# Patient Record
Sex: MALE | Race: WHITE | NOT HISPANIC OR LATINO | Employment: OTHER | URBAN - METROPOLITAN AREA
[De-identification: names, ages, dates, MRNs, and addresses within clinical notes are randomized per-mention and may not be internally consistent; named-entity substitution may affect disease eponyms.]

---

## 2017-06-01 ENCOUNTER — CONVERSION ENCOUNTER (OUTPATIENT)
Dept: SURGERY | Facility: CLINIC | Age: 77
End: 2017-06-01

## 2017-10-26 ENCOUNTER — HOSPITAL ENCOUNTER (OUTPATIENT)
Dept: FAMILY MEDICINE CLINIC | Facility: CLINIC | Age: 77
Setting detail: SPECIMEN
Discharge: HOME | End: 2017-10-26
Attending: FAMILY MEDICINE | Admitting: FAMILY MEDICINE

## 2017-10-26 ENCOUNTER — CONVERSION ENCOUNTER (OUTPATIENT)
Dept: SURGERY | Facility: CLINIC | Age: 77
End: 2017-10-26

## 2017-10-26 LAB
ALBUMIN SERPL-MCNC: 3.9 G/DL (ref 3.5–4.8)
ALBUMIN/GLOB SERPL: 1.2 {RATIO} (ref 1–1.7)
ALP SERPL-CCNC: 61 IU/L (ref 32–91)
ALT SERPL-CCNC: 18 IU/L (ref 17–63)
ANION GAP SERPL CALC-SCNC: 12 MMOL/L (ref 10–20)
AST SERPL-CCNC: 20 IU/L (ref 15–41)
BASOPHILS # BLD AUTO: 0.1 10*3/UL (ref 0–0.2)
BASOPHILS NFR BLD AUTO: 1 % (ref 0–2)
BILIRUB SERPL-MCNC: 0.5 MG/DL (ref 0.3–1.2)
BUN SERPL-MCNC: 16 MG/DL (ref 8–20)
BUN/CREAT SERPL: 14.5 (ref 6.2–20.3)
CALCIUM SERPL-MCNC: 9.3 MG/DL (ref 8.9–10.3)
CHLORIDE SERPL-SCNC: 104 MMOL/L (ref 101–111)
CHOLEST SERPL-MCNC: 242 MG/DL
CHOLEST/HDLC SERPL: 6.5 {RATIO}
CONV CO2: 28 MMOL/L (ref 22–32)
CONV LDL CHOLESTEROL DIRECT: 180 MG/DL (ref 0–100)
CONV TOTAL PROTEIN: 7.2 G/DL (ref 6.1–7.9)
CREAT UR-MCNC: 1.1 MG/DL (ref 0.7–1.2)
DIFFERENTIAL METHOD BLD: (no result)
EOSINOPHIL # BLD AUTO: 0.3 10*3/UL (ref 0–0.3)
EOSINOPHIL # BLD AUTO: 5 % (ref 0–3)
ERYTHROCYTE [DISTWIDTH] IN BLOOD BY AUTOMATED COUNT: 14.3 % (ref 11.5–14.5)
GLOBULIN UR ELPH-MCNC: 3.3 G/DL (ref 2.5–3.8)
GLUCOSE SERPL-MCNC: 97 MG/DL (ref 65–99)
HCT VFR BLD AUTO: 43.7 % (ref 40–54)
HDLC SERPL-MCNC: 37 MG/DL
HGB BLD-MCNC: 14.3 G/DL (ref 14–18)
LDLC/HDLC SERPL: 4.8 {RATIO}
LIPID INTERPRETATION: ABNORMAL
LYMPHOCYTES # BLD AUTO: 1.5 10*3/UL (ref 0.8–4.8)
LYMPHOCYTES NFR BLD AUTO: 26 % (ref 18–42)
MCH RBC QN AUTO: 29.9 PG (ref 26–32)
MCHC RBC AUTO-ENTMCNC: 32.8 G/DL (ref 32–36)
MCV RBC AUTO: 91.1 FL (ref 80–94)
MONOCYTES # BLD AUTO: 0.4 10*3/UL (ref 0.1–1.3)
MONOCYTES NFR BLD AUTO: 7 % (ref 2–11)
NEUTROPHILS # BLD AUTO: 3.4 10*3/UL (ref 2.3–8.6)
NEUTROPHILS NFR BLD AUTO: 61 % (ref 50–75)
NRBC BLD AUTO-RTO: 0 /100{WBCS}
NRBC/RBC NFR BLD MANUAL: 0 10*3/UL
PLATELET # BLD AUTO: 258 10*3/UL (ref 150–450)
PMV BLD AUTO: 9.1 FL (ref 7.4–10.4)
POTASSIUM SERPL-SCNC: 5 MMOL/L (ref 3.6–5.1)
RBC # BLD AUTO: 4.8 10*6/UL (ref 4.6–6)
SODIUM SERPL-SCNC: 139 MMOL/L (ref 136–144)
TRIGL SERPL-MCNC: 99 MG/DL
VLDLC SERPL CALC-MCNC: 24.2 MG/DL
WBC # BLD AUTO: 5.6 10*3/UL (ref 4.5–11.5)

## 2017-12-13 ENCOUNTER — ON CAMPUS - OUTPATIENT (AMBULATORY)
Dept: URBAN - METROPOLITAN AREA HOSPITAL 2 | Facility: HOSPITAL | Age: 77
End: 2017-12-13
Payer: MEDICARE

## 2017-12-13 ENCOUNTER — OFFICE (AMBULATORY)
Dept: URBAN - METROPOLITAN AREA CLINIC 64 | Facility: CLINIC | Age: 77
End: 2017-12-13
Payer: MEDICARE

## 2017-12-13 ENCOUNTER — HOSPITAL ENCOUNTER (OUTPATIENT)
Dept: OTHER | Facility: HOSPITAL | Age: 77
Setting detail: SPECIMEN
Discharge: HOME | End: 2017-12-13
Attending: INTERNAL MEDICINE | Admitting: INTERNAL MEDICINE

## 2017-12-13 VITALS
DIASTOLIC BLOOD PRESSURE: 68 MMHG | SYSTOLIC BLOOD PRESSURE: 123 MMHG | WEIGHT: 188 LBS | HEART RATE: 80 BPM | SYSTOLIC BLOOD PRESSURE: 101 MMHG | SYSTOLIC BLOOD PRESSURE: 156 MMHG | HEART RATE: 97 BPM | HEART RATE: 85 BPM | HEART RATE: 90 BPM | SYSTOLIC BLOOD PRESSURE: 119 MMHG | HEART RATE: 81 BPM | SYSTOLIC BLOOD PRESSURE: 124 MMHG | SYSTOLIC BLOOD PRESSURE: 110 MMHG | SYSTOLIC BLOOD PRESSURE: 109 MMHG | HEART RATE: 105 BPM | HEIGHT: 68 IN | DIASTOLIC BLOOD PRESSURE: 92 MMHG | SYSTOLIC BLOOD PRESSURE: 148 MMHG | OXYGEN SATURATION: 99 % | HEART RATE: 83 BPM | TEMPERATURE: 97.6 F | SYSTOLIC BLOOD PRESSURE: 100 MMHG | DIASTOLIC BLOOD PRESSURE: 75 MMHG | DIASTOLIC BLOOD PRESSURE: 63 MMHG | DIASTOLIC BLOOD PRESSURE: 62 MMHG | OXYGEN SATURATION: 98 % | DIASTOLIC BLOOD PRESSURE: 64 MMHG | DIASTOLIC BLOOD PRESSURE: 87 MMHG | RESPIRATION RATE: 18 BRPM | OXYGEN SATURATION: 94 % | RESPIRATION RATE: 16 BRPM

## 2017-12-13 DIAGNOSIS — K57.30 DIVERTICULOSIS OF LARGE INTESTINE WITHOUT PERFORATION OR ABS: ICD-10-CM

## 2017-12-13 DIAGNOSIS — K64.1 SECOND DEGREE HEMORRHOIDS: ICD-10-CM

## 2017-12-13 DIAGNOSIS — D12.5 BENIGN NEOPLASM OF SIGMOID COLON: ICD-10-CM

## 2017-12-13 DIAGNOSIS — D12.2 BENIGN NEOPLASM OF ASCENDING COLON: ICD-10-CM

## 2017-12-13 DIAGNOSIS — Z86.010 PERSONAL HISTORY OF COLONIC POLYPS: ICD-10-CM

## 2017-12-13 LAB
GI HISTOLOGY: A. UNSPECIFIED: (no result)
GI HISTOLOGY: B. UNSPECIFIED: (no result)
GI HISTOLOGY: PDF REPORT: (no result)

## 2017-12-13 PROCEDURE — 88305 TISSUE EXAM BY PATHOLOGIST: CPT | Mod: 26 | Performed by: INTERNAL MEDICINE

## 2017-12-13 PROCEDURE — 45385 COLONOSCOPY W/LESION REMOVAL: CPT | Mod: PT | Performed by: INTERNAL MEDICINE

## 2017-12-13 RX ADMIN — PROPOFOL: 10 INJECTION, EMULSION INTRAVENOUS at 11:22

## 2018-06-27 ENCOUNTER — CONVERSION ENCOUNTER (OUTPATIENT)
Dept: SURGERY | Facility: CLINIC | Age: 78
End: 2018-06-27

## 2018-08-23 ENCOUNTER — HOSPITAL ENCOUNTER (OUTPATIENT)
Dept: GENERAL RADIOLOGY | Facility: HOSPITAL | Age: 78
Discharge: HOME | End: 2018-08-23
Attending: INTERNAL MEDICINE | Admitting: INTERNAL MEDICINE

## 2018-09-21 ENCOUNTER — HOSPITAL ENCOUNTER (OUTPATIENT)
Dept: CT IMAGING | Facility: HOSPITAL | Age: 78
Discharge: HOME | End: 2018-09-21
Attending: INTERNAL MEDICINE | Admitting: INTERNAL MEDICINE

## 2018-12-18 ENCOUNTER — CONVERSION ENCOUNTER (OUTPATIENT)
Dept: SURGERY | Facility: CLINIC | Age: 78
End: 2018-12-18

## 2018-12-21 ENCOUNTER — HOSPITAL ENCOUNTER (OUTPATIENT)
Dept: FAMILY MEDICINE CLINIC | Facility: CLINIC | Age: 78
Setting detail: SPECIMEN
Discharge: HOME | End: 2018-12-21
Attending: FAMILY MEDICINE | Admitting: FAMILY MEDICINE

## 2018-12-21 LAB
ALBUMIN SERPL-MCNC: 3.9 G/DL (ref 3.5–4.8)
ALBUMIN/GLOB SERPL: 1.3 {RATIO} (ref 1–1.7)
ALP SERPL-CCNC: 61 IU/L (ref 32–91)
ALT SERPL-CCNC: 20 IU/L (ref 17–63)
ANION GAP SERPL CALC-SCNC: 11.1 MMOL/L (ref 10–20)
AST SERPL-CCNC: 22 IU/L (ref 15–41)
BASOPHILS # BLD AUTO: 0.1 10*3/UL (ref 0–0.2)
BASOPHILS NFR BLD AUTO: 1 % (ref 0–2)
BILIRUB SERPL-MCNC: 0.6 MG/DL (ref 0.3–1.2)
BUN SERPL-MCNC: 9 MG/DL (ref 8–20)
BUN/CREAT SERPL: 8.2 (ref 6.2–20.3)
CALCIUM SERPL-MCNC: 8.7 MG/DL (ref 8.9–10.3)
CHLORIDE SERPL-SCNC: 106 MMOL/L (ref 101–111)
CHOLEST SERPL-MCNC: 122 MG/DL
CHOLEST/HDLC SERPL: 3 {RATIO}
CONV CO2: 26 MMOL/L (ref 22–32)
CONV LDL CHOLESTEROL DIRECT: 76 MG/DL (ref 0–100)
CONV TOTAL PROTEIN: 7 G/DL (ref 6.1–7.9)
CREAT UR-MCNC: 1.1 MG/DL (ref 0.7–1.2)
DIFFERENTIAL METHOD BLD: (no result)
EOSINOPHIL # BLD AUTO: 0.3 10*3/UL (ref 0–0.3)
EOSINOPHIL # BLD AUTO: 4 % (ref 0–3)
ERYTHROCYTE [DISTWIDTH] IN BLOOD BY AUTOMATED COUNT: 14.1 % (ref 11.5–14.5)
GLOBULIN UR ELPH-MCNC: 3.1 G/DL (ref 2.5–3.8)
GLUCOSE SERPL-MCNC: 115 MG/DL (ref 65–99)
HBA1C MFR BLD: 5.6 % (ref 0–5.6)
HCT VFR BLD AUTO: 41.5 % (ref 40–54)
HDLC SERPL-MCNC: 41 MG/DL
HGB BLD-MCNC: 14 G/DL (ref 14–18)
LDLC/HDLC SERPL: 1.9 {RATIO}
LIPID INTERPRETATION: NORMAL
LYMPHOCYTES # BLD AUTO: 1.4 10*3/UL (ref 0.8–4.8)
LYMPHOCYTES NFR BLD AUTO: 23 % (ref 18–42)
MCH RBC QN AUTO: 31 PG (ref 26–32)
MCHC RBC AUTO-ENTMCNC: 33.7 G/DL (ref 32–36)
MCV RBC AUTO: 92.1 FL (ref 80–94)
MONOCYTES # BLD AUTO: 0.5 10*3/UL (ref 0.1–1.3)
MONOCYTES NFR BLD AUTO: 8 % (ref 2–11)
NEUTROPHILS # BLD AUTO: 4 10*3/UL (ref 2.3–8.6)
NEUTROPHILS NFR BLD AUTO: 64 % (ref 50–75)
NRBC BLD AUTO-RTO: 0 /100{WBCS}
NRBC/RBC NFR BLD MANUAL: 0 10*3/UL
PLATELET # BLD AUTO: 215 10*3/UL (ref 150–450)
PMV BLD AUTO: 9.3 FL (ref 7.4–10.4)
POTASSIUM SERPL-SCNC: 4.1 MMOL/L (ref 3.6–5.1)
RBC # BLD AUTO: 4.51 10*6/UL (ref 4.6–6)
SODIUM SERPL-SCNC: 139 MMOL/L (ref 136–144)
TRIGL SERPL-MCNC: 79 MG/DL
VLDLC SERPL CALC-MCNC: 4.8 MG/DL
WBC # BLD AUTO: 6.3 10*3/UL (ref 4.5–11.5)

## 2019-01-03 ENCOUNTER — HOSPITAL ENCOUNTER (OUTPATIENT)
Dept: LAB | Facility: HOSPITAL | Age: 79
Discharge: HOME | End: 2019-01-03
Attending: INTERNAL MEDICINE | Admitting: INTERNAL MEDICINE

## 2019-01-03 LAB — CREAT UR-MCNC: 1 MG/DL (ref 0.7–1.2)

## 2019-01-11 ENCOUNTER — HOSPITAL ENCOUNTER (OUTPATIENT)
Dept: CT IMAGING | Facility: HOSPITAL | Age: 79
Discharge: HOME | End: 2019-01-11
Attending: INTERNAL MEDICINE | Admitting: INTERNAL MEDICINE

## 2019-03-06 ENCOUNTER — CONVERSION ENCOUNTER (OUTPATIENT)
Dept: SURGERY | Facility: CLINIC | Age: 79
End: 2019-03-06

## 2019-05-29 ENCOUNTER — CONVERSION ENCOUNTER (OUTPATIENT)
Dept: FAMILY MEDICINE CLINIC | Facility: CLINIC | Age: 79
End: 2019-05-29

## 2019-06-04 VITALS
BODY MASS INDEX: 29.69 KG/M2 | HEART RATE: 81 BPM | HEIGHT: 68 IN | WEIGHT: 195.25 LBS | OXYGEN SATURATION: 96 % | OXYGEN SATURATION: 98 % | DIASTOLIC BLOOD PRESSURE: 70 MMHG | SYSTOLIC BLOOD PRESSURE: 144 MMHG | BODY MASS INDEX: 28.82 KG/M2 | SYSTOLIC BLOOD PRESSURE: 129 MMHG | OXYGEN SATURATION: 97 % | DIASTOLIC BLOOD PRESSURE: 72 MMHG | SYSTOLIC BLOOD PRESSURE: 140 MMHG | HEART RATE: 82 BPM | SYSTOLIC BLOOD PRESSURE: 118 MMHG | WEIGHT: 198 LBS | HEIGHT: 68 IN | OXYGEN SATURATION: 96 % | WEIGHT: 196 LBS | SYSTOLIC BLOOD PRESSURE: 133 MMHG | HEART RATE: 82 BPM | DIASTOLIC BLOOD PRESSURE: 77 MMHG | BODY MASS INDEX: 29.7 KG/M2 | DIASTOLIC BLOOD PRESSURE: 73 MMHG | WEIGHT: 190.2 LBS | WEIGHT: 201.25 LBS | HEART RATE: 81 BPM | OXYGEN SATURATION: 97 % | DIASTOLIC BLOOD PRESSURE: 58 MMHG | BODY MASS INDEX: 30.01 KG/M2 | HEIGHT: 68 IN | HEART RATE: 80 BPM

## 2019-06-04 VITALS
WEIGHT: 185.4 LBS | SYSTOLIC BLOOD PRESSURE: 138 MMHG | OXYGEN SATURATION: 96 % | DIASTOLIC BLOOD PRESSURE: 83 MMHG | HEART RATE: 76 BPM | HEIGHT: 68 IN | BODY MASS INDEX: 28.1 KG/M2

## 2019-06-06 NOTE — PROGRESS NOTES
Visit Type:  Follow-up Visit  Referring Provider:  Radha Adler MD  Primary Provider:  Nayely RONDON MD    CC:  hospital follow up.    History of Present Illness:  hospital follow up on sbo and delerium  home bp was elevated this afternoon  appt w/ vascular surgeon      Past Medical History:     Reviewed history from 12/18/2018 and no changes required:        Coronary Artery Disease, s/p stent        Hyperlipidemia        Hypertension        Myocardial Infarction        AAA        lung cancer [id number]- chemo, surgery, radiation to brain        BPH- urology- dr cherry        COPD                        had pna and shingles         colonoscopy 2014- scheduled for dec 17, polyps    Past Surgical History:     Reviewed history from 10/13/2016 and no changes required:        Heart Catherization  2006        P T C A: with stent  1999        AAA repair 1998        Social History:     Reviewed history from 03/06/2019 and no changes required:        Patient is a former smoker.        Passive Smoke: N        Alcohol Use: N        Drug Use: N        HIV/High Risk: N        Regular Exercise: N        Hx Domestic Abuse: N        Lutheran Affecting Care: N              Active Medications (reviewed today):  CARTIA  MG XR24H-CAP (DILTIAZEM HCL COATED BEADS) TAKE 1 CAPSULE DAILY  ROSUVASTATIN TABS 40MG (ROSUVASTATIN CALCIUM) TAKE 1 TABLET DAILY  CALTRATE 600 TABLET (CALCIUM CARBONATE TABS) p.o. qd  TAMSULOSIN HCL 0.4 MG ORAL CAPSULE (TAMSULOSIN HCL) p.o.  qhs  CENTRUM SILVER ORAL TABLET (MULTIPLE VITAMINS-MINERALS) Take 1 tablet by mouth daily  FINASTERIDE 5 MG ORAL TABLET (FINASTERIDE) Take 1 tablet by mouth daily    Current Allergies:  No known allergies    Current Medications (including medications started today):   CARTIA  MG XR24H-CAP (DILTIAZEM HCL COATED BEADS) TAKE 1 CAPSULE DAILY  ROSUVASTATIN TABS 40MG (ROSUVASTATIN CALCIUM) TAKE 1 TABLET DAILY  CALTRATE 600 TABLET (CALCIUM CARBONATE TABS) p.o.  qd  TAMSULOSIN HCL 0.4 MG ORAL CAPSULE (TAMSULOSIN HCL) p.o.  qhs  CENTRUM SILVER ORAL TABLET (MULTIPLE VITAMINS-MINERALS) Take 1 tablet by mouth daily  FINASTERIDE 5 MG ORAL TABLET (FINASTERIDE) Take 1 tablet by mouth daily      Risk Factors:     Smoked Tobacco Use:  Former smoker  Passive smoke exposure:  no  Drug use:  no  HIV high-risk behavior:  no  Caffeine use:  4 drinks per day  Alcohol use:  no  Exercise:  no  Seatbelt use:  100 %  Sun Exposure:  occasionally        Vital Signs:    Patient Profile:    78 Years Old Male  Height:     68 inches  Weight:     185.4 pounds  BMI:        28.19     O2 Sat:     96 %  Pulse rate: 76 / minute  BP Sittin / 83  (left arm)    Cuff size:  regular    Medications: Medications were reviewed with the patient during this visit.  No Known Allergy.        Vitals Entered By: Inez Correa CMA (May 29, 2019 3:38 PM)      Physical Exam    Abdomen:       normal bowel sounds; no hepatosplenomegaly no ventral,umbilical hernias or masses noted.  non-tender.    Additional Exam:        General:      well developed, well nourished, in no acute distress.    Head:      normocephalic and atraumatic.    Lungs:      clear bilaterally to auscultation.    Heart:      regular rhythm.    Psych:      alert and cooperative; normal mood and affect; normal attention span and concentration.        Blood Pressure:  Today's BP: 138/83 mm Hg    Labwork:   Most Recent Lab Results:   LDL: 76 mg/dL 2018  HbA1c: : 5.6 % 2018        Impression & Recommendations:    Problem # 1:  Small bowel obstruction (ICD-560.9) (XQR44-G11.609)  resolved      Patient Instructions:  1)  small bowel obstruction  2)  follow up 6 months                        Medication Administration    Orders Added:  1)  12700-Mdr Vst-Est Level III [CPT-35455]  ]      Electronically signed by Radha Adler MD on 2019 at 4:13 PM  ________________________________________________________________________        Disclaimer: Converted Note message may not contain all data elements that existed in the legacy source system. Please see [facility] for the original note details.

## 2019-07-12 RX ORDER — DILTIAZEM HYDROCHLORIDE 240 MG/1
1 CAPSULE, COATED, EXTENDED RELEASE ORAL EVERY 24 HOURS
COMMUNITY
Start: 2017-12-19 | End: 2019-07-24 | Stop reason: SDUPTHER

## 2019-07-12 RX ORDER — TAMSULOSIN HYDROCHLORIDE 0.4 MG/1
CAPSULE ORAL
COMMUNITY
Start: 2015-02-09 | End: 2021-01-13

## 2019-07-12 RX ORDER — ROSUVASTATIN CALCIUM 40 MG/1
TABLET, COATED ORAL EVERY 24 HOURS
COMMUNITY
Start: 2018-05-01 | End: 2019-08-21

## 2019-07-12 RX ORDER — MULTIVIT WITH MINERALS/LUTEIN
TABLET ORAL
COMMUNITY
Start: 2017-06-01 | End: 2021-01-13

## 2019-07-12 RX ORDER — FINASTERIDE 5 MG/1
TABLET, FILM COATED ORAL
COMMUNITY
Start: 2016-10-13 | End: 2021-01-13

## 2019-07-25 RX ORDER — DILTIAZEM HYDROCHLORIDE 240 MG/1
CAPSULE, EXTENDED RELEASE ORAL
Qty: 90 CAPSULE | Refills: 0 | Status: SHIPPED | OUTPATIENT
Start: 2019-07-25 | End: 2019-10-18 | Stop reason: SDUPTHER

## 2019-08-06 PROBLEM — I21.3 ST ELEVATION (STEMI) MYOCARDIAL INFARCTION (HCC): Status: ACTIVE | Noted: 2019-08-06

## 2019-08-06 PROBLEM — I25.10 CORONARY ARTERY DISEASE: Status: ACTIVE | Noted: 2019-08-06

## 2019-08-06 PROBLEM — J44.9 CHRONIC OBSTRUCTIVE PULMONARY DISEASE (HCC): Status: ACTIVE | Noted: 2017-06-01

## 2019-08-06 PROBLEM — I10 HYPERTENSION: Status: ACTIVE | Noted: 2019-08-06

## 2019-08-06 PROBLEM — E78.5 HYPERLIPIDEMIA: Status: ACTIVE | Noted: 2019-08-06

## 2019-08-07 ENCOUNTER — OFFICE VISIT (OUTPATIENT)
Dept: CARDIOLOGY | Facility: CLINIC | Age: 79
End: 2019-08-07

## 2019-08-07 VITALS
BODY MASS INDEX: 30.48 KG/M2 | HEIGHT: 64 IN | WEIGHT: 178.5 LBS | OXYGEN SATURATION: 99 % | DIASTOLIC BLOOD PRESSURE: 73 MMHG | HEART RATE: 73 BPM | SYSTOLIC BLOOD PRESSURE: 126 MMHG

## 2019-08-07 DIAGNOSIS — I10 ESSENTIAL HYPERTENSION: ICD-10-CM

## 2019-08-07 DIAGNOSIS — I25.118 CORONARY ARTERY DISEASE OF NATIVE ARTERY OF NATIVE HEART WITH STABLE ANGINA PECTORIS (HCC): Primary | ICD-10-CM

## 2019-08-07 DIAGNOSIS — E78.00 PURE HYPERCHOLESTEROLEMIA: ICD-10-CM

## 2019-08-07 PROCEDURE — 99213 OFFICE O/P EST LOW 20 MIN: CPT | Performed by: INTERNAL MEDICINE

## 2019-08-07 RX ORDER — DILTIAZEM HYDROCHLORIDE 240 MG/1
240 CAPSULE, EXTENDED RELEASE ORAL DAILY
COMMUNITY
End: 2021-01-13

## 2019-08-07 RX ORDER — ASPIRIN 81 MG/1
81 TABLET ORAL DAILY
COMMUNITY
End: 2020-01-09

## 2019-08-07 NOTE — PROGRESS NOTES
"    Subjective:     Encounter Date:08/07/2019      Patient ID: Lenny Larson is a 78 y.o. male.    Chief Complaint:  History of Present Illness 28-year-old white male with history of coronary artery disease hypertension hyperlipidemia COPD lung cancer and abdominal aortic aneurysm presents to my office for follow-up.  Pain is currently stable without any symptoms of chest pain but has some shortness of breath with exertion.  No complaints of any PND orthopnea.  No palpitations dizziness syncope or swelling of the feet.  He is taking his medicines regularly.  He does not smoke.    The following portions of the patient's history were reviewed and updated as appropriate: allergies, current medications, past family history, past medical history, past social history, past surgical history and problem list.  Past Medical History:   Diagnosis Date   • COPD (chronic obstructive pulmonary disease) (CMS/HCC)    • Coronary artery disease    • Hyperlipidemia    • Hypertension      Past Surgical History:   Procedure Laterality Date   • CARDIAC CATHETERIZATION      PCI     /73   Pulse 73   Ht 162.6 cm (64\")   Wt 81 kg (178 lb 8 oz)   SpO2 99%   BMI 30.64 kg/m²   History reviewed. No pertinent family history.    Current Outpatient Medications:   •  aspirin 81 MG EC tablet, Take 81 mg by mouth Daily., Disp: , Rfl:   •  Calcium Carbonate (CALTRATE 600 PO), CALTRATE 600 TABS, Disp: , Rfl:   •  CARTIA  MG 24 hr capsule, TAKE 1 CAPSULE DAILY, Disp: 90 capsule, Rfl: 0  •  diltiaZEM (TIAZAC) 240 MG 24 hr capsule, Take 240 mg by mouth Daily., Disp: , Rfl:   •  finasteride (PROSCAR) 5 MG tablet, FINASTERIDE 5 MG TABS, Disp: , Rfl:   •  Ipratropium-Albuterol (COMBIVENT IN), Inhale 2 puffs., Disp: , Rfl:   •  Multiple Vitamins-Minerals (CENTRUM SILVER) tablet, CENTRUM SILVER TABS, Disp: , Rfl:   •  rosuvastatin (CRESTOR) 40 MG tablet, Daily., Disp: , Rfl:   •  tamsulosin (FLOMAX) 0.4 MG capsule 24 hr capsule, TAMSULOSIN HCL " 0.4 MG CAPS, Disp: , Rfl:   Allergies   Allergen Reactions   • Ativan [Lorazepam] Mental Status Change     Social History     Socioeconomic History   • Marital status:      Spouse name: Not on file   • Number of children: Not on file   • Years of education: Not on file   • Highest education level: Not on file   Tobacco Use   • Smoking status: Never Smoker     Review of Systems   Constitution: Positive for malaise/fatigue. Negative for fever.   Cardiovascular: Negative for chest pain, dyspnea on exertion and palpitations.   Respiratory: Positive for shortness of breath. Negative for cough.    Skin: Negative for rash.   Gastrointestinal: Negative for abdominal pain, nausea and vomiting.   Neurological: Negative for focal weakness, headaches, light-headedness and numbness.   All other systems reviewed and are negative.             Objective:     Physical Exam   Constitutional: He appears well-developed and well-nourished.   HENT:   Head: Normocephalic and atraumatic.   Eyes: Conjunctivae are normal. No scleral icterus.   Neck: Normal range of motion. Neck supple. No JVD present. Carotid bruit is not present.   Cardiovascular: Normal rate, regular rhythm, S1 normal, S2 normal, normal heart sounds and intact distal pulses. PMI is not displaced.   Pulmonary/Chest: Effort normal and breath sounds normal. He has no wheezes. He has no rales.   Abdominal: Soft. Bowel sounds are normal.   Neurological: He is alert. He has normal strength.   Skin: Skin is warm and dry. No rash noted.     Procedures    Lab Review:       Assessment:          Diagnosis Plan   1. 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)     2. Essential hypertension     3. Pure hypercholesterolemia            Plan:       Patient had stent placement to the LAD in the past and is currently stable on medications.  Patient has normal LV function.  Patient blood pressure and heart are stable per  Patient's  lipid levels are followed by primary care doctor.

## 2019-08-12 ENCOUNTER — TELEPHONE (OUTPATIENT)
Dept: FAMILY MEDICINE CLINIC | Facility: CLINIC | Age: 79
End: 2019-08-12

## 2019-08-12 DIAGNOSIS — Z13.6 SCREENING FOR HEART DISEASE: Primary | ICD-10-CM

## 2019-08-14 ENCOUNTER — LAB (OUTPATIENT)
Dept: FAMILY MEDICINE CLINIC | Facility: CLINIC | Age: 79
End: 2019-08-14

## 2019-08-14 DIAGNOSIS — Z13.6 SCREENING FOR HEART DISEASE: ICD-10-CM

## 2019-08-14 LAB
ARTICHOKE IGE QN: 221 MG/DL (ref 0–100)
CHOLEST SERPL-MCNC: 263 MG/DL
HDLC SERPL QL: 7.31
HDLC SERPL-MCNC: 36 MG/DL
LDLC/HDLC SERPL: 5.84 {RATIO}
TRIGL SERPL-MCNC: 84 MG/DL
VLDLC SERPL-MCNC: 16.8 MG/DL

## 2019-08-14 PROCEDURE — 36415 COLL VENOUS BLD VENIPUNCTURE: CPT

## 2019-08-14 PROCEDURE — 80061 LIPID PANEL: CPT | Performed by: FAMILY MEDICINE

## 2019-08-15 ENCOUNTER — TELEPHONE (OUTPATIENT)
Dept: FAMILY MEDICINE CLINIC | Facility: CLINIC | Age: 79
End: 2019-08-15

## 2019-08-15 NOTE — TELEPHONE ENCOUNTER
Cholesterol is extremely elevated--putting him at risk for heart attack and stroke, appears patient was on rosuvastatin or Crestor in the past----is there a reason he is no longer on this?

## 2019-08-21 RX ORDER — ROSUVASTATIN CALCIUM 40 MG/1
40 TABLET, COATED ORAL DAILY
Qty: 90 TABLET | Refills: 3 | Status: SHIPPED | OUTPATIENT
Start: 2019-08-21 | End: 2020-09-01 | Stop reason: SDUPTHER

## 2019-08-21 NOTE — TELEPHONE ENCOUNTER
Pt just stopped taking it because he just wanted to. [place] asked that it get restarted and [facility]

## 2019-08-22 ENCOUNTER — TELEPHONE (OUTPATIENT)
Dept: FAMILY MEDICINE CLINIC | Facility: CLINIC | Age: 79
End: 2019-08-22

## 2019-08-28 ENCOUNTER — TELEPHONE (OUTPATIENT)
Dept: FAMILY MEDICINE CLINIC | Facility: CLINIC | Age: 79
End: 2019-08-28

## 2019-10-18 RX ORDER — DILTIAZEM HYDROCHLORIDE 240 MG/1
CAPSULE, EXTENDED RELEASE ORAL
Qty: 90 CAPSULE | Refills: 2 | Status: SHIPPED | OUTPATIENT
Start: 2019-10-18 | End: 2020-08-03

## 2020-01-09 ENCOUNTER — OFFICE VISIT (OUTPATIENT)
Dept: FAMILY MEDICINE CLINIC | Facility: CLINIC | Age: 80
End: 2020-01-09

## 2020-01-09 VITALS
BODY MASS INDEX: 31.92 KG/M2 | OXYGEN SATURATION: 98 % | DIASTOLIC BLOOD PRESSURE: 69 MMHG | HEART RATE: 85 BPM | TEMPERATURE: 98 F | SYSTOLIC BLOOD PRESSURE: 125 MMHG | WEIGHT: 187 LBS | HEIGHT: 64 IN

## 2020-01-09 DIAGNOSIS — J44.9 CHRONIC OBSTRUCTIVE PULMONARY DISEASE, UNSPECIFIED COPD TYPE (HCC): ICD-10-CM

## 2020-01-09 DIAGNOSIS — Z23 INFLUENZA VACCINE NEEDED: ICD-10-CM

## 2020-01-09 DIAGNOSIS — I10 ESSENTIAL HYPERTENSION: Primary | ICD-10-CM

## 2020-01-09 DIAGNOSIS — E78.5 HYPERLIPIDEMIA, UNSPECIFIED HYPERLIPIDEMIA TYPE: ICD-10-CM

## 2020-01-09 LAB
ALBUMIN SERPL-MCNC: 4.2 G/DL (ref 3.5–5.2)
ALBUMIN/GLOB SERPL: 1.4 G/DL
ALP SERPL-CCNC: 62 U/L (ref 39–117)
ALT SERPL W P-5'-P-CCNC: 10 U/L (ref 1–41)
ANION GAP SERPL CALCULATED.3IONS-SCNC: 8.7 MMOL/L (ref 5–15)
AST SERPL-CCNC: 15 U/L (ref 1–40)
BASOPHILS # BLD AUTO: 0.08 10*3/MM3 (ref 0–0.2)
BASOPHILS NFR BLD AUTO: 1.3 % (ref 0–1.5)
BILIRUB SERPL-MCNC: 0.3 MG/DL (ref 0.2–1.2)
BUN BLD-MCNC: 12 MG/DL (ref 8–23)
BUN/CREAT SERPL: 11.8 (ref 7–25)
CALCIUM SPEC-SCNC: 9.4 MG/DL (ref 8.6–10.5)
CHLORIDE SERPL-SCNC: 101 MMOL/L (ref 98–107)
CHOLEST SERPL-MCNC: 136 MG/DL (ref 0–200)
CO2 SERPL-SCNC: 29.3 MMOL/L (ref 22–29)
CREAT BLD-MCNC: 1.02 MG/DL (ref 0.76–1.27)
DEPRECATED RDW RBC AUTO: 40.7 FL (ref 37–54)
EOSINOPHIL # BLD AUTO: 0.32 10*3/MM3 (ref 0–0.4)
EOSINOPHIL NFR BLD AUTO: 5.2 % (ref 0.3–6.2)
ERYTHROCYTE [DISTWIDTH] IN BLOOD BY AUTOMATED COUNT: 12.5 % (ref 12.3–15.4)
GFR SERPL CREATININE-BSD FRML MDRD: 71 ML/MIN/1.73
GLOBULIN UR ELPH-MCNC: 3 GM/DL
GLUCOSE BLD-MCNC: 99 MG/DL (ref 65–99)
HCT VFR BLD AUTO: 40.3 % (ref 37.5–51)
HDLC SERPL-MCNC: 38 MG/DL (ref 40–60)
HGB BLD-MCNC: 13 G/DL (ref 13–17.7)
IMM GRANULOCYTES # BLD AUTO: 0.02 10*3/MM3 (ref 0–0.05)
IMM GRANULOCYTES NFR BLD AUTO: 0.3 % (ref 0–0.5)
LDLC SERPL CALC-MCNC: 76 MG/DL (ref 0–100)
LDLC/HDLC SERPL: 2 {RATIO}
LYMPHOCYTES # BLD AUTO: 1.67 10*3/MM3 (ref 0.7–3.1)
LYMPHOCYTES NFR BLD AUTO: 27.2 % (ref 19.6–45.3)
MCH RBC QN AUTO: 29.2 PG (ref 26.6–33)
MCHC RBC AUTO-ENTMCNC: 32.3 G/DL (ref 31.5–35.7)
MCV RBC AUTO: 90.6 FL (ref 79–97)
MONOCYTES # BLD AUTO: 0.48 10*3/MM3 (ref 0.1–0.9)
MONOCYTES NFR BLD AUTO: 7.8 % (ref 5–12)
NEUTROPHILS # BLD AUTO: 3.56 10*3/MM3 (ref 1.7–7)
NEUTROPHILS NFR BLD AUTO: 58.2 % (ref 42.7–76)
NRBC BLD AUTO-RTO: 0 /100 WBC (ref 0–0.2)
PLATELET # BLD AUTO: 279 10*3/MM3 (ref 140–450)
PMV BLD AUTO: 11.2 FL (ref 6–12)
POTASSIUM BLD-SCNC: 4.9 MMOL/L (ref 3.5–5.2)
PROT SERPL-MCNC: 7.2 G/DL (ref 6–8.5)
RBC # BLD AUTO: 4.45 10*6/MM3 (ref 4.14–5.8)
SODIUM BLD-SCNC: 139 MMOL/L (ref 136–145)
TRIGL SERPL-MCNC: 110 MG/DL (ref 0–150)
VLDLC SERPL-MCNC: 22 MG/DL (ref 5–40)
WBC NRBC COR # BLD: 6.13 10*3/MM3 (ref 3.4–10.8)

## 2020-01-09 PROCEDURE — 99213 OFFICE O/P EST LOW 20 MIN: CPT | Performed by: FAMILY MEDICINE

## 2020-01-09 PROCEDURE — 36415 COLL VENOUS BLD VENIPUNCTURE: CPT | Performed by: FAMILY MEDICINE

## 2020-01-09 PROCEDURE — 90674 CCIIV4 VAC NO PRSV 0.5 ML IM: CPT | Performed by: FAMILY MEDICINE

## 2020-01-09 PROCEDURE — 80053 COMPREHEN METABOLIC PANEL: CPT | Performed by: FAMILY MEDICINE

## 2020-01-09 PROCEDURE — 85025 COMPLETE CBC W/AUTO DIFF WBC: CPT | Performed by: FAMILY MEDICINE

## 2020-01-09 PROCEDURE — 80061 LIPID PANEL: CPT | Performed by: FAMILY MEDICINE

## 2020-01-09 PROCEDURE — G0008 ADMIN INFLUENZA VIRUS VAC: HCPCS | Performed by: FAMILY MEDICINE

## 2020-01-09 NOTE — PROGRESS NOTES
"Subjective   Lenny Larson is a 79 y.o. male.     He is in today to see me for the first time.  He is not new to our office as he used to see our former provider, Dr Adler.  He has a history of hypertension and high cholesterol and BPH.  He tries to be compliant with his medication but does occasionally miss some doses.  He denies any issues with headache or chest pain.  He denies any issues with bowel or bladder function.  He is due for labs and he also wants to get a flu vaccination today.         /69 (BP Location: Left arm, Patient Position: Sitting, Cuff Size: Large Adult)   Pulse 85   Temp 98 °F (36.7 °C) (Oral)   Ht 162.6 cm (64\")   Wt 84.8 kg (187 lb)   SpO2 98%   BMI 32.10 kg/m²       Chief Complaint   Patient presents with   • Annual Exam     establish care           Current Outpatient Medications:   •  aspirin 81 MG EC tablet, Take 81 mg by mouth Daily., Disp: , Rfl:   •  Calcium Carbonate (CALTRATE 600 PO), CALTRATE 600 TABS, Disp: , Rfl:   •  CARTIA  MG 24 hr capsule, TAKE 1 CAPSULE DAILY, Disp: 90 capsule, Rfl: 2  •  diltiaZEM (TIAZAC) 240 MG 24 hr capsule, Take 240 mg by mouth Daily., Disp: , Rfl:   •  finasteride (PROSCAR) 5 MG tablet, FINASTERIDE 5 MG TABS, Disp: , Rfl:   •  Ipratropium-Albuterol (COMBIVENT IN), Inhale 2 puffs., Disp: , Rfl:   •  Multiple Vitamins-Minerals (CENTRUM SILVER) tablet, CENTRUM SILVER TABS, Disp: , Rfl:   •  rosuvastatin (CRESTOR) 40 MG tablet, Take 1 tablet by mouth Daily., Disp: 90 tablet, Rfl: 3  •  tamsulosin (FLOMAX) 0.4 MG capsule 24 hr capsule, TAMSULOSIN HCL 0.4 MG CAPS, Disp: , Rfl:         The following portions of the patient's history were reviewed and updated as appropriate: allergies, current medications, past family history, past medical history, past social history, past surgical history and problem list.    Review of Systems   Constitutional: Negative for activity change, fatigue and fever.   HENT: Negative for congestion, sinus " pressure, sinus pain, sore throat and trouble swallowing.    Eyes: Negative for visual disturbance.   Respiratory: Negative for chest tightness, shortness of breath and wheezing.    Cardiovascular: Negative for chest pain.   Gastrointestinal: Negative for abdominal distention, abdominal pain, constipation, diarrhea, nausea and vomiting.   Genitourinary: Negative for difficulty urinating and dysuria.   Musculoskeletal: Negative for back pain and neck pain.   Neurological: Negative for dizziness, weakness and numbness.   Psychiatric/Behavioral: Negative for agitation, hallucinations and suicidal ideas.       Objective   Physical Exam   Constitutional: He is oriented to person, place, and time. No distress.   Neck: Normal range of motion. Neck supple. No thyromegaly present.   Cardiovascular: Normal rate, regular rhythm and normal heart sounds.   Pulmonary/Chest: Effort normal and breath sounds normal. He has no wheezes.   Abdominal: Soft. Bowel sounds are normal.   Lymphadenopathy:     He has no cervical adenopathy.   Neurological: He is alert and oriented to person, place, and time.   Nursing note and vitals reviewed.        Assessment/Plan   Problems Addressed this Visit        Cardiovascular and Mediastinum    Hyperlipidemia    Hypertension - Primary    Relevant Orders    Comprehensive metabolic panel    Lipid panel       Respiratory    Chronic obstructive pulmonary disease (CMS/HCC)    Relevant Orders    CBC w AUTO Differential    CBC Auto Differential      Other Visit Diagnoses     Influenza vaccine needed        Relevant Orders    Flucelvax Quad=>4Years (PFS) (Completed)        His chronic issues seem to be stable at present  I updated flu vaccine today and I will get labs to see if any adjustments are needed  He is to call for new concerns and see me again in 6 months

## 2020-01-20 ENCOUNTER — TRANSCRIBE ORDERS (OUTPATIENT)
Dept: ADMINISTRATIVE | Facility: HOSPITAL | Age: 80
End: 2020-01-20

## 2020-01-20 DIAGNOSIS — R06.00 DYSPNEA, UNSPECIFIED TYPE: Primary | ICD-10-CM

## 2020-01-30 ENCOUNTER — HOSPITAL ENCOUNTER (OUTPATIENT)
Dept: RESPIRATORY THERAPY | Facility: HOSPITAL | Age: 80
Discharge: HOME | End: 2020-01-30
Admitting: INTERNAL MEDICINE

## 2020-01-30 DIAGNOSIS — R06.00 DYSPNEA, UNSPECIFIED TYPE: ICD-10-CM

## 2020-01-30 PROCEDURE — 94729 DIFFUSING CAPACITY: CPT

## 2020-01-30 PROCEDURE — 94726 PLETHYSMOGRAPHY LUNG VOLUMES: CPT

## 2020-01-30 PROCEDURE — 94060 EVALUATION OF WHEEZING: CPT

## 2020-01-30 PROCEDURE — 94618 PULMONARY STRESS TESTING: CPT

## 2020-01-30 RX ORDER — ALBUTEROL SULFATE 2.5 MG/3ML
2.5 SOLUTION RESPIRATORY (INHALATION) ONCE
Status: COMPLETED | OUTPATIENT
Start: 2020-01-30 | End: 2020-01-30

## 2020-01-30 RX ADMIN — ALBUTEROL SULFATE 2.5 MG: 2.5 SOLUTION RESPIRATORY (INHALATION) at 12:10

## 2020-01-30 NOTE — NURSING NOTE
Exercise Oximetry    Patient Name:Lenny Larson   MRN: [id number]   Date: 01/30/20             ROOM AIR BASELINE   SpO2% 97   Heart Rate 88   Blood Pressure      EXERCISE ON ROOM AIR SpO2% EXERCISE ON O2 @  LPM SpO2%   1 MINUTE 97 1 MINUTE    2 MINUTES 96 2 MINUTES    3 MINUTES 96 3 MINUTES    4 MINUTES 96 4 MINUTES    5 MINUTES 95 5 MINUTES    6 MINUTES 96 6 MINUTES               Distance Walked   Distance Walked   Dyspnea (Mar Scale)   Dyspnea (Mar Scale)   Fatigue (Mar Scale)   Fatigue (Mar Scale)   SpO2% Post Exercise  97 SpO2% Post Exercise   HR Post Exercise  84 HR Post Exercise   Time to Recovery  IMMEDIATELY Time to Recovery     Comments: PATIENT WALKED ON ROOM AIR, O2 SATS REMAINED 95% OR BETTER THRU-OUT THE WALK

## 2020-07-09 ENCOUNTER — LAB (OUTPATIENT)
Dept: FAMILY MEDICINE CLINIC | Facility: CLINIC | Age: 80
End: 2020-07-09

## 2020-07-09 ENCOUNTER — OFFICE VISIT (OUTPATIENT)
Dept: FAMILY MEDICINE CLINIC | Facility: CLINIC | Age: 80
End: 2020-07-09

## 2020-07-09 VITALS
OXYGEN SATURATION: 96 % | SYSTOLIC BLOOD PRESSURE: 148 MMHG | WEIGHT: 186 LBS | HEART RATE: 99 BPM | DIASTOLIC BLOOD PRESSURE: 93 MMHG | TEMPERATURE: 97.5 F | BODY MASS INDEX: 31.93 KG/M2

## 2020-07-09 DIAGNOSIS — I10 ESSENTIAL HYPERTENSION: Primary | ICD-10-CM

## 2020-07-09 DIAGNOSIS — E78.5 HYPERLIPIDEMIA, UNSPECIFIED HYPERLIPIDEMIA TYPE: ICD-10-CM

## 2020-07-09 DIAGNOSIS — J44.9 CHRONIC OBSTRUCTIVE PULMONARY DISEASE, UNSPECIFIED COPD TYPE (HCC): ICD-10-CM

## 2020-07-09 DIAGNOSIS — Z12.5 ENCOUNTER FOR SCREENING FOR MALIGNANT NEOPLASM OF PROSTATE: ICD-10-CM

## 2020-07-09 LAB
ALBUMIN SERPL-MCNC: 4.1 G/DL (ref 3.5–5.2)
ALBUMIN/GLOB SERPL: 1.3 G/DL
ALP SERPL-CCNC: 60 U/L (ref 39–117)
ALT SERPL W P-5'-P-CCNC: 16 U/L (ref 1–41)
ANION GAP SERPL CALCULATED.3IONS-SCNC: 9.4 MMOL/L (ref 5–15)
AST SERPL-CCNC: 17 U/L (ref 1–40)
BASOPHILS # BLD AUTO: 0.06 10*3/MM3 (ref 0–0.2)
BASOPHILS NFR BLD AUTO: 1 % (ref 0–1.5)
BILIRUB SERPL-MCNC: 0.4 MG/DL (ref 0–1.2)
BUN SERPL-MCNC: 14 MG/DL (ref 8–23)
BUN/CREAT SERPL: 12.3 (ref 7–25)
CALCIUM SPEC-SCNC: 9.4 MG/DL (ref 8.6–10.5)
CHLORIDE SERPL-SCNC: 106 MMOL/L (ref 98–107)
CHOLEST SERPL-MCNC: 121 MG/DL (ref 0–200)
CO2 SERPL-SCNC: 26.6 MMOL/L (ref 22–29)
CREAT SERPL-MCNC: 1.14 MG/DL (ref 0.76–1.27)
DEPRECATED RDW RBC AUTO: 43.2 FL (ref 37–54)
EOSINOPHIL # BLD AUTO: 0.27 10*3/MM3 (ref 0–0.4)
EOSINOPHIL NFR BLD AUTO: 4.3 % (ref 0.3–6.2)
ERYTHROCYTE [DISTWIDTH] IN BLOOD BY AUTOMATED COUNT: 13.1 % (ref 12.3–15.4)
GFR SERPL CREATININE-BSD FRML MDRD: 62 ML/MIN/1.73
GLOBULIN UR ELPH-MCNC: 3.2 GM/DL
GLUCOSE SERPL-MCNC: 103 MG/DL (ref 65–99)
HCT VFR BLD AUTO: 40.9 % (ref 37.5–51)
HDLC SERPL-MCNC: 36 MG/DL (ref 40–60)
HGB BLD-MCNC: 13.4 G/DL (ref 13–17.7)
IMM GRANULOCYTES # BLD AUTO: 0.02 10*3/MM3 (ref 0–0.05)
IMM GRANULOCYTES NFR BLD AUTO: 0.3 % (ref 0–0.5)
LDLC SERPL CALC-MCNC: 70 MG/DL (ref 0–100)
LDLC/HDLC SERPL: 1.94 {RATIO}
LYMPHOCYTES # BLD AUTO: 2 10*3/MM3 (ref 0.7–3.1)
LYMPHOCYTES NFR BLD AUTO: 32.2 % (ref 19.6–45.3)
MCH RBC QN AUTO: 29.4 PG (ref 26.6–33)
MCHC RBC AUTO-ENTMCNC: 32.8 G/DL (ref 31.5–35.7)
MCV RBC AUTO: 89.7 FL (ref 79–97)
MONOCYTES # BLD AUTO: 0.54 10*3/MM3 (ref 0.1–0.9)
MONOCYTES NFR BLD AUTO: 8.7 % (ref 5–12)
NEUTROPHILS NFR BLD AUTO: 3.32 10*3/MM3 (ref 1.7–7)
NEUTROPHILS NFR BLD AUTO: 53.5 % (ref 42.7–76)
NRBC BLD AUTO-RTO: 0 /100 WBC (ref 0–0.2)
PLATELET # BLD AUTO: 277 10*3/MM3 (ref 140–450)
PMV BLD AUTO: 11.5 FL (ref 6–12)
POTASSIUM SERPL-SCNC: 4.7 MMOL/L (ref 3.5–5.2)
PROT SERPL-MCNC: 7.3 G/DL (ref 6–8.5)
PSA SERPL-MCNC: 2.27 NG/ML (ref 0–4)
RBC # BLD AUTO: 4.56 10*6/MM3 (ref 4.14–5.8)
SODIUM SERPL-SCNC: 142 MMOL/L (ref 136–145)
TRIGL SERPL-MCNC: 75 MG/DL (ref 0–150)
VLDLC SERPL-MCNC: 15 MG/DL (ref 5–40)
WBC # BLD AUTO: 6.21 10*3/MM3 (ref 3.4–10.8)

## 2020-07-09 PROCEDURE — 80061 LIPID PANEL: CPT | Performed by: FAMILY MEDICINE

## 2020-07-09 PROCEDURE — 80053 COMPREHEN METABOLIC PANEL: CPT | Performed by: FAMILY MEDICINE

## 2020-07-09 PROCEDURE — 99214 OFFICE O/P EST MOD 30 MIN: CPT | Performed by: FAMILY MEDICINE

## 2020-07-09 PROCEDURE — 36415 COLL VENOUS BLD VENIPUNCTURE: CPT | Performed by: FAMILY MEDICINE

## 2020-07-09 PROCEDURE — 85025 COMPLETE CBC W/AUTO DIFF WBC: CPT | Performed by: FAMILY MEDICINE

## 2020-07-09 PROCEDURE — G0103 PSA SCREENING: HCPCS | Performed by: FAMILY MEDICINE

## 2020-07-09 NOTE — PROGRESS NOTES
Subjective   Lenny Larson is a 79 y.o. male.     He is in today for follow-up on his chronic issues with high blood pressure and high cholesterol.  He also has COPD and sees pulmonary for that primarily.  He is not currently using an inhaler, stating he is tried many different inhalers without success.  Currently, when he gets short of breath, he sips on about half a can of Pepsi and feels better.  He denies any chest pain.  He denies any dizziness or lightheadedness.  He denies any issue with sleep or mood.  He denies any fever or illness symptoms.  He denies any issue with vision or hearing.  He denies any issue with bowel or bladder function.  He has been compliant with his medication.         /93 (BP Location: Left arm, Patient Position: Sitting, Cuff Size: Adult)   Pulse 99   Temp 97.5 °F (36.4 °C) (Temporal)   Wt 84.4 kg (186 lb)   SpO2 96%   BMI 31.93 kg/m²       Chief Complaint   Patient presents with   • Hypertension     6 month f/u           Current Outpatient Medications:   •  Calcium Carbonate (CALTRATE 600 PO), CALTRATE 600 TABS, Disp: , Rfl:   •  CARTIA  MG 24 hr capsule, TAKE 1 CAPSULE DAILY, Disp: 90 capsule, Rfl: 2  •  diltiaZEM (TIAZAC) 240 MG 24 hr capsule, Take 240 mg by mouth Daily., Disp: , Rfl:   •  finasteride (PROSCAR) 5 MG tablet, FINASTERIDE 5 MG TABS, Disp: , Rfl:   •  Multiple Vitamins-Minerals (CENTRUM SILVER) tablet, CENTRUM SILVER TABS, Disp: , Rfl:   •  rosuvastatin (CRESTOR) 40 MG tablet, Take 1 tablet by mouth Daily., Disp: 90 tablet, Rfl: 3  •  tamsulosin (FLOMAX) 0.4 MG capsule 24 hr capsule, TAMSULOSIN HCL 0.4 MG CAPS, Disp: , Rfl:         The following portions of the patient's history were reviewed and updated as appropriate: allergies, current medications, past family history, past medical history, past social history, past surgical history and problem list.    Review of Systems   Constitutional: Negative for activity change, fatigue and fever.   HENT:  Negative for congestion, sinus pressure, sinus pain, sore throat and trouble swallowing.    Eyes: Negative for visual disturbance.   Respiratory: Negative for chest tightness, shortness of breath and wheezing.    Cardiovascular: Negative for chest pain.   Gastrointestinal: Negative for abdominal distention, abdominal pain, constipation, diarrhea, nausea and vomiting.   Genitourinary: Negative for difficulty urinating, dysuria, frequency and urgency.   Musculoskeletal: Negative for back pain and neck pain.   Neurological: Negative for dizziness, weakness, light-headedness and numbness.   Psychiatric/Behavioral: Negative for agitation, hallucinations and suicidal ideas.       Objective   Physical Exam   Constitutional: He is oriented to person, place, and time. No distress.   Neck: No thyromegaly present.   Cardiovascular: Normal rate, regular rhythm and normal heart sounds.   No murmur heard.  Pulmonary/Chest: Effort normal.   Abdominal: Soft. Bowel sounds are normal. There is no guarding.   Lymphadenopathy:     He has no cervical adenopathy.   Neurological: He is alert and oriented to person, place, and time. He displays normal reflexes.   Nursing note and vitals reviewed.        Assessment/Plan   Problems Addressed this Visit        Cardiovascular and Mediastinum    Hyperlipidemia    Hypertension - Primary    Relevant Orders    Comprehensive metabolic panel    Lipid panel       Respiratory    Chronic obstructive pulmonary disease (CMS/HCC)    Relevant Orders    CBC w AUTO Differential      Other Visit Diagnoses     Encounter for screening for malignant neoplasm of prostate        Relevant Orders    PSA SCREENING        At this time I am going to continue his current treatment plan and follow-up on his upcoming visit with cardiology  I will update labs today and adjust treatment plan if indicated  I have asked him to monitor his diet and call me for any new concerns  I will see him back in 6 months, sooner if  needed

## 2020-08-03 RX ORDER — THEOPHYLLINE 300 MG/1
300 TABLET, EXTENDED RELEASE ORAL DAILY
COMMUNITY
Start: 2020-06-25 | End: 2021-01-13

## 2020-08-03 RX ORDER — DILTIAZEM HYDROCHLORIDE 240 MG/1
CAPSULE, COATED, EXTENDED RELEASE ORAL
Qty: 90 CAPSULE | Refills: 3 | Status: SHIPPED | OUTPATIENT
Start: 2020-08-03 | End: 2021-01-01 | Stop reason: SDUPTHER

## 2020-09-01 RX ORDER — ROSUVASTATIN CALCIUM 40 MG/1
40 TABLET, COATED ORAL DAILY
Qty: 90 TABLET | Refills: 3 | Status: SHIPPED | OUTPATIENT
Start: 2020-09-01 | End: 2021-01-01 | Stop reason: SDUPTHER

## 2020-10-07 ENCOUNTER — FLU SHOT (OUTPATIENT)
Dept: FAMILY MEDICINE CLINIC | Facility: CLINIC | Age: 80
End: 2020-10-07

## 2020-10-07 DIAGNOSIS — Z23 IMMUNIZATION DUE: Primary | ICD-10-CM

## 2020-10-07 PROCEDURE — 90694 VACC AIIV4 NO PRSRV 0.5ML IM: CPT | Performed by: FAMILY MEDICINE

## 2020-10-07 PROCEDURE — G0008 ADMIN INFLUENZA VIRUS VAC: HCPCS | Performed by: FAMILY MEDICINE

## 2020-11-06 ENCOUNTER — OFFICE (AMBULATORY)
Dept: URBAN - METROPOLITAN AREA PATHOLOGY 4 | Facility: PATHOLOGY | Age: 80
End: 2020-11-06
Payer: MEDICARE

## 2020-11-06 ENCOUNTER — ON CAMPUS - OUTPATIENT (AMBULATORY)
Dept: URBAN - METROPOLITAN AREA HOSPITAL 2 | Facility: HOSPITAL | Age: 80
End: 2020-11-06
Payer: MEDICARE

## 2020-11-06 ENCOUNTER — OFFICE (AMBULATORY)
Dept: URBAN - METROPOLITAN AREA PATHOLOGY 4 | Facility: PATHOLOGY | Age: 80
End: 2020-11-06
Payer: COMMERCIAL

## 2020-11-06 VITALS
OXYGEN SATURATION: 100 % | RESPIRATION RATE: 17 BRPM | HEART RATE: 96 BPM | DIASTOLIC BLOOD PRESSURE: 63 MMHG | SYSTOLIC BLOOD PRESSURE: 99 MMHG | HEART RATE: 102 BPM | OXYGEN SATURATION: 94 % | DIASTOLIC BLOOD PRESSURE: 69 MMHG | OXYGEN SATURATION: 96 % | SYSTOLIC BLOOD PRESSURE: 107 MMHG | DIASTOLIC BLOOD PRESSURE: 50 MMHG | HEART RATE: 56 BPM | SYSTOLIC BLOOD PRESSURE: 127 MMHG | TEMPERATURE: 97 F | SYSTOLIC BLOOD PRESSURE: 124 MMHG | DIASTOLIC BLOOD PRESSURE: 67 MMHG | DIASTOLIC BLOOD PRESSURE: 62 MMHG | HEART RATE: 76 BPM | RESPIRATION RATE: 16 BRPM | OXYGEN SATURATION: 99 % | DIASTOLIC BLOOD PRESSURE: 112 MMHG | HEART RATE: 61 BPM | SYSTOLIC BLOOD PRESSURE: 104 MMHG | DIASTOLIC BLOOD PRESSURE: 74 MMHG | WEIGHT: 167 LBS | SYSTOLIC BLOOD PRESSURE: 112 MMHG | HEART RATE: 77 BPM | SYSTOLIC BLOOD PRESSURE: 138 MMHG | RESPIRATION RATE: 19 BRPM | DIASTOLIC BLOOD PRESSURE: 60 MMHG | SYSTOLIC BLOOD PRESSURE: 123 MMHG | DIASTOLIC BLOOD PRESSURE: 100 MMHG | HEART RATE: 104 BPM | DIASTOLIC BLOOD PRESSURE: 71 MMHG | OXYGEN SATURATION: 93 % | SYSTOLIC BLOOD PRESSURE: 106 MMHG | HEIGHT: 68 IN | OXYGEN SATURATION: 97 % | DIASTOLIC BLOOD PRESSURE: 58 MMHG | HEART RATE: 106 BPM | SYSTOLIC BLOOD PRESSURE: 115 MMHG | RESPIRATION RATE: 18 BRPM | RESPIRATION RATE: 14 BRPM | SYSTOLIC BLOOD PRESSURE: 113 MMHG | HEART RATE: 92 BPM | HEART RATE: 99 BPM

## 2020-11-06 DIAGNOSIS — K57.30 DIVERTICULOSIS OF LARGE INTESTINE WITHOUT PERFORATION OR ABS: ICD-10-CM

## 2020-11-06 DIAGNOSIS — K22.8 OTHER SPECIFIED DISEASES OF ESOPHAGUS: ICD-10-CM

## 2020-11-06 DIAGNOSIS — Z86.010 PERSONAL HISTORY OF COLONIC POLYPS: ICD-10-CM

## 2020-11-06 DIAGNOSIS — D12.3 BENIGN NEOPLASM OF TRANSVERSE COLON: ICD-10-CM

## 2020-11-06 DIAGNOSIS — K21.9 GASTRO-ESOPHAGEAL REFLUX DISEASE WITHOUT ESOPHAGITIS: ICD-10-CM

## 2020-11-06 DIAGNOSIS — B37.81 CANDIDAL ESOPHAGITIS: ICD-10-CM

## 2020-11-06 DIAGNOSIS — K64.1 SECOND DEGREE HEMORRHOIDS: ICD-10-CM

## 2020-11-06 DIAGNOSIS — R13.10 DYSPHAGIA, UNSPECIFIED: ICD-10-CM

## 2020-11-06 PROBLEM — K63.5 POLYP OF COLON: Status: ACTIVE | Noted: 2020-11-06

## 2020-11-06 LAB
GI HISTOLOGY: A. UNSPECIFIED: (no result)
GI HISTOLOGY: B. UNSPECIFIED: (no result)
GI HISTOLOGY: C. UNSPECIFIED: (no result)
GI HISTOLOGY: PDF REPORT: (no result)

## 2020-11-06 PROCEDURE — 45380 COLONOSCOPY AND BIOPSY: CPT | Mod: 59,PT | Performed by: INTERNAL MEDICINE

## 2020-11-06 PROCEDURE — 88305 TISSUE EXAM BY PATHOLOGIST: CPT | Mod: 26 | Performed by: INTERNAL MEDICINE

## 2020-11-06 PROCEDURE — 43450 DILATE ESOPHAGUS 1/MULT PASS: CPT | Performed by: INTERNAL MEDICINE

## 2020-11-06 PROCEDURE — 43239 EGD BIOPSY SINGLE/MULTIPLE: CPT | Mod: 59 | Performed by: INTERNAL MEDICINE

## 2020-11-06 PROCEDURE — 45385 COLONOSCOPY W/LESION REMOVAL: CPT | Mod: PT | Performed by: INTERNAL MEDICINE

## 2021-01-01 ENCOUNTER — TELEPHONE (OUTPATIENT)
Dept: SOCIAL WORK | Facility: HOSPITAL | Age: 81
End: 2021-01-01

## 2021-01-01 ENCOUNTER — HOSPITAL ENCOUNTER (OUTPATIENT)
Dept: MRI IMAGING | Facility: HOSPITAL | Age: 81
Discharge: HOME | End: 2021-10-16
Admitting: FAMILY MEDICINE

## 2021-01-01 ENCOUNTER — HOME HEALTH ADMISSION (OUTPATIENT)
Dept: HOME HEALTH SERVICES | Facility: HOME HEALTHCARE | Age: 81
End: 2021-01-01

## 2021-01-01 ENCOUNTER — OFFICE VISIT (OUTPATIENT)
Dept: WOUND CARE | Facility: HOSPITAL | Age: 81
End: 2021-01-01

## 2021-01-01 ENCOUNTER — HOME CARE VISIT (OUTPATIENT)
Dept: HOME HEALTH SERVICES | Facility: HOME HEALTHCARE | Age: 81
End: 2021-01-01

## 2021-01-01 ENCOUNTER — APPOINTMENT (OUTPATIENT)
Dept: GENERAL RADIOLOGY | Facility: HOSPITAL | Age: 81
End: 2021-01-01

## 2021-01-01 ENCOUNTER — APPOINTMENT (OUTPATIENT)
Dept: CT IMAGING | Facility: HOSPITAL | Age: 81
End: 2021-01-01

## 2021-01-01 ENCOUNTER — TRANSITIONAL CARE MANAGEMENT TELEPHONE ENCOUNTER (OUTPATIENT)
Dept: CALL CENTER | Facility: HOSPITAL | Age: 81
End: 2021-01-01

## 2021-01-01 ENCOUNTER — TELEPHONE (OUTPATIENT)
Dept: CARDIAC REHAB | Facility: HOSPITAL | Age: 81
End: 2021-01-01

## 2021-01-01 ENCOUNTER — APPOINTMENT (OUTPATIENT)
Dept: WOUND CARE | Facility: HOSPITAL | Age: 81
End: 2021-01-01

## 2021-01-01 ENCOUNTER — READMISSION MANAGEMENT (OUTPATIENT)
Dept: CALL CENTER | Facility: HOSPITAL | Age: 81
End: 2021-01-01

## 2021-01-01 ENCOUNTER — ANESTHESIA (OUTPATIENT)
Dept: GASTROENTEROLOGY | Facility: HOSPITAL | Age: 81
End: 2021-01-01

## 2021-01-01 ENCOUNTER — CLINICAL SUPPORT (OUTPATIENT)
Dept: FAMILY MEDICINE CLINIC | Facility: CLINIC | Age: 81
End: 2021-01-01

## 2021-01-01 ENCOUNTER — OFFICE VISIT (OUTPATIENT)
Dept: CARDIOLOGY | Facility: CLINIC | Age: 81
End: 2021-01-01

## 2021-01-01 ENCOUNTER — TELEPHONE (OUTPATIENT)
Dept: CARDIOLOGY | Facility: CLINIC | Age: 81
End: 2021-01-01

## 2021-01-01 ENCOUNTER — APPOINTMENT (OUTPATIENT)
Dept: ULTRASOUND IMAGING | Facility: HOSPITAL | Age: 81
End: 2021-01-01

## 2021-01-01 ENCOUNTER — HOSPITAL ENCOUNTER (OUTPATIENT)
Dept: CARDIOLOGY | Facility: HOSPITAL | Age: 81
Discharge: HOME | End: 2021-09-16

## 2021-01-01 ENCOUNTER — HOSPITAL ENCOUNTER (OUTPATIENT)
Dept: NEUROLOGY | Facility: HOSPITAL | Age: 81
Discharge: HOME | End: 2021-11-29
Admitting: PSYCHIATRY & NEUROLOGY

## 2021-01-01 ENCOUNTER — ANESTHESIA EVENT (OUTPATIENT)
Dept: GASTROENTEROLOGY | Facility: HOSPITAL | Age: 81
End: 2021-01-01

## 2021-01-01 ENCOUNTER — OFFICE VISIT (OUTPATIENT)
Dept: FAMILY MEDICINE CLINIC | Facility: CLINIC | Age: 81
End: 2021-01-01

## 2021-01-01 ENCOUNTER — LAB (OUTPATIENT)
Dept: FAMILY MEDICINE CLINIC | Facility: CLINIC | Age: 81
End: 2021-01-01

## 2021-01-01 ENCOUNTER — OFFICE VISIT (OUTPATIENT)
Dept: NEUROLOGY | Facility: CLINIC | Age: 81
End: 2021-01-01

## 2021-01-01 ENCOUNTER — HOSPITAL ENCOUNTER (OUTPATIENT)
Dept: GENERAL RADIOLOGY | Facility: HOSPITAL | Age: 81
Discharge: HOME | End: 2021-10-08
Admitting: PHYSICIAN ASSISTANT

## 2021-01-01 ENCOUNTER — TELEPHONE (OUTPATIENT)
Dept: FAMILY MEDICINE CLINIC | Facility: CLINIC | Age: 81
End: 2021-01-01

## 2021-01-01 ENCOUNTER — HOSPITAL ENCOUNTER (OUTPATIENT)
Dept: CT IMAGING | Facility: HOSPITAL | Age: 81
Discharge: HOME | End: 2021-07-12
Admitting: PHYSICIAN ASSISTANT

## 2021-01-01 ENCOUNTER — HOSPITAL ENCOUNTER (OUTPATIENT)
Dept: ULTRASOUND IMAGING | Facility: HOSPITAL | Age: 81
Discharge: HOME | End: 2021-07-26

## 2021-01-01 ENCOUNTER — HOSPITAL ENCOUNTER (INPATIENT)
Facility: HOSPITAL | Age: 81
LOS: 11 days | Discharge: HOME HEALTH | End: 2021-09-29
Attending: STUDENT IN AN ORGANIZED HEALTH CARE EDUCATION/TRAINING PROGRAM | Admitting: INTERNAL MEDICINE

## 2021-01-01 ENCOUNTER — HOSPITAL ENCOUNTER (INPATIENT)
Facility: HOSPITAL | Age: 81
LOS: 3 days | Discharge: HOME | End: 2021-07-24
Attending: HOSPITALIST | Admitting: HOSPITALIST

## 2021-01-01 VITALS
DIASTOLIC BLOOD PRESSURE: 78 MMHG | HEART RATE: 57 BPM | TEMPERATURE: 100 F | SYSTOLIC BLOOD PRESSURE: 122 MMHG | OXYGEN SATURATION: 97 %

## 2021-01-01 VITALS
TEMPERATURE: 97.9 F | OXYGEN SATURATION: 98 % | DIASTOLIC BLOOD PRESSURE: 58 MMHG | SYSTOLIC BLOOD PRESSURE: 122 MMHG | RESPIRATION RATE: 18 BRPM | HEART RATE: 52 BPM

## 2021-01-01 VITALS
WEIGHT: 153.88 LBS | RESPIRATION RATE: 16 BRPM | BODY MASS INDEX: 23.32 KG/M2 | TEMPERATURE: 97.4 F | OXYGEN SATURATION: 95 % | HEART RATE: 86 BPM | HEIGHT: 68 IN | SYSTOLIC BLOOD PRESSURE: 111 MMHG | DIASTOLIC BLOOD PRESSURE: 54 MMHG

## 2021-01-01 VITALS
BODY MASS INDEX: 23.64 KG/M2 | DIASTOLIC BLOOD PRESSURE: 114 MMHG | WEIGHT: 156 LBS | HEIGHT: 68 IN | SYSTOLIC BLOOD PRESSURE: 156 MMHG

## 2021-01-01 VITALS
HEART RATE: 82 BPM | DIASTOLIC BLOOD PRESSURE: 68 MMHG | RESPIRATION RATE: 19 BRPM | OXYGEN SATURATION: 97 % | TEMPERATURE: 98.2 F | SYSTOLIC BLOOD PRESSURE: 118 MMHG

## 2021-01-01 VITALS
DIASTOLIC BLOOD PRESSURE: 70 MMHG | BODY MASS INDEX: 26.07 KG/M2 | WEIGHT: 172 LBS | OXYGEN SATURATION: 95 % | HEIGHT: 68 IN | SYSTOLIC BLOOD PRESSURE: 111 MMHG | HEART RATE: 108 BPM

## 2021-01-01 VITALS
BODY MASS INDEX: 23.76 KG/M2 | WEIGHT: 156.75 LBS | HEIGHT: 68 IN | OXYGEN SATURATION: 96 % | DIASTOLIC BLOOD PRESSURE: 56 MMHG | SYSTOLIC BLOOD PRESSURE: 102 MMHG | HEART RATE: 108 BPM

## 2021-01-01 VITALS
WEIGHT: 142 LBS | DIASTOLIC BLOOD PRESSURE: 46 MMHG | TEMPERATURE: 97.1 F | HEART RATE: 65 BPM | HEIGHT: 68 IN | BODY MASS INDEX: 21.52 KG/M2 | SYSTOLIC BLOOD PRESSURE: 115 MMHG

## 2021-01-01 VITALS
TEMPERATURE: 97.4 F | RESPIRATION RATE: 15 BRPM | DIASTOLIC BLOOD PRESSURE: 49 MMHG | HEIGHT: 68 IN | OXYGEN SATURATION: 100 % | BODY MASS INDEX: 21.52 KG/M2 | WEIGHT: 141.98 LBS | SYSTOLIC BLOOD PRESSURE: 95 MMHG | HEART RATE: 98 BPM

## 2021-01-01 VITALS
SYSTOLIC BLOOD PRESSURE: 109 MMHG | WEIGHT: 158 LBS | BODY MASS INDEX: 22.67 KG/M2 | OXYGEN SATURATION: 98 % | HEART RATE: 86 BPM | TEMPERATURE: 98.2 F | DIASTOLIC BLOOD PRESSURE: 54 MMHG

## 2021-01-01 VITALS
SYSTOLIC BLOOD PRESSURE: 85 MMHG | HEART RATE: 56 BPM | DIASTOLIC BLOOD PRESSURE: 37 MMHG | WEIGHT: 138 LBS | RESPIRATION RATE: 16 BRPM | TEMPERATURE: 97.1 F | BODY MASS INDEX: 20.92 KG/M2 | HEIGHT: 68 IN | OXYGEN SATURATION: 96 %

## 2021-01-01 VITALS
RESPIRATION RATE: 18 BRPM | HEART RATE: 61 BPM | TEMPERATURE: 100.4 F | OXYGEN SATURATION: 96 % | DIASTOLIC BLOOD PRESSURE: 76 MMHG | SYSTOLIC BLOOD PRESSURE: 122 MMHG

## 2021-01-01 VITALS
DIASTOLIC BLOOD PRESSURE: 58 MMHG | RESPIRATION RATE: 18 BRPM | TEMPERATURE: 98.8 F | HEART RATE: 52 BPM | SYSTOLIC BLOOD PRESSURE: 116 MMHG | OXYGEN SATURATION: 97 %

## 2021-01-01 VITALS
HEART RATE: 71 BPM | TEMPERATURE: 97.5 F | SYSTOLIC BLOOD PRESSURE: 108 MMHG | DIASTOLIC BLOOD PRESSURE: 70 MMHG | OXYGEN SATURATION: 95 %

## 2021-01-01 VITALS
DIASTOLIC BLOOD PRESSURE: 62 MMHG | RESPIRATION RATE: 18 BRPM | OXYGEN SATURATION: 97 % | SYSTOLIC BLOOD PRESSURE: 128 MMHG | TEMPERATURE: 98 F | HEART RATE: 65 BPM

## 2021-01-01 VITALS
DIASTOLIC BLOOD PRESSURE: 49 MMHG | HEART RATE: 47 BPM | WEIGHT: 146.5 LBS | SYSTOLIC BLOOD PRESSURE: 110 MMHG | OXYGEN SATURATION: 100 % | BODY MASS INDEX: 22.2 KG/M2 | HEIGHT: 68 IN

## 2021-01-01 VITALS
TEMPERATURE: 98.1 F | OXYGEN SATURATION: 97 % | SYSTOLIC BLOOD PRESSURE: 110 MMHG | DIASTOLIC BLOOD PRESSURE: 68 MMHG | HEART RATE: 104 BPM

## 2021-01-01 DIAGNOSIS — E78.00 PURE HYPERCHOLESTEROLEMIA: ICD-10-CM

## 2021-01-01 DIAGNOSIS — L89.151 PRESSURE INJURY OF SACRAL REGION, STAGE 1: ICD-10-CM

## 2021-01-01 DIAGNOSIS — J43.9 PULMONARY EMPHYSEMA, UNSPECIFIED EMPHYSEMA TYPE (HCC): ICD-10-CM

## 2021-01-01 DIAGNOSIS — R41.3 MEMORY LOSS: Primary | ICD-10-CM

## 2021-01-01 DIAGNOSIS — M53.3 COCCYX PAIN: Primary | ICD-10-CM

## 2021-01-01 DIAGNOSIS — I50.22 CHRONIC SYSTOLIC CONGESTIVE HEART FAILURE (HCC): ICD-10-CM

## 2021-01-01 DIAGNOSIS — I25.118 CORONARY ARTERY DISEASE OF NATIVE ARTERY OF NATIVE HEART WITH STABLE ANGINA PECTORIS (HCC): Primary | ICD-10-CM

## 2021-01-01 DIAGNOSIS — I10 ESSENTIAL HYPERTENSION: ICD-10-CM

## 2021-01-01 DIAGNOSIS — R06.02 SHORTNESS OF BREATH: Primary | ICD-10-CM

## 2021-01-01 DIAGNOSIS — Z87.09 H/O PLEURAL EFFUSION: ICD-10-CM

## 2021-01-01 DIAGNOSIS — R06.02 SHORTNESS OF BREATH: ICD-10-CM

## 2021-01-01 DIAGNOSIS — R26.9 GAIT ABNORMALITY: ICD-10-CM

## 2021-01-01 DIAGNOSIS — R41.3 MEMORY DEFICIT: Primary | ICD-10-CM

## 2021-01-01 DIAGNOSIS — I95.9 HYPOTENSION, UNSPECIFIED HYPOTENSION TYPE: ICD-10-CM

## 2021-01-01 DIAGNOSIS — R94.5 LIVER FUNCTION ABNORMALITY: ICD-10-CM

## 2021-01-01 DIAGNOSIS — R53.83 FATIGUE, UNSPECIFIED TYPE: ICD-10-CM

## 2021-01-01 DIAGNOSIS — R41.3 MEMORY LOSS: ICD-10-CM

## 2021-01-01 DIAGNOSIS — R94.5 LIVER FUNCTION ABNORMALITY: Primary | ICD-10-CM

## 2021-01-01 DIAGNOSIS — Z85.118 HISTORY OF LUNG CANCER: ICD-10-CM

## 2021-01-01 DIAGNOSIS — Z85.118 HISTORY OF LUNG CANCER: Primary | ICD-10-CM

## 2021-01-01 DIAGNOSIS — N28.9 ABNORMAL RENAL FUNCTION: ICD-10-CM

## 2021-01-01 DIAGNOSIS — M53.3 COCCYX PAIN: ICD-10-CM

## 2021-01-01 DIAGNOSIS — E78.5 HYPERLIPIDEMIA, UNSPECIFIED HYPERLIPIDEMIA TYPE: ICD-10-CM

## 2021-01-01 DIAGNOSIS — R30.0 DYSURIA: Primary | ICD-10-CM

## 2021-01-01 DIAGNOSIS — I48.19 PERSISTENT ATRIAL FIBRILLATION (HCC): ICD-10-CM

## 2021-01-01 DIAGNOSIS — B25.0 CYTOMEGALOVIRUS PNEUMONIA (HCC): Primary | ICD-10-CM

## 2021-01-01 DIAGNOSIS — I25.118 CORONARY ARTERY DISEASE OF NATIVE ARTERY OF NATIVE HEART WITH STABLE ANGINA PECTORIS (HCC): ICD-10-CM

## 2021-01-01 DIAGNOSIS — C34.11 MALIGNANT NEOPLASM OF UPPER LOBE OF RIGHT LUNG (HCC): Primary | ICD-10-CM

## 2021-01-01 DIAGNOSIS — I50.23 ACUTE ON CHRONIC SYSTOLIC HEART FAILURE (HCC): Primary | ICD-10-CM

## 2021-01-01 DIAGNOSIS — J43.1 PANLOBULAR EMPHYSEMA (HCC): Chronic | ICD-10-CM

## 2021-01-01 DIAGNOSIS — I50.23 ACUTE ON CHRONIC SYSTOLIC CONGESTIVE HEART FAILURE (HCC): ICD-10-CM

## 2021-01-01 DIAGNOSIS — G47.00 INSOMNIA, UNSPECIFIED TYPE: ICD-10-CM

## 2021-01-01 LAB
A-TOCOPHEROL VIT E SERPL-MCNC: 10.8 MG/L (ref 9–29)
ALBUMIN SERPL-MCNC: 2.5 G/DL (ref 3.5–5.2)
ALBUMIN SERPL-MCNC: 2.7 G/DL (ref 3.5–5.2)
ALBUMIN SERPL-MCNC: 2.7 G/DL (ref 3.5–5.2)
ALBUMIN SERPL-MCNC: 2.8 G/DL (ref 3.5–5.2)
ALBUMIN SERPL-MCNC: 2.9 G/DL (ref 3.5–5.2)
ALBUMIN SERPL-MCNC: 3.3 G/DL (ref 3.5–5.2)
ALBUMIN SERPL-MCNC: 3.8 G/DL (ref 3.5–5.2)
ALBUMIN UR-MCNC: 12.4 MG/DL
ALBUMIN/GLOB SERPL: 0.9 G/DL
ALBUMIN/GLOB SERPL: 1 G/DL
ALBUMIN/GLOB SERPL: 1.4 G/DL
ALP SERPL-CCNC: 66 U/L (ref 39–117)
ALP SERPL-CCNC: 70 U/L (ref 39–117)
ALP SERPL-CCNC: 71 U/L (ref 39–117)
ALT SERPL W P-5'-P-CCNC: 76 U/L (ref 1–41)
ALT SERPL W P-5'-P-CCNC: 89 U/L (ref 1–41)
ALT SERPL W P-5'-P-CCNC: 95 U/L (ref 1–41)
AMMONIA BLD-SCNC: 13 UMOL/L (ref 16–60)
AMMONIA BLD-SCNC: 33 UMOL/L (ref 16–60)
ANION GAP SERPL CALCULATED.3IONS-SCNC: 10 MMOL/L (ref 5–15)
ANION GAP SERPL CALCULATED.3IONS-SCNC: 11 MMOL/L (ref 5–15)
ANION GAP SERPL CALCULATED.3IONS-SCNC: 11 MMOL/L (ref 5–15)
ANION GAP SERPL CALCULATED.3IONS-SCNC: 12 MMOL/L (ref 5–15)
ANION GAP SERPL CALCULATED.3IONS-SCNC: 7 MMOL/L (ref 5–15)
ANION GAP SERPL CALCULATED.3IONS-SCNC: 8 MMOL/L (ref 5–15)
ANION GAP SERPL CALCULATED.3IONS-SCNC: 9 MMOL/L (ref 5–15)
APPEARANCE FLD: CLEAR
APTT PPP: 28.3 SECONDS (ref 24–31)
AST SERPL-CCNC: 60 U/L (ref 1–40)
AST SERPL-CCNC: 68 U/L (ref 1–40)
AST SERPL-CCNC: 70 U/L (ref 1–40)
B PARAPERT DNA SPEC QL NAA+PROBE: NOT DETECTED
B PARAPERT DNA SPEC QL NAA+PROBE: NOT DETECTED
B PERT DNA SPEC QL NAA+PROBE: NOT DETECTED
B PERT DNA SPEC QL NAA+PROBE: NOT DETECTED
BACTERIA FLD CULT: NORMAL
BACTERIA SPEC AEROBE CULT: NO GROWTH
BACTERIA SPEC AEROBE CULT: NORMAL
BACTERIA SPEC RESP CULT: NORMAL
BACTERIA UR QL AUTO: ABNORMAL /HPF
BASOPHILS # BLD AUTO: 0 10*3/MM3 (ref 0–0.2)
BASOPHILS # BLD AUTO: 0.04 10*3/MM3 (ref 0–0.2)
BASOPHILS # BLD AUTO: 0.1 10*3/MM3 (ref 0–0.2)
BASOPHILS NFR BLD AUTO: 0.1 % (ref 0–1.5)
BASOPHILS NFR BLD AUTO: 0.1 % (ref 0–1.5)
BASOPHILS NFR BLD AUTO: 0.2 % (ref 0–1.5)
BASOPHILS NFR BLD AUTO: 0.3 % (ref 0–1.5)
BASOPHILS NFR BLD AUTO: 0.3 % (ref 0–1.5)
BASOPHILS NFR BLD AUTO: 0.6 % (ref 0–1.5)
BASOPHILS NFR BLD AUTO: 0.7 % (ref 0–1.5)
BASOPHILS NFR BLD AUTO: 0.7 % (ref 0–1.5)
BASOPHILS NFR BLD AUTO: 0.8 % (ref 0–1.5)
BH CV ECHO MEAS - ACS: 0.92 CM
BH CV ECHO MEAS - AI DEC SLOPE: 423.4 CM/SEC^2
BH CV ECHO MEAS - AI DEC TIME: 0.86 SEC
BH CV ECHO MEAS - AI MAX PG: 53.8 MMHG
BH CV ECHO MEAS - AI MAX VEL: 364.8 CM/SEC
BH CV ECHO MEAS - AI P1/2T: 252.3 MSEC
BH CV ECHO MEAS - AO MAX PG (FULL): 28.3 MMHG
BH CV ECHO MEAS - AO MAX PG: 30.4 MMHG
BH CV ECHO MEAS - AO MEAN PG (FULL): 18.6 MMHG
BH CV ECHO MEAS - AO MEAN PG: 19.9 MMHG
BH CV ECHO MEAS - AO ROOT AREA (BSA CORRECTED): 1.9
BH CV ECHO MEAS - AO ROOT AREA: 10.5 CM^2
BH CV ECHO MEAS - AO ROOT DIAM: 3.7 CM
BH CV ECHO MEAS - AO V2 MAX: 275.7 CM/SEC
BH CV ECHO MEAS - AO V2 MEAN: 216.5 CM/SEC
BH CV ECHO MEAS - AO V2 VTI: 55.4 CM
BH CV ECHO MEAS - ASC AORTA: 3.2 CM
BH CV ECHO MEAS - AVA(I,A): 0.99 CM^2
BH CV ECHO MEAS - AVA(I,D): 0.99 CM^2
BH CV ECHO MEAS - AVA(V,A): 0.94 CM^2
BH CV ECHO MEAS - AVA(V,D): 0.94 CM^2
BH CV ECHO MEAS - BSA(HAYCOCK): 1.9 M^2
BH CV ECHO MEAS - BSA: 1.9 M^2
BH CV ECHO MEAS - BZI_BMI: 25.5 KILOGRAMS/M^2
BH CV ECHO MEAS - BZI_METRIC_HEIGHT: 172.7 CM
BH CV ECHO MEAS - BZI_METRIC_WEIGHT: 76.2 KG
BH CV ECHO MEAS - EDV(CUBED): 225.2 ML
BH CV ECHO MEAS - EDV(MOD-SP2): 128.4 ML
BH CV ECHO MEAS - EDV(MOD-SP4): 122.9 ML
BH CV ECHO MEAS - EDV(TEICH): 185.8 ML
BH CV ECHO MEAS - EF(CUBED): 33.3 %
BH CV ECHO MEAS - EF(MOD-SP2): 39.7 %
BH CV ECHO MEAS - EF(MOD-SP4): 25.8 %
BH CV ECHO MEAS - EF(TEICH): 26.7 %
BH CV ECHO MEAS - ESV(CUBED): 150.2 ML
BH CV ECHO MEAS - ESV(MOD-SP2): 77.4 ML
BH CV ECHO MEAS - ESV(MOD-SP4): 91.1 ML
BH CV ECHO MEAS - ESV(TEICH): 136.2 ML
BH CV ECHO MEAS - FS: 12.6 %
BH CV ECHO MEAS - IVS/LVPW: 1
BH CV ECHO MEAS - IVSD: 0.79 CM
BH CV ECHO MEAS - LA DIMENSION: 4.6 CM
BH CV ECHO MEAS - LA/AO: 1.3
BH CV ECHO MEAS - LV DIASTOLIC VOL/BSA (35-75): 64.7 ML/M^2
BH CV ECHO MEAS - LV MASS(C)D: 187.8 GRAMS
BH CV ECHO MEAS - LV MASS(C)DI: 98.9 GRAMS/M^2
BH CV ECHO MEAS - LV MAX PG: 2.1 MMHG
BH CV ECHO MEAS - LV MEAN PG: 1.3 MMHG
BH CV ECHO MEAS - LV SYSTOLIC VOL/BSA (12-30): 48 ML/M^2
BH CV ECHO MEAS - LV V1 MAX: 73.2 CM/SEC
BH CV ECHO MEAS - LV V1 MEAN: 54.1 CM/SEC
BH CV ECHO MEAS - LV V1 VTI: 15.6 CM
BH CV ECHO MEAS - LVIDD: 6.1 CM
BH CV ECHO MEAS - LVIDS: 5.3 CM
BH CV ECHO MEAS - LVOT AREA: 3.5 CM^2
BH CV ECHO MEAS - LVOT DIAM: 2.1 CM
BH CV ECHO MEAS - LVPWD: 0.79 CM
BH CV ECHO MEAS - MV A MAX VEL: 52.4 CM/SEC
BH CV ECHO MEAS - MV DEC SLOPE: 1363 CM/SEC^2
BH CV ECHO MEAS - MV DEC TIME: 0.1 SEC
BH CV ECHO MEAS - MV E MAX VEL: 133.8 CM/SEC
BH CV ECHO MEAS - MV E/A: 2.6
BH CV ECHO MEAS - MV MAX PG: 8.8 MMHG
BH CV ECHO MEAS - MV MEAN PG: 3.8 MMHG
BH CV ECHO MEAS - MV V2 MAX: 148.7 CM/SEC
BH CV ECHO MEAS - MV V2 MEAN: 93.3 CM/SEC
BH CV ECHO MEAS - MV V2 VTI: 20.5 CM
BH CV ECHO MEAS - MVA(VTI): 2.7 CM^2
BH CV ECHO MEAS - PA ACC TIME: 0.12 SEC
BH CV ECHO MEAS - PA PR(ACCEL): 24.5 MMHG
BH CV ECHO MEAS - RAP SYSTOLE: 3 MMHG
BH CV ECHO MEAS - RV MAX PG: 1.6 MMHG
BH CV ECHO MEAS - RV MEAN PG: 0.73 MMHG
BH CV ECHO MEAS - RV V1 MAX: 63 CM/SEC
BH CV ECHO MEAS - RV V1 MEAN: 38.4 CM/SEC
BH CV ECHO MEAS - RV V1 VTI: 8.5 CM
BH CV ECHO MEAS - RVDD: 2 CM
BH CV ECHO MEAS - RVSP: 25.8 MMHG
BH CV ECHO MEAS - SI(AO): 306.7 ML/M^2
BH CV ECHO MEAS - SI(CUBED): 39.5 ML/M^2
BH CV ECHO MEAS - SI(LVOT): 29 ML/M^2
BH CV ECHO MEAS - SI(MOD-SP2): 26.9 ML/M^2
BH CV ECHO MEAS - SI(MOD-SP4): 16.7 ML/M^2
BH CV ECHO MEAS - SI(TEICH): 26.1 ML/M^2
BH CV ECHO MEAS - SV(AO): 582.1 ML
BH CV ECHO MEAS - SV(CUBED): 75 ML
BH CV ECHO MEAS - SV(LVOT): 55.1 ML
BH CV ECHO MEAS - SV(MOD-SP2): 51 ML
BH CV ECHO MEAS - SV(MOD-SP4): 31.7 ML
BH CV ECHO MEAS - SV(TEICH): 49.6 ML
BH CV ECHO MEAS - TR MAX VEL: 238.3 CM/SEC
BILIRUB BLD-MCNC: ABNORMAL MG/DL
BILIRUB SERPL-MCNC: 0.2 MG/DL (ref 0–1.2)
BILIRUB SERPL-MCNC: 0.3 MG/DL (ref 0–1.2)
BILIRUB SERPL-MCNC: 0.3 MG/DL (ref 0–1.2)
BILIRUB UR QL STRIP: NEGATIVE
BILIRUB UR QL STRIP: NEGATIVE
BUN SERPL-MCNC: 17 MG/DL (ref 8–23)
BUN SERPL-MCNC: 19 MG/DL (ref 8–23)
BUN SERPL-MCNC: 19 MG/DL (ref 8–23)
BUN SERPL-MCNC: 20 MG/DL (ref 8–23)
BUN SERPL-MCNC: 21 MG/DL (ref 8–23)
BUN SERPL-MCNC: 23 MG/DL (ref 8–23)
BUN SERPL-MCNC: 24 MG/DL (ref 8–23)
BUN SERPL-MCNC: 26 MG/DL (ref 8–23)
BUN SERPL-MCNC: 28 MG/DL (ref 8–23)
BUN SERPL-MCNC: 28 MG/DL (ref 8–23)
BUN SERPL-MCNC: 30 MG/DL (ref 8–23)
BUN SERPL-MCNC: 30 MG/DL (ref 8–23)
BUN SERPL-MCNC: 34 MG/DL (ref 8–23)
BUN/CREAT SERPL: 13.5 (ref 7–25)
BUN/CREAT SERPL: 14.2 (ref 7–25)
BUN/CREAT SERPL: 14.5 (ref 7–25)
BUN/CREAT SERPL: 15 (ref 7–25)
BUN/CREAT SERPL: 15.7 (ref 7–25)
BUN/CREAT SERPL: 16.5 (ref 7–25)
BUN/CREAT SERPL: 18 (ref 7–25)
BUN/CREAT SERPL: 18.3 (ref 7–25)
BUN/CREAT SERPL: 18.4 (ref 7–25)
BUN/CREAT SERPL: 18.5 (ref 7–25)
BUN/CREAT SERPL: 19.6 (ref 7–25)
BUN/CREAT SERPL: 21.1 (ref 7–25)
BUN/CREAT SERPL: 21.2 (ref 7–25)
BUN/CREAT SERPL: 21.7 (ref 7–25)
BUN/CREAT SERPL: 22.8 (ref 7–25)
BUN/CREAT SERPL: 23 (ref 7–25)
BUN/CREAT SERPL: 23.4 (ref 7–25)
BUN/CREAT SERPL: 24.4 (ref 7–25)
BUN/CREAT SERPL: 25.6 (ref 7–25)
C PNEUM DNA NPH QL NAA+NON-PROBE: NOT DETECTED
C PNEUM DNA NPH QL NAA+NON-PROBE: NOT DETECTED
CALCIUM SPEC-SCNC: 7 MG/DL (ref 8.6–10.5)
CALCIUM SPEC-SCNC: 7.5 MG/DL (ref 8.6–10.5)
CALCIUM SPEC-SCNC: 7.8 MG/DL (ref 8.6–10.5)
CALCIUM SPEC-SCNC: 7.9 MG/DL (ref 8.6–10.5)
CALCIUM SPEC-SCNC: 8 MG/DL (ref 8.6–10.5)
CALCIUM SPEC-SCNC: 8.1 MG/DL (ref 8.6–10.5)
CALCIUM SPEC-SCNC: 8.2 MG/DL (ref 8.6–10.5)
CALCIUM SPEC-SCNC: 8.3 MG/DL (ref 8.6–10.5)
CALCIUM SPEC-SCNC: 8.4 MG/DL (ref 8.6–10.5)
CALCIUM SPEC-SCNC: 8.5 MG/DL (ref 8.6–10.5)
CALCIUM SPEC-SCNC: 8.6 MG/DL (ref 8.6–10.5)
CALCIUM SPEC-SCNC: 8.6 MG/DL (ref 8.6–10.5)
CALCIUM SPEC-SCNC: 8.9 MG/DL (ref 8.6–10.5)
CHLORIDE SERPL-SCNC: 101 MMOL/L (ref 98–107)
CHLORIDE SERPL-SCNC: 101 MMOL/L (ref 98–107)
CHLORIDE SERPL-SCNC: 102 MMOL/L (ref 98–107)
CHLORIDE SERPL-SCNC: 102 MMOL/L (ref 98–107)
CHLORIDE SERPL-SCNC: 103 MMOL/L (ref 98–107)
CHLORIDE SERPL-SCNC: 105 MMOL/L (ref 98–107)
CHLORIDE SERPL-SCNC: 106 MMOL/L (ref 98–107)
CHLORIDE SERPL-SCNC: 107 MMOL/L (ref 98–107)
CHLORIDE SERPL-SCNC: 107 MMOL/L (ref 98–107)
CHLORIDE SERPL-SCNC: 98 MMOL/L (ref 98–107)
CHOLEST SERPL-MCNC: 117 MG/DL (ref 0–200)
CLARITY UR: ABNORMAL
CLARITY UR: ABNORMAL
CLARITY, POC: CLEAR
CMV DNA SPEC QL NAA+PROBE: NEGATIVE
CMV DNA SPEC QL NAA+PROBE: NORMAL
CO2 SERPL-SCNC: 23 MMOL/L (ref 22–29)
CO2 SERPL-SCNC: 23 MMOL/L (ref 22–29)
CO2 SERPL-SCNC: 25 MMOL/L (ref 22–29)
CO2 SERPL-SCNC: 26 MMOL/L (ref 22–29)
CO2 SERPL-SCNC: 27 MMOL/L (ref 22–29)
CO2 SERPL-SCNC: 28 MMOL/L (ref 22–29)
CO2 SERPL-SCNC: 29 MMOL/L (ref 22–29)
CO2 SERPL-SCNC: 29 MMOL/L (ref 22–29)
CO2 SERPL-SCNC: 30 MMOL/L (ref 22–29)
CO2 SERPL-SCNC: 30 MMOL/L (ref 22–29)
COLOR FLD: YELLOW
COLOR UR: YELLOW
CREAT SERPL-MCNC: 0.86 MG/DL (ref 0.76–1.27)
CREAT SERPL-MCNC: 0.92 MG/DL (ref 0.76–1.27)
CREAT SERPL-MCNC: 0.99 MG/DL (ref 0.76–1.27)
CREAT SERPL-MCNC: 1 MG/DL (ref 0.76–1.27)
CREAT SERPL-MCNC: 1.2 MG/DL (ref 0.76–1.27)
CREAT SERPL-MCNC: 1.2 MG/DL (ref 0.76–1.27)
CREAT SERPL-MCNC: 1.21 MG/DL (ref 0.76–1.27)
CREAT SERPL-MCNC: 1.25 MG/DL (ref 0.76–1.27)
CREAT SERPL-MCNC: 1.28 MG/DL (ref 0.76–1.27)
CREAT SERPL-MCNC: 1.28 MG/DL (ref 0.76–1.27)
CREAT SERPL-MCNC: 1.31 MG/DL (ref 0.76–1.27)
CREAT SERPL-MCNC: 1.31 MG/DL (ref 0.76–1.27)
CREAT SERPL-MCNC: 1.33 MG/DL (ref 0.76–1.27)
CREAT SERPL-MCNC: 1.39 MG/DL (ref 0.76–1.27)
CREAT SERPL-MCNC: 1.51 MG/DL (ref 0.76–1.27)
CREAT SERPL-MCNC: 1.53 MG/DL (ref 0.76–1.27)
CREAT SERPL-MCNC: 1.55 MG/DL (ref 0.76–1.27)
CREAT UR-MCNC: 117.7 MG/DL
DEPRECATED RDW RBC AUTO: 42.1 FL (ref 37–54)
DEPRECATED RDW RBC AUTO: 45.9 FL (ref 37–54)
DEPRECATED RDW RBC AUTO: 46.4 FL (ref 37–54)
DEPRECATED RDW RBC AUTO: 47.7 FL (ref 37–54)
DEPRECATED RDW RBC AUTO: 48.1 FL (ref 37–54)
DEPRECATED RDW RBC AUTO: 48.6 FL (ref 37–54)
DEPRECATED RDW RBC AUTO: 49.9 FL (ref 37–54)
DEPRECATED RDW RBC AUTO: 50.3 FL (ref 37–54)
DEPRECATED RDW RBC AUTO: 50.3 FL (ref 37–54)
DEPRECATED RDW RBC AUTO: 50.8 FL (ref 37–54)
DEPRECATED RDW RBC AUTO: 50.8 FL (ref 37–54)
DEPRECATED RDW RBC AUTO: 51.2 FL (ref 37–54)
DEPRECATED RDW RBC AUTO: 52.1 FL (ref 37–54)
DEPRECATED RDW RBC AUTO: 52.1 FL (ref 37–54)
DEPRECATED RDW RBC AUTO: 53.8 FL (ref 37–54)
EOSINOPHIL # BLD AUTO: 0 10*3/MM3 (ref 0–0.4)
EOSINOPHIL # BLD AUTO: 0.1 10*3/MM3 (ref 0–0.4)
EOSINOPHIL # BLD AUTO: 0.11 10*3/MM3 (ref 0–0.4)
EOSINOPHIL NFR BLD AUTO: 0.3 % (ref 0.3–6.2)
EOSINOPHIL NFR BLD AUTO: 0.4 % (ref 0.3–6.2)
EOSINOPHIL NFR BLD AUTO: 0.4 % (ref 0.3–6.2)
EOSINOPHIL NFR BLD AUTO: 0.5 % (ref 0.3–6.2)
EOSINOPHIL NFR BLD AUTO: 0.6 % (ref 0.3–6.2)
EOSINOPHIL NFR BLD AUTO: 0.9 % (ref 0.3–6.2)
EOSINOPHIL NFR BLD AUTO: 1 % (ref 0.3–6.2)
EOSINOPHIL NFR BLD AUTO: 1.1 % (ref 0.3–6.2)
EOSINOPHIL NFR BLD AUTO: 1.2 % (ref 0.3–6.2)
EOSINOPHIL NFR BLD AUTO: 1.3 % (ref 0.3–6.2)
EOSINOPHIL NFR BLD AUTO: 1.4 % (ref 0.3–6.2)
EOSINOPHIL NFR BLD AUTO: 1.6 % (ref 0.3–6.2)
EOSINOPHIL NFR BLD AUTO: 1.8 % (ref 0.3–6.2)
ERYTHROCYTE [DISTWIDTH] IN BLOOD BY AUTOMATED COUNT: 12.8 % (ref 12.3–15.4)
ERYTHROCYTE [DISTWIDTH] IN BLOOD BY AUTOMATED COUNT: 14.5 % (ref 12.3–15.4)
ERYTHROCYTE [DISTWIDTH] IN BLOOD BY AUTOMATED COUNT: 14.6 % (ref 12.3–15.4)
ERYTHROCYTE [DISTWIDTH] IN BLOOD BY AUTOMATED COUNT: 14.8 % (ref 12.3–15.4)
ERYTHROCYTE [DISTWIDTH] IN BLOOD BY AUTOMATED COUNT: 15.1 % (ref 12.3–15.4)
ERYTHROCYTE [DISTWIDTH] IN BLOOD BY AUTOMATED COUNT: 15.8 % (ref 12.3–15.4)
ERYTHROCYTE [DISTWIDTH] IN BLOOD BY AUTOMATED COUNT: 15.8 % (ref 12.3–15.4)
ERYTHROCYTE [DISTWIDTH] IN BLOOD BY AUTOMATED COUNT: 16 % (ref 12.3–15.4)
ERYTHROCYTE [DISTWIDTH] IN BLOOD BY AUTOMATED COUNT: 16.1 % (ref 12.3–15.4)
ERYTHROCYTE [DISTWIDTH] IN BLOOD BY AUTOMATED COUNT: 16.2 % (ref 12.3–15.4)
ERYTHROCYTE [DISTWIDTH] IN BLOOD BY AUTOMATED COUNT: 16.3 % (ref 12.3–15.4)
ERYTHROCYTE [DISTWIDTH] IN BLOOD BY AUTOMATED COUNT: 16.3 % (ref 12.3–15.4)
ERYTHROCYTE [DISTWIDTH] IN BLOOD BY AUTOMATED COUNT: 16.4 % (ref 12.3–15.4)
ERYTHROCYTE [DISTWIDTH] IN BLOOD BY AUTOMATED COUNT: 16.6 % (ref 12.3–15.4)
ERYTHROCYTE [DISTWIDTH] IN BLOOD BY AUTOMATED COUNT: 16.7 % (ref 12.3–15.4)
FLUAV SUBTYP SPEC NAA+PROBE: NOT DETECTED
FLUAV SUBTYP SPEC NAA+PROBE: NOT DETECTED
FLUBV RNA ISLT QL NAA+PROBE: NOT DETECTED
FLUBV RNA ISLT QL NAA+PROBE: NOT DETECTED
FOLATE SERPL-MCNC: >20 NG/ML (ref 4.78–24.2)
FUNGUS WND CULT: ABNORMAL
FUNGUS WND CULT: ABNORMAL
GALACTOMANNAN AG SPEC IA-ACNC: 0.41 INDEX (ref 0–0.49)
GAMMA TOCOPHEROL SERPL-MCNC: 0.8 MG/L (ref 0.5–4.9)
GFR SERPL CREATININE-BSD FRML MDRD: 44 ML/MIN/1.73
GFR SERPL CREATININE-BSD FRML MDRD: 44 ML/MIN/1.73
GFR SERPL CREATININE-BSD FRML MDRD: 45 ML/MIN/1.73
GFR SERPL CREATININE-BSD FRML MDRD: 49 ML/MIN/1.73
GFR SERPL CREATININE-BSD FRML MDRD: 52 ML/MIN/1.73
GFR SERPL CREATININE-BSD FRML MDRD: 53 ML/MIN/1.73
GFR SERPL CREATININE-BSD FRML MDRD: 53 ML/MIN/1.73
GFR SERPL CREATININE-BSD FRML MDRD: 54 ML/MIN/1.73
GFR SERPL CREATININE-BSD FRML MDRD: 54 ML/MIN/1.73
GFR SERPL CREATININE-BSD FRML MDRD: 56 ML/MIN/1.73
GFR SERPL CREATININE-BSD FRML MDRD: 58 ML/MIN/1.73
GFR SERPL CREATININE-BSD FRML MDRD: 59 ML/MIN/1.73
GFR SERPL CREATININE-BSD FRML MDRD: 59 ML/MIN/1.73
GFR SERPL CREATININE-BSD FRML MDRD: 72 ML/MIN/1.73
GFR SERPL CREATININE-BSD FRML MDRD: 73 ML/MIN/1.73
GFR SERPL CREATININE-BSD FRML MDRD: 80 ML/MIN/1.73
GFR SERPL CREATININE-BSD FRML MDRD: 86 ML/MIN/1.73
GLOBULIN UR ELPH-MCNC: 2.8 GM/DL
GLOBULIN UR ELPH-MCNC: 2.9 GM/DL
GLOBULIN UR ELPH-MCNC: 3.4 GM/DL
GLUCOSE BLDC GLUCOMTR-MCNC: 127 MG/DL (ref 70–105)
GLUCOSE SERPL-MCNC: 100 MG/DL (ref 65–99)
GLUCOSE SERPL-MCNC: 102 MG/DL (ref 65–99)
GLUCOSE SERPL-MCNC: 103 MG/DL (ref 65–99)
GLUCOSE SERPL-MCNC: 107 MG/DL (ref 65–99)
GLUCOSE SERPL-MCNC: 110 MG/DL (ref 65–99)
GLUCOSE SERPL-MCNC: 111 MG/DL (ref 65–99)
GLUCOSE SERPL-MCNC: 112 MG/DL (ref 65–99)
GLUCOSE SERPL-MCNC: 117 MG/DL (ref 65–99)
GLUCOSE SERPL-MCNC: 118 MG/DL (ref 65–99)
GLUCOSE SERPL-MCNC: 126 MG/DL (ref 65–99)
GLUCOSE SERPL-MCNC: 134 MG/DL (ref 65–99)
GLUCOSE SERPL-MCNC: 79 MG/DL (ref 65–99)
GLUCOSE SERPL-MCNC: 79 MG/DL (ref 65–99)
GLUCOSE SERPL-MCNC: 87 MG/DL (ref 65–99)
GLUCOSE SERPL-MCNC: 91 MG/DL (ref 65–99)
GLUCOSE SERPL-MCNC: 94 MG/DL (ref 65–99)
GLUCOSE SERPL-MCNC: 98 MG/DL (ref 65–99)
GLUCOSE SERPL-MCNC: 99 MG/DL (ref 65–99)
GLUCOSE SERPL-MCNC: 99 MG/DL (ref 65–99)
GLUCOSE UR STRIP-MCNC: NEGATIVE MG/DL
GRAM STN SPEC: NORMAL
GRAN CASTS URNS QL MICRO: ABNORMAL /LPF
HADV DNA SPEC NAA+PROBE: NOT DETECTED
HADV DNA SPEC NAA+PROBE: NOT DETECTED
HAV IGM SERPL QL IA: NORMAL
HBV CORE IGM SERPL QL IA: NORMAL
HBV SURFACE AG SERPL QL IA: NORMAL
HCOV 229E RNA SPEC QL NAA+PROBE: NOT DETECTED
HCOV 229E RNA SPEC QL NAA+PROBE: NOT DETECTED
HCOV HKU1 RNA SPEC QL NAA+PROBE: NOT DETECTED
HCOV HKU1 RNA SPEC QL NAA+PROBE: NOT DETECTED
HCOV NL63 RNA SPEC QL NAA+PROBE: NOT DETECTED
HCOV NL63 RNA SPEC QL NAA+PROBE: NOT DETECTED
HCOV OC43 RNA SPEC QL NAA+PROBE: NOT DETECTED
HCOV OC43 RNA SPEC QL NAA+PROBE: NOT DETECTED
HCT VFR BLD AUTO: 29.2 % (ref 37.5–51)
HCT VFR BLD AUTO: 29.9 % (ref 37.5–51)
HCT VFR BLD AUTO: 30.3 % (ref 37.5–51)
HCT VFR BLD AUTO: 31 % (ref 37.5–51)
HCT VFR BLD AUTO: 31.1 % (ref 37.5–51)
HCT VFR BLD AUTO: 31.2 % (ref 37.5–51)
HCT VFR BLD AUTO: 31.4 % (ref 37.5–51)
HCT VFR BLD AUTO: 31.6 % (ref 37.5–51)
HCT VFR BLD AUTO: 31.8 % (ref 37.5–51)
HCT VFR BLD AUTO: 31.9 % (ref 37.5–51)
HCT VFR BLD AUTO: 32 % (ref 37.5–51)
HCT VFR BLD AUTO: 33.4 % (ref 37.5–51)
HCT VFR BLD AUTO: 33.7 % (ref 37.5–51)
HCT VFR BLD AUTO: 34.5 % (ref 37.5–51)
HCT VFR BLD AUTO: 34.6 % (ref 37.5–51)
HCT VFR BLD AUTO: 34.6 % (ref 37.5–51)
HCT VFR BLD AUTO: 34.8 % (ref 37.5–51)
HCT VFR BLD AUTO: 35.3 % (ref 37.5–51)
HCV AB SER DONR QL: NORMAL
HDLC SERPL-MCNC: 40 MG/DL (ref 40–60)
HGB BLD-MCNC: 10.2 G/DL (ref 13–17.7)
HGB BLD-MCNC: 10.2 G/DL (ref 13–17.7)
HGB BLD-MCNC: 10.3 G/DL (ref 13–17.7)
HGB BLD-MCNC: 10.4 G/DL (ref 13–17.7)
HGB BLD-MCNC: 10.6 G/DL (ref 13–17.7)
HGB BLD-MCNC: 10.6 G/DL (ref 13–17.7)
HGB BLD-MCNC: 11 G/DL (ref 13–17.7)
HGB BLD-MCNC: 11.1 G/DL (ref 13–17.7)
HGB BLD-MCNC: 11.1 G/DL (ref 13–17.7)
HGB BLD-MCNC: 11.4 G/DL (ref 13–17.7)
HGB BLD-MCNC: 11.4 G/DL (ref 13–17.7)
HGB BLD-MCNC: 11.6 G/DL (ref 13–17.7)
HGB BLD-MCNC: 11.7 G/DL (ref 13–17.7)
HGB BLD-MCNC: 9.8 G/DL (ref 13–17.7)
HGB BLD-MCNC: 9.9 G/DL (ref 13–17.7)
HGB BLD-MCNC: 9.9 G/DL (ref 13–17.7)
HGB UR QL STRIP.AUTO: ABNORMAL
HGB UR QL STRIP.AUTO: NEGATIVE
HMPV RNA NPH QL NAA+NON-PROBE: NOT DETECTED
HMPV RNA NPH QL NAA+NON-PROBE: NOT DETECTED
HOLD SPECIMEN: NORMAL
HPIV1 RNA SPEC QL NAA+PROBE: NOT DETECTED
HPIV1 RNA SPEC QL NAA+PROBE: NOT DETECTED
HPIV2 RNA SPEC QL NAA+PROBE: NOT DETECTED
HPIV2 RNA SPEC QL NAA+PROBE: NOT DETECTED
HPIV3 RNA NPH QL NAA+PROBE: NOT DETECTED
HPIV3 RNA NPH QL NAA+PROBE: NOT DETECTED
HPIV4 P GENE NPH QL NAA+PROBE: NOT DETECTED
HPIV4 P GENE NPH QL NAA+PROBE: NOT DETECTED
HYALINE CASTS UR QL AUTO: ABNORMAL /LPF
IMM GRANULOCYTES # BLD AUTO: 0.02 10*3/MM3 (ref 0–0.05)
IMM GRANULOCYTES NFR BLD AUTO: 0.3 % (ref 0–0.5)
INR PPP: 1.04 (ref 0.93–1.1)
INR PPP: 1.04 (ref 0.93–1.1)
INTERPRETATION: NEGATIVE
INTERPRETATION: NEGATIVE
IRON 24H UR-MRATE: 26 MCG/DL (ref 59–158)
IRON 24H UR-MRATE: 31 MCG/DL (ref 59–158)
IRON SATN MFR SERPL: 13 % (ref 20–50)
IRON SATN MFR SERPL: 17 % (ref 20–50)
KETONES UR QL STRIP: NEGATIVE
KETONES UR QL STRIP: NEGATIVE
KETONES UR QL: ABNORMAL
LAB AP CASE REPORT: NORMAL
LDH FLD-CCNC: 143 U/L
LDH SERPL-CCNC: 238 U/L (ref 135–225)
LDLC SERPL CALC-MCNC: 65 MG/DL (ref 0–100)
LDLC/HDLC SERPL: 1.67 {RATIO}
LEUKOCYTE EST, POC: NEGATIVE
LEUKOCYTE ESTERASE UR QL STRIP.AUTO: ABNORMAL
LEUKOCYTE ESTERASE UR QL STRIP.AUTO: NEGATIVE
LYMPHOCYTES # BLD AUTO: 0.6 10*3/MM3 (ref 0.7–3.1)
LYMPHOCYTES # BLD AUTO: 0.6 10*3/MM3 (ref 0.7–3.1)
LYMPHOCYTES # BLD AUTO: 0.7 10*3/MM3 (ref 0.7–3.1)
LYMPHOCYTES # BLD AUTO: 0.8 10*3/MM3 (ref 0.7–3.1)
LYMPHOCYTES # BLD AUTO: 0.9 10*3/MM3 (ref 0.7–3.1)
LYMPHOCYTES # BLD AUTO: 1 10*3/MM3 (ref 0.7–3.1)
LYMPHOCYTES # BLD AUTO: 1.62 10*3/MM3 (ref 0.7–3.1)
LYMPHOCYTES NFR BLD AUTO: 10.9 % (ref 19.6–45.3)
LYMPHOCYTES NFR BLD AUTO: 12 % (ref 19.6–45.3)
LYMPHOCYTES NFR BLD AUTO: 12.5 % (ref 19.6–45.3)
LYMPHOCYTES NFR BLD AUTO: 12.9 % (ref 19.6–45.3)
LYMPHOCYTES NFR BLD AUTO: 12.9 % (ref 19.6–45.3)
LYMPHOCYTES NFR BLD AUTO: 14.1 % (ref 19.6–45.3)
LYMPHOCYTES NFR BLD AUTO: 14.4 % (ref 19.6–45.3)
LYMPHOCYTES NFR BLD AUTO: 14.4 % (ref 19.6–45.3)
LYMPHOCYTES NFR BLD AUTO: 15.9 % (ref 19.6–45.3)
LYMPHOCYTES NFR BLD AUTO: 16.4 % (ref 19.6–45.3)
LYMPHOCYTES NFR BLD AUTO: 24.2 % (ref 19.6–45.3)
LYMPHOCYTES NFR BLD AUTO: 7.1 % (ref 19.6–45.3)
LYMPHOCYTES NFR BLD AUTO: 9.1 % (ref 19.6–45.3)
LYMPHOCYTES NFR BLD AUTO: 9.3 % (ref 19.6–45.3)
LYMPHOCYTES NFR BLD AUTO: 9.6 % (ref 19.6–45.3)
LYMPHOCYTES NFR FLD MANUAL: 56 %
M PNEUMO IGG SER IA-ACNC: NOT DETECTED
M PNEUMO IGG SER IA-ACNC: NOT DETECTED
MACROPHAGE FLUID: 24 %
MAGNESIUM SERPL-MCNC: 1.8 MG/DL (ref 1.6–2.4)
MAGNESIUM SERPL-MCNC: 1.8 MG/DL (ref 1.6–2.4)
MAGNESIUM SERPL-MCNC: 1.9 MG/DL (ref 1.6–2.4)
MAGNESIUM SERPL-MCNC: 2 MG/DL (ref 1.6–2.4)
MAGNESIUM SERPL-MCNC: 2 MG/DL (ref 1.6–2.4)
MAGNESIUM SERPL-MCNC: 2.1 MG/DL (ref 1.6–2.4)
MAGNESIUM SERPL-MCNC: 2.2 MG/DL (ref 1.6–2.4)
MAGNESIUM SERPL-MCNC: 2.2 MG/DL (ref 1.6–2.4)
MAXIMAL PREDICTED HEART RATE: 142 BPM
MCH RBC QN AUTO: 28.6 PG (ref 26.6–33)
MCH RBC QN AUTO: 29 PG (ref 26.6–33)
MCH RBC QN AUTO: 29.1 PG (ref 26.6–33)
MCH RBC QN AUTO: 29.1 PG (ref 26.6–33)
MCH RBC QN AUTO: 29.2 PG (ref 26.6–33)
MCH RBC QN AUTO: 29.2 PG (ref 26.6–33)
MCH RBC QN AUTO: 29.3 PG (ref 26.6–33)
MCH RBC QN AUTO: 29.6 PG (ref 26.6–33)
MCH RBC QN AUTO: 29.7 PG (ref 26.6–33)
MCH RBC QN AUTO: 29.8 PG (ref 26.6–33)
MCH RBC QN AUTO: 29.9 PG (ref 26.6–33)
MCH RBC QN AUTO: 30.2 PG (ref 26.6–33)
MCH RBC QN AUTO: 30.6 PG (ref 26.6–33)
MCHC RBC AUTO-ENTMCNC: 32.1 G/DL (ref 31.5–35.7)
MCHC RBC AUTO-ENTMCNC: 32.3 G/DL (ref 31.5–35.7)
MCHC RBC AUTO-ENTMCNC: 32.5 G/DL (ref 31.5–35.7)
MCHC RBC AUTO-ENTMCNC: 32.6 G/DL (ref 31.5–35.7)
MCHC RBC AUTO-ENTMCNC: 32.7 G/DL (ref 31.5–35.7)
MCHC RBC AUTO-ENTMCNC: 32.7 G/DL (ref 31.5–35.7)
MCHC RBC AUTO-ENTMCNC: 32.8 G/DL (ref 31.5–35.7)
MCHC RBC AUTO-ENTMCNC: 32.8 G/DL (ref 31.5–35.7)
MCHC RBC AUTO-ENTMCNC: 33 G/DL (ref 31.5–35.7)
MCHC RBC AUTO-ENTMCNC: 33.1 G/DL (ref 31.5–35.7)
MCHC RBC AUTO-ENTMCNC: 33.2 G/DL (ref 31.5–35.7)
MCHC RBC AUTO-ENTMCNC: 33.2 G/DL (ref 31.5–35.7)
MCHC RBC AUTO-ENTMCNC: 33.3 G/DL (ref 31.5–35.7)
MCHC RBC AUTO-ENTMCNC: 33.4 G/DL (ref 31.5–35.7)
MCHC RBC AUTO-ENTMCNC: 33.7 G/DL (ref 31.5–35.7)
MCV RBC AUTO: 87.5 FL (ref 79–97)
MCV RBC AUTO: 88.2 FL (ref 79–97)
MCV RBC AUTO: 88.3 FL (ref 79–97)
MCV RBC AUTO: 88.4 FL (ref 79–97)
MCV RBC AUTO: 88.5 FL (ref 79–97)
MCV RBC AUTO: 88.5 FL (ref 79–97)
MCV RBC AUTO: 89 FL (ref 79–97)
MCV RBC AUTO: 89.1 FL (ref 79–97)
MCV RBC AUTO: 89.3 FL (ref 79–97)
MCV RBC AUTO: 90.1 FL (ref 79–97)
MCV RBC AUTO: 90.2 FL (ref 79–97)
MCV RBC AUTO: 90.2 FL (ref 79–97)
MCV RBC AUTO: 90.3 FL (ref 79–97)
MCV RBC AUTO: 90.6 FL (ref 79–97)
MCV RBC AUTO: 90.6 FL (ref 79–97)
MCV RBC AUTO: 91 FL (ref 79–97)
MCV RBC AUTO: 91.3 FL (ref 79–97)
MCV RBC AUTO: 91.8 FL (ref 79–97)
METHOD: NORMAL
MICROALBUMIN/CREAT UR: 105.4 MG/G
MONOCYTES # BLD AUTO: 0.4 10*3/MM3 (ref 0.1–0.9)
MONOCYTES # BLD AUTO: 0.4 10*3/MM3 (ref 0.1–0.9)
MONOCYTES # BLD AUTO: 0.5 10*3/MM3 (ref 0.1–0.9)
MONOCYTES # BLD AUTO: 0.57 10*3/MM3 (ref 0.1–0.9)
MONOCYTES # BLD AUTO: 0.6 10*3/MM3 (ref 0.1–0.9)
MONOCYTES # BLD AUTO: 0.7 10*3/MM3 (ref 0.1–0.9)
MONOCYTES NFR BLD AUTO: 6.3 % (ref 5–12)
MONOCYTES NFR BLD AUTO: 6.9 % (ref 5–12)
MONOCYTES NFR BLD AUTO: 7.3 % (ref 5–12)
MONOCYTES NFR BLD AUTO: 7.6 % (ref 5–12)
MONOCYTES NFR BLD AUTO: 7.8 % (ref 5–12)
MONOCYTES NFR BLD AUTO: 8.1 % (ref 5–12)
MONOCYTES NFR BLD AUTO: 8.4 % (ref 5–12)
MONOCYTES NFR BLD AUTO: 8.4 % (ref 5–12)
MONOCYTES NFR BLD AUTO: 8.5 % (ref 5–12)
MONOCYTES NFR BLD AUTO: 8.5 % (ref 5–12)
MONOCYTES NFR BLD AUTO: 8.6 % (ref 5–12)
MONOCYTES NFR BLD AUTO: 9 % (ref 5–12)
MONOCYTES NFR BLD AUTO: 9.6 % (ref 5–12)
MONOCYTES NFR BLD AUTO: 9.7 % (ref 5–12)
MONOCYTES NFR BLD AUTO: 9.7 % (ref 5–12)
MONOCYTES NFR FLD: 15 %
MR PISA EROA: 0.1 CM2
MYCOBACTERIUM SPEC CULT: NORMAL
NEUTROPHILS NFR BLD AUTO: 3.9 10*3/MM3 (ref 1.7–7)
NEUTROPHILS NFR BLD AUTO: 4 10*3/MM3 (ref 1.7–7)
NEUTROPHILS NFR BLD AUTO: 4.1 10*3/MM3 (ref 1.7–7)
NEUTROPHILS NFR BLD AUTO: 4.33 10*3/MM3 (ref 1.7–7)
NEUTROPHILS NFR BLD AUTO: 4.4 10*3/MM3 (ref 1.7–7)
NEUTROPHILS NFR BLD AUTO: 4.8 10*3/MM3 (ref 1.7–7)
NEUTROPHILS NFR BLD AUTO: 4.9 10*3/MM3 (ref 1.7–7)
NEUTROPHILS NFR BLD AUTO: 5.2 10*3/MM3 (ref 1.7–7)
NEUTROPHILS NFR BLD AUTO: 5.6 10*3/MM3 (ref 1.7–7)
NEUTROPHILS NFR BLD AUTO: 6 10*3/MM3 (ref 1.7–7)
NEUTROPHILS NFR BLD AUTO: 64.8 % (ref 42.7–76)
NEUTROPHILS NFR BLD AUTO: 7.1 10*3/MM3 (ref 1.7–7)
NEUTROPHILS NFR BLD AUTO: 7.8 10*3/MM3 (ref 1.7–7)
NEUTROPHILS NFR BLD AUTO: 72.2 % (ref 42.7–76)
NEUTROPHILS NFR BLD AUTO: 73.7 % (ref 42.7–76)
NEUTROPHILS NFR BLD AUTO: 74.8 % (ref 42.7–76)
NEUTROPHILS NFR BLD AUTO: 75 % (ref 42.7–76)
NEUTROPHILS NFR BLD AUTO: 75.2 % (ref 42.7–76)
NEUTROPHILS NFR BLD AUTO: 76.2 % (ref 42.7–76)
NEUTROPHILS NFR BLD AUTO: 76.8 % (ref 42.7–76)
NEUTROPHILS NFR BLD AUTO: 77.1 % (ref 42.7–76)
NEUTROPHILS NFR BLD AUTO: 78.3 % (ref 42.7–76)
NEUTROPHILS NFR BLD AUTO: 79.5 % (ref 42.7–76)
NEUTROPHILS NFR BLD AUTO: 81.4 % (ref 42.7–76)
NEUTROPHILS NFR BLD AUTO: 81.9 % (ref 42.7–76)
NEUTROPHILS NFR BLD AUTO: 83.1 % (ref 42.7–76)
NEUTROPHILS NFR BLD AUTO: 86.1 % (ref 42.7–76)
NEUTROPHILS NFR FLD MANUAL: 5 %
NIGHT BLUE STAIN TISS: NORMAL
NITRITE UR QL STRIP: NEGATIVE
NITRITE UR QL STRIP: NEGATIVE
NITRITE UR-MCNC: NEGATIVE MG/ML
NRBC BLD AUTO-RTO: 0 /100 WBC (ref 0–0.2)
NRBC BLD AUTO-RTO: 0.1 /100 WBC (ref 0–0.2)
NRBC BLD AUTO-RTO: 0.2 /100 WBC (ref 0–0.2)
NT-PROBNP SERPL-MCNC: 3400 PG/ML (ref 0–1800)
NT-PROBNP SERPL-MCNC: 4794 PG/ML (ref 0–1800)
NUC CELL # FLD: 138 /MM3
P JIROVECII DNA # SPEC NAA+PROBE: NEGATIVE {COPIES}/ML
P JIROVECII DNA # SPEC NAA+PROBE: NEGATIVE {COPIES}/ML
PATH REPORT.FINAL DX SPEC: NORMAL
PATH REPORT.GROSS SPEC: NORMAL
PH FLD: 7.5 [PH] (ref 6.5–7.5)
PH UR STRIP.AUTO: 5.5 [PH] (ref 5–8)
PH UR STRIP.AUTO: 8.5 [PH] (ref 5–8)
PH UR: 6 [PH] (ref 5–8)
PHOSPHATE SERPL-MCNC: 2.3 MG/DL (ref 2.5–4.5)
PHOSPHATE SERPL-MCNC: 2.3 MG/DL (ref 2.5–4.5)
PHOSPHATE SERPL-MCNC: 3.5 MG/DL (ref 2.5–4.5)
PHOSPHATE SERPL-MCNC: 3.6 MG/DL (ref 2.5–4.5)
PISA ALIASING VEL: 0.13 M/S
PISA RADIUS: 0.7 CM
PLATELET # BLD AUTO: 229 10*3/MM3 (ref 140–450)
PLATELET # BLD AUTO: 237 10*3/MM3 (ref 140–450)
PLATELET # BLD AUTO: 271 10*3/MM3 (ref 140–450)
PLATELET # BLD AUTO: 272 10*3/MM3 (ref 140–450)
PLATELET # BLD AUTO: 285 10*3/MM3 (ref 140–450)
PLATELET # BLD AUTO: 293 10*3/MM3 (ref 140–450)
PLATELET # BLD AUTO: 304 10*3/MM3 (ref 140–450)
PLATELET # BLD AUTO: 308 10*3/MM3 (ref 140–450)
PLATELET # BLD AUTO: 312 10*3/MM3 (ref 140–450)
PLATELET # BLD AUTO: 316 10*3/MM3 (ref 140–450)
PLATELET # BLD AUTO: 325 10*3/MM3 (ref 140–450)
PLATELET # BLD AUTO: 326 10*3/MM3 (ref 140–450)
PLATELET # BLD AUTO: 333 10*3/MM3 (ref 140–450)
PLATELET # BLD AUTO: 354 10*3/MM3 (ref 140–450)
PLATELET # BLD AUTO: 354 10*3/MM3 (ref 140–450)
PLATELET # BLD AUTO: 362 10*3/MM3 (ref 140–450)
PLATELET # BLD AUTO: 411 10*3/MM3 (ref 140–450)
PLATELET # BLD AUTO: 423 10*3/MM3 (ref 140–450)
PMV BLD AUTO: 10.9 FL (ref 6–12)
PMV BLD AUTO: 8 FL (ref 6–12)
PMV BLD AUTO: 8 FL (ref 6–12)
PMV BLD AUTO: 8.2 FL (ref 6–12)
PMV BLD AUTO: 8.3 FL (ref 6–12)
PMV BLD AUTO: 8.3 FL (ref 6–12)
PMV BLD AUTO: 8.4 FL (ref 6–12)
PMV BLD AUTO: 8.5 FL (ref 6–12)
PMV BLD AUTO: 8.8 FL (ref 6–12)
PMV BLD AUTO: 8.9 FL (ref 6–12)
PMV BLD AUTO: 9 FL (ref 6–12)
POTASSIUM SERPL-SCNC: 3.3 MMOL/L (ref 3.5–5.2)
POTASSIUM SERPL-SCNC: 3.4 MMOL/L (ref 3.5–5.2)
POTASSIUM SERPL-SCNC: 3.4 MMOL/L (ref 3.5–5.2)
POTASSIUM SERPL-SCNC: 3.6 MMOL/L (ref 3.5–5.2)
POTASSIUM SERPL-SCNC: 3.8 MMOL/L (ref 3.5–5.2)
POTASSIUM SERPL-SCNC: 3.8 MMOL/L (ref 3.5–5.2)
POTASSIUM SERPL-SCNC: 3.9 MMOL/L (ref 3.5–5.2)
POTASSIUM SERPL-SCNC: 4 MMOL/L (ref 3.5–5.2)
POTASSIUM SERPL-SCNC: 4 MMOL/L (ref 3.5–5.2)
POTASSIUM SERPL-SCNC: 4.1 MMOL/L (ref 3.5–5.2)
POTASSIUM SERPL-SCNC: 4.3 MMOL/L (ref 3.5–5.2)
POTASSIUM SERPL-SCNC: 4.3 MMOL/L (ref 3.5–5.2)
POTASSIUM SERPL-SCNC: 4.4 MMOL/L (ref 3.5–5.2)
POTASSIUM SERPL-SCNC: 4.7 MMOL/L (ref 3.5–5.2)
POTASSIUM SERPL-SCNC: 4.8 MMOL/L (ref 3.5–5.2)
POTASSIUM SERPL-SCNC: 5 MMOL/L (ref 3.5–5.2)
POTASSIUM SERPL-SCNC: 5 MMOL/L (ref 3.5–5.2)
PROT FLD-MCNC: 3.6 G/DL
PROT SERPL-MCNC: 5.4 G/DL (ref 6–8.5)
PROT SERPL-MCNC: 6.6 G/DL (ref 6–8.5)
PROT SERPL-MCNC: 6.7 G/DL (ref 6–8.5)
PROT UR QL STRIP: ABNORMAL
PROT UR QL STRIP: ABNORMAL
PROT UR STRIP-MCNC: ABNORMAL MG/DL
PROTHROMBIN TIME: 11.5 SECONDS (ref 9.6–11.7)
PROTHROMBIN TIME: 11.5 SECONDS (ref 9.6–11.7)
QT INTERVAL: 393 MS
QT INTERVAL: 461 MS
RBC # BLD AUTO: 3.34 10*6/MM3 (ref 4.14–5.8)
RBC # BLD AUTO: 3.38 10*6/MM3 (ref 4.14–5.8)
RBC # BLD AUTO: 3.4 10*6/MM3 (ref 4.14–5.8)
RBC # BLD AUTO: 3.44 10*6/MM3 (ref 4.14–5.8)
RBC # BLD AUTO: 3.48 10*6/MM3 (ref 4.14–5.8)
RBC # BLD AUTO: 3.5 10*6/MM3 (ref 4.14–5.8)
RBC # BLD AUTO: 3.5 10*6/MM3 (ref 4.14–5.8)
RBC # BLD AUTO: 3.54 10*6/MM3 (ref 4.14–5.8)
RBC # BLD AUTO: 3.54 10*6/MM3 (ref 4.14–5.8)
RBC # BLD AUTO: 3.55 10*6/MM3 (ref 4.14–5.8)
RBC # BLD AUTO: 3.56 10*6/MM3 (ref 4.14–5.8)
RBC # BLD AUTO: 3.77 10*6/MM3 (ref 4.14–5.8)
RBC # BLD AUTO: 3.79 10*6/MM3 (ref 4.14–5.8)
RBC # BLD AUTO: 3.84 10*6/MM3 (ref 4.14–5.8)
RBC # BLD AUTO: 3.87 10*6/MM3 (ref 4.14–5.8)
RBC # BLD AUTO: 3.91 10*6/MM3 (ref 4.14–5.8)
RBC # UR STRIP: NEGATIVE /UL
RBC # UR: ABNORMAL /HPF
REF LAB TEST METHOD: ABNORMAL
RESULT: NORMAL NG/ML
RESULT: NORMAL NG/ML
RHINOVIRUS RNA SPEC NAA+PROBE: NOT DETECTED
RHINOVIRUS RNA SPEC NAA+PROBE: NOT DETECTED
RSV RNA NPH QL NAA+NON-PROBE: NOT DETECTED
RSV RNA NPH QL NAA+NON-PROBE: NOT DETECTED
SARS-COV-2 RNA PNL SPEC NAA+PROBE: NOT DETECTED
SARS-COV-2 RNA PNL SPEC NAA+PROBE: NOT DETECTED
SODIUM SERPL-SCNC: 136 MMOL/L (ref 136–145)
SODIUM SERPL-SCNC: 139 MMOL/L (ref 136–145)
SODIUM SERPL-SCNC: 140 MMOL/L (ref 136–145)
SODIUM SERPL-SCNC: 141 MMOL/L (ref 136–145)
SODIUM SERPL-SCNC: 142 MMOL/L (ref 136–145)
SODIUM SERPL-SCNC: 143 MMOL/L (ref 136–145)
SODIUM SERPL-SCNC: 143 MMOL/L (ref 136–145)
SODIUM UR-SCNC: 77 MMOL/L
SP GR UR STRIP: 1.01 (ref 1–1.03)
SP GR UR STRIP: 1.02 (ref 1–1.03)
SP GR UR: 1.02 (ref 1–1.03)
SPECIMEN SOURCE: NORMAL
SQUAMOUS #/AREA URNS HPF: ABNORMAL /HPF
STRESS TARGET HR: 121 BPM
TIBC SERPL-MCNC: 179 MCG/DL (ref 298–536)
TIBC SERPL-MCNC: 206 MCG/DL (ref 298–536)
TRANSFERRIN SERPL-MCNC: 120 MG/DL (ref 200–360)
TRANSFERRIN SERPL-MCNC: 138 MG/DL (ref 200–360)
TRIGL SERPL-MCNC: 51 MG/DL (ref 0–150)
TROPONIN T SERPL-MCNC: <0.01 NG/ML (ref 0–0.03)
TSH SERPL DL<=0.05 MIU/L-ACNC: 3.14 UIU/ML (ref 0.27–4.2)
UROBILINOGEN UR QL STRIP: ABNORMAL
UROBILINOGEN UR QL STRIP: ABNORMAL
UROBILINOGEN UR QL: NORMAL
VIRUS SPEC CULT: NORMAL
VIT B12 BLD-MCNC: 659 PG/ML (ref 211–946)
VLDLC SERPL-MCNC: 12 MG/DL (ref 5–40)
WBC # BLD AUTO: 5.1 10*3/MM3 (ref 3.4–10.8)
WBC # BLD AUTO: 5.5 10*3/MM3 (ref 3.4–10.8)
WBC # BLD AUTO: 5.5 10*3/MM3 (ref 3.4–10.8)
WBC # BLD AUTO: 5.9 10*3/MM3 (ref 3.4–10.8)
WBC # BLD AUTO: 6 10*3/MM3 (ref 3.4–10.8)
WBC # BLD AUTO: 6 10*3/MM3 (ref 3.4–10.8)
WBC # BLD AUTO: 6.1 10*3/MM3 (ref 3.4–10.8)
WBC # BLD AUTO: 6.2 10*3/MM3 (ref 3.4–10.8)
WBC # BLD AUTO: 6.4 10*3/MM3 (ref 3.4–10.8)
WBC # BLD AUTO: 6.4 10*3/MM3 (ref 3.4–10.8)
WBC # BLD AUTO: 6.69 10*3/MM3 (ref 3.4–10.8)
WBC # BLD AUTO: 6.9 10*3/MM3 (ref 3.4–10.8)
WBC # BLD AUTO: 7.1 10*3/MM3 (ref 3.4–10.8)
WBC # BLD AUTO: 7.3 10*3/MM3 (ref 3.4–10.8)
WBC # BLD AUTO: 7.3 10*3/MM3 (ref 3.4–10.8)
WBC # BLD AUTO: 7.7 10*3/MM3 (ref 3.4–10.8)
WBC # BLD AUTO: 8.5 10*3/MM3 (ref 3.4–10.8)
WBC # BLD AUTO: 9.1 10*3/MM3 (ref 3.4–10.8)
WBC UR QL AUTO: ABNORMAL /HPF

## 2021-01-01 PROCEDURE — 94799 UNLISTED PULMONARY SVC/PX: CPT

## 2021-01-01 PROCEDURE — 71250 CT THORAX DX C-: CPT

## 2021-01-01 PROCEDURE — 99233 SBSQ HOSP IP/OBS HIGH 50: CPT | Performed by: INTERNAL MEDICINE

## 2021-01-01 PROCEDURE — 63710000001 PREDNISONE PER 5 MG: Performed by: NURSE PRACTITIONER

## 2021-01-01 PROCEDURE — 84443 ASSAY THYROID STIM HORMONE: CPT | Performed by: PHYSICIAN ASSISTANT

## 2021-01-01 PROCEDURE — 87385 HISTOPLASMA CAPSUL AG IA: CPT | Performed by: INTERNAL MEDICINE

## 2021-01-01 PROCEDURE — 63710000001 PREDNISONE PER 1 MG: Performed by: NURSE PRACTITIONER

## 2021-01-01 PROCEDURE — 89051 BODY FLUID CELL COUNT: CPT | Performed by: NURSE PRACTITIONER

## 2021-01-01 PROCEDURE — 83735 ASSAY OF MAGNESIUM: CPT | Performed by: NURSE PRACTITIONER

## 2021-01-01 PROCEDURE — 87633 RESP VIRUS 12-25 TARGETS: CPT | Performed by: INTERNAL MEDICINE

## 2021-01-01 PROCEDURE — G0378 HOSPITAL OBSERVATION PER HR: HCPCS

## 2021-01-01 PROCEDURE — 82962 GLUCOSE BLOOD TEST: CPT

## 2021-01-01 PROCEDURE — 99239 HOSP IP/OBS DSCHRG MGMT >30: CPT | Performed by: HOSPITALIST

## 2021-01-01 PROCEDURE — G0463 HOSPITAL OUTPT CLINIC VISIT: HCPCS

## 2021-01-01 PROCEDURE — 25010000002 CEFTRIAXONE PER 250 MG: Performed by: INTERNAL MEDICINE

## 2021-01-01 PROCEDURE — 87496 CYTOMEG DNA AMP PROBE: CPT | Performed by: INTERNAL MEDICINE

## 2021-01-01 PROCEDURE — 25010000002 ENOXAPARIN PER 10 MG: Performed by: INTERNAL MEDICINE

## 2021-01-01 PROCEDURE — 25010000002 ENOXAPARIN PER 10 MG: Performed by: NURSE PRACTITIONER

## 2021-01-01 PROCEDURE — 97116 GAIT TRAINING THERAPY: CPT

## 2021-01-01 PROCEDURE — 93356 MYOCRD STRAIN IMG SPCKL TRCK: CPT

## 2021-01-01 PROCEDURE — 87102 FUNGUS ISOLATION CULTURE: CPT | Performed by: INTERNAL MEDICINE

## 2021-01-01 PROCEDURE — 99232 SBSQ HOSP IP/OBS MODERATE 35: CPT | Performed by: INTERNAL MEDICINE

## 2021-01-01 PROCEDURE — 99232 SBSQ HOSP IP/OBS MODERATE 35: CPT | Performed by: STUDENT IN AN ORGANIZED HEALTH CARE EDUCATION/TRAINING PROGRAM

## 2021-01-01 PROCEDURE — 99204 OFFICE O/P NEW MOD 45 MIN: CPT | Performed by: PSYCHIATRY & NEUROLOGY

## 2021-01-01 PROCEDURE — 25010000002 PHENYLEPHRINE 10 MG/ML SOLUTION 5 ML VIAL: Performed by: INTERNAL MEDICINE

## 2021-01-01 PROCEDURE — 93010 ELECTROCARDIOGRAM REPORT: CPT | Performed by: INTERNAL MEDICINE

## 2021-01-01 PROCEDURE — 83880 ASSAY OF NATRIURETIC PEPTIDE: CPT | Performed by: NURSE PRACTITIONER

## 2021-01-01 PROCEDURE — 85025 COMPLETE CBC W/AUTO DIFF WBC: CPT | Performed by: PHYSICIAN ASSISTANT

## 2021-01-01 PROCEDURE — 94760 N-INVAS EAR/PLS OXIMETRY 1: CPT

## 2021-01-01 PROCEDURE — G0180 MD CERTIFICATION HHA PATIENT: HCPCS | Performed by: FAMILY MEDICINE

## 2021-01-01 PROCEDURE — 80048 BASIC METABOLIC PNL TOTAL CA: CPT | Performed by: INTERNAL MEDICINE

## 2021-01-01 PROCEDURE — 99215 OFFICE O/P EST HI 40 MIN: CPT | Performed by: INTERNAL MEDICINE

## 2021-01-01 PROCEDURE — 88108 CYTOPATH CONCENTRATE TECH: CPT | Performed by: NURSE PRACTITIONER

## 2021-01-01 PROCEDURE — 99214 OFFICE O/P EST MOD 30 MIN: CPT | Performed by: INTERNAL MEDICINE

## 2021-01-01 PROCEDURE — 80069 RENAL FUNCTION PANEL: CPT | Performed by: INTERNAL MEDICINE

## 2021-01-01 PROCEDURE — 87635 SARS-COV-2 COVID-19 AMP PRB: CPT

## 2021-01-01 PROCEDURE — G0299 HHS/HOSPICE OF RN EA 15 MIN: HCPCS

## 2021-01-01 PROCEDURE — 87206 SMEAR FLUORESCENT/ACID STAI: CPT | Performed by: INTERNAL MEDICINE

## 2021-01-01 PROCEDURE — 85025 COMPLETE CBC W/AUTO DIFF WBC: CPT | Performed by: NURSE PRACTITIONER

## 2021-01-01 PROCEDURE — 88108 CYTOPATH CONCENTRATE TECH: CPT | Performed by: INTERNAL MEDICINE

## 2021-01-01 PROCEDURE — 87071 CULTURE AEROBIC QUANT OTHER: CPT | Performed by: INTERNAL MEDICINE

## 2021-01-01 PROCEDURE — 83986 ASSAY PH BODY FLUID NOS: CPT | Performed by: NURSE PRACTITIONER

## 2021-01-01 PROCEDURE — 83615 LACTATE (LD) (LDH) ENZYME: CPT | Performed by: NURSE PRACTITIONER

## 2021-01-01 PROCEDURE — 70450 CT HEAD/BRAIN W/O DYE: CPT

## 2021-01-01 PROCEDURE — 83880 ASSAY OF NATRIURETIC PEPTIDE: CPT | Performed by: INTERNAL MEDICINE

## 2021-01-01 PROCEDURE — 84466 ASSAY OF TRANSFERRIN: CPT | Performed by: INTERNAL MEDICINE

## 2021-01-01 PROCEDURE — 80053 COMPREHEN METABOLIC PANEL: CPT | Performed by: NURSE PRACTITIONER

## 2021-01-01 PROCEDURE — 85025 COMPLETE CBC W/AUTO DIFF WBC: CPT | Performed by: INTERNAL MEDICINE

## 2021-01-01 PROCEDURE — 71045 X-RAY EXAM CHEST 1 VIEW: CPT

## 2021-01-01 PROCEDURE — G0157 HHC PT ASSISTANT EA 15: HCPCS

## 2021-01-01 PROCEDURE — 72220 X-RAY EXAM SACRUM TAILBONE: CPT

## 2021-01-01 PROCEDURE — 25010000002 PROPOFOL 10 MG/ML EMULSION: Performed by: ANESTHESIOLOGIST ASSISTANT

## 2021-01-01 PROCEDURE — 72148 MRI LUMBAR SPINE W/O DYE: CPT

## 2021-01-01 PROCEDURE — 76775 US EXAM ABDO BACK WALL LIM: CPT

## 2021-01-01 PROCEDURE — 94618 PULMONARY STRESS TESTING: CPT

## 2021-01-01 PROCEDURE — 25010000002 MAGNESIUM SULFATE IN D5W 1G/100ML (PREMIX) 1-5 GM/100ML-% SOLUTION: Performed by: NURSE PRACTITIONER

## 2021-01-01 PROCEDURE — 97110 THERAPEUTIC EXERCISES: CPT

## 2021-01-01 PROCEDURE — 94640 AIRWAY INHALATION TREATMENT: CPT

## 2021-01-01 PROCEDURE — 99495 TRANSJ CARE MGMT MOD F2F 14D: CPT | Performed by: PHYSICIAN ASSISTANT

## 2021-01-01 PROCEDURE — G0151 HHCP-SERV OF PT,EA 15 MIN: HCPCS

## 2021-01-01 PROCEDURE — 0B9C7ZX DRAINAGE OF RIGHT UPPER LUNG LOBE, VIA NATURAL OR ARTIFICIAL OPENING, DIAGNOSTIC: ICD-10-PCS | Performed by: INTERNAL MEDICINE

## 2021-01-01 PROCEDURE — 84484 ASSAY OF TROPONIN QUANT: CPT | Performed by: NURSE PRACTITIONER

## 2021-01-01 PROCEDURE — 85027 COMPLETE CBC AUTOMATED: CPT | Performed by: STUDENT IN AN ORGANIZED HEALTH CARE EDUCATION/TRAINING PROGRAM

## 2021-01-01 PROCEDURE — 85025 COMPLETE CBC W/AUTO DIFF WBC: CPT | Performed by: HOSPITALIST

## 2021-01-01 PROCEDURE — 25010000002 ALBUMIN HUMAN 25% PER 50 ML: Performed by: NURSE PRACTITIONER

## 2021-01-01 PROCEDURE — 84132 ASSAY OF SERUM POTASSIUM: CPT | Performed by: NURSE PRACTITIONER

## 2021-01-01 PROCEDURE — 97530 THERAPEUTIC ACTIVITIES: CPT

## 2021-01-01 PROCEDURE — 82043 UR ALBUMIN QUANTITATIVE: CPT | Performed by: HOSPITALIST

## 2021-01-01 PROCEDURE — 99239 HOSP IP/OBS DSCHRG MGMT >30: CPT | Performed by: STUDENT IN AN ORGANIZED HEALTH CARE EDUCATION/TRAINING PROGRAM

## 2021-01-01 PROCEDURE — 93356 MYOCRD STRAIN IMG SPCKL TRCK: CPT | Performed by: INTERNAL MEDICINE

## 2021-01-01 PROCEDURE — 85730 THROMBOPLASTIN TIME PARTIAL: CPT | Performed by: NURSE PRACTITIONER

## 2021-01-01 PROCEDURE — 0W9930Z DRAINAGE OF RIGHT PLEURAL CAVITY WITH DRAINAGE DEVICE, PERCUTANEOUS APPROACH: ICD-10-PCS | Performed by: INTERNAL MEDICINE

## 2021-01-01 PROCEDURE — 99222 1ST HOSP IP/OBS MODERATE 55: CPT | Performed by: NURSE PRACTITIONER

## 2021-01-01 PROCEDURE — P9047 ALBUMIN (HUMAN), 25%, 50ML: HCPCS | Performed by: NURSE PRACTITIONER

## 2021-01-01 PROCEDURE — 25010000002 FUROSEMIDE PER 20 MG: Performed by: NURSE PRACTITIONER

## 2021-01-01 PROCEDURE — 87798 DETECT AGENT NOS DNA AMP: CPT | Performed by: INTERNAL MEDICINE

## 2021-01-01 PROCEDURE — 85027 COMPLETE CBC AUTOMATED: CPT | Performed by: HOSPITALIST

## 2021-01-01 PROCEDURE — 95816 EEG AWAKE AND DROWSY: CPT

## 2021-01-01 PROCEDURE — 87116 MYCOBACTERIA CULTURE: CPT | Performed by: INTERNAL MEDICINE

## 2021-01-01 PROCEDURE — 99232 SBSQ HOSP IP/OBS MODERATE 35: CPT | Performed by: HOSPITALIST

## 2021-01-01 PROCEDURE — 81003 URINALYSIS AUTO W/O SCOPE: CPT | Performed by: FAMILY MEDICINE

## 2021-01-01 PROCEDURE — 1111F DSCHRG MED/CURRENT MED MERGE: CPT | Performed by: PHYSICIAN ASSISTANT

## 2021-01-01 PROCEDURE — 84466 ASSAY OF TRANSFERRIN: CPT | Performed by: HOSPITALIST

## 2021-01-01 PROCEDURE — 87086 URINE CULTURE/COLONY COUNT: CPT | Performed by: INTERNAL MEDICINE

## 2021-01-01 PROCEDURE — 87635 SARS-COV-2 COVID-19 AMP PRB: CPT | Performed by: HOSPITALIST

## 2021-01-01 PROCEDURE — 87205 SMEAR GRAM STAIN: CPT | Performed by: INTERNAL MEDICINE

## 2021-01-01 PROCEDURE — 81001 URINALYSIS AUTO W/SCOPE: CPT | Performed by: INTERNAL MEDICINE

## 2021-01-01 PROCEDURE — 85610 PROTHROMBIN TIME: CPT | Performed by: NURSE PRACTITIONER

## 2021-01-01 PROCEDURE — 97163 PT EVAL HIGH COMPLEX 45 MIN: CPT

## 2021-01-01 PROCEDURE — 82746 ASSAY OF FOLIC ACID SERUM: CPT | Performed by: PHYSICIAN ASSISTANT

## 2021-01-01 PROCEDURE — 81003 URINALYSIS AUTO W/O SCOPE: CPT | Performed by: PHYSICIAN ASSISTANT

## 2021-01-01 PROCEDURE — 84157 ASSAY OF PROTEIN OTHER: CPT | Performed by: NURSE PRACTITIONER

## 2021-01-01 PROCEDURE — 83540 ASSAY OF IRON: CPT | Performed by: INTERNAL MEDICINE

## 2021-01-01 PROCEDURE — 84300 ASSAY OF URINE SODIUM: CPT | Performed by: HOSPITALIST

## 2021-01-01 PROCEDURE — 80048 BASIC METABOLIC PNL TOTAL CA: CPT | Performed by: HOSPITALIST

## 2021-01-01 PROCEDURE — 80053 COMPREHEN METABOLIC PANEL: CPT | Performed by: PHYSICIAN ASSISTANT

## 2021-01-01 PROCEDURE — 84446 ASSAY OF VITAMIN E: CPT | Performed by: PHYSICIAN ASSISTANT

## 2021-01-01 PROCEDURE — 82570 ASSAY OF URINE CREATININE: CPT | Performed by: HOSPITALIST

## 2021-01-01 PROCEDURE — 87070 CULTURE OTHR SPECIMN AEROBIC: CPT | Performed by: NURSE PRACTITIONER

## 2021-01-01 PROCEDURE — 87252 VIRUS INOCULATION TISSUE: CPT | Performed by: INTERNAL MEDICINE

## 2021-01-01 PROCEDURE — 0B9F8ZX DRAINAGE OF RIGHT LOWER LUNG LOBE, VIA NATURAL OR ARTIFICIAL OPENING ENDOSCOPIC, DIAGNOSTIC: ICD-10-PCS | Performed by: INTERNAL MEDICINE

## 2021-01-01 PROCEDURE — 87798 DETECT AGENT NOS DNA AMP: CPT | Performed by: HOSPITALIST

## 2021-01-01 PROCEDURE — G0156 HHCP-SVS OF AIDE,EA 15 MIN: HCPCS

## 2021-01-01 PROCEDURE — 25010000002 PROPOFOL 200 MG/20ML EMULSION: Performed by: ANESTHESIOLOGY

## 2021-01-01 PROCEDURE — 80048 BASIC METABOLIC PNL TOTAL CA: CPT | Performed by: STUDENT IN AN ORGANIZED HEALTH CARE EDUCATION/TRAINING PROGRAM

## 2021-01-01 PROCEDURE — 87070 CULTURE OTHR SPECIMN AEROBIC: CPT | Performed by: INTERNAL MEDICINE

## 2021-01-01 PROCEDURE — 80048 BASIC METABOLIC PNL TOTAL CA: CPT | Performed by: NURSE PRACTITIONER

## 2021-01-01 PROCEDURE — 87205 SMEAR GRAM STAIN: CPT | Performed by: NURSE PRACTITIONER

## 2021-01-01 PROCEDURE — 83540 ASSAY OF IRON: CPT | Performed by: HOSPITALIST

## 2021-01-01 PROCEDURE — 82140 ASSAY OF AMMONIA: CPT | Performed by: INTERNAL MEDICINE

## 2021-01-01 PROCEDURE — 80061 LIPID PANEL: CPT | Performed by: PHYSICIAN ASSISTANT

## 2021-01-01 PROCEDURE — 36415 COLL VENOUS BLD VENIPUNCTURE: CPT | Performed by: PHYSICIAN ASSISTANT

## 2021-01-01 PROCEDURE — 93005 ELECTROCARDIOGRAM TRACING: CPT | Performed by: HOSPITALIST

## 2021-01-01 PROCEDURE — 99219 PR INITIAL OBSERVATION CARE/DAY 50 MINUTES: CPT | Performed by: NURSE PRACTITIONER

## 2021-01-01 PROCEDURE — 99233 SBSQ HOSP IP/OBS HIGH 50: CPT | Performed by: STUDENT IN AN ORGANIZED HEALTH CARE EDUCATION/TRAINING PROGRAM

## 2021-01-01 PROCEDURE — 87305 ASPERGILLUS AG IA: CPT | Performed by: INTERNAL MEDICINE

## 2021-01-01 PROCEDURE — 93306 TTE W/DOPPLER COMPLETE: CPT | Performed by: INTERNAL MEDICINE

## 2021-01-01 PROCEDURE — 82607 VITAMIN B-12: CPT | Performed by: PHYSICIAN ASSISTANT

## 2021-01-01 PROCEDURE — 99214 OFFICE O/P EST MOD 30 MIN: CPT | Performed by: PHYSICIAN ASSISTANT

## 2021-01-01 PROCEDURE — 93306 TTE W/DOPPLER COMPLETE: CPT

## 2021-01-01 PROCEDURE — 80074 ACUTE HEPATITIS PANEL: CPT | Performed by: HOSPITALIST

## 2021-01-01 PROCEDURE — 95816 EEG AWAKE AND DROWSY: CPT | Performed by: PSYCHIATRY & NEUROLOGY

## 2021-01-01 PROCEDURE — 76705 ECHO EXAM OF ABDOMEN: CPT

## 2021-01-01 PROCEDURE — 93005 ELECTROCARDIOGRAM TRACING: CPT | Performed by: NURSE PRACTITIONER

## 2021-01-01 RX ORDER — CALCIUM GLUCONATE 20 MG/ML
2 INJECTION, SOLUTION INTRAVENOUS ONCE AS NEEDED
Status: DISCONTINUED | OUTPATIENT
Start: 2021-01-01 | End: 2021-01-01

## 2021-01-01 RX ORDER — ALUMINA, MAGNESIA, AND SIMETHICONE 2400; 2400; 240 MG/30ML; MG/30ML; MG/30ML
15 SUSPENSION ORAL EVERY 6 HOURS PRN
Status: DISCONTINUED | OUTPATIENT
Start: 2021-01-01 | End: 2021-01-01 | Stop reason: HOSPADM

## 2021-01-01 RX ORDER — ALBUTEROL SULFATE 2.5 MG/3ML
2.5 SOLUTION RESPIRATORY (INHALATION) EVERY 6 HOURS PRN
Status: DISCONTINUED | OUTPATIENT
Start: 2021-01-01 | End: 2021-01-01 | Stop reason: HOSPADM

## 2021-01-01 RX ORDER — SODIUM CHLORIDE 0.9 % (FLUSH) 0.9 %
10 SYRINGE (ML) INJECTION EVERY 12 HOURS SCHEDULED
Status: DISCONTINUED | OUTPATIENT
Start: 2021-01-01 | End: 2021-01-01 | Stop reason: HOSPADM

## 2021-01-01 RX ORDER — LIDOCAINE 50 MG/G
OINTMENT TOPICAL AS NEEDED
Status: DISCONTINUED | OUTPATIENT
Start: 2021-01-01 | End: 2021-01-01 | Stop reason: HOSPADM

## 2021-01-01 RX ORDER — PREDNISONE 20 MG/1
20 TABLET ORAL DAILY
Status: COMPLETED | OUTPATIENT
Start: 2021-01-01 | End: 2021-01-01

## 2021-01-01 RX ORDER — PHENOL 1.4 %
600 AEROSOL, SPRAY (ML) MUCOUS MEMBRANE DAILY
Qty: 30 TABLET | Refills: 11 | Status: SHIPPED | OUTPATIENT
Start: 2021-01-01

## 2021-01-01 RX ORDER — FUROSEMIDE 20 MG/1
20 TABLET ORAL DAILY
Qty: 30 TABLET | Refills: 0 | Status: SHIPPED | OUTPATIENT
Start: 2021-01-01 | End: 2021-01-01 | Stop reason: SDUPTHER

## 2021-01-01 RX ORDER — ONDANSETRON 2 MG/ML
4 INJECTION INTRAMUSCULAR; INTRAVENOUS EVERY 6 HOURS PRN
Status: DISCONTINUED | OUTPATIENT
Start: 2021-01-01 | End: 2021-01-01 | Stop reason: HOSPADM

## 2021-01-01 RX ORDER — LIDOCAINE HYDROCHLORIDE 20 MG/ML
INJECTION, SOLUTION EPIDURAL; INFILTRATION; INTRACAUDAL; PERINEURAL AS NEEDED
Status: DISCONTINUED | OUTPATIENT
Start: 2021-01-01 | End: 2021-01-01 | Stop reason: SURG

## 2021-01-01 RX ORDER — CHOLECALCIFEROL (VITAMIN D3) 125 MCG
5 CAPSULE ORAL NIGHTLY PRN
Status: DISCONTINUED | OUTPATIENT
Start: 2021-01-01 | End: 2021-01-01 | Stop reason: HOSPADM

## 2021-01-01 RX ORDER — AMIODARONE HYDROCHLORIDE 200 MG/1
200 TABLET ORAL 2 TIMES DAILY
COMMUNITY
End: 2021-01-01 | Stop reason: SDUPTHER

## 2021-01-01 RX ORDER — EPINEPHRINE 0.1 MG/ML
SYRINGE (ML) INJECTION
Status: DISCONTINUED
Start: 2021-01-01 | End: 2021-01-01 | Stop reason: WASHOUT

## 2021-01-01 RX ORDER — ACETAMINOPHEN 160 MG/5ML
650 SOLUTION ORAL EVERY 4 HOURS PRN
Status: DISCONTINUED | OUTPATIENT
Start: 2021-01-01 | End: 2021-01-01 | Stop reason: HOSPADM

## 2021-01-01 RX ORDER — LIDOCAINE HYDROCHLORIDE 20 MG/ML
INJECTION, SOLUTION EPIDURAL; INFILTRATION; INTRACAUDAL; PERINEURAL
Status: DISPENSED
Start: 2021-01-01 | End: 2021-01-01

## 2021-01-01 RX ORDER — ACETAMINOPHEN 650 MG/1
650 SUPPOSITORY RECTAL EVERY 4 HOURS PRN
Status: DISCONTINUED | OUTPATIENT
Start: 2021-01-01 | End: 2021-01-01 | Stop reason: HOSPADM

## 2021-01-01 RX ORDER — TAMSULOSIN HYDROCHLORIDE 0.4 MG/1
0.4 CAPSULE ORAL NIGHTLY
Status: DISCONTINUED | OUTPATIENT
Start: 2021-01-01 | End: 2021-01-01

## 2021-01-01 RX ORDER — LIDOCAINE 50 MG/G
OINTMENT TOPICAL
Status: DISPENSED
Start: 2021-01-01 | End: 2021-01-01

## 2021-01-01 RX ORDER — ALBUMIN (HUMAN) 12.5 G/50ML
25 SOLUTION INTRAVENOUS EVERY 4 HOURS
Status: COMPLETED | OUTPATIENT
Start: 2021-01-01 | End: 2021-01-01

## 2021-01-01 RX ORDER — ROSUVASTATIN CALCIUM 10 MG/1
40 TABLET, COATED ORAL DAILY
Status: DISCONTINUED | OUTPATIENT
Start: 2021-01-01 | End: 2021-01-01 | Stop reason: HOSPADM

## 2021-01-01 RX ORDER — MIDAZOLAM HYDROCHLORIDE 1 MG/ML
0.5 INJECTION INTRAMUSCULAR; INTRAVENOUS
Status: DISCONTINUED | OUTPATIENT
Start: 2021-01-01 | End: 2021-01-01 | Stop reason: HOSPADM

## 2021-01-01 RX ORDER — TRAZODONE HYDROCHLORIDE 50 MG/1
50 TABLET ORAL NIGHTLY
Status: DISCONTINUED | OUTPATIENT
Start: 2021-01-01 | End: 2021-01-01 | Stop reason: HOSPADM

## 2021-01-01 RX ORDER — METOPROLOL SUCCINATE 25 MG/1
25 TABLET, EXTENDED RELEASE ORAL
Status: DISCONTINUED | OUTPATIENT
Start: 2021-01-01 | End: 2021-01-01

## 2021-01-01 RX ORDER — LIDOCAINE HYDROCHLORIDE 10 MG/ML
INJECTION, SOLUTION EPIDURAL; INFILTRATION; INTRACAUDAL; PERINEURAL
Status: DISPENSED
Start: 2021-01-01 | End: 2021-01-01

## 2021-01-01 RX ORDER — GUAIFENESIN 600 MG/1
1200 TABLET, EXTENDED RELEASE ORAL 2 TIMES DAILY
Status: DISCONTINUED | OUTPATIENT
Start: 2021-01-01 | End: 2021-01-01 | Stop reason: HOSPADM

## 2021-01-01 RX ORDER — LIDOCAINE HYDROCHLORIDE 20 MG/ML
JELLY TOPICAL AS NEEDED
Status: DISCONTINUED | OUTPATIENT
Start: 2021-01-01 | End: 2021-01-01 | Stop reason: HOSPADM

## 2021-01-01 RX ORDER — TORSEMIDE 10 MG/1
20 TABLET ORAL DAILY
Status: DISCONTINUED | OUTPATIENT
Start: 2021-01-01 | End: 2021-01-01

## 2021-01-01 RX ORDER — MAGNESIUM SULFATE 1 G/100ML
1 INJECTION INTRAVENOUS AS NEEDED
Status: DISCONTINUED | OUTPATIENT
Start: 2021-01-01 | End: 2021-01-01 | Stop reason: HOSPADM

## 2021-01-01 RX ORDER — IPRATROPIUM BROMIDE AND ALBUTEROL SULFATE 2.5; .5 MG/3ML; MG/3ML
3 SOLUTION RESPIRATORY (INHALATION)
Status: DISCONTINUED | OUTPATIENT
Start: 2021-01-01 | End: 2021-01-01 | Stop reason: HOSPADM

## 2021-01-01 RX ORDER — FLUCONAZOLE 100 MG/1
100 TABLET ORAL DAILY
Qty: 5 TABLET | Refills: 0 | Status: SHIPPED | OUTPATIENT
Start: 2021-01-01 | End: 2021-01-01

## 2021-01-01 RX ORDER — SODIUM CHLORIDE 0.9 % (FLUSH) 0.9 %
10 SYRINGE (ML) INJECTION AS NEEDED
Status: DISCONTINUED | OUTPATIENT
Start: 2021-01-01 | End: 2021-01-01 | Stop reason: HOSPADM

## 2021-01-01 RX ORDER — DONEPEZIL HYDROCHLORIDE 5 MG/1
5 TABLET, FILM COATED ORAL NIGHTLY
Qty: 30 TABLET | Refills: 5 | Status: SHIPPED | OUTPATIENT
Start: 2021-01-01 | End: 2022-01-01

## 2021-01-01 RX ORDER — DILTIAZEM HYDROCHLORIDE 240 MG/1
240 CAPSULE, COATED, EXTENDED RELEASE ORAL DAILY
Status: DISCONTINUED | OUTPATIENT
Start: 2021-01-01 | End: 2021-01-01 | Stop reason: HOSPADM

## 2021-01-01 RX ORDER — BUDESONIDE 0.5 MG/2ML
0.5 INHALANT ORAL
Status: DISCONTINUED | OUTPATIENT
Start: 2021-01-01 | End: 2021-01-01 | Stop reason: HOSPADM

## 2021-01-01 RX ORDER — PROPOFOL 10 MG/ML
VIAL (ML) INTRAVENOUS AS NEEDED
Status: DISCONTINUED | OUTPATIENT
Start: 2021-01-01 | End: 2021-01-01 | Stop reason: SURG

## 2021-01-01 RX ORDER — APIXABAN 5 MG/1
TABLET, FILM COATED ORAL
Qty: 180 TABLET | Refills: 1 | Status: SHIPPED | OUTPATIENT
Start: 2021-01-01 | End: 2021-01-01

## 2021-01-01 RX ORDER — DIGOXIN 125 MCG
125 TABLET ORAL
Status: DISCONTINUED | OUTPATIENT
Start: 2021-01-01 | End: 2021-01-01 | Stop reason: HOSPADM

## 2021-01-01 RX ORDER — FUROSEMIDE 10 MG/ML
40 INJECTION INTRAMUSCULAR; INTRAVENOUS EVERY 12 HOURS
Status: DISCONTINUED | OUTPATIENT
Start: 2021-01-01 | End: 2021-01-01

## 2021-01-01 RX ORDER — MIDODRINE HYDROCHLORIDE 2.5 MG/1
2.5 TABLET ORAL
Status: DISCONTINUED | OUTPATIENT
Start: 2021-01-01 | End: 2021-01-01

## 2021-01-01 RX ORDER — FUROSEMIDE 20 MG/1
20 TABLET ORAL DAILY
Status: DISCONTINUED | OUTPATIENT
Start: 2021-01-01 | End: 2021-01-01 | Stop reason: HOSPADM

## 2021-01-01 RX ORDER — APIXABAN 5 MG/1
TABLET, FILM COATED ORAL
Qty: 180 TABLET | Refills: 3 | Status: SHIPPED | OUTPATIENT
Start: 2021-01-01 | End: 2022-01-01

## 2021-01-01 RX ORDER — PANTOPRAZOLE SODIUM 40 MG/1
40 TABLET, DELAYED RELEASE ORAL
Status: DISCONTINUED | OUTPATIENT
Start: 2021-01-01 | End: 2021-01-01

## 2021-01-01 RX ORDER — TRAZODONE HYDROCHLORIDE 150 MG/1
150 TABLET ORAL NIGHTLY
Qty: 30 TABLET | Refills: 3 | Status: SHIPPED | OUTPATIENT
Start: 2021-01-01 | End: 2021-01-01

## 2021-01-01 RX ORDER — MIDODRINE HYDROCHLORIDE 5 MG/1
10 TABLET ORAL
Status: DISCONTINUED | OUTPATIENT
Start: 2021-01-01 | End: 2021-01-01 | Stop reason: HOSPADM

## 2021-01-01 RX ORDER — TRAZODONE HYDROCHLORIDE 50 MG/1
50 TABLET ORAL NIGHTLY
Qty: 90 TABLET | Refills: 0 | Status: SHIPPED | OUTPATIENT
Start: 2021-01-01 | End: 2021-01-01

## 2021-01-01 RX ORDER — IPRATROPIUM BROMIDE AND ALBUTEROL SULFATE 2.5; .5 MG/3ML; MG/3ML
3 SOLUTION RESPIRATORY (INHALATION) EVERY 4 HOURS PRN
Status: DISCONTINUED | OUTPATIENT
Start: 2021-01-01 | End: 2021-01-01 | Stop reason: HOSPADM

## 2021-01-01 RX ORDER — MIDODRINE HYDROCHLORIDE 10 MG/1
10 TABLET ORAL
Qty: 90 TABLET | Refills: 2 | Status: SHIPPED | OUTPATIENT
Start: 2021-01-01 | End: 2021-01-01 | Stop reason: SDUPTHER

## 2021-01-01 RX ORDER — TAMSULOSIN HYDROCHLORIDE 0.4 MG/1
0.4 CAPSULE ORAL DAILY
Status: DISCONTINUED | OUTPATIENT
Start: 2021-01-01 | End: 2021-01-01

## 2021-01-01 RX ORDER — AMIODARONE HYDROCHLORIDE 200 MG/1
200 TABLET ORAL EVERY 12 HOURS SCHEDULED
Status: DISCONTINUED | OUTPATIENT
Start: 2021-01-01 | End: 2021-01-01 | Stop reason: HOSPADM

## 2021-01-01 RX ORDER — PREDNISONE 10 MG/1
10 TABLET ORAL DAILY
Status: COMPLETED | OUTPATIENT
Start: 2021-01-01 | End: 2021-01-01

## 2021-01-01 RX ORDER — SODIUM CHLORIDE 9 MG/ML
INJECTION, SOLUTION INTRAVENOUS CONTINUOUS PRN
Status: DISCONTINUED | OUTPATIENT
Start: 2021-01-01 | End: 2021-01-01 | Stop reason: SURG

## 2021-01-01 RX ORDER — FUROSEMIDE 10 MG/ML
40 INJECTION INTRAMUSCULAR; INTRAVENOUS EVERY 12 HOURS
Status: COMPLETED | OUTPATIENT
Start: 2021-01-01 | End: 2021-01-01

## 2021-01-01 RX ORDER — ACETAMINOPHEN 325 MG/1
650 TABLET ORAL EVERY 4 HOURS PRN
Status: DISCONTINUED | OUTPATIENT
Start: 2021-01-01 | End: 2021-01-01 | Stop reason: HOSPADM

## 2021-01-01 RX ORDER — FINASTERIDE 5 MG/1
5 TABLET, FILM COATED ORAL DAILY
Status: DISCONTINUED | OUTPATIENT
Start: 2021-01-01 | End: 2021-01-01 | Stop reason: HOSPADM

## 2021-01-01 RX ORDER — METOPROLOL SUCCINATE 25 MG/1
25 TABLET, EXTENDED RELEASE ORAL
Status: DISCONTINUED | OUTPATIENT
Start: 2021-01-01 | End: 2021-01-01 | Stop reason: HOSPADM

## 2021-01-01 RX ORDER — CALCIUM GLUCONATE 20 MG/ML
1 INJECTION, SOLUTION INTRAVENOUS ONCE AS NEEDED
Status: DISCONTINUED | OUTPATIENT
Start: 2021-01-01 | End: 2021-01-01

## 2021-01-01 RX ORDER — MIDODRINE HYDROCHLORIDE 10 MG/1
10 TABLET ORAL
Qty: 90 TABLET | Refills: 2 | Status: SHIPPED | OUTPATIENT
Start: 2021-01-01

## 2021-01-01 RX ORDER — LIDOCAINE HYDROCHLORIDE 20 MG/ML
JELLY TOPICAL
Status: DISPENSED
Start: 2021-01-01 | End: 2021-01-01

## 2021-01-01 RX ORDER — LIDOCAINE HYDROCHLORIDE 20 MG/ML
INJECTION, SOLUTION INFILTRATION; PERINEURAL AS NEEDED
Status: DISCONTINUED | OUTPATIENT
Start: 2021-01-01 | End: 2021-01-01 | Stop reason: HOSPADM

## 2021-01-01 RX ORDER — DILTIAZEM HYDROCHLORIDE 240 MG/1
240 CAPSULE, COATED, EXTENDED RELEASE ORAL DAILY
Qty: 90 CAPSULE | Refills: 3 | Status: SHIPPED | OUTPATIENT
Start: 2021-01-01 | End: 2021-01-01 | Stop reason: HOSPADM

## 2021-01-01 RX ORDER — LIDOCAINE HYDROCHLORIDE 20 MG/ML
INJECTION, SOLUTION EPIDURAL; INFILTRATION; INTRACAUDAL; PERINEURAL
Status: COMPLETED
Start: 2021-01-01 | End: 2021-01-01

## 2021-01-01 RX ORDER — POTASSIUM CHLORIDE 20 MEQ/1
20 TABLET, EXTENDED RELEASE ORAL DAILY
Status: DISCONTINUED | OUTPATIENT
Start: 2021-01-01 | End: 2021-01-01 | Stop reason: HOSPADM

## 2021-01-01 RX ORDER — HYDROMORPHONE HCL 110MG/55ML
0.5 PATIENT CONTROLLED ANALGESIA SYRINGE INTRAVENOUS
Status: DISCONTINUED | OUTPATIENT
Start: 2021-01-01 | End: 2021-01-01 | Stop reason: HOSPADM

## 2021-01-01 RX ORDER — MAGNESIUM SULFATE HEPTAHYDRATE 40 MG/ML
2 INJECTION, SOLUTION INTRAVENOUS AS NEEDED
Status: DISCONTINUED | OUTPATIENT
Start: 2021-01-01 | End: 2021-01-01 | Stop reason: HOSPADM

## 2021-01-01 RX ORDER — ONDANSETRON 4 MG/1
4 TABLET, FILM COATED ORAL EVERY 6 HOURS PRN
Status: DISCONTINUED | OUTPATIENT
Start: 2021-01-01 | End: 2021-01-01 | Stop reason: HOSPADM

## 2021-01-01 RX ORDER — THEOPHYLLINE 300 MG/1
300 TABLET, EXTENDED RELEASE ORAL DAILY
Status: DISCONTINUED | OUTPATIENT
Start: 2021-01-01 | End: 2021-01-01 | Stop reason: HOSPADM

## 2021-01-01 RX ORDER — AMIODARONE HYDROCHLORIDE 200 MG/1
200 TABLET ORAL 2 TIMES DAILY
Qty: 180 TABLET | Refills: 3 | Status: SHIPPED | OUTPATIENT
Start: 2021-01-01

## 2021-01-01 RX ORDER — TAMSULOSIN HYDROCHLORIDE 0.4 MG/1
1 CAPSULE ORAL 2 TIMES DAILY
Qty: 60 CAPSULE | Refills: 0 | Status: SHIPPED | OUTPATIENT
Start: 2021-01-01 | End: 2021-01-01 | Stop reason: ALTCHOICE

## 2021-01-01 RX ORDER — METOPROLOL SUCCINATE 25 MG/1
25 TABLET, EXTENDED RELEASE ORAL
Qty: 30 TABLET | Refills: 2 | Status: SHIPPED | OUTPATIENT
Start: 2021-01-01 | End: 2021-01-01

## 2021-01-01 RX ORDER — ROSUVASTATIN CALCIUM 10 MG/1
40 TABLET, COATED ORAL NIGHTLY
Status: DISCONTINUED | OUTPATIENT
Start: 2021-01-01 | End: 2021-01-01 | Stop reason: HOSPADM

## 2021-01-01 RX ORDER — POTASSIUM CHLORIDE 20 MEQ/1
40 TABLET, EXTENDED RELEASE ORAL AS NEEDED
Status: DISCONTINUED | OUTPATIENT
Start: 2021-01-01 | End: 2021-01-01 | Stop reason: HOSPADM

## 2021-01-01 RX ORDER — FUROSEMIDE 20 MG/1
20 TABLET ORAL DAILY
Qty: 90 TABLET | Refills: 2 | Status: SHIPPED | OUTPATIENT
Start: 2021-01-01 | End: 2022-01-01

## 2021-01-01 RX ORDER — TAMSULOSIN HYDROCHLORIDE 0.4 MG/1
0.4 CAPSULE ORAL 2 TIMES DAILY
Status: DISCONTINUED | OUTPATIENT
Start: 2021-01-01 | End: 2021-01-01 | Stop reason: HOSPADM

## 2021-01-01 RX ORDER — TRAZODONE HYDROCHLORIDE 150 MG/1
150 TABLET ORAL NIGHTLY
Qty: 90 TABLET | Refills: 0 | Status: SHIPPED | OUTPATIENT
Start: 2021-01-01 | End: 2021-01-01

## 2021-01-01 RX ORDER — MIDODRINE HYDROCHLORIDE 5 MG/1
5 TABLET ORAL
Status: DISCONTINUED | OUTPATIENT
Start: 2021-01-01 | End: 2021-01-01

## 2021-01-01 RX ORDER — LIDOCAINE HYDROCHLORIDE 10 MG/ML
INJECTION, SOLUTION EPIDURAL; INFILTRATION; INTRACAUDAL; PERINEURAL AS NEEDED
Status: DISCONTINUED | OUTPATIENT
Start: 2021-01-01 | End: 2021-01-01 | Stop reason: HOSPADM

## 2021-01-01 RX ORDER — PROPOFOL 10 MG/ML
INJECTION, EMULSION INTRAVENOUS AS NEEDED
Status: DISCONTINUED | OUTPATIENT
Start: 2021-01-01 | End: 2021-01-01 | Stop reason: SURG

## 2021-01-01 RX ORDER — HEPARIN SODIUM 1000 [USP'U]/ML
INJECTION, SOLUTION INTRAVENOUS; SUBCUTANEOUS AS NEEDED
Status: DISCONTINUED | OUTPATIENT
Start: 2021-01-01 | End: 2021-01-01

## 2021-01-01 RX ORDER — DIGOXIN 125 MCG
125 TABLET ORAL
Qty: 30 TABLET | Refills: 2 | Status: SHIPPED | OUTPATIENT
Start: 2021-01-01 | End: 2022-01-01

## 2021-01-01 RX ORDER — TAMSULOSIN HYDROCHLORIDE 0.4 MG/1
0.4 CAPSULE ORAL 2 TIMES DAILY
Status: DISCONTINUED | OUTPATIENT
Start: 2021-01-01 | End: 2021-01-01

## 2021-01-01 RX ORDER — MAGNESIUM SULFATE HEPTAHYDRATE 40 MG/ML
2 INJECTION, SOLUTION INTRAVENOUS AS NEEDED
Status: ACTIVE | OUTPATIENT
Start: 2021-01-01 | End: 2021-01-01

## 2021-01-01 RX ORDER — TORSEMIDE 10 MG/1
10 TABLET ORAL DAILY
Status: DISCONTINUED | OUTPATIENT
Start: 2021-01-01 | End: 2021-01-01

## 2021-01-01 RX ORDER — CALCIUM CARBONATE 200(500)MG
2 TABLET,CHEWABLE ORAL DAILY
COMMUNITY
End: 2021-01-01 | Stop reason: SDUPTHER

## 2021-01-01 RX ORDER — POTASSIUM CHLORIDE 29.8 MG/ML
20 INJECTION INTRAVENOUS AS NEEDED
Status: DISCONTINUED | OUTPATIENT
Start: 2021-01-01 | End: 2021-01-01 | Stop reason: HOSPADM

## 2021-01-01 RX ORDER — ROSUVASTATIN CALCIUM 40 MG/1
40 TABLET, COATED ORAL DAILY
Qty: 90 TABLET | Refills: 3 | Status: SHIPPED | OUTPATIENT
Start: 2021-01-01

## 2021-01-01 RX ADMIN — FINASTERIDE 5 MG: 5 TABLET, FILM COATED ORAL at 18:52

## 2021-01-01 RX ADMIN — POTASSIUM CHLORIDE 20 MEQ: 1500 TABLET, EXTENDED RELEASE ORAL at 08:13

## 2021-01-01 RX ADMIN — CEFTRIAXONE SODIUM 2 G: 2 INJECTION, POWDER, FOR SOLUTION INTRAMUSCULAR; INTRAVENOUS at 12:33

## 2021-01-01 RX ADMIN — POTASSIUM CHLORIDE 40 MEQ: 1500 TABLET, EXTENDED RELEASE ORAL at 08:18

## 2021-01-01 RX ADMIN — BUDESONIDE 0.5 MG: 0.5 SUSPENSION RESPIRATORY (INHALATION) at 23:25

## 2021-01-01 RX ADMIN — MIDODRINE HYDROCHLORIDE 10 MG: 5 TABLET ORAL at 11:25

## 2021-01-01 RX ADMIN — CEFTRIAXONE SODIUM 2 G: 2 INJECTION, POWDER, FOR SOLUTION INTRAMUSCULAR; INTRAVENOUS at 12:23

## 2021-01-01 RX ADMIN — Medication 5 MG: at 20:14

## 2021-01-01 RX ADMIN — PHENYLEPHRINE HYDROCHLORIDE 0.5 MCG/KG/MIN: 10 INJECTION INTRAVENOUS at 18:07

## 2021-01-01 RX ADMIN — IPRATROPIUM BROMIDE AND ALBUTEROL SULFATE 3 ML: 2.5; .5 SOLUTION RESPIRATORY (INHALATION) at 23:20

## 2021-01-01 RX ADMIN — THEOPHYLLINE 300 MG: 300 TABLET, EXTENDED RELEASE ORAL at 09:17

## 2021-01-01 RX ADMIN — BUDESONIDE 0.5 MG: 0.5 SUSPENSION RESPIRATORY (INHALATION) at 06:11

## 2021-01-01 RX ADMIN — METOPROLOL SUCCINATE 25 MG: 25 TABLET, EXTENDED RELEASE ORAL at 09:57

## 2021-01-01 RX ADMIN — IPRATROPIUM BROMIDE AND ALBUTEROL SULFATE 3 ML: 2.5; .5 SOLUTION RESPIRATORY (INHALATION) at 23:48

## 2021-01-01 RX ADMIN — FINASTERIDE 5 MG: 5 TABLET, FILM COATED ORAL at 08:40

## 2021-01-01 RX ADMIN — Medication 10 ML: at 21:35

## 2021-01-01 RX ADMIN — TAMSULOSIN HYDROCHLORIDE 0.4 MG: 0.4 CAPSULE ORAL at 13:25

## 2021-01-01 RX ADMIN — AMIODARONE HYDROCHLORIDE 200 MG: 200 TABLET ORAL at 08:18

## 2021-01-01 RX ADMIN — AMIODARONE HYDROCHLORIDE 200 MG: 200 TABLET ORAL at 08:04

## 2021-01-01 RX ADMIN — DIGOXIN 125 MCG: 125 TABLET ORAL at 11:25

## 2021-01-01 RX ADMIN — ACETAMINOPHEN 650 MG: 325 TABLET, FILM COATED ORAL at 21:23

## 2021-01-01 RX ADMIN — ENOXAPARIN SODIUM 80 MG: 80 INJECTION, SOLUTION INTRAVENOUS; SUBCUTANEOUS at 04:07

## 2021-01-01 RX ADMIN — BUDESONIDE 0.5 MG: 0.5 SUSPENSION RESPIRATORY (INHALATION) at 07:16

## 2021-01-01 RX ADMIN — MIDODRINE HYDROCHLORIDE 5 MG: 5 TABLET ORAL at 09:16

## 2021-01-01 RX ADMIN — MIDODRINE HYDROCHLORIDE 10 MG: 5 TABLET ORAL at 08:12

## 2021-01-01 RX ADMIN — MIDODRINE HYDROCHLORIDE 10 MG: 5 TABLET ORAL at 12:06

## 2021-01-01 RX ADMIN — IPRATROPIUM BROMIDE AND ALBUTEROL SULFATE 3 ML: 2.5; .5 SOLUTION RESPIRATORY (INHALATION) at 15:17

## 2021-01-01 RX ADMIN — ENOXAPARIN SODIUM 70 MG: 80 INJECTION SUBCUTANEOUS at 03:43

## 2021-01-01 RX ADMIN — PROPOFOL 50 MG: 10 INJECTION, EMULSION INTRAVENOUS at 07:46

## 2021-01-01 RX ADMIN — TAMSULOSIN HYDROCHLORIDE 0.4 MG: 0.4 CAPSULE ORAL at 20:47

## 2021-01-01 RX ADMIN — BUDESONIDE 0.5 MG: 0.5 SUSPENSION RESPIRATORY (INHALATION) at 20:04

## 2021-01-01 RX ADMIN — MIDODRINE HYDROCHLORIDE 10 MG: 5 TABLET ORAL at 17:28

## 2021-01-01 RX ADMIN — BUDESONIDE 0.5 MG: 0.5 SUSPENSION RESPIRATORY (INHALATION) at 08:20

## 2021-01-01 RX ADMIN — BUDESONIDE 0.5 MG: 0.5 SUSPENSION RESPIRATORY (INHALATION) at 23:28

## 2021-01-01 RX ADMIN — GUAIFENESIN 1200 MG: 600 TABLET, EXTENDED RELEASE ORAL at 08:40

## 2021-01-01 RX ADMIN — BUDESONIDE 0.5 MG: 0.5 SUSPENSION RESPIRATORY (INHALATION) at 07:59

## 2021-01-01 RX ADMIN — APIXABAN 5 MG: 5 TABLET, FILM COATED ORAL at 08:32

## 2021-01-01 RX ADMIN — CEFTRIAXONE SODIUM 2 G: 2 INJECTION, POWDER, FOR SOLUTION INTRAMUSCULAR; INTRAVENOUS at 14:28

## 2021-01-01 RX ADMIN — ROSUVASTATIN CALCIUM 40 MG: 10 TABLET, FILM COATED ORAL at 21:05

## 2021-01-01 RX ADMIN — THEOPHYLLINE 300 MG: 300 TABLET, EXTENDED RELEASE ORAL at 08:18

## 2021-01-01 RX ADMIN — THEOPHYLLINE 300 MG: 300 TABLET, EXTENDED RELEASE ORAL at 09:30

## 2021-01-01 RX ADMIN — AMIODARONE HYDROCHLORIDE 200 MG: 200 TABLET ORAL at 21:34

## 2021-01-01 RX ADMIN — PROPOFOL 50 MG: 10 INJECTION, EMULSION INTRAVENOUS at 07:44

## 2021-01-01 RX ADMIN — PANTOPRAZOLE SODIUM 40 MG: 40 TABLET, DELAYED RELEASE ORAL at 06:31

## 2021-01-01 RX ADMIN — TORSEMIDE 10 MG: 10 TABLET ORAL at 09:17

## 2021-01-01 RX ADMIN — ROSUVASTATIN CALCIUM 40 MG: 10 TABLET, FILM COATED ORAL at 20:47

## 2021-01-01 RX ADMIN — GUAIFENESIN 1200 MG: 600 TABLET, EXTENDED RELEASE ORAL at 20:50

## 2021-01-01 RX ADMIN — ACETAMINOPHEN 650 MG: 325 TABLET, FILM COATED ORAL at 21:40

## 2021-01-01 RX ADMIN — ROSUVASTATIN 40 MG: 10 TABLET, FILM COATED ORAL at 08:09

## 2021-01-01 RX ADMIN — IPRATROPIUM BROMIDE AND ALBUTEROL SULFATE 3 ML: 2.5; .5 SOLUTION RESPIRATORY (INHALATION) at 00:05

## 2021-01-01 RX ADMIN — PANTOPRAZOLE SODIUM 40 MG: 40 TABLET, DELAYED RELEASE ORAL at 05:37

## 2021-01-01 RX ADMIN — SODIUM CHLORIDE 250 ML: 9 INJECTION, SOLUTION INTRAVENOUS at 14:07

## 2021-01-01 RX ADMIN — DIGOXIN 125 MCG: 125 TABLET ORAL at 12:32

## 2021-01-01 RX ADMIN — POTASSIUM CHLORIDE 20 MEQ: 1500 TABLET, EXTENDED RELEASE ORAL at 09:16

## 2021-01-01 RX ADMIN — FINASTERIDE 5 MG: 5 TABLET, FILM COATED ORAL at 14:57

## 2021-01-01 RX ADMIN — TAMSULOSIN HYDROCHLORIDE 0.4 MG: 0.4 CAPSULE ORAL at 20:22

## 2021-01-01 RX ADMIN — FINASTERIDE 5 MG: 5 TABLET, FILM COATED ORAL at 09:42

## 2021-01-01 RX ADMIN — THEOPHYLLINE 300 MG: 300 TABLET, EXTENDED RELEASE ORAL at 14:57

## 2021-01-01 RX ADMIN — IPRATROPIUM BROMIDE AND ALBUTEROL SULFATE 3 ML: 2.5; .5 SOLUTION RESPIRATORY (INHALATION) at 15:22

## 2021-01-01 RX ADMIN — GUAIFENESIN 1200 MG: 600 TABLET, EXTENDED RELEASE ORAL at 09:42

## 2021-01-01 RX ADMIN — AMIODARONE HYDROCHLORIDE 200 MG: 200 TABLET ORAL at 21:05

## 2021-01-01 RX ADMIN — IPRATROPIUM BROMIDE AND ALBUTEROL SULFATE 3 ML: 2.5; .5 SOLUTION RESPIRATORY (INHALATION) at 20:57

## 2021-01-01 RX ADMIN — LIDOCAINE HYDROCHLORIDE 50 MG: 20 INJECTION, SOLUTION EPIDURAL; INFILTRATION; INTRACAUDAL; PERINEURAL at 07:44

## 2021-01-01 RX ADMIN — AMIODARONE HYDROCHLORIDE 200 MG: 200 TABLET ORAL at 07:53

## 2021-01-01 RX ADMIN — IPRATROPIUM BROMIDE AND ALBUTEROL SULFATE 3 ML: 2.5; .5 SOLUTION RESPIRATORY (INHALATION) at 07:17

## 2021-01-01 RX ADMIN — PANTOPRAZOLE SODIUM 40 MG: 40 TABLET, DELAYED RELEASE ORAL at 05:29

## 2021-01-01 RX ADMIN — AMIODARONE HYDROCHLORIDE 200 MG: 200 TABLET ORAL at 08:40

## 2021-01-01 RX ADMIN — IPRATROPIUM BROMIDE AND ALBUTEROL SULFATE 3 ML: 2.5; .5 SOLUTION RESPIRATORY (INHALATION) at 14:34

## 2021-01-01 RX ADMIN — IPRATROPIUM BROMIDE AND ALBUTEROL SULFATE 3 ML: 2.5; .5 SOLUTION RESPIRATORY (INHALATION) at 18:31

## 2021-01-01 RX ADMIN — AMIODARONE HYDROCHLORIDE 200 MG: 200 TABLET ORAL at 10:58

## 2021-01-01 RX ADMIN — BUDESONIDE 0.5 MG: 0.5 SUSPENSION RESPIRATORY (INHALATION) at 07:12

## 2021-01-01 RX ADMIN — ROSUVASTATIN 40 MG: 10 TABLET, FILM COATED ORAL at 09:17

## 2021-01-01 RX ADMIN — APIXABAN 5 MG: 5 TABLET, FILM COATED ORAL at 08:09

## 2021-01-01 RX ADMIN — BUDESONIDE 0.5 MG: 0.5 SUSPENSION RESPIRATORY (INHALATION) at 07:47

## 2021-01-01 RX ADMIN — METOPROLOL SUCCINATE 25 MG: 25 TABLET, EXTENDED RELEASE ORAL at 09:17

## 2021-01-01 RX ADMIN — CARBIDOPA AND LEVODOPA 2.5 MG: 50; 200 TABLET, EXTENDED RELEASE ORAL at 10:58

## 2021-01-01 RX ADMIN — IPRATROPIUM BROMIDE AND ALBUTEROL SULFATE 3 ML: 2.5; .5 SOLUTION RESPIRATORY (INHALATION) at 07:53

## 2021-01-01 RX ADMIN — IPRATROPIUM BROMIDE AND ALBUTEROL SULFATE 3 ML: 2.5; .5 SOLUTION RESPIRATORY (INHALATION) at 15:02

## 2021-01-01 RX ADMIN — IPRATROPIUM BROMIDE AND ALBUTEROL SULFATE 3 ML: 2.5; .5 SOLUTION RESPIRATORY (INHALATION) at 15:34

## 2021-01-01 RX ADMIN — PREDNISONE 10 MG: 10 TABLET ORAL at 08:04

## 2021-01-01 RX ADMIN — AMIODARONE HYDROCHLORIDE 200 MG: 200 TABLET ORAL at 20:12

## 2021-01-01 RX ADMIN — IPRATROPIUM BROMIDE AND ALBUTEROL SULFATE 3 ML: 2.5; .5 SOLUTION RESPIRATORY (INHALATION) at 06:53

## 2021-01-01 RX ADMIN — DILTIAZEM HYDROCHLORIDE 240 MG: 240 CAPSULE, COATED, EXTENDED RELEASE ORAL at 14:56

## 2021-01-01 RX ADMIN — IPRATROPIUM BROMIDE AND ALBUTEROL SULFATE 3 ML: 2.5; .5 SOLUTION RESPIRATORY (INHALATION) at 07:29

## 2021-01-01 RX ADMIN — PANTOPRAZOLE SODIUM 40 MG: 40 TABLET, DELAYED RELEASE ORAL at 10:58

## 2021-01-01 RX ADMIN — ALBUMIN HUMAN 25 G: 0.25 SOLUTION INTRAVENOUS at 16:38

## 2021-01-01 RX ADMIN — MIDODRINE HYDROCHLORIDE 10 MG: 5 TABLET ORAL at 15:09

## 2021-01-01 RX ADMIN — THEOPHYLLINE 300 MG: 300 TABLET, EXTENDED RELEASE ORAL at 09:57

## 2021-01-01 RX ADMIN — MIDODRINE HYDROCHLORIDE 10 MG: 5 TABLET ORAL at 08:09

## 2021-01-01 RX ADMIN — ROSUVASTATIN CALCIUM 40 MG: 10 TABLET, FILM COATED ORAL at 21:35

## 2021-01-01 RX ADMIN — PREDNISONE 30 MG: 20 TABLET ORAL at 16:38

## 2021-01-01 RX ADMIN — ALBUMIN HUMAN 25 G: 0.25 SOLUTION INTRAVENOUS at 14:05

## 2021-01-01 RX ADMIN — AMIODARONE HYDROCHLORIDE 200 MG: 200 TABLET ORAL at 09:17

## 2021-01-01 RX ADMIN — AMIODARONE HYDROCHLORIDE 200 MG: 200 TABLET ORAL at 20:45

## 2021-01-01 RX ADMIN — AMIODARONE HYDROCHLORIDE 200 MG: 200 TABLET ORAL at 09:08

## 2021-01-01 RX ADMIN — CARBIDOPA AND LEVODOPA 2.5 MG: 50; 200 TABLET, EXTENDED RELEASE ORAL at 07:44

## 2021-01-01 RX ADMIN — MIDODRINE HYDROCHLORIDE 10 MG: 5 TABLET ORAL at 11:58

## 2021-01-01 RX ADMIN — Medication 10 ML: at 08:50

## 2021-01-01 RX ADMIN — METOPROLOL SUCCINATE 25 MG: 25 TABLET, EXTENDED RELEASE ORAL at 08:13

## 2021-01-01 RX ADMIN — TRAZODONE HYDROCHLORIDE 50 MG: 50 TABLET ORAL at 21:05

## 2021-01-01 RX ADMIN — THEOPHYLLINE 300 MG: 300 TABLET, EXTENDED RELEASE ORAL at 08:04

## 2021-01-01 RX ADMIN — IPRATROPIUM BROMIDE AND ALBUTEROL SULFATE 3 ML: 2.5; .5 SOLUTION RESPIRATORY (INHALATION) at 23:06

## 2021-01-01 RX ADMIN — POTASSIUM CHLORIDE 20 MEQ: 1500 TABLET, EXTENDED RELEASE ORAL at 09:08

## 2021-01-01 RX ADMIN — DILTIAZEM HYDROCHLORIDE 240 MG: 240 CAPSULE, COATED, EXTENDED RELEASE ORAL at 09:28

## 2021-01-01 RX ADMIN — Medication 10 ML: at 21:40

## 2021-01-01 RX ADMIN — AMIODARONE HYDROCHLORIDE 200 MG: 200 TABLET ORAL at 20:47

## 2021-01-01 RX ADMIN — FUROSEMIDE 20 MG: 20 TABLET ORAL at 08:40

## 2021-01-01 RX ADMIN — IPRATROPIUM BROMIDE AND ALBUTEROL SULFATE 3 ML: 2.5; .5 SOLUTION RESPIRATORY (INHALATION) at 10:30

## 2021-01-01 RX ADMIN — BUDESONIDE 0.5 MG: 0.5 SUSPENSION RESPIRATORY (INHALATION) at 06:53

## 2021-01-01 RX ADMIN — AMIODARONE HYDROCHLORIDE 200 MG: 200 TABLET ORAL at 20:44

## 2021-01-01 RX ADMIN — SODIUM CHLORIDE: 0.9 INJECTION, SOLUTION INTRAVENOUS at 07:40

## 2021-01-01 RX ADMIN — BUDESONIDE 0.5 MG: 0.5 SUSPENSION RESPIRATORY (INHALATION) at 23:33

## 2021-01-01 RX ADMIN — MIDODRINE HYDROCHLORIDE 10 MG: 5 TABLET ORAL at 11:39

## 2021-01-01 RX ADMIN — THEOPHYLLINE 300 MG: 300 TABLET, EXTENDED RELEASE ORAL at 09:46

## 2021-01-01 RX ADMIN — FINASTERIDE 5 MG: 5 TABLET, FILM COATED ORAL at 07:53

## 2021-01-01 RX ADMIN — ROSUVASTATIN 40 MG: 10 TABLET, FILM COATED ORAL at 08:18

## 2021-01-01 RX ADMIN — IPRATROPIUM BROMIDE AND ALBUTEROL SULFATE 3 ML: 2.5; .5 SOLUTION RESPIRATORY (INHALATION) at 15:13

## 2021-01-01 RX ADMIN — FINASTERIDE 5 MG: 5 TABLET, FILM COATED ORAL at 10:58

## 2021-01-01 RX ADMIN — THEOPHYLLINE 300 MG: 300 TABLET, EXTENDED RELEASE ORAL at 08:06

## 2021-01-01 RX ADMIN — IPRATROPIUM BROMIDE AND ALBUTEROL SULFATE 3 ML: 2.5; .5 SOLUTION RESPIRATORY (INHALATION) at 15:28

## 2021-01-01 RX ADMIN — MIDODRINE HYDROCHLORIDE 5 MG: 5 TABLET ORAL at 16:40

## 2021-01-01 RX ADMIN — AMIODARONE HYDROCHLORIDE 200 MG: 200 TABLET ORAL at 20:18

## 2021-01-01 RX ADMIN — TORSEMIDE 20 MG: 10 TABLET ORAL at 07:53

## 2021-01-01 RX ADMIN — CEFTRIAXONE SODIUM 2 G: 2 INJECTION, POWDER, FOR SOLUTION INTRAMUSCULAR; INTRAVENOUS at 11:39

## 2021-01-01 RX ADMIN — GUAIFENESIN 1200 MG: 600 TABLET, EXTENDED RELEASE ORAL at 21:23

## 2021-01-01 RX ADMIN — TORSEMIDE 20 MG: 10 TABLET ORAL at 10:58

## 2021-01-01 RX ADMIN — FINASTERIDE 5 MG: 5 TABLET, FILM COATED ORAL at 08:03

## 2021-01-01 RX ADMIN — ACETAMINOPHEN 650 MG: 325 TABLET, FILM COATED ORAL at 15:33

## 2021-01-01 RX ADMIN — ACETAMINOPHEN 650 MG: 325 TABLET, FILM COATED ORAL at 16:59

## 2021-01-01 RX ADMIN — TORSEMIDE 10 MG: 10 TABLET ORAL at 09:15

## 2021-01-01 RX ADMIN — METOPROLOL SUCCINATE 25 MG: 25 TABLET, EXTENDED RELEASE ORAL at 20:22

## 2021-01-01 RX ADMIN — Medication 10 ML: at 09:17

## 2021-01-01 RX ADMIN — MIDODRINE HYDROCHLORIDE 10 MG: 5 TABLET ORAL at 17:56

## 2021-01-01 RX ADMIN — TORSEMIDE 10 MG: 10 TABLET ORAL at 08:06

## 2021-01-01 RX ADMIN — ROSUVASTATIN 40 MG: 10 TABLET, FILM COATED ORAL at 09:57

## 2021-01-01 RX ADMIN — AMIODARONE HYDROCHLORIDE 200 MG: 200 TABLET ORAL at 20:11

## 2021-01-01 RX ADMIN — TAMSULOSIN HYDROCHLORIDE 0.4 MG: 0.4 CAPSULE ORAL at 08:04

## 2021-01-01 RX ADMIN — APIXABAN 5 MG: 5 TABLET, FILM COATED ORAL at 08:13

## 2021-01-01 RX ADMIN — AMIODARONE HYDROCHLORIDE 200 MG: 200 TABLET ORAL at 21:41

## 2021-01-01 RX ADMIN — Medication 10 ML: at 20:22

## 2021-01-01 RX ADMIN — TRAZODONE HYDROCHLORIDE 50 MG: 50 TABLET ORAL at 21:23

## 2021-01-01 RX ADMIN — APIXABAN 5 MG: 5 TABLET, FILM COATED ORAL at 21:41

## 2021-01-01 RX ADMIN — CARBIDOPA AND LEVODOPA 2.5 MG: 50; 200 TABLET, EXTENDED RELEASE ORAL at 11:38

## 2021-01-01 RX ADMIN — IPRATROPIUM BROMIDE AND ALBUTEROL SULFATE 3 ML: 2.5; .5 SOLUTION RESPIRATORY (INHALATION) at 15:33

## 2021-01-01 RX ADMIN — ALBUMIN HUMAN 25 G: 0.25 SOLUTION INTRAVENOUS at 21:41

## 2021-01-01 RX ADMIN — Medication 10 ML: at 10:59

## 2021-01-01 RX ADMIN — ENOXAPARIN SODIUM 80 MG: 80 INJECTION, SOLUTION INTRAVENOUS; SUBCUTANEOUS at 05:47

## 2021-01-01 RX ADMIN — GUAIFENESIN 1200 MG: 600 TABLET, EXTENDED RELEASE ORAL at 14:57

## 2021-01-01 RX ADMIN — FINASTERIDE 5 MG: 5 TABLET, FILM COATED ORAL at 09:08

## 2021-01-01 RX ADMIN — FUROSEMIDE 20 MG: 20 TABLET ORAL at 09:29

## 2021-01-01 RX ADMIN — BUDESONIDE 0.5 MG: 0.5 SUSPENSION RESPIRATORY (INHALATION) at 20:08

## 2021-01-01 RX ADMIN — CALCIUM CARBONATE-VITAMIN D TAB 500 MG-200 UNIT 1 TABLET: 500-200 TAB at 09:42

## 2021-01-01 RX ADMIN — TRAZODONE HYDROCHLORIDE 50 MG: 50 TABLET ORAL at 20:47

## 2021-01-01 RX ADMIN — GUAIFENESIN 1200 MG: 600 TABLET, EXTENDED RELEASE ORAL at 09:29

## 2021-01-01 RX ADMIN — Medication 10 ML: at 21:19

## 2021-01-01 RX ADMIN — APIXABAN 5 MG: 5 TABLET, FILM COATED ORAL at 09:17

## 2021-01-01 RX ADMIN — ROSUVASTATIN 40 MG: 10 TABLET, FILM COATED ORAL at 09:08

## 2021-01-01 RX ADMIN — IPRATROPIUM BROMIDE AND ALBUTEROL SULFATE 3 ML: 2.5; .5 SOLUTION RESPIRATORY (INHALATION) at 07:44

## 2021-01-01 RX ADMIN — CEFTRIAXONE SODIUM 2 G: 2 INJECTION, POWDER, FOR SOLUTION INTRAMUSCULAR; INTRAVENOUS at 12:59

## 2021-01-01 RX ADMIN — Medication 10 ML: at 09:56

## 2021-01-01 RX ADMIN — BUDESONIDE 0.5 MG: 0.5 SUSPENSION RESPIRATORY (INHALATION) at 07:53

## 2021-01-01 RX ADMIN — FINASTERIDE 5 MG: 5 TABLET, FILM COATED ORAL at 09:16

## 2021-01-01 RX ADMIN — TAMSULOSIN HYDROCHLORIDE 0.4 MG: 0.4 CAPSULE ORAL at 08:12

## 2021-01-01 RX ADMIN — Medication 10 ML: at 07:54

## 2021-01-01 RX ADMIN — Medication 10 ML: at 08:12

## 2021-01-01 RX ADMIN — ENOXAPARIN SODIUM 70 MG: 80 INJECTION SUBCUTANEOUS at 18:37

## 2021-01-01 RX ADMIN — TAMSULOSIN HYDROCHLORIDE 0.4 MG: 0.4 CAPSULE ORAL at 20:18

## 2021-01-01 RX ADMIN — CALCIUM CARBONATE-VITAMIN D TAB 500 MG-200 UNIT 1 TABLET: 500-200 TAB at 08:04

## 2021-01-01 RX ADMIN — SACUBITRIL AND VALSARTAN 1 TABLET: 24; 26 TABLET, FILM COATED ORAL at 20:22

## 2021-01-01 RX ADMIN — Medication 10 ML: at 09:39

## 2021-01-01 RX ADMIN — APIXABAN 5 MG: 5 TABLET, FILM COATED ORAL at 09:57

## 2021-01-01 RX ADMIN — DIGOXIN 125 MCG: 125 TABLET ORAL at 11:38

## 2021-01-01 RX ADMIN — Medication 10 ML: at 21:06

## 2021-01-01 RX ADMIN — PREDNISONE 20 MG: 20 TABLET ORAL at 09:39

## 2021-01-01 RX ADMIN — FUROSEMIDE 20 MG: 20 TABLET ORAL at 14:57

## 2021-01-01 RX ADMIN — AMIODARONE HYDROCHLORIDE 200 MG: 200 TABLET ORAL at 08:13

## 2021-01-01 RX ADMIN — PANTOPRAZOLE SODIUM 40 MG: 40 TABLET, DELAYED RELEASE ORAL at 05:52

## 2021-01-01 RX ADMIN — ROSUVASTATIN 40 MG: 10 TABLET, FILM COATED ORAL at 10:57

## 2021-01-01 RX ADMIN — DIGOXIN 125 MCG: 125 TABLET ORAL at 12:06

## 2021-01-01 RX ADMIN — IPRATROPIUM BROMIDE AND ALBUTEROL SULFATE 3 ML: 2.5; .5 SOLUTION RESPIRATORY (INHALATION) at 07:40

## 2021-01-01 RX ADMIN — ENOXAPARIN SODIUM 80 MG: 80 INJECTION, SOLUTION INTRAVENOUS; SUBCUTANEOUS at 16:40

## 2021-01-01 RX ADMIN — ALUMINUM HYDROXIDE, MAGNESIUM HYDROXIDE, AND DIMETHICONE 15 ML: 400; 400; 40 SUSPENSION ORAL at 18:34

## 2021-01-01 RX ADMIN — IPRATROPIUM BROMIDE AND ALBUTEROL SULFATE 3 ML: 2.5; .5 SOLUTION RESPIRATORY (INHALATION) at 07:59

## 2021-01-01 RX ADMIN — FINASTERIDE 5 MG: 5 TABLET, FILM COATED ORAL at 08:12

## 2021-01-01 RX ADMIN — ROSUVASTATIN 40 MG: 10 TABLET, FILM COATED ORAL at 08:12

## 2021-01-01 RX ADMIN — DIGOXIN 125 MCG: 125 TABLET ORAL at 11:39

## 2021-01-01 RX ADMIN — POTASSIUM CHLORIDE 20 MEQ: 1500 TABLET, EXTENDED RELEASE ORAL at 07:54

## 2021-01-01 RX ADMIN — FUROSEMIDE 40 MG: 10 INJECTION, SOLUTION INTRAMUSCULAR; INTRAVENOUS at 18:52

## 2021-01-01 RX ADMIN — FINASTERIDE 5 MG: 5 TABLET, FILM COATED ORAL at 08:19

## 2021-01-01 RX ADMIN — IPRATROPIUM BROMIDE AND ALBUTEROL SULFATE 3 ML: 2.5; .5 SOLUTION RESPIRATORY (INHALATION) at 19:31

## 2021-01-01 RX ADMIN — AMIODARONE HYDROCHLORIDE 200 MG: 200 TABLET ORAL at 20:22

## 2021-01-01 RX ADMIN — AMIODARONE HYDROCHLORIDE 200 MG: 200 TABLET ORAL at 21:24

## 2021-01-01 RX ADMIN — MIDODRINE HYDROCHLORIDE 10 MG: 5 TABLET ORAL at 16:37

## 2021-01-01 RX ADMIN — ROSUVASTATIN 40 MG: 10 TABLET, FILM COATED ORAL at 09:16

## 2021-01-01 RX ADMIN — PREDNISONE 20 MG: 20 TABLET ORAL at 07:54

## 2021-01-01 RX ADMIN — AMIODARONE HYDROCHLORIDE 200 MG: 200 TABLET ORAL at 09:57

## 2021-01-01 RX ADMIN — IPRATROPIUM BROMIDE AND ALBUTEROL SULFATE 3 ML: 2.5; .5 SOLUTION RESPIRATORY (INHALATION) at 14:37

## 2021-01-01 RX ADMIN — TAMSULOSIN HYDROCHLORIDE 0.4 MG: 0.4 CAPSULE ORAL at 21:23

## 2021-01-01 RX ADMIN — Medication 10 ML: at 20:45

## 2021-01-01 RX ADMIN — FINASTERIDE 5 MG: 5 TABLET, FILM COATED ORAL at 09:39

## 2021-01-01 RX ADMIN — IPRATROPIUM BROMIDE AND ALBUTEROL SULFATE 3 ML: 2.5; .5 SOLUTION RESPIRATORY (INHALATION) at 07:12

## 2021-01-01 RX ADMIN — IPRATROPIUM BROMIDE AND ALBUTEROL SULFATE 3 ML: 2.5; .5 SOLUTION RESPIRATORY (INHALATION) at 20:05

## 2021-01-01 RX ADMIN — PREDNISONE 30 MG: 20 TABLET ORAL at 08:19

## 2021-01-01 RX ADMIN — SACUBITRIL AND VALSARTAN 1 TABLET: 24; 26 TABLET, FILM COATED ORAL at 20:18

## 2021-01-01 RX ADMIN — CALCIUM CARBONATE-VITAMIN D TAB 500 MG-200 UNIT 1 TABLET: 500-200 TAB at 09:28

## 2021-01-01 RX ADMIN — TAMSULOSIN HYDROCHLORIDE 0.4 MG: 0.4 CAPSULE ORAL at 09:29

## 2021-01-01 RX ADMIN — ENOXAPARIN SODIUM 70 MG: 80 INJECTION SUBCUTANEOUS at 18:07

## 2021-01-01 RX ADMIN — IPRATROPIUM BROMIDE AND ALBUTEROL SULFATE 3 ML: 2.5; .5 SOLUTION RESPIRATORY (INHALATION) at 14:40

## 2021-01-01 RX ADMIN — IPRATROPIUM BROMIDE AND ALBUTEROL SULFATE 3 ML: 2.5; .5 SOLUTION RESPIRATORY (INHALATION) at 23:18

## 2021-01-01 RX ADMIN — APIXABAN 5 MG: 5 TABLET, FILM COATED ORAL at 20:12

## 2021-01-01 RX ADMIN — FINASTERIDE 5 MG: 5 TABLET, FILM COATED ORAL at 08:13

## 2021-01-01 RX ADMIN — ENOXAPARIN SODIUM 80 MG: 80 INJECTION, SOLUTION INTRAVENOUS; SUBCUTANEOUS at 17:48

## 2021-01-01 RX ADMIN — ROSUVASTATIN 40 MG: 10 TABLET, FILM COATED ORAL at 08:32

## 2021-01-01 RX ADMIN — FINASTERIDE 5 MG: 5 TABLET, FILM COATED ORAL at 09:17

## 2021-01-01 RX ADMIN — CEFTRIAXONE SODIUM 2 G: 2 INJECTION, POWDER, FOR SOLUTION INTRAMUSCULAR; INTRAVENOUS at 12:41

## 2021-01-01 RX ADMIN — POTASSIUM CHLORIDE 20 MEQ: 1500 TABLET, EXTENDED RELEASE ORAL at 09:57

## 2021-01-01 RX ADMIN — GUAIFENESIN 1200 MG: 600 TABLET, EXTENDED RELEASE ORAL at 08:04

## 2021-01-01 RX ADMIN — ENOXAPARIN SODIUM 70 MG: 80 INJECTION SUBCUTANEOUS at 15:58

## 2021-01-01 RX ADMIN — MIDODRINE HYDROCHLORIDE 10 MG: 5 TABLET ORAL at 06:31

## 2021-01-01 RX ADMIN — Medication 10 ML: at 20:44

## 2021-01-01 RX ADMIN — Medication 10 ML: at 20:12

## 2021-01-01 RX ADMIN — THEOPHYLLINE 300 MG: 300 TABLET, EXTENDED RELEASE ORAL at 18:52

## 2021-01-01 RX ADMIN — PANTOPRAZOLE SODIUM 40 MG: 40 TABLET, DELAYED RELEASE ORAL at 05:06

## 2021-01-01 RX ADMIN — FUROSEMIDE 40 MG: 10 INJECTION, SOLUTION INTRAMUSCULAR; INTRAVENOUS at 06:32

## 2021-01-01 RX ADMIN — Medication 10 ML: at 20:11

## 2021-01-01 RX ADMIN — AMIODARONE HYDROCHLORIDE 200 MG: 200 TABLET ORAL at 09:28

## 2021-01-01 RX ADMIN — AMIODARONE HYDROCHLORIDE 200 MG: 200 TABLET ORAL at 09:15

## 2021-01-01 RX ADMIN — BUDESONIDE 0.5 MG: 0.5 SUSPENSION RESPIRATORY (INHALATION) at 07:05

## 2021-01-01 RX ADMIN — FINASTERIDE 5 MG: 5 TABLET, FILM COATED ORAL at 08:32

## 2021-01-01 RX ADMIN — Medication 10 ML: at 08:06

## 2021-01-01 RX ADMIN — BUDESONIDE 0.5 MG: 0.5 SUSPENSION RESPIRATORY (INHALATION) at 20:10

## 2021-01-01 RX ADMIN — APIXABAN 5 MG: 5 TABLET, FILM COATED ORAL at 08:14

## 2021-01-01 RX ADMIN — Medication 10 ML: at 20:06

## 2021-01-01 RX ADMIN — IPRATROPIUM BROMIDE AND ALBUTEROL SULFATE 3 ML: 2.5; .5 SOLUTION RESPIRATORY (INHALATION) at 06:50

## 2021-01-01 RX ADMIN — THEOPHYLLINE 300 MG: 300 TABLET, EXTENDED RELEASE ORAL at 09:15

## 2021-01-01 RX ADMIN — IPRATROPIUM BROMIDE AND ALBUTEROL SULFATE 3 ML: 2.5; .5 SOLUTION RESPIRATORY (INHALATION) at 11:14

## 2021-01-01 RX ADMIN — ROSUVASTATIN CALCIUM 40 MG: 10 TABLET, FILM COATED ORAL at 21:23

## 2021-01-01 RX ADMIN — IPRATROPIUM BROMIDE AND ALBUTEROL SULFATE 3 ML: 2.5; .5 SOLUTION RESPIRATORY (INHALATION) at 11:01

## 2021-01-01 RX ADMIN — TAMSULOSIN HYDROCHLORIDE 0.4 MG: 0.4 CAPSULE ORAL at 11:18

## 2021-01-01 RX ADMIN — PREDNISONE 10 MG: 10 TABLET ORAL at 10:58

## 2021-01-01 RX ADMIN — PHENYLEPHRINE HYDROCHLORIDE 0.5 MCG/KG/MIN: 10 INJECTION INTRAVENOUS at 04:00

## 2021-01-01 RX ADMIN — SACUBITRIL AND VALSARTAN 1 TABLET: 24; 26 TABLET, FILM COATED ORAL at 08:13

## 2021-01-01 RX ADMIN — PANTOPRAZOLE SODIUM 40 MG: 40 TABLET, DELAYED RELEASE ORAL at 05:22

## 2021-01-01 RX ADMIN — Medication 10 ML: at 08:20

## 2021-01-01 RX ADMIN — TAMSULOSIN HYDROCHLORIDE 0.4 MG: 0.4 CAPSULE ORAL at 21:05

## 2021-01-01 RX ADMIN — SACUBITRIL AND VALSARTAN 1 TABLET: 24; 26 TABLET, FILM COATED ORAL at 08:32

## 2021-01-01 RX ADMIN — THEOPHYLLINE 300 MG: 300 TABLET, EXTENDED RELEASE ORAL at 09:39

## 2021-01-01 RX ADMIN — APIXABAN 5 MG: 5 TABLET, FILM COATED ORAL at 20:22

## 2021-01-01 RX ADMIN — Medication 10 ML: at 20:47

## 2021-01-01 RX ADMIN — IPRATROPIUM BROMIDE AND ALBUTEROL SULFATE 3 ML: 2.5; .5 SOLUTION RESPIRATORY (INHALATION) at 23:28

## 2021-01-01 RX ADMIN — Medication 10 ML: at 21:24

## 2021-01-01 RX ADMIN — APIXABAN 5 MG: 5 TABLET, FILM COATED ORAL at 08:19

## 2021-01-01 RX ADMIN — IPRATROPIUM BROMIDE AND ALBUTEROL SULFATE 3 ML: 2.5; .5 SOLUTION RESPIRATORY (INHALATION) at 19:29

## 2021-01-01 RX ADMIN — PANTOPRAZOLE SODIUM 40 MG: 40 TABLET, DELAYED RELEASE ORAL at 05:47

## 2021-01-01 RX ADMIN — MIDODRINE HYDROCHLORIDE 10 MG: 5 TABLET ORAL at 09:08

## 2021-01-01 RX ADMIN — BUDESONIDE 0.5 MG: 0.5 SUSPENSION RESPIRATORY (INHALATION) at 19:29

## 2021-01-01 RX ADMIN — PROPOFOL 50 MG: 10 INJECTION, EMULSION INTRAVENOUS at 07:50

## 2021-01-01 RX ADMIN — Medication 5 MG: at 21:40

## 2021-01-01 RX ADMIN — METOPROLOL SUCCINATE 25 MG: 25 TABLET, EXTENDED RELEASE ORAL at 09:08

## 2021-01-01 RX ADMIN — TORSEMIDE 10 MG: 10 TABLET ORAL at 08:04

## 2021-01-01 RX ADMIN — IPRATROPIUM BROMIDE AND ALBUTEROL SULFATE 3 ML: 2.5; .5 SOLUTION RESPIRATORY (INHALATION) at 15:57

## 2021-01-01 RX ADMIN — AMIODARONE HYDROCHLORIDE 200 MG: 200 TABLET ORAL at 21:19

## 2021-01-01 RX ADMIN — Medication 10 ML: at 08:04

## 2021-01-01 RX ADMIN — BUDESONIDE 0.5 MG: 0.5 SUSPENSION RESPIRATORY (INHALATION) at 18:39

## 2021-01-01 RX ADMIN — MIDODRINE HYDROCHLORIDE 10 MG: 5 TABLET ORAL at 12:32

## 2021-01-01 RX ADMIN — CEFTRIAXONE SODIUM 2 G: 2 INJECTION, POWDER, FOR SOLUTION INTRAMUSCULAR; INTRAVENOUS at 11:38

## 2021-01-01 RX ADMIN — IPRATROPIUM BROMIDE AND ALBUTEROL SULFATE 3 ML: 2.5; .5 SOLUTION RESPIRATORY (INHALATION) at 15:26

## 2021-01-01 RX ADMIN — POTASSIUM CHLORIDE 20 MEQ: 1500 TABLET, EXTENDED RELEASE ORAL at 09:18

## 2021-01-01 RX ADMIN — GUAIFENESIN 1200 MG: 600 TABLET, EXTENDED RELEASE ORAL at 21:34

## 2021-01-01 RX ADMIN — MAGNESIUM SULFATE IN DEXTROSE 1 G: 10 INJECTION, SOLUTION INTRAVENOUS at 04:41

## 2021-01-01 RX ADMIN — Medication 10 ML: at 20:21

## 2021-01-01 RX ADMIN — DIGOXIN 125 MCG: 125 TABLET ORAL at 11:58

## 2021-01-01 RX ADMIN — IPRATROPIUM BROMIDE AND ALBUTEROL SULFATE 3 ML: 2.5; .5 SOLUTION RESPIRATORY (INHALATION) at 07:05

## 2021-01-01 RX ADMIN — BUDESONIDE 0.5 MG: 0.5 SUSPENSION RESPIRATORY (INHALATION) at 07:37

## 2021-01-01 RX ADMIN — BUDESONIDE 0.5 MG: 0.5 SUSPENSION RESPIRATORY (INHALATION) at 07:46

## 2021-01-01 RX ADMIN — TAMSULOSIN HYDROCHLORIDE 0.4 MG: 0.4 CAPSULE ORAL at 21:35

## 2021-01-01 RX ADMIN — THEOPHYLLINE 300 MG: 300 TABLET, EXTENDED RELEASE ORAL at 08:13

## 2021-01-01 RX ADMIN — PHENYLEPHRINE HYDROCHLORIDE 0.8 MCG/KG/MIN: 10 INJECTION INTRAVENOUS at 19:27

## 2021-01-01 RX ADMIN — THEOPHYLLINE 300 MG: 300 TABLET, EXTENDED RELEASE ORAL at 10:58

## 2021-01-01 RX ADMIN — Medication 10 ML: at 08:40

## 2021-01-01 RX ADMIN — SODIUM CHLORIDE: 0.9 INJECTION, SOLUTION INTRAVENOUS at 14:35

## 2021-01-01 RX ADMIN — PROPOFOL 80 MG: 10 INJECTION, EMULSION INTRAVENOUS at 14:39

## 2021-01-01 RX ADMIN — IPRATROPIUM BROMIDE AND ALBUTEROL SULFATE 3 ML: 2.5; .5 SOLUTION RESPIRATORY (INHALATION) at 18:39

## 2021-01-01 RX ADMIN — IPRATROPIUM BROMIDE AND ALBUTEROL SULFATE 3 ML: 2.5; .5 SOLUTION RESPIRATORY (INHALATION) at 14:47

## 2021-01-01 RX ADMIN — AMIODARONE HYDROCHLORIDE 200 MG: 200 TABLET ORAL at 09:42

## 2021-01-01 RX ADMIN — IPRATROPIUM BROMIDE AND ALBUTEROL SULFATE 3 ML: 2.5; .5 SOLUTION RESPIRATORY (INHALATION) at 06:11

## 2021-01-01 RX ADMIN — ROSUVASTATIN 40 MG: 10 TABLET, FILM COATED ORAL at 08:03

## 2021-01-01 RX ADMIN — POTASSIUM CHLORIDE 20 MEQ: 1500 TABLET, EXTENDED RELEASE ORAL at 08:09

## 2021-01-01 RX ADMIN — Medication 10 ML: at 09:46

## 2021-01-01 RX ADMIN — Medication 10 ML: at 08:17

## 2021-01-01 RX ADMIN — AMIODARONE HYDROCHLORIDE 200 MG: 200 TABLET ORAL at 08:06

## 2021-01-01 RX ADMIN — CARBIDOPA AND LEVODOPA 2.5 MG: 50; 200 TABLET, EXTENDED RELEASE ORAL at 17:48

## 2021-01-01 RX ADMIN — TAMSULOSIN HYDROCHLORIDE 0.4 MG: 0.4 CAPSULE ORAL at 09:09

## 2021-01-01 RX ADMIN — METOPROLOL SUCCINATE 25 MG: 25 TABLET, EXTENDED RELEASE ORAL at 09:16

## 2021-01-01 RX ADMIN — Medication 10 ML: at 20:19

## 2021-01-01 RX ADMIN — BUDESONIDE 0.5 MG: 0.5 SUSPENSION RESPIRATORY (INHALATION) at 20:57

## 2021-01-01 RX ADMIN — IPRATROPIUM BROMIDE AND ALBUTEROL SULFATE 3 ML: 2.5; .5 SOLUTION RESPIRATORY (INHALATION) at 17:02

## 2021-01-01 RX ADMIN — METOPROLOL SUCCINATE 25 MG: 25 TABLET, EXTENDED RELEASE ORAL at 08:32

## 2021-01-01 RX ADMIN — ENOXAPARIN SODIUM 70 MG: 80 INJECTION SUBCUTANEOUS at 16:01

## 2021-01-01 RX ADMIN — FINASTERIDE 5 MG: 5 TABLET, FILM COATED ORAL at 08:04

## 2021-01-01 RX ADMIN — CARBIDOPA AND LEVODOPA 2.5 MG: 50; 200 TABLET, EXTENDED RELEASE ORAL at 15:25

## 2021-01-01 RX ADMIN — MAGNESIUM SULFATE IN DEXTROSE 1 G: 10 INJECTION, SOLUTION INTRAVENOUS at 08:24

## 2021-01-01 RX ADMIN — ROSUVASTATIN 40 MG: 10 TABLET, FILM COATED ORAL at 09:39

## 2021-01-01 RX ADMIN — CALCIUM CARBONATE-VITAMIN D TAB 500 MG-200 UNIT 1 TABLET: 500-200 TAB at 14:56

## 2021-01-01 RX ADMIN — MIDODRINE HYDROCHLORIDE 10 MG: 5 TABLET ORAL at 09:17

## 2021-01-01 RX ADMIN — THEOPHYLLINE 300 MG: 300 TABLET, EXTENDED RELEASE ORAL at 09:09

## 2021-01-01 RX ADMIN — POTASSIUM CHLORIDE 20 MEQ: 1500 TABLET, EXTENDED RELEASE ORAL at 08:04

## 2021-01-01 RX ADMIN — BUDESONIDE 0.5 MG: 0.5 SUSPENSION RESPIRATORY (INHALATION) at 06:55

## 2021-01-01 RX ADMIN — BUDESONIDE 0.5 MG: 0.5 SUSPENSION RESPIRATORY (INHALATION) at 23:18

## 2021-01-01 RX ADMIN — CALCIUM CARBONATE-VITAMIN D TAB 500 MG-200 UNIT 1 TABLET: 500-200 TAB at 08:40

## 2021-01-01 RX ADMIN — APIXABAN 5 MG: 5 TABLET, FILM COATED ORAL at 20:18

## 2021-01-01 RX ADMIN — THEOPHYLLINE 300 MG: 300 TABLET, EXTENDED RELEASE ORAL at 08:40

## 2021-01-01 RX ADMIN — TAMSULOSIN HYDROCHLORIDE 0.4 MG: 0.4 CAPSULE ORAL at 08:32

## 2021-01-01 RX ADMIN — POTASSIUM CHLORIDE 20 MEQ: 1500 TABLET, EXTENDED RELEASE ORAL at 09:39

## 2021-01-01 RX ADMIN — MIDODRINE HYDROCHLORIDE 10 MG: 5 TABLET ORAL at 17:47

## 2021-01-01 RX ADMIN — Medication 10 ML: at 09:18

## 2021-01-01 RX ADMIN — POTASSIUM CHLORIDE 20 MEQ: 1500 TABLET, EXTENDED RELEASE ORAL at 10:58

## 2021-01-01 RX ADMIN — FUROSEMIDE 20 MG: 20 TABLET ORAL at 09:42

## 2021-01-01 RX ADMIN — FINASTERIDE 5 MG: 5 TABLET, FILM COATED ORAL at 08:09

## 2021-01-01 RX ADMIN — FINASTERIDE 5 MG: 5 TABLET, FILM COATED ORAL at 09:29

## 2021-01-01 RX ADMIN — DILTIAZEM HYDROCHLORIDE 240 MG: 240 CAPSULE, COATED, EXTENDED RELEASE ORAL at 09:42

## 2021-01-01 RX ADMIN — APIXABAN 5 MG: 5 TABLET, FILM COATED ORAL at 17:47

## 2021-01-01 RX ADMIN — BUDESONIDE 0.5 MG: 0.5 SUSPENSION RESPIRATORY (INHALATION) at 07:29

## 2021-01-01 RX ADMIN — DIGOXIN 125 MCG: 125 TABLET ORAL at 12:23

## 2021-01-01 RX ADMIN — BUDESONIDE 0.5 MG: 0.5 SUSPENSION RESPIRATORY (INHALATION) at 22:47

## 2021-01-01 RX ADMIN — DILTIAZEM HYDROCHLORIDE 240 MG: 240 CAPSULE, COATED, EXTENDED RELEASE ORAL at 08:40

## 2021-01-01 RX ADMIN — MIDODRINE HYDROCHLORIDE 10 MG: 5 TABLET ORAL at 12:24

## 2021-01-01 RX ADMIN — FINASTERIDE 5 MG: 5 TABLET, FILM COATED ORAL at 09:57

## 2021-01-01 RX ADMIN — PROPOFOL 50 MG: 10 INJECTION, EMULSION INTRAVENOUS at 07:48

## 2021-01-01 RX ADMIN — AMIODARONE HYDROCHLORIDE 200 MG: 200 TABLET ORAL at 20:06

## 2021-01-01 RX ADMIN — PANTOPRAZOLE SODIUM 40 MG: 40 TABLET, DELAYED RELEASE ORAL at 09:39

## 2021-01-01 RX ADMIN — APIXABAN 5 MG: 5 TABLET, FILM COATED ORAL at 09:08

## 2021-01-01 RX ADMIN — MIDODRINE HYDROCHLORIDE 10 MG: 5 TABLET ORAL at 18:15

## 2021-01-01 RX ADMIN — IPRATROPIUM BROMIDE AND ALBUTEROL SULFATE 3 ML: 2.5; .5 SOLUTION RESPIRATORY (INHALATION) at 07:34

## 2021-01-01 RX ADMIN — AMIODARONE HYDROCHLORIDE 200 MG: 200 TABLET ORAL at 09:39

## 2021-01-01 RX ADMIN — THEOPHYLLINE 300 MG: 300 TABLET, EXTENDED RELEASE ORAL at 08:32

## 2021-01-01 RX ADMIN — ENOXAPARIN SODIUM 70 MG: 80 INJECTION SUBCUTANEOUS at 04:36

## 2021-01-01 RX ADMIN — ACETAMINOPHEN 650 MG: 325 TABLET, FILM COATED ORAL at 04:16

## 2021-01-01 RX ADMIN — IPRATROPIUM BROMIDE AND ALBUTEROL SULFATE 3 ML: 2.5; .5 SOLUTION RESPIRATORY (INHALATION) at 22:47

## 2021-01-01 RX ADMIN — ENOXAPARIN SODIUM 70 MG: 80 INJECTION SUBCUTANEOUS at 04:07

## 2021-01-01 RX ADMIN — ENOXAPARIN SODIUM 70 MG: 80 INJECTION SUBCUTANEOUS at 05:28

## 2021-01-01 RX ADMIN — TORSEMIDE 20 MG: 10 TABLET ORAL at 14:29

## 2021-01-01 RX ADMIN — IPRATROPIUM BROMIDE AND ALBUTEROL SULFATE 3 ML: 2.5; .5 SOLUTION RESPIRATORY (INHALATION) at 23:33

## 2021-01-01 RX ADMIN — BUDESONIDE 0.5 MG: 0.5 SUSPENSION RESPIRATORY (INHALATION) at 18:31

## 2021-01-01 RX ADMIN — IPRATROPIUM BROMIDE AND ALBUTEROL SULFATE 3 ML: 2.5; .5 SOLUTION RESPIRATORY (INHALATION) at 11:42

## 2021-01-01 RX ADMIN — THEOPHYLLINE 300 MG: 300 TABLET, EXTENDED RELEASE ORAL at 07:54

## 2021-01-01 RX ADMIN — FUROSEMIDE 20 MG: 20 TABLET ORAL at 08:04

## 2021-01-01 RX ADMIN — BUDESONIDE 0.5 MG: 0.5 SUSPENSION RESPIRATORY (INHALATION) at 23:49

## 2021-01-01 RX ADMIN — AMIODARONE HYDROCHLORIDE 200 MG: 200 TABLET ORAL at 20:38

## 2021-01-01 RX ADMIN — BUDESONIDE 0.5 MG: 0.5 SUSPENSION RESPIRATORY (INHALATION) at 19:31

## 2021-01-01 RX ADMIN — ROSUVASTATIN 40 MG: 10 TABLET, FILM COATED ORAL at 07:53

## 2021-01-01 RX ADMIN — ACETAMINOPHEN 650 MG: 325 TABLET, FILM COATED ORAL at 12:32

## 2021-01-01 RX ADMIN — Medication 10 ML: at 09:08

## 2021-01-01 RX ADMIN — Medication 10 ML: at 20:38

## 2021-01-01 RX ADMIN — PANTOPRAZOLE SODIUM 40 MG: 40 TABLET, DELAYED RELEASE ORAL at 05:31

## 2021-01-01 RX ADMIN — TRAZODONE HYDROCHLORIDE 50 MG: 50 TABLET ORAL at 21:34

## 2021-01-01 RX ADMIN — METOPROLOL SUCCINATE 25 MG: 25 TABLET, EXTENDED RELEASE ORAL at 08:09

## 2021-01-01 RX ADMIN — ROSUVASTATIN 40 MG: 10 TABLET, FILM COATED ORAL at 18:52

## 2021-01-01 RX ADMIN — Medication 10 ML: at 09:29

## 2021-01-01 RX ADMIN — IPRATROPIUM BROMIDE AND ALBUTEROL SULFATE 3 ML: 2.5; .5 SOLUTION RESPIRATORY (INHALATION) at 08:20

## 2021-01-01 RX ADMIN — DILTIAZEM HYDROCHLORIDE 240 MG: 240 CAPSULE, COATED, EXTENDED RELEASE ORAL at 08:04

## 2021-01-01 RX ADMIN — IPRATROPIUM BROMIDE AND ALBUTEROL SULFATE 3 ML: 2.5; .5 SOLUTION RESPIRATORY (INHALATION) at 23:49

## 2021-01-01 RX ADMIN — GUAIFENESIN 1200 MG: 600 TABLET, EXTENDED RELEASE ORAL at 21:05

## 2021-01-01 RX ADMIN — IPRATROPIUM BROMIDE AND ALBUTEROL SULFATE 3 ML: 2.5; .5 SOLUTION RESPIRATORY (INHALATION) at 17:00

## 2021-01-13 ENCOUNTER — HOSPITAL ENCOUNTER (INPATIENT)
Facility: HOSPITAL | Age: 81
LOS: 9 days | Discharge: HOME HEALTH | End: 2021-01-22
Attending: EMERGENCY MEDICINE | Admitting: HOSPITALIST

## 2021-01-13 ENCOUNTER — APPOINTMENT (OUTPATIENT)
Dept: GENERAL RADIOLOGY | Facility: HOSPITAL | Age: 81
End: 2021-01-13

## 2021-01-13 ENCOUNTER — APPOINTMENT (OUTPATIENT)
Dept: CARDIOLOGY | Facility: HOSPITAL | Age: 81
End: 2021-01-13

## 2021-01-13 DIAGNOSIS — J44.1 CHRONIC OBSTRUCTIVE PULMONARY DISEASE WITH ACUTE EXACERBATION (HCC): Primary | ICD-10-CM

## 2021-01-13 DIAGNOSIS — R06.03 ACUTE RESPIRATORY DISTRESS: ICD-10-CM

## 2021-01-13 DIAGNOSIS — J90 PLEURAL EFFUSION: ICD-10-CM

## 2021-01-13 DIAGNOSIS — C34.11 MALIGNANT NEOPLASM OF UPPER LOBE OF RIGHT LUNG (HCC): ICD-10-CM

## 2021-01-13 DIAGNOSIS — R33.8 ACUTE URINARY RETENTION: ICD-10-CM

## 2021-01-13 DIAGNOSIS — I48.91 ATRIAL FIBRILLATION WITH RVR (HCC): ICD-10-CM

## 2021-01-13 DIAGNOSIS — I25.118 CORONARY ARTERY DISEASE OF NATIVE ARTERY OF NATIVE HEART WITH STABLE ANGINA PECTORIS (HCC): ICD-10-CM

## 2021-01-13 DIAGNOSIS — J44.9 CHRONIC OBSTRUCTIVE PULMONARY DISEASE, UNSPECIFIED COPD TYPE (HCC): Chronic | ICD-10-CM

## 2021-01-13 DIAGNOSIS — I50.21 ACUTE SYSTOLIC CONGESTIVE HEART FAILURE (HCC): ICD-10-CM

## 2021-01-13 PROBLEM — R07.9 CHEST PAIN: Status: ACTIVE | Noted: 2021-01-13

## 2021-01-13 PROBLEM — I71.40 ABDOMINAL AORTIC ANEURYSM (AAA) (HCC): Status: ACTIVE | Noted: 2019-03-06

## 2021-01-13 PROBLEM — I10 HYPERTENSION: Chronic | Status: ACTIVE | Noted: 2019-08-06

## 2021-01-13 PROBLEM — Z85.118 HISTORY OF LUNG CANCER: Chronic | Status: ACTIVE | Noted: 2021-01-13

## 2021-01-13 PROBLEM — N40.0 BPH WITHOUT OBSTRUCTION/LOWER URINARY TRACT SYMPTOMS: Chronic | Status: ACTIVE | Noted: 2021-01-13

## 2021-01-13 PROBLEM — I25.10 CORONARY ARTERY DISEASE: Chronic | Status: ACTIVE | Noted: 2019-08-06

## 2021-01-13 PROBLEM — E78.5 HYPERLIPIDEMIA: Chronic | Status: ACTIVE | Noted: 2019-08-06

## 2021-01-13 LAB
ALBUMIN SERPL-MCNC: 3.9 G/DL (ref 3.5–5.2)
ALBUMIN/GLOB SERPL: 1.5 G/DL
ALP SERPL-CCNC: 67 U/L (ref 39–117)
ALT SERPL W P-5'-P-CCNC: 22 U/L (ref 1–41)
ANION GAP SERPL CALCULATED.3IONS-SCNC: 11 MMOL/L (ref 5–15)
APTT PPP: 23.9 SECONDS (ref 24–31)
AST SERPL-CCNC: 26 U/L (ref 1–40)
B PARAPERT DNA SPEC QL NAA+PROBE: NOT DETECTED
B PERT DNA SPEC QL NAA+PROBE: NOT DETECTED
BASOPHILS # BLD AUTO: 0 10*3/MM3 (ref 0–0.2)
BASOPHILS NFR BLD AUTO: 0.4 % (ref 0–1.5)
BILIRUB SERPL-MCNC: 0.6 MG/DL (ref 0–1.2)
BUN SERPL-MCNC: 11 MG/DL (ref 8–23)
BUN/CREAT SERPL: 12 (ref 7–25)
C PNEUM DNA NPH QL NAA+NON-PROBE: NOT DETECTED
CALCIUM SPEC-SCNC: 9.1 MG/DL (ref 8.6–10.5)
CHLORIDE SERPL-SCNC: 104 MMOL/L (ref 98–107)
CO2 SERPL-SCNC: 25 MMOL/L (ref 22–29)
CREAT SERPL-MCNC: 0.92 MG/DL (ref 0.76–1.27)
CRP SERPL-MCNC: 0.45 MG/DL (ref 0–0.5)
D-LACTATE SERPL-SCNC: 2.7 MMOL/L (ref 0.5–2)
D-LACTATE SERPL-SCNC: 3.1 MMOL/L (ref 0.5–2)
DEPRECATED RDW RBC AUTO: 49.9 FL (ref 37–54)
EOSINOPHIL # BLD AUTO: 0 10*3/MM3 (ref 0–0.4)
EOSINOPHIL NFR BLD AUTO: 0.4 % (ref 0.3–6.2)
ERYTHROCYTE [DISTWIDTH] IN BLOOD BY AUTOMATED COUNT: 16.1 % (ref 12.3–15.4)
FLUAV SUBTYP SPEC NAA+PROBE: NOT DETECTED
FLUBV RNA ISLT QL NAA+PROBE: NOT DETECTED
GFR SERPL CREATININE-BSD FRML MDRD: 80 ML/MIN/1.73
GLOBULIN UR ELPH-MCNC: 2.6 GM/DL
GLUCOSE SERPL-MCNC: 102 MG/DL (ref 65–99)
HADV DNA SPEC NAA+PROBE: NOT DETECTED
HCOV 229E RNA SPEC QL NAA+PROBE: NOT DETECTED
HCOV HKU1 RNA SPEC QL NAA+PROBE: NOT DETECTED
HCOV NL63 RNA SPEC QL NAA+PROBE: NOT DETECTED
HCOV OC43 RNA SPEC QL NAA+PROBE: NOT DETECTED
HCT VFR BLD AUTO: 39.5 % (ref 37.5–51)
HGB BLD-MCNC: 13 G/DL (ref 13–17.7)
HMPV RNA NPH QL NAA+NON-PROBE: NOT DETECTED
HOLD SPECIMEN: NORMAL
HPIV1 RNA SPEC QL NAA+PROBE: NOT DETECTED
HPIV2 RNA SPEC QL NAA+PROBE: NOT DETECTED
HPIV3 RNA NPH QL NAA+PROBE: NOT DETECTED
HPIV4 P GENE NPH QL NAA+PROBE: NOT DETECTED
INR PPP: 1.04 (ref 0.93–1.1)
LACTATE HOLD SPECIMEN: NORMAL
LYMPHOCYTES # BLD AUTO: 0.6 10*3/MM3 (ref 0.7–3.1)
LYMPHOCYTES NFR BLD AUTO: 9.2 % (ref 19.6–45.3)
M PNEUMO IGG SER IA-ACNC: NOT DETECTED
MCH RBC QN AUTO: 29.1 PG (ref 26.6–33)
MCHC RBC AUTO-ENTMCNC: 33 G/DL (ref 31.5–35.7)
MCV RBC AUTO: 88.1 FL (ref 79–97)
MONOCYTES # BLD AUTO: 0.4 10*3/MM3 (ref 0.1–0.9)
MONOCYTES NFR BLD AUTO: 6.9 % (ref 5–12)
NEUTROPHILS NFR BLD AUTO: 5 10*3/MM3 (ref 1.7–7)
NEUTROPHILS NFR BLD AUTO: 83.1 % (ref 42.7–76)
NRBC BLD AUTO-RTO: 0 /100 WBC (ref 0–0.2)
NT-PROBNP SERPL-MCNC: 7372 PG/ML (ref 0–1800)
PLATELET # BLD AUTO: 221 10*3/MM3 (ref 140–450)
PMV BLD AUTO: 9.3 FL (ref 6–12)
POTASSIUM SERPL-SCNC: 4.1 MMOL/L (ref 3.5–5.2)
PROCALCITONIN SERPL-MCNC: 0.11 NG/ML (ref 0–0.25)
PROT SERPL-MCNC: 6.5 G/DL (ref 6–8.5)
PROTHROMBIN TIME: 11.4 SECONDS (ref 9.6–11.7)
RBC # BLD AUTO: 4.48 10*6/MM3 (ref 4.14–5.8)
RHINOVIRUS RNA SPEC NAA+PROBE: NOT DETECTED
RSV RNA NPH QL NAA+NON-PROBE: NOT DETECTED
SARS-COV-2 RNA PNL SPEC NAA+PROBE: NOT DETECTED
SODIUM SERPL-SCNC: 140 MMOL/L (ref 136–145)
TROPONIN T SERPL-MCNC: 0.02 NG/ML (ref 0–0.03)
TROPONIN T SERPL-MCNC: 0.02 NG/ML (ref 0–0.03)
TROPONIN T SERPL-MCNC: 0.03 NG/ML (ref 0–0.03)
WBC # BLD AUTO: 6 10*3/MM3 (ref 3.4–10.8)

## 2021-01-13 PROCEDURE — 87635 SARS-COV-2 COVID-19 AMP PRB: CPT | Performed by: EMERGENCY MEDICINE

## 2021-01-13 PROCEDURE — 85730 THROMBOPLASTIN TIME PARTIAL: CPT | Performed by: EMERGENCY MEDICINE

## 2021-01-13 PROCEDURE — 94640 AIRWAY INHALATION TREATMENT: CPT

## 2021-01-13 PROCEDURE — 83880 ASSAY OF NATRIURETIC PEPTIDE: CPT | Performed by: EMERGENCY MEDICINE

## 2021-01-13 PROCEDURE — 93005 ELECTROCARDIOGRAM TRACING: CPT | Performed by: EMERGENCY MEDICINE

## 2021-01-13 PROCEDURE — 99284 EMERGENCY DEPT VISIT MOD MDM: CPT

## 2021-01-13 PROCEDURE — 99222 1ST HOSP IP/OBS MODERATE 55: CPT | Performed by: INTERNAL MEDICINE

## 2021-01-13 PROCEDURE — 0100U HC BIOFIRE FILMARRAY RESP PANEL 2: CPT | Performed by: NURSE PRACTITIONER

## 2021-01-13 PROCEDURE — 25010000002 METHYLPREDNISOLONE PER 125 MG: Performed by: EMERGENCY MEDICINE

## 2021-01-13 PROCEDURE — 25010000002 FUROSEMIDE PER 20 MG: Performed by: EMERGENCY MEDICINE

## 2021-01-13 PROCEDURE — 80053 COMPREHEN METABOLIC PANEL: CPT | Performed by: EMERGENCY MEDICINE

## 2021-01-13 PROCEDURE — 87040 BLOOD CULTURE FOR BACTERIA: CPT | Performed by: EMERGENCY MEDICINE

## 2021-01-13 PROCEDURE — 93306 TTE W/DOPPLER COMPLETE: CPT | Performed by: INTERNAL MEDICINE

## 2021-01-13 PROCEDURE — 94799 UNLISTED PULMONARY SVC/PX: CPT

## 2021-01-13 PROCEDURE — 84484 ASSAY OF TROPONIN QUANT: CPT | Performed by: NURSE PRACTITIONER

## 2021-01-13 PROCEDURE — 83605 ASSAY OF LACTIC ACID: CPT | Performed by: NURSE PRACTITIONER

## 2021-01-13 PROCEDURE — 25010000002 ENOXAPARIN PER 10 MG: Performed by: EMERGENCY MEDICINE

## 2021-01-13 PROCEDURE — 93306 TTE W/DOPPLER COMPLETE: CPT

## 2021-01-13 PROCEDURE — 25010000002 METHYLPREDNISOLONE PER 40 MG: Performed by: NURSE PRACTITIONER

## 2021-01-13 PROCEDURE — 84484 ASSAY OF TROPONIN QUANT: CPT | Performed by: EMERGENCY MEDICINE

## 2021-01-13 PROCEDURE — 85610 PROTHROMBIN TIME: CPT | Performed by: EMERGENCY MEDICINE

## 2021-01-13 PROCEDURE — 25010000002 FUROSEMIDE PER 20 MG: Performed by: NURSE PRACTITIONER

## 2021-01-13 PROCEDURE — 84145 PROCALCITONIN (PCT): CPT | Performed by: NURSE PRACTITIONER

## 2021-01-13 PROCEDURE — 86140 C-REACTIVE PROTEIN: CPT | Performed by: NURSE PRACTITIONER

## 2021-01-13 PROCEDURE — 85025 COMPLETE CBC W/AUTO DIFF WBC: CPT | Performed by: EMERGENCY MEDICINE

## 2021-01-13 PROCEDURE — 25010000002 CEFEPIME PER 500 MG: Performed by: EMERGENCY MEDICINE

## 2021-01-13 PROCEDURE — 71045 X-RAY EXAM CHEST 1 VIEW: CPT

## 2021-01-13 RX ORDER — IPRATROPIUM BROMIDE AND ALBUTEROL SULFATE 2.5; .5 MG/3ML; MG/3ML
3 SOLUTION RESPIRATORY (INHALATION)
Status: DISCONTINUED | OUTPATIENT
Start: 2021-01-13 | End: 2021-01-22 | Stop reason: HOSPADM

## 2021-01-13 RX ORDER — ALBUTEROL SULFATE 2.5 MG/3ML
2.5 SOLUTION RESPIRATORY (INHALATION) ONCE
Status: DISCONTINUED | OUTPATIENT
Start: 2021-01-13 | End: 2021-01-13 | Stop reason: ALTCHOICE

## 2021-01-13 RX ORDER — THEOPHYLLINE 300 MG/1
300 TABLET, EXTENDED RELEASE ORAL DAILY
COMMUNITY

## 2021-01-13 RX ORDER — SODIUM CHLORIDE 0.9 % (FLUSH) 0.9 %
10 SYRINGE (ML) INJECTION EVERY 12 HOURS SCHEDULED
Status: DISCONTINUED | OUTPATIENT
Start: 2021-01-13 | End: 2021-01-22 | Stop reason: HOSPADM

## 2021-01-13 RX ORDER — POTASSIUM CHLORIDE 20 MEQ/1
40 TABLET, EXTENDED RELEASE ORAL AS NEEDED
Status: DISCONTINUED | OUTPATIENT
Start: 2021-01-13 | End: 2021-01-22 | Stop reason: HOSPADM

## 2021-01-13 RX ORDER — ACETAMINOPHEN 325 MG/1
650 TABLET ORAL EVERY 4 HOURS PRN
Status: DISCONTINUED | OUTPATIENT
Start: 2021-01-13 | End: 2021-01-22 | Stop reason: HOSPADM

## 2021-01-13 RX ORDER — METOPROLOL TARTRATE 5 MG/5ML
5 INJECTION INTRAVENOUS ONCE
Status: COMPLETED | OUTPATIENT
Start: 2021-01-13 | End: 2021-01-13

## 2021-01-13 RX ORDER — SODIUM CHLORIDE 0.9 % (FLUSH) 0.9 %
10 SYRINGE (ML) INJECTION AS NEEDED
Status: DISCONTINUED | OUTPATIENT
Start: 2021-01-13 | End: 2021-01-22 | Stop reason: HOSPADM

## 2021-01-13 RX ORDER — DILTIAZEM HYDROCHLORIDE 5 MG/ML
25 INJECTION INTRAVENOUS ONCE
Status: COMPLETED | OUTPATIENT
Start: 2021-01-13 | End: 2021-01-13

## 2021-01-13 RX ORDER — IPRATROPIUM BROMIDE AND ALBUTEROL SULFATE 2.5; .5 MG/3ML; MG/3ML
3 SOLUTION RESPIRATORY (INHALATION) ONCE
Status: DISCONTINUED | OUTPATIENT
Start: 2021-01-13 | End: 2021-01-13 | Stop reason: ALTCHOICE

## 2021-01-13 RX ORDER — BENZONATATE 100 MG/1
200 CAPSULE ORAL 3 TIMES DAILY PRN
Status: DISCONTINUED | OUTPATIENT
Start: 2021-01-13 | End: 2021-01-22 | Stop reason: HOSPADM

## 2021-01-13 RX ORDER — FUROSEMIDE 10 MG/ML
40 INJECTION INTRAMUSCULAR; INTRAVENOUS EVERY 12 HOURS
Status: COMPLETED | OUTPATIENT
Start: 2021-01-13 | End: 2021-01-14

## 2021-01-13 RX ORDER — PHENOL 1.4 %
600 AEROSOL, SPRAY (ML) MUCOUS MEMBRANE DAILY
COMMUNITY
End: 2021-01-01 | Stop reason: SDUPTHER

## 2021-01-13 RX ORDER — MAGNESIUM SULFATE HEPTAHYDRATE 40 MG/ML
2 INJECTION, SOLUTION INTRAVENOUS AS NEEDED
Status: DISCONTINUED | OUTPATIENT
Start: 2021-01-13 | End: 2021-01-22 | Stop reason: HOSPADM

## 2021-01-13 RX ORDER — FINASTERIDE 5 MG/1
5 TABLET, FILM COATED ORAL DAILY
COMMUNITY

## 2021-01-13 RX ORDER — METHYLPREDNISOLONE SODIUM SUCCINATE 125 MG/2ML
125 INJECTION, POWDER, LYOPHILIZED, FOR SOLUTION INTRAMUSCULAR; INTRAVENOUS ONCE
Status: COMPLETED | OUTPATIENT
Start: 2021-01-13 | End: 2021-01-13

## 2021-01-13 RX ORDER — ROSUVASTATIN CALCIUM 10 MG/1
40 TABLET, COATED ORAL NIGHTLY
Status: DISCONTINUED | OUTPATIENT
Start: 2021-01-13 | End: 2021-01-22 | Stop reason: HOSPADM

## 2021-01-13 RX ORDER — DILTIAZEM HYDROCHLORIDE 5 MG/ML
20 INJECTION INTRAVENOUS ONCE
Status: COMPLETED | OUTPATIENT
Start: 2021-01-13 | End: 2021-01-13

## 2021-01-13 RX ORDER — IPRATROPIUM BROMIDE AND ALBUTEROL SULFATE 2.5; .5 MG/3ML; MG/3ML
3 SOLUTION RESPIRATORY (INHALATION)
Status: DISCONTINUED | OUTPATIENT
Start: 2021-01-13 | End: 2021-01-19

## 2021-01-13 RX ORDER — MULTIPLE VITAMINS W/ MINERALS TAB 9MG-400MCG
1 TAB ORAL DAILY
COMMUNITY
End: 2021-02-08

## 2021-01-13 RX ORDER — THEOPHYLLINE 300 MG/1
300 TABLET, EXTENDED RELEASE ORAL DAILY
Status: DISCONTINUED | OUTPATIENT
Start: 2021-01-14 | End: 2021-01-22 | Stop reason: HOSPADM

## 2021-01-13 RX ORDER — ONDANSETRON 4 MG/1
4 TABLET, FILM COATED ORAL EVERY 6 HOURS PRN
Status: DISCONTINUED | OUTPATIENT
Start: 2021-01-13 | End: 2021-01-22 | Stop reason: HOSPADM

## 2021-01-13 RX ORDER — ONDANSETRON 2 MG/ML
4 INJECTION INTRAMUSCULAR; INTRAVENOUS EVERY 6 HOURS PRN
Status: DISCONTINUED | OUTPATIENT
Start: 2021-01-13 | End: 2021-01-22 | Stop reason: HOSPADM

## 2021-01-13 RX ORDER — ACETAMINOPHEN 650 MG/1
650 SUPPOSITORY RECTAL EVERY 4 HOURS PRN
Status: DISCONTINUED | OUTPATIENT
Start: 2021-01-13 | End: 2021-01-22 | Stop reason: HOSPADM

## 2021-01-13 RX ORDER — TAMSULOSIN HYDROCHLORIDE 0.4 MG/1
1 CAPSULE ORAL
Status: ON HOLD | COMMUNITY
End: 2021-01-01 | Stop reason: SDUPTHER

## 2021-01-13 RX ORDER — MAGNESIUM SULFATE 1 G/100ML
1 INJECTION INTRAVENOUS AS NEEDED
Status: DISCONTINUED | OUTPATIENT
Start: 2021-01-13 | End: 2021-01-22 | Stop reason: HOSPADM

## 2021-01-13 RX ORDER — GUAIFENESIN 600 MG/1
1200 TABLET, EXTENDED RELEASE ORAL EVERY 12 HOURS
Status: DISCONTINUED | OUTPATIENT
Start: 2021-01-13 | End: 2021-01-22 | Stop reason: HOSPADM

## 2021-01-13 RX ORDER — FINASTERIDE 5 MG/1
5 TABLET, FILM COATED ORAL DAILY
Status: DISCONTINUED | OUTPATIENT
Start: 2021-01-13 | End: 2021-01-22 | Stop reason: HOSPADM

## 2021-01-13 RX ORDER — TAMSULOSIN HYDROCHLORIDE 0.4 MG/1
0.4 CAPSULE ORAL
Status: DISCONTINUED | OUTPATIENT
Start: 2021-01-13 | End: 2021-01-22 | Stop reason: HOSPADM

## 2021-01-13 RX ORDER — MULTIPLE VITAMINS W/ MINERALS TAB 9MG-400MCG
1 TAB ORAL DAILY
Status: DISCONTINUED | OUTPATIENT
Start: 2021-01-13 | End: 2021-01-22 | Stop reason: HOSPADM

## 2021-01-13 RX ORDER — ACETAMINOPHEN 160 MG/5ML
650 SOLUTION ORAL EVERY 4 HOURS PRN
Status: DISCONTINUED | OUTPATIENT
Start: 2021-01-13 | End: 2021-01-22 | Stop reason: HOSPADM

## 2021-01-13 RX ORDER — NITROGLYCERIN 0.4 MG/1
0.4 TABLET SUBLINGUAL
Status: DISCONTINUED | OUTPATIENT
Start: 2021-01-13 | End: 2021-01-22 | Stop reason: HOSPADM

## 2021-01-13 RX ORDER — BUDESONIDE 0.5 MG/2ML
0.5 INHALANT ORAL
Status: DISCONTINUED | OUTPATIENT
Start: 2021-01-13 | End: 2021-01-22 | Stop reason: HOSPADM

## 2021-01-13 RX ORDER — ALBUTEROL SULFATE 90 UG/1
2 AEROSOL, METERED RESPIRATORY (INHALATION) ONCE
Status: COMPLETED | OUTPATIENT
Start: 2021-01-13 | End: 2021-01-13

## 2021-01-13 RX ORDER — METHYLPREDNISOLONE SODIUM SUCCINATE 40 MG/ML
40 INJECTION, POWDER, LYOPHILIZED, FOR SOLUTION INTRAMUSCULAR; INTRAVENOUS EVERY 8 HOURS
Status: DISCONTINUED | OUTPATIENT
Start: 2021-01-13 | End: 2021-01-22

## 2021-01-13 RX ORDER — FUROSEMIDE 10 MG/ML
40 INJECTION INTRAMUSCULAR; INTRAVENOUS ONCE
Status: COMPLETED | OUTPATIENT
Start: 2021-01-13 | End: 2021-01-13

## 2021-01-13 RX ADMIN — IPRATROPIUM BROMIDE AND ALBUTEROL SULFATE 3 ML: 2.5; .5 SOLUTION RESPIRATORY (INHALATION) at 22:54

## 2021-01-13 RX ADMIN — ENOXAPARIN SODIUM 80 MG: 80 INJECTION SUBCUTANEOUS at 15:20

## 2021-01-13 RX ADMIN — FUROSEMIDE 40 MG: 10 INJECTION, SOLUTION INTRAMUSCULAR; INTRAVENOUS at 20:19

## 2021-01-13 RX ADMIN — METHYLPREDNISOLONE SODIUM SUCCINATE 40 MG: 40 INJECTION, POWDER, FOR SOLUTION INTRAMUSCULAR; INTRAVENOUS at 21:15

## 2021-01-13 RX ADMIN — FINASTERIDE 5 MG: 5 TABLET, FILM COATED ORAL at 17:24

## 2021-01-13 RX ADMIN — METOPROLOL TARTRATE 5 MG: 5 INJECTION INTRAVENOUS at 15:57

## 2021-01-13 RX ADMIN — MULTIPLE VITAMINS W/ MINERALS TAB 1 TABLET: TAB at 17:24

## 2021-01-13 RX ADMIN — SODIUM CHLORIDE 10 MG/HR: 900 INJECTION, SOLUTION INTRAVENOUS at 14:02

## 2021-01-13 RX ADMIN — GUAIFENESIN 1200 MG: 600 TABLET, EXTENDED RELEASE ORAL at 17:24

## 2021-01-13 RX ADMIN — DILTIAZEM HYDROCHLORIDE 20 MG: 5 INJECTION INTRAVENOUS at 14:03

## 2021-01-13 RX ADMIN — Medication 10 ML: at 20:19

## 2021-01-13 RX ADMIN — ALBUTEROL SULFATE 2 PUFF: 108 AEROSOL, METERED RESPIRATORY (INHALATION) at 13:39

## 2021-01-13 RX ADMIN — FUROSEMIDE 40 MG: 10 INJECTION, SOLUTION INTRAMUSCULAR; INTRAVENOUS at 14:02

## 2021-01-13 RX ADMIN — ROSUVASTATIN CALCIUM 40 MG: 10 TABLET, FILM COATED ORAL at 20:19

## 2021-01-13 RX ADMIN — TAMSULOSIN HYDROCHLORIDE 0.4 MG: 0.4 CAPSULE ORAL at 17:24

## 2021-01-13 RX ADMIN — METHYLPREDNISOLONE SODIUM SUCCINATE 125 MG: 125 INJECTION, POWDER, FOR SOLUTION INTRAMUSCULAR; INTRAVENOUS at 15:57

## 2021-01-13 RX ADMIN — DILTIAZEM HYDROCHLORIDE 25 MG: 5 INJECTION INTRAVENOUS at 15:09

## 2021-01-13 RX ADMIN — CEFEPIME HYDROCHLORIDE 2 G: 2 INJECTION, POWDER, FOR SOLUTION INTRAVENOUS at 15:20

## 2021-01-13 RX ADMIN — SODIUM CHLORIDE 7.5 MG/HR: 900 INJECTION, SOLUTION INTRAVENOUS at 17:25

## 2021-01-13 NOTE — H&P
[facility]      Patient Name: Lenny Larson  : 1940  MRN: [id number]  Primary Care Physician: Camron Pollard MD  Date of admission: 2021    Patient Care Team:  Camron Pollard MD as PCP - General (Family Medicine)  Alvarado Cai MD as Consulting Physician (Cardiology)          Subjective   History Present Illness     Chief Complaint:   Chief Complaint   Patient presents with   • Shortness of Breath         Mr. Larson is a 80 y.o. male with a past medical history of COPD, CAD, hyperlipidemia, hypertension, and atrial fibrillation who presented to [facility] on 2021 with complaints of shortness of breath.  Patient explains he has not been able to breathe for the last couple of days.  He states it is worse in the morning, laying flat, and on exertion.  He denies any alleviating factors.  He used to have to stick his finger down his throat to get his phlegm out.  He explains that he has an appointment with Dr. Gonsales in 3 days.  He did have an outpatient CT scan ordered this week.  He explains he is also had accompanying chills and cough with yellow sputum production.  He reports today he had left-sided chest pain, which have since subsided.  He has had accompanied heart palpitations.  He denies any aggravating or alleviating factors.  He denies any recent nausea, vomiting, diarrhea, or fever.    In the ED, chest x-ray showed Interval development of a moderate-sized right pleural effusion. Worsening consolidation in the right lung particularly in the mid and lower lung zone. This is likely due to a combination of compressive atelectasis with superimposed pneumonia.  Masslike area of consolidation has been present the right hilar  region on previous imaging studies consistent with treated lung cancer. EKG showed sinus tachycardia, patient subsequently went into Atrial fibrillation with RVR in ED. all labs unremarkable upon admission  except proBNP 7372.  Patient Covid negative.  Blood culture x2 pending.  All vital signs stable upon admission except heart rate 140s to 160s.  Patient was placed on Cardizem drip as well as given Proventil, cefepime, Lovenox, Lasix, and Solu-Medrol in the ED.    Review of Systems   Constitution: Negative.   HENT: Negative.    Cardiovascular: Positive for chest pain, dyspnea on exertion, irregular heartbeat and palpitations.   Respiratory: Positive for cough, shortness of breath and sputum production.    Skin: Negative.    Musculoskeletal: Negative.    Gastrointestinal: Negative.    Genitourinary: Negative.    Neurological: Negative.    Psychiatric/Behavioral: Negative.            Personal History     Past Medical History:   Past Medical History:   Diagnosis Date   • COPD (chronic obstructive pulmonary disease) (CMS/[facility])    • Coronary artery disease    • Hyperlipidemia    • Hypertension        Surgical History:      Past Surgical History:   Procedure Laterality Date   • CARDIAC CATHETERIZATION      PCI     Family History: family history is not on file. Otherwise pertinent FHx was reviewed and unremarkable.     Social History:  reports that he has never smoked. He has never used smokeless tobacco. He reports previous alcohol use. He reports that he does not use drugs.      Medications:  Prior to Admission medications    Medication Sig Start Date End Date Taking? Authorizing Provider   Calcium Carbonate (CALTRATE 600 PO) CALTRATE 600 TABS 6/1/17   Nabil Quiroz MD   diltiaZEM (TIAZAC) 240 MG 24 hr capsule Take 240 mg by mouth Daily.    Nabil Quiroz MD   dilTIAZem CD (CARDIZEM CD) 240 MG 24 hr capsule TAKE 1 CAPSULE DAILY 8/3/20   Alvarado Cai MD   finasteride (PROSCAR) 5 MG tablet FINASTERIDE 5 MG TABS 10/13/16   Nabil Quiroz MD   Multiple Vitamins-Minerals (CENTRUM SILVER) tablet CENTRUM SILVER TABS 6/1/17   Nabil Quiroz MD   rosuvastatin (Crestor) 40 MG tablet Take 1 tablet by  mouth Daily. 9/1/20   Camron Pollard MD   tamsulosin (FLOMAX) 0.4 MG capsule 24 hr capsule TAMSULOSIN HCL 0.4 MG CAPS 2/9/15   Provider, MD Nabil   theophylline (THEODUR) 300 MG 12 hr tablet Take  by mouth Daily. 6/25/20   Provider, MD Nabil       Allergies:    Allergies   Allergen Reactions   • Ativan [Lorazepam] Mental Status Change       Objective   Objective     Vital Signs  Temp:  [98 °F (36.7 °C)] 98 °F (36.7 °C)  Heart Rate:  [] 106  Resp:  [14-20] 20  BP: ()/(55-88) 108/75  SpO2:  [92 %-97 %] 94 %  on  Flow (L/min):  [2] 2;   Device (Oxygen Therapy): nasal cannula  Body mass index is 25.5 kg/m².    Physical Exam  Vitals signs reviewed.   Constitutional:       Appearance: Normal appearance. He is normal weight.   HENT:      Head: Normocephalic and atraumatic.      Nose: Nose normal.      Mouth/Throat:      Mouth: Mucous membranes are moist.      Pharynx: Oropharynx is clear.   Eyes:      Extraocular Movements: Extraocular movements intact.      Conjunctiva/sclera: Conjunctivae normal.      Pupils: Pupils are equal, round, and reactive to light.   Neck:      Musculoskeletal: Normal range of motion.   Cardiovascular:      Rate and Rhythm: Tachycardia present. Rhythm irregular.      Pulses: Normal pulses.      Heart sounds: Normal heart sounds.      Comments: S1, S2 audible, atrial fibrillation noted on monitor with rate 140's  Pulmonary:      Effort: Pulmonary effort is normal.      Comments: Expiratory wheezes noted, On 2 L NC   Abdominal:      General: Abdomen is flat. Bowel sounds are normal.      Palpations: Abdomen is soft.   Musculoskeletal: Normal range of motion.      Comments: +1 edema BLE    Skin:     General: Skin is warm and dry.   Neurological:      General: No focal deficit present.      Mental Status: He is alert and oriented to person, place, and time. Mental status is at baseline.   Psychiatric:         Mood and Affect: Mood normal.         Behavior: Behavior  normal.         Thought Content: Thought content normal.         Judgment: Judgment normal.           Results Review:  I have personally reviewed most recent cardiac tracings, lab results and radiology images and interpretations and agree with findings.    Results from last 7 days   Lab Units 01/13/21  1310 01/13/21  1302   WBC 10*3/mm3  --  6.00   HEMOGLOBIN g/dL  --  13.0   HEMATOCRIT %  --  39.5   PLATELETS 10*3/mm3  --  221   INR  1.04  --      Results from last 7 days   Lab Units 01/13/21  1302   SODIUM mmol/L 140   POTASSIUM mmol/L 4.1   CHLORIDE mmol/L 104   CO2 mmol/L 25.0   BUN mg/dL 11   CREATININE mg/dL 0.92   GLUCOSE mg/dL 102*   CALCIUM mg/dL 9.1   ALT (SGPT) U/L 22   AST (SGOT) U/L 26   TROPONIN T ng/mL 0.022   PROBNP pg/mL 7,372.0*   PROCALCITONIN ng/mL 0.11     Estimated Creatinine Clearance: 75.4 mL/min (by C-G formula based on SCr of 0.92 mg/dL).  Brief Urine Lab Results     None          Microbiology Results (last 10 days)     Procedure Component Value - Date/Time    COVID-19,[facility] IN-HOUSE,Nasal Swab No Transport Media 3-4 HR TAT - Swab, Nasal Cavity [[id number]]  (Normal) Collected: 01/13/21 1303    Lab Status: Final result Specimen: Swab from Nasal Cavity Updated: 01/13/21 1343     COVID19 Not Detected    Narrative:      Fact sheet for providers: https://www.fda.gov/media/734240/download     Fact sheet for patients: https://www.fda.gov/media/626217/download    Test performed by PCR.          ECG/EMG Results (most recent)     Procedure Component Value Units Date/Time    ECG 12 Lead [[id number]] Collected: 01/13/21 1235     Updated: 01/13/21 1238     QT Interval 350 ms     Narrative:      HEART RATE= 108  bpm  RR Interval= 556  ms  LA Interval= 159  ms  P Horizontal Axis= 22  deg  P Front Axis= 44  deg  QRSD Interval= 95  ms  QT Interval= 350  ms  QRS Axis= 58  deg  T Wave Axis= 103  deg  - ABNORMAL ECG -  Sinus tachycardia  Multiple ventricular premature complexes  Probable LVH with secondary  repol abnrm  Anterior Q waves, possibly due to LVH  When compared with ECG of 18-May-2019 9:03:54,  No significant change  Electronically Signed By:   Date and Time of Study: 2021-01-13 12:35:42                    Xr Chest 1 View    Result Date: 1/13/2021   1. Interval development of a moderate-sized right pleural effusion. 2. Worsening consolidation in the right lung particularly in the mid and lower lung zone. This is likely due to a combination of compressive atelectasis with superimposed pneumonia. 3. Masslike area of consolidation has been present the right hilar region on previous imaging studies consistent with treated lung cancer.  Electronically Signed By-Kel Fair MD On:1/13/2021 2:19 PM This report was finalized on [id number] by  Kel Fair MD.        Estimated Creatinine Clearance: 75.4 mL/min (by C-G formula based on SCr of 0.92 mg/dL).    Assessment/Plan   Assessment/Plan       Active Hospital Problems    Diagnosis  POA   • **Acute respiratory distress [R06.03]  Yes     Priority: High   • Chest pain [R07.9]  Yes     Priority: High   • Atrial fibrillation with RVR (CMS/HCC) [I48.91]  Yes     Priority: High   • BPH without obstruction/lower urinary tract symptoms [N40.0]  Yes   • History of lung cancer [Z85.118]  Not Applicable   • Hypertension [I10]  Yes   • Hyperlipidemia [E78.5]  Yes   • Coronary artery disease [I25.10]  Yes   • Chronic obstructive pulmonary disease (CMS/HCC) [J44.9]  Yes      Resolved Hospital Problems   No resolved problems to display.       Acute respiratory distress  - likely secondary to pleural effusion and acute COPD exacerbation  - Patient currently on 2L NC and does not wear this at home   - Chest x-ray showed Interval development of a moderate-sized right pleural effusion. Worsening consolidation in the right lung particularly in the mid and lower lung zone. This is likely due to a combination of compressive atelectasis with superimposed pneumonia.  Masslike area of  consolidation has been present the right hilar  region on previous imaging studies consistent with treated lung cancer  - Patient Covid negative.    - Blood culture x2 pending.    -Patient received Proventil, cefepime, Lovenox, Lasix, and Solu-Medrol in the ED   - Procalcitonin 0.11  - CRP 0.45   - Check respiratory panel   - Continue home theophylline, breathing treatments, and Solu-Medrol, cefepime continued for now  - Pulmonology consulted   -Pleural fluid needs to be sent for cytology as the patient has a history of the right upper lobe resection for lung cancer in the past.    Atrial fibrillation with RVR  -EKG showed sinus tachycardia, patient subsequently went into Atrial fibrillation with RVR in ED.   - Continuous cardiac monitoring  - proBNP 7372, monitor   - 2D echo ordered   - Continue cardizem gtt for now       -Patient continued to have tachycardia at a rate between 101 40 during the time that I was with him.  - Lasix 40mg X2 doses ordered   - Defer anticoagulation to cardiology- possible thoracentesis    - Cardiology consulted     Acute chest pain   - Continuous cardiac monitoring  - Serial troponins ordered  - Nitrostat ordered  - Cardiology consulted     CAD status post stent placement  -Continue statin    Essential hypertension  -Moderately controlled  -Monitor blood pressure  -Continue Cardizem  - IV hydralazine ordered PRN     Hyperlipidemia  -Continue statin    BPH  -Continue Proscar and Flomax    History of lung cancer  - S/P lobectomy, patient previously received radiation    VTE Prophylaxis - SCD's, defer anticoagulation to cardiology- possible thoracentesis   Mechanical Order History:     None      Pharmalogical Order History:      Ordered     Dose Route Frequency Stop    01/13/21 1502  enoxaparin (LOVENOX) syringe 80 mg      1 mg/kg SC Once --                CODE STATUS:    Code Status and Medical Interventions:   Ordered at: 01/13/21 1610     Limited Support to NOT Include:    Intubation      Level Of Support Discussed With:    Patient     Code Status:    CPR     Medical Interventions (Level of Support Prior to Arrest):    Limited       This patient has been examined wearing appropriate Personal Protective Equipment. 01/13/21      I discussed the patient's findings and my recommendations with patient and nursing staff.      Signature: Electronically signed by PRIYA Bello, 01/13/21, 4:19 PM EST.    [facility] Hospitalist Team

## 2021-01-13 NOTE — ED PROVIDER NOTES
Subjective   Chief complaint shortness of breath    History of present illness this is a 80-year-old male with a history of COPD heart disease and hypertension who complains of about a 6-week history of increasing shortness of breath and a cough with thick sputum.  He denies any fever chills or night sweats or weight loss no chest pain neck arm or jaw pain symptoms are moderate to severe continuous progressively of gotten worse worse with exertion and better with rest.  No recent change in medication.  No one in the home with similar illness          Review of Systems   Constitutional: Negative for chills and fever.   HENT: Negative for congestion and sinus pressure.    Eyes: Negative for photophobia and visual disturbance.   Respiratory: Positive for cough and shortness of breath. Negative for chest tightness.    Cardiovascular: Negative for chest pain and leg swelling.   Gastrointestinal: Negative for abdominal pain and vomiting.   Endocrine: Negative for cold intolerance and heat intolerance.   Genitourinary: Negative for difficulty urinating and dysuria.   Musculoskeletal: Negative for arthralgias and back pain.   Skin: Negative for color change and pallor.   Neurological: Positive for weakness. Negative for dizziness and light-headedness.   Psychiatric/Behavioral: Negative for agitation and behavioral problems.       Past Medical History:   Diagnosis Date   • COPD (chronic obstructive pulmonary disease) (CMS/[facility])    • Coronary artery disease    • Hyperlipidemia    • Hypertension        Allergies   Allergen Reactions   • Ativan [Lorazepam] Mental Status Change       Past Surgical History:   Procedure Laterality Date   • CARDIAC CATHETERIZATION      PCI       No family history on file.    Social History     Socioeconomic History   • Marital status:      Spouse name: Not on file   • Number of children: Not on file   • Years of education: Not on file   • Highest education level: Not on file   Tobacco Use   •  Smoking status: Never Smoker   • Smokeless tobacco: Never Used   Substance and Sexual Activity   • Alcohol use: Not Currently   • Drug use: Never   • Sexual activity: Defer     Prior to Admission medications    Medication Sig Start Date End Date Taking? Authorizing Provider   Calcium Carbonate (CALTRATE 600 PO) CALTRATE 600 TABS 6/1/17   Nabil Quiroz MD   diltiaZEM (TIAZAC) 240 MG 24 hr capsule Take 240 mg by mouth Daily.    Nabil Quiroz MD   dilTIAZem CD (CARDIZEM CD) 240 MG 24 hr capsule TAKE 1 CAPSULE DAILY 8/3/20   Alvarado Cai MD   finasteride (PROSCAR) 5 MG tablet FINASTERIDE 5 MG TABS 10/13/16   Nabil Quiroz MD   Multiple Vitamins-Minerals (CENTRUM SILVER) tablet CENTRUM SILVER TABS 6/1/17   Nabil Quiroz MD   rosuvastatin (Crestor) 40 MG tablet Take 1 tablet by mouth Daily. 9/1/20   Camron Pollard MD   tamsulosin (FLOMAX) 0.4 MG capsule 24 hr capsule TAMSULOSIN HCL 0.4 MG CAPS 2/9/15   Nabil Quiroz MD   theophylline (THEODUR) 300 MG 12 hr tablet Take  by mouth Daily. 6/25/20   Nabil Quiroz MD           Objective   Physical Exam  80-year-old male awake alert no acute distress he is on oxygen here 3 L sats at 94%.  Initial heart rate in the 90s.  HEENT extraocular muscles intact pupils equal round reactive sclera clear mouth clear  Neck no adenopathy no JVD no bruits no meningeal signs  Lungs diffuse rhonchi and wheezes throughout no retractions  Heart regular without murmur  Abdomen soft nontender good bowel sounds no peritoneal findings.  Patient has known abdominal aortic aneurysm which he is awaiting surgery on but there is no current pain  Extremities pulses are equal throughout upper and lower extremities no edema cords or Homans' sign or evidence of DVT.  Neurologically he is awake alert orientated x4 no facial asymmetry normal speech no weakness in extremities    Procedures           ED Course      Results for orders placed or performed  during the hospital encounter of 01/13/21   COVID-19,[facility] IN-HOUSE,Nasal Swab No Transport Media 3-4 HR TAT - Swab, Nasal Cavity    Specimen: Nasal Cavity; Swab   Result Value Ref Range    COVID19 Not Detected Not Detected - Ref. Range   Comprehensive Metabolic Panel    Specimen: Blood   Result Value Ref Range    Glucose 102 (H) 65 - 99 mg/dL    BUN 11 8 - 23 mg/dL    Creatinine 0.92 0.76 - 1.27 mg/dL    Sodium 140 136 - 145 mmol/L    Potassium 4.1 3.5 - 5.2 mmol/L    Chloride 104 98 - 107 mmol/L    CO2 25.0 22.0 - 29.0 mmol/L    Calcium 9.1 8.6 - 10.5 mg/dL    Total Protein 6.5 6.0 - 8.5 g/dL    Albumin 3.90 3.50 - 5.20 g/dL    ALT (SGPT) 22 1 - 41 U/L    AST (SGOT) 26 1 - 40 U/L    Alkaline Phosphatase 67 39 - 117 U/L    Total Bilirubin 0.6 0.0 - 1.2 mg/dL    eGFR Non African Amer 80 >60 mL/min/1.73    Globulin 2.6 gm/dL    A/G Ratio 1.5 g/dL    BUN/Creatinine Ratio 12.0 7.0 - 25.0    Anion Gap 11.0 5.0 - 15.0 mmol/L   Protime-INR    Specimen: Arm, Left; Blood   Result Value Ref Range    Protime 11.4 9.6 - 11.7 Seconds    INR 1.04 0.93 - 1.10   aPTT    Specimen: Blood   Result Value Ref Range    PTT 23.9 (L) 24.0 - 31.0 seconds   BNP    Specimen: Blood   Result Value Ref Range    proBNP 7,372.0 (H) 0.0-1,800.0 pg/mL   Troponin    Specimen: Blood   Result Value Ref Range    Troponin T 0.022 0.000 - 0.030 ng/mL   CBC Auto Differential    Specimen: Blood   Result Value Ref Range    WBC 6.00 3.40 - 10.80 10*3/mm3    RBC 4.48 4.14 - 5.80 10*6/mm3    Hemoglobin 13.0 13.0 - 17.7 g/dL    Hematocrit 39.5 37.5 - 51.0 %    MCV 88.1 79.0 - 97.0 fL    MCH 29.1 26.6 - 33.0 pg    MCHC 33.0 31.5 - 35.7 g/dL    RDW 16.1 (H) 12.3 - 15.4 %    RDW-SD 49.9 37.0 - 54.0 fl    MPV 9.3 6.0 - 12.0 fL    Platelets 221 140 - 450 10*3/mm3    Neutrophil % 83.1 (H) 42.7 - 76.0 %    Lymphocyte % 9.2 (L) 19.6 - 45.3 %    Monocyte % 6.9 5.0 - 12.0 %    Eosinophil % 0.4 0.3 - 6.2 %    Basophil % 0.4 0.0 - 1.5 %    Neutrophils, Absolute 5.00  1.70 - 7.00 10*3/mm3    Lymphocytes, Absolute 0.60 (L) 0.70 - 3.10 10*3/mm3    Monocytes, Absolute 0.40 0.10 - 0.90 10*3/mm3    Eosinophils, Absolute 0.00 0.00 - 0.40 10*3/mm3    Basophils, Absolute 0.00 0.00 - 0.20 10*3/mm3    nRBC 0.0 0.0 - 0.2 /100 WBC   ECG 12 Lead   Result Value Ref Range    QT Interval 350 ms   Gold Top - SST   Result Value Ref Range    Extra Tube Hold for add-ons.      Xr Chest 1 View    Result Date: 1/13/2021   1. Interval development of a moderate-sized right pleural effusion. 2. Worsening consolidation in the right lung particularly in the mid and lower lung zone. This is likely due to a combination of compressive atelectasis with superimposed pneumonia. 3. Masslike area of consolidation has been present the right hilar region on previous imaging studies consistent with treated lung cancer.  Electronically Signed By-Kel Fair MD On:1/13/2021 2:19 PM This report was finalized on [id number] by  Kel Fair MD.    Medications   sodium chloride 0.9 % flush 10 mL (has no administration in time range)   dilTIAZem (CARDIZEM) 100 mg in 100 mL NS infusion (ADV) (7.5 mg/hr Intravenous Rate/Dose Change 1/13/21 1432)   ceFEPime (MAXIPIME) in SWFI 2g/10ml IV PUSH syringe (has no administration in time range)   enoxaparin (LOVENOX) syringe 80 mg (has no administration in time range)   albuterol sulfate HFA (PROVENTIL HFA;VENTOLIN HFA;PROAIR HFA) inhaler 2 puff (2 puffs Inhalation Given 1/13/21 1339)   dilTIAZem (CARDIZEM) injection 20 mg (20 mg Intravenous Given 1/13/21 1403)   furosemide (LASIX) injection 40 mg (40 mg Intravenous Given 1/13/21 1402)   dilTIAZem (CARDIZEM) injection 25 mg (25 mg Intravenous Given 1/13/21 1509)       EKG #1 my interpretation normal sinus rhythm rate of 105 some LVH anterior Q waves QTC of 426 Q waves anteriorly appear to be new from 2019 abnormal EKG  EKG #2 my interpretation atrial fibrillation rate of 160 normal axis hypertrophy QTC of 440 abnormal EKG unchanged  from previous     A CT scan of the chest without was obtained from prior to radiology which he had done yesterday showed development of a large partially loculated right pleural effusion dependent layering left pleural effusion some of this may be related to posterior and changes with increased size suggest possibility of cancer recurrence of possible infection.  Emphysema was noted right upper lobectomy was noted cardiomegaly atherosclerosis was noted                                MDM  Number of Diagnoses or Management Options  Acute respiratory distress:   Atrial fibrillation with RVR (CMS/HCC):   Chronic obstructive pulmonary disease with acute exacerbation (CMS/HCC):   Pleural effusion:   Diagnosis management comments: Medical decision making.  Patient IV established he was placed on a cardiac monitor placed on oxygen given a DuoNeb 25 mg Solu-Medrol IV.  And he had the above exam and evaluation KG was a sinus rhythm.  CBC white count was 6000 hemoglobin 13 troponin was 0.022 proBNP was 7372 INR was 1.04 COVID-19 was negative chemistries were unremarkable.  Blood cultures were obtained.  The patient subsequently elevated heart rate in atrial fibrillation up into the 160 range.  An EKG showed A. fib.  Patient denies any history of this.  He was given Cardizem 20 mg IV and placed on a drip.  Initial heart rate came down to the 140 range he was bolused again with 25 mg IV and drip adjusted.  Patient was given Lovenox 1 mg/kg subcutaneously.  Chest x-ray shows the moderate size right pleural effusion.  Patient had been given Maxipime IV he is on Cardizem drip after 2 boluses.  And Lovenox 1 mg/kg and Solu-Medrol have been provided as well.  I do not see any evidence suggest acute DVT or pulmonary embolism or aortic dissection.  This certainly could be related to infection or pneumonia or cancer.  We have anticoagulated him because of his A. fib with RVR his rate has come down to about 120 on Cardizem.  This is down  from about 160.  He was made aware of the findings.  He is feeling much better resting comfortably.  In no distress the symptoms have been ongoing for about 6 weeks and progressively gotten worse.  Hospitalist has been notified.  And the patient will be admitted to the hospital for further work-up and care labs chest x-ray and prior to radiology CT reviewed.  Patient was given Lopressor 5 mg IV.  Heart rate came down to 105.  Patient made aware of the findings hospitalist nurse practitioner notified stable unremarkable improved ER course      Final diagnoses:   Acute respiratory distress   Atrial fibrillation with RVR (CMS/HCC)   Chronic obstructive pulmonary disease with acute exacerbation (CMS/[facility])   Pleural effusion            Dakota Rordigues MD  01/13/21 3917

## 2021-01-13 NOTE — PLAN OF CARE
Goal Outcome Evaluation:  Patient arrived from ER on cardizem gtt at 7.5mg/hr. -130s. Last /74. Patient states he is short of breath and is on 2L with O2 sats 100%. Cardiology and Pulmonary consulted. Will continue to monitor.     Problem: Adult Inpatient Plan of Care  Goal: Plan of Care Review  Outcome: Ongoing, Progressing  Goal: Patient-Specific Goal (Individualized)  Outcome: Ongoing, Progressing  Goal: Absence of Hospital-Acquired Illness or Injury  Outcome: Ongoing, Progressing  Goal: Optimal Comfort and Wellbeing  Outcome: Ongoing, Progressing  Goal: Readiness for Transition of Care  Outcome: Ongoing, Progressing  Intervention: Mutually Develop Transition Plan  Recent Flowsheet Documentation  Taken 1/13/2021 1739 by Priya Chavez, RN  Transportation Anticipated: family or friend will provide  Patient/Family Anticipated Services at Transition: none  Patient/Family Anticipates Transition to: home  Taken 1/13/2021 1710 by Priya Chavez, RN  Equipment Currently Used at Home: none

## 2021-01-13 NOTE — ED NOTES
Pt is alert and comfortable in the bed. Vitals are stable, care hand off has been given and patient is waiting to be transported to the floor.      Cam Castro RN  01/13/21 [id number]

## 2021-01-13 NOTE — ED NOTES
After Cardizem bolus pt became hypotensive, asymptomatic pt remains stable no signs of distress Cardizem was adjusted as need. Will continue to monitor      Matilde Deluna RN  01/13/21 9720

## 2021-01-13 NOTE — ED NOTES
"Pt yelling out in pain stating his \"Penis hurts\" reporting his bladder feels full but he cannot urinate. MD notified. Bladder Scan shows minimal urine. Cardizem titrated up to 7.5mg/hr- BP stable at this time, HR fluctuating between 160-170.       Prior, RICKIE Godinez  01/13/21 1432       Prior, RICKIE Godinez  01/13/21 1434    "

## 2021-01-14 ENCOUNTER — APPOINTMENT (OUTPATIENT)
Dept: ULTRASOUND IMAGING | Facility: HOSPITAL | Age: 81
End: 2021-01-14

## 2021-01-14 ENCOUNTER — ANESTHESIA EVENT (OUTPATIENT)
Dept: GASTROENTEROLOGY | Facility: HOSPITAL | Age: 81
End: 2021-01-14

## 2021-01-14 ENCOUNTER — ANESTHESIA (OUTPATIENT)
Dept: GASTROENTEROLOGY | Facility: HOSPITAL | Age: 81
End: 2021-01-14

## 2021-01-14 PROBLEM — C34.11 MALIGNANT NEOPLASM OF UPPER LOBE OF RIGHT LUNG (HCC): Status: ACTIVE | Noted: 2021-01-13

## 2021-01-14 LAB
ANION GAP SERPL CALCULATED.3IONS-SCNC: 13 MMOL/L (ref 5–15)
B PARAPERT DNA SPEC QL NAA+PROBE: NOT DETECTED
B PERT DNA SPEC QL NAA+PROBE: NOT DETECTED
BACTERIA UR QL AUTO: ABNORMAL /HPF
BH CV ECHO MEAS - AI DEC SLOPE: 287.4 CM/SEC^2
BH CV ECHO MEAS - AI DEC TIME: 1.4 SEC
BH CV ECHO MEAS - AI MAX PG: 60.5 MMHG
BH CV ECHO MEAS - AI MAX VEL: 389.1 CM/SEC
BH CV ECHO MEAS - AI P1/2T: 396.5 MSEC
BH CV ECHO MEAS - AO MAX PG (FULL): 25.4 MMHG
BH CV ECHO MEAS - AO MAX PG: 28.7 MMHG
BH CV ECHO MEAS - AO MEAN PG (FULL): 14 MMHG
BH CV ECHO MEAS - AO MEAN PG: 15.8 MMHG
BH CV ECHO MEAS - AO ROOT AREA (BSA CORRECTED): 1.9
BH CV ECHO MEAS - AO ROOT AREA: 11.5 CM^2
BH CV ECHO MEAS - AO ROOT DIAM: 3.8 CM
BH CV ECHO MEAS - AO V2 MAX: 267.9 CM/SEC
BH CV ECHO MEAS - AO V2 MEAN: 186.7 CM/SEC
BH CV ECHO MEAS - AO V2 VTI: 51.9 CM
BH CV ECHO MEAS - AORTIC HR: 118.5 BPM
BH CV ECHO MEAS - AORTIC R-R: 0.51 SEC
BH CV ECHO MEAS - AVA(I,A): 0.97 CM^2
BH CV ECHO MEAS - AVA(I,D): 0.97 CM^2
BH CV ECHO MEAS - AVA(V,A): 1 CM^2
BH CV ECHO MEAS - AVA(V,D): 1 CM^2
BH CV ECHO MEAS - BSA(HAYCOCK): 2 M^2
BH CV ECHO MEAS - BSA: 2 M^2
BH CV ECHO MEAS - BZI_BMI: 25.4 KILOGRAMS/M^2
BH CV ECHO MEAS - BZI_METRIC_HEIGHT: 177.8 CM
BH CV ECHO MEAS - BZI_METRIC_WEIGHT: 80.3 KG
BH CV ECHO MEAS - CI(AO): 35.7 L/MIN/M^2
BH CV ECHO MEAS - CI(LVOT): 3 L/MIN/M^2
BH CV ECHO MEAS - CO(AO): 70.8 L/MIN
BH CV ECHO MEAS - CO(LVOT): 6 L/MIN
BH CV ECHO MEAS - EDV(CUBED): 165.2 ML
BH CV ECHO MEAS - EDV(MOD-SP4): 125.6 ML
BH CV ECHO MEAS - EDV(TEICH): 146.6 ML
BH CV ECHO MEAS - EF(CUBED): 14.6 %
BH CV ECHO MEAS - EF(MOD-SP4): 30.3 %
BH CV ECHO MEAS - EF(TEICH): 11.5 %
BH CV ECHO MEAS - ESV(CUBED): 141.1 ML
BH CV ECHO MEAS - ESV(MOD-SP4): 87.5 ML
BH CV ECHO MEAS - ESV(TEICH): 129.8 ML
BH CV ECHO MEAS - FS: 5.1 %
BH CV ECHO MEAS - IVS/LVPW: 1
BH CV ECHO MEAS - IVSD: 0.9 CM
BH CV ECHO MEAS - LA DIMENSION(2D): 3.3 CM
BH CV ECHO MEAS - LV DIASTOLIC VOL/BSA (35-75): 63.4 ML/M^2
BH CV ECHO MEAS - LV MASS(C)D: 186 GRAMS
BH CV ECHO MEAS - LV MASS(C)DI: 93.9 GRAMS/M^2
BH CV ECHO MEAS - LV MAX PG: 3.3 MMHG
BH CV ECHO MEAS - LV MEAN PG: 1.8 MMHG
BH CV ECHO MEAS - LV SYSTOLIC VOL/BSA (12-30): 44.2 ML/M^2
BH CV ECHO MEAS - LV V1 MAX: 90.4 CM/SEC
BH CV ECHO MEAS - LV V1 MEAN: 63.5 CM/SEC
BH CV ECHO MEAS - LV V1 VTI: 16.4 CM
BH CV ECHO MEAS - LVIDD: 5.5 CM
BH CV ECHO MEAS - LVIDS: 5.2 CM
BH CV ECHO MEAS - LVOT AREA: 3.1 CM^2
BH CV ECHO MEAS - LVOT DIAM: 2 CM
BH CV ECHO MEAS - LVPWD: 0.91 CM
BH CV ECHO MEAS - MR MAX PG: 105.9 MMHG
BH CV ECHO MEAS - MR MAX VEL: 514.5 CM/SEC
BH CV ECHO MEAS - MV MAX PG: 3.3 MMHG
BH CV ECHO MEAS - MV MEAN PG: 1.5 MMHG
BH CV ECHO MEAS - MV V2 MAX: 90.8 CM/SEC
BH CV ECHO MEAS - MV V2 MEAN: 56.8 CM/SEC
BH CV ECHO MEAS - MV V2 VTI: 17.5 CM
BH CV ECHO MEAS - MVA(VTI): 2.9 CM^2
BH CV ECHO MEAS - PA MAX PG (FULL): -0.6 MMHG
BH CV ECHO MEAS - PA MAX PG: 1.9 MMHG
BH CV ECHO MEAS - PA MEAN PG (FULL): -0.25 MMHG
BH CV ECHO MEAS - PA MEAN PG: 1 MMHG
BH CV ECHO MEAS - PA V2 MAX: 68.8 CM/SEC
BH CV ECHO MEAS - PA V2 MEAN: 47.5 CM/SEC
BH CV ECHO MEAS - PA V2 VTI: 14.3 CM
BH CV ECHO MEAS - RAP SYSTOLE: 3 MMHG
BH CV ECHO MEAS - RV MAX PG: 2.5 MMHG
BH CV ECHO MEAS - RV MEAN PG: 1.3 MMHG
BH CV ECHO MEAS - RV V1 MAX: 78.9 CM/SEC
BH CV ECHO MEAS - RV V1 MEAN: 52.8 CM/SEC
BH CV ECHO MEAS - RV V1 VTI: 15.3 CM
BH CV ECHO MEAS - RVDD: 2.1 CM
BH CV ECHO MEAS - RVSP: 36.9 MMHG
BH CV ECHO MEAS - SI(AO): 301.4 ML/M^2
BH CV ECHO MEAS - SI(CUBED): 12.2 ML/M^2
BH CV ECHO MEAS - SI(LVOT): 25.4 ML/M^2
BH CV ECHO MEAS - SI(MOD-SP4): 19.2 ML/M^2
BH CV ECHO MEAS - SI(TEICH): 8.5 ML/M^2
BH CV ECHO MEAS - SV(AO): 597.3 ML
BH CV ECHO MEAS - SV(CUBED): 24.1 ML
BH CV ECHO MEAS - SV(LVOT): 50.4 ML
BH CV ECHO MEAS - SV(MOD-SP4): 38 ML
BH CV ECHO MEAS - SV(TEICH): 16.8 ML
BH CV ECHO MEAS - TR MAX VEL: 291 CM/SEC
BILIRUB UR QL STRIP: NEGATIVE
BUN SERPL-MCNC: 21 MG/DL (ref 8–23)
BUN/CREAT SERPL: 15.8 (ref 7–25)
C PNEUM DNA NPH QL NAA+NON-PROBE: NOT DETECTED
CALCIUM SPEC-SCNC: 9.4 MG/DL (ref 8.6–10.5)
CHLORIDE SERPL-SCNC: 100 MMOL/L (ref 98–107)
CLARITY UR: CLEAR
CO2 SERPL-SCNC: 26 MMOL/L (ref 22–29)
COLOR UR: YELLOW
CREAT SERPL-MCNC: 1.33 MG/DL (ref 0.76–1.27)
D-LACTATE SERPL-SCNC: 2.4 MMOL/L (ref 0.5–2)
DEPRECATED RDW RBC AUTO: 49.9 FL (ref 37–54)
ERYTHROCYTE [DISTWIDTH] IN BLOOD BY AUTOMATED COUNT: 16.1 % (ref 12.3–15.4)
FLUAV SUBTYP SPEC NAA+PROBE: NOT DETECTED
FLUBV RNA ISLT QL NAA+PROBE: NOT DETECTED
GFR SERPL CREATININE-BSD FRML MDRD: 52 ML/MIN/1.73
GLUCOSE SERPL-MCNC: 190 MG/DL (ref 65–99)
GLUCOSE UR STRIP-MCNC: NEGATIVE MG/DL
HADV DNA SPEC NAA+PROBE: NOT DETECTED
HCOV 229E RNA SPEC QL NAA+PROBE: NOT DETECTED
HCOV HKU1 RNA SPEC QL NAA+PROBE: NOT DETECTED
HCOV NL63 RNA SPEC QL NAA+PROBE: NOT DETECTED
HCOV OC43 RNA SPEC QL NAA+PROBE: NOT DETECTED
HCT VFR BLD AUTO: 39.2 % (ref 37.5–51)
HGB BLD-MCNC: 12.7 G/DL (ref 13–17.7)
HGB UR QL STRIP.AUTO: ABNORMAL
HMPV RNA NPH QL NAA+NON-PROBE: NOT DETECTED
HPIV1 RNA SPEC QL NAA+PROBE: NOT DETECTED
HPIV2 RNA SPEC QL NAA+PROBE: NOT DETECTED
HPIV3 RNA NPH QL NAA+PROBE: NOT DETECTED
HPIV4 P GENE NPH QL NAA+PROBE: NOT DETECTED
HYALINE CASTS UR QL AUTO: ABNORMAL /LPF
KETONES UR QL STRIP: ABNORMAL
LEUKOCYTE ESTERASE UR QL STRIP.AUTO: NEGATIVE
M PNEUMO IGG SER IA-ACNC: NOT DETECTED
MAGNESIUM SERPL-MCNC: 1.9 MG/DL (ref 1.6–2.4)
MCH RBC QN AUTO: 28.5 PG (ref 26.6–33)
MCHC RBC AUTO-ENTMCNC: 32.4 G/DL (ref 31.5–35.7)
MCV RBC AUTO: 87.9 FL (ref 79–97)
NITRITE UR QL STRIP: NEGATIVE
NT-PROBNP SERPL-MCNC: ABNORMAL PG/ML (ref 0–1800)
PH UR STRIP.AUTO: 5.5 [PH] (ref 5–8)
PLATELET # BLD AUTO: 236 10*3/MM3 (ref 140–450)
PMV BLD AUTO: 9.5 FL (ref 6–12)
POTASSIUM SERPL-SCNC: 4.3 MMOL/L (ref 3.5–5.2)
PROT UR QL STRIP: NEGATIVE
QT INTERVAL: 350 MS
RBC # BLD AUTO: 4.46 10*6/MM3 (ref 4.14–5.8)
RBC # UR: ABNORMAL /HPF
REF LAB TEST METHOD: ABNORMAL
RHINOVIRUS RNA SPEC NAA+PROBE: NOT DETECTED
RSV RNA NPH QL NAA+NON-PROBE: NOT DETECTED
SODIUM SERPL-SCNC: 139 MMOL/L (ref 136–145)
SP GR UR STRIP: 1.02 (ref 1–1.03)
SQUAMOUS #/AREA URNS HPF: ABNORMAL /HPF
TROPONIN T SERPL-MCNC: <0.01 NG/ML (ref 0–0.03)
UROBILINOGEN UR QL STRIP: ABNORMAL
WBC # BLD AUTO: 4.2 10*3/MM3 (ref 3.4–10.8)
WBC UR QL AUTO: ABNORMAL /HPF

## 2021-01-14 PROCEDURE — 25010000002 AMIODARONE IN DEXTROSE 5% 150-4.21 MG/100ML-% SOLUTION: Performed by: INTERNAL MEDICINE

## 2021-01-14 PROCEDURE — 99232 SBSQ HOSP IP/OBS MODERATE 35: CPT | Performed by: INTERNAL MEDICINE

## 2021-01-14 PROCEDURE — 83880 ASSAY OF NATRIURETIC PEPTIDE: CPT | Performed by: NURSE PRACTITIONER

## 2021-01-14 PROCEDURE — 87116 MYCOBACTERIA CULTURE: CPT | Performed by: INTERNAL MEDICINE

## 2021-01-14 PROCEDURE — 87205 SMEAR GRAM STAIN: CPT | Performed by: INTERNAL MEDICINE

## 2021-01-14 PROCEDURE — 0B9F8ZX DRAINAGE OF RIGHT LOWER LUNG LOBE, VIA NATURAL OR ARTIFICIAL OPENING ENDOSCOPIC, DIAGNOSTIC: ICD-10-PCS | Performed by: INTERNAL MEDICINE

## 2021-01-14 PROCEDURE — 83605 ASSAY OF LACTIC ACID: CPT | Performed by: INTERNAL MEDICINE

## 2021-01-14 PROCEDURE — 25010000002 AMIODARONE PER 30 MG: Performed by: INTERNAL MEDICINE

## 2021-01-14 PROCEDURE — 99222 1ST HOSP IP/OBS MODERATE 55: CPT | Performed by: INTERNAL MEDICINE

## 2021-01-14 PROCEDURE — 81001 URINALYSIS AUTO W/SCOPE: CPT | Performed by: NURSE PRACTITIONER

## 2021-01-14 PROCEDURE — 25010000002 CEFEPIME PER 500 MG: Performed by: INTERNAL MEDICINE

## 2021-01-14 PROCEDURE — 87798 DETECT AGENT NOS DNA AMP: CPT | Performed by: INTERNAL MEDICINE

## 2021-01-14 PROCEDURE — 87102 FUNGUS ISOLATION CULTURE: CPT | Performed by: INTERNAL MEDICINE

## 2021-01-14 PROCEDURE — 85027 COMPLETE CBC AUTOMATED: CPT | Performed by: NURSE PRACTITIONER

## 2021-01-14 PROCEDURE — 0100U HC BIOFIRE FILMARRAY RESP PANEL 2: CPT | Performed by: INTERNAL MEDICINE

## 2021-01-14 PROCEDURE — 80048 BASIC METABOLIC PNL TOTAL CA: CPT | Performed by: NURSE PRACTITIONER

## 2021-01-14 PROCEDURE — 25010000002 FUROSEMIDE PER 20 MG: Performed by: NURSE PRACTITIONER

## 2021-01-14 PROCEDURE — 87206 SMEAR FLUORESCENT/ACID STAI: CPT | Performed by: INTERNAL MEDICINE

## 2021-01-14 PROCEDURE — 94799 UNLISTED PULMONARY SVC/PX: CPT

## 2021-01-14 PROCEDURE — 76775 US EXAM ABDO BACK WALL LIM: CPT

## 2021-01-14 PROCEDURE — 93010 ELECTROCARDIOGRAM REPORT: CPT | Performed by: INTERNAL MEDICINE

## 2021-01-14 PROCEDURE — 88108 CYTOPATH CONCENTRATE TECH: CPT | Performed by: INTERNAL MEDICINE

## 2021-01-14 PROCEDURE — 87071 CULTURE AEROBIC QUANT OTHER: CPT | Performed by: INTERNAL MEDICINE

## 2021-01-14 PROCEDURE — 25010000002 METHYLPREDNISOLONE PER 40 MG: Performed by: INTERNAL MEDICINE

## 2021-01-14 PROCEDURE — 84484 ASSAY OF TROPONIN QUANT: CPT | Performed by: NURSE PRACTITIONER

## 2021-01-14 PROCEDURE — 25010000002 PROPOFOL 10 MG/ML EMULSION: Performed by: ANESTHESIOLOGY

## 2021-01-14 PROCEDURE — 87496 CYTOMEG DNA AMP PROBE: CPT | Performed by: INTERNAL MEDICINE

## 2021-01-14 PROCEDURE — 25010000002 METHYLPREDNISOLONE PER 40 MG: Performed by: NURSE PRACTITIONER

## 2021-01-14 PROCEDURE — 25010000002 CEFEPIME PER 500 MG: Performed by: NURSE PRACTITIONER

## 2021-01-14 PROCEDURE — 87252 VIRUS INOCULATION TISSUE: CPT | Performed by: INTERNAL MEDICINE

## 2021-01-14 PROCEDURE — 25010000002 DIGOXIN PER 500 MCG: Performed by: INTERNAL MEDICINE

## 2021-01-14 PROCEDURE — 93005 ELECTROCARDIOGRAM TRACING: CPT | Performed by: INTERNAL MEDICINE

## 2021-01-14 PROCEDURE — 83735 ASSAY OF MAGNESIUM: CPT | Performed by: NURSE PRACTITIONER

## 2021-01-14 RX ORDER — AMIODARONE HCL/D5W 450 MG/250
0.5 PLASTIC BAG, INJECTION (ML) INTRAVENOUS CONTINUOUS
Status: DISPENSED | OUTPATIENT
Start: 2021-01-14 | End: 2021-01-18

## 2021-01-14 RX ORDER — ONDANSETRON 2 MG/ML
4 INJECTION INTRAMUSCULAR; INTRAVENOUS ONCE AS NEEDED
Status: DISCONTINUED | OUTPATIENT
Start: 2021-01-14 | End: 2021-01-14 | Stop reason: HOSPADM

## 2021-01-14 RX ORDER — LIDOCAINE HYDROCHLORIDE 20 MG/ML
INJECTION, SOLUTION INTRAVENOUS
Status: DISPENSED
Start: 2021-01-14 | End: 2021-01-15

## 2021-01-14 RX ORDER — PROPOFOL 10 MG/ML
VIAL (ML) INTRAVENOUS AS NEEDED
Status: DISCONTINUED | OUTPATIENT
Start: 2021-01-14 | End: 2021-01-14 | Stop reason: SURG

## 2021-01-14 RX ORDER — DIGOXIN 0.25 MG/ML
250 INJECTION INTRAMUSCULAR; INTRAVENOUS ONCE
Status: COMPLETED | OUTPATIENT
Start: 2021-01-14 | End: 2021-01-14

## 2021-01-14 RX ORDER — LIDOCAINE HYDROCHLORIDE 20 MG/ML
INJECTION, SOLUTION INFILTRATION; PERINEURAL AS NEEDED
Status: DISCONTINUED | OUTPATIENT
Start: 2021-01-14 | End: 2021-01-14 | Stop reason: HOSPADM

## 2021-01-14 RX ORDER — LIDOCAINE HYDROCHLORIDE 20 MG/ML
INJECTION, SOLUTION INFILTRATION; PERINEURAL AS NEEDED
Status: DISCONTINUED | OUTPATIENT
Start: 2021-01-14 | End: 2021-01-14 | Stop reason: SURG

## 2021-01-14 RX ORDER — LIDOCAINE 50 MG/G
OINTMENT TOPICAL
Status: DISPENSED
Start: 2021-01-14 | End: 2021-01-15

## 2021-01-14 RX ORDER — TAMSULOSIN HYDROCHLORIDE 0.4 MG/1
0.8 CAPSULE ORAL ONCE
Status: COMPLETED | OUTPATIENT
Start: 2021-01-14 | End: 2021-01-14

## 2021-01-14 RX ORDER — SODIUM CHLORIDE 9 MG/ML
75 INJECTION, SOLUTION INTRAVENOUS CONTINUOUS
Status: DISCONTINUED | OUTPATIENT
Start: 2021-01-14 | End: 2021-01-17

## 2021-01-14 RX ADMIN — METHYLPREDNISOLONE SODIUM SUCCINATE 40 MG: 40 INJECTION, POWDER, FOR SOLUTION INTRAMUSCULAR; INTRAVENOUS at 15:37

## 2021-01-14 RX ADMIN — TAMSULOSIN HYDROCHLORIDE 0.4 MG: 0.4 CAPSULE ORAL at 15:39

## 2021-01-14 RX ADMIN — AMIODARONE HYDROCHLORIDE 1 MG/MIN: 50 INJECTION, SOLUTION INTRAVENOUS at 18:05

## 2021-01-14 RX ADMIN — SODIUM CHLORIDE 9 ML/HR: 9 INJECTION, SOLUTION INTRAVENOUS at 13:15

## 2021-01-14 RX ADMIN — ROSUVASTATIN CALCIUM 40 MG: 10 TABLET, FILM COATED ORAL at 23:42

## 2021-01-14 RX ADMIN — LIDOCAINE HYDROCHLORIDE 100 MG: 20 INJECTION, SOLUTION INFILTRATION; PERINEURAL at 13:49

## 2021-01-14 RX ADMIN — GUAIFENESIN 1200 MG: 600 TABLET, EXTENDED RELEASE ORAL at 05:24

## 2021-01-14 RX ADMIN — AMIODARONE HYDROCHLORIDE 1 MG/MIN: 50 INJECTION, SOLUTION INTRAVENOUS at 09:25

## 2021-01-14 RX ADMIN — METHYLPREDNISOLONE SODIUM SUCCINATE 40 MG: 40 INJECTION, POWDER, FOR SOLUTION INTRAMUSCULAR; INTRAVENOUS at 23:42

## 2021-01-14 RX ADMIN — AMIODARONE HYDROCHLORIDE 150 MG: 1.5 INJECTION, SOLUTION INTRAVENOUS at 00:45

## 2021-01-14 RX ADMIN — DIGOXIN 250 MCG: 250 INJECTION, SOLUTION INTRAMUSCULAR; INTRAVENOUS; PARENTERAL at 18:08

## 2021-01-14 RX ADMIN — CEFEPIME 2 G: 2 INJECTION, POWDER, FOR SOLUTION INTRAVENOUS at 08:21

## 2021-01-14 RX ADMIN — FINASTERIDE 5 MG: 5 TABLET, FILM COATED ORAL at 08:22

## 2021-01-14 RX ADMIN — AMIODARONE HYDROCHLORIDE 1 MG/MIN: 50 INJECTION, SOLUTION INTRAVENOUS at 00:58

## 2021-01-14 RX ADMIN — TAMSULOSIN HYDROCHLORIDE 0.8 MG: 0.4 CAPSULE ORAL at 23:41

## 2021-01-14 RX ADMIN — METHYLPREDNISOLONE SODIUM SUCCINATE 40 MG: 40 INJECTION, POWDER, FOR SOLUTION INTRAMUSCULAR; INTRAVENOUS at 05:24

## 2021-01-14 RX ADMIN — CEFEPIME HYDROCHLORIDE 2 G: 2 INJECTION, POWDER, FOR SOLUTION INTRAVENOUS at 15:40

## 2021-01-14 RX ADMIN — Medication 10 ML: at 08:26

## 2021-01-14 RX ADMIN — GUAIFENESIN 1200 MG: 600 TABLET, EXTENDED RELEASE ORAL at 15:39

## 2021-01-14 RX ADMIN — PROPOFOL 70 MG: 10 INJECTION, EMULSION INTRAVENOUS at 13:49

## 2021-01-14 RX ADMIN — FUROSEMIDE 40 MG: 10 INJECTION, SOLUTION INTRAMUSCULAR; INTRAVENOUS at 05:24

## 2021-01-14 RX ADMIN — THEOPHYLLINE 300 MG: 300 TABLET, EXTENDED RELEASE ORAL at 08:22

## 2021-01-14 RX ADMIN — DIGOXIN 250 MCG: 250 INJECTION, SOLUTION INTRAMUSCULAR; INTRAVENOUS; PARENTERAL at 10:20

## 2021-01-14 RX ADMIN — Medication 10 ML: at 23:42

## 2021-01-14 RX ADMIN — CEFEPIME 2 G: 2 INJECTION, POWDER, FOR SOLUTION INTRAVENOUS at 01:40

## 2021-01-14 RX ADMIN — MULTIPLE VITAMINS W/ MINERALS TAB 1 TABLET: TAB at 08:22

## 2021-01-14 NOTE — ANESTHESIA PREPROCEDURE EVALUATION
Anesthesia Evaluation     Patient summary reviewed and Nursing notes reviewed   no history of anesthetic complications:  NPO Solid Status: > 8 hours  NPO Liquid Status: > 8 hours           Airway   Mallampati: II  TM distance: >3 FB  Neck ROM: full  No difficulty expected  Dental      Pulmonary     breath sounds clear to auscultation  (+) lung cancer, COPD,   Cardiovascular     ECG reviewed  Rhythm: regular  Rate: normal    (+) hypertension, CAD, dysrhythmias Atrial Fib, hyperlipidemia,       Neuro/Psych- negative ROS  GI/Hepatic/Renal/Endo - negative ROS     Musculoskeletal (-) negative ROS    Abdominal     Abdomen: soft.   Substance History - negative use     OB/GYN negative ob/gyn ROS         Other      history of cancer                    Anesthesia Plan    ASA 4     MAC     intravenous induction     Anesthetic plan, all risks, benefits, and alternatives have been provided, discussed and informed consent has been obtained with: patient.

## 2021-01-14 NOTE — PLAN OF CARE
Goal Outcome Evaluation:  Plan of Care Reviewed With: patient  Progress: improving  Outcome Summary: Pt. tachycardic throughout night, cardizem d/c related to hypotension, bronch in am per Dr. Gonsales, will call to get phone consent with spouse, cefepime continued, amiodarone gtt started with rate currently in 110-120 range, will continue to monitor

## 2021-01-14 NOTE — PROGRESS NOTES
"      [facility] Daily Progress Note      Hospitalist Team  LOS 1 days      Patient Care Team:  Camron Pollard MD as PCP - General (Family Medicine)  Alvarado Cai MD as Consulting Physician (Cardiology)    Patient Location: 2102/1      Subjective   Subjective     Chief Complaint / Subjective  Chief Complaint   Patient presents with   • Shortness of Breath     Brief Synopsis of Hospital Course/HPI  The patient is a 80-year-old male who has underlying coronary artery disease with atrial fibrillation, stent placement and an abdominal aortic aneurysm.  The patient also is status post right upper lobectomy for previous lung carcinoma which was also treated with radiation and chemotherapy.  The patient presents with atrial for with rapid ventricular response as well as a new right lower lobe effusion.      Date::      Review of Systems   Constitution: Negative.   HENT: Negative.    Eyes: Negative.    Cardiovascular: Negative.         Does feel his heart racing.  He is not having any chest pain at this time.   Respiratory: Negative.         Patient reports less shortness of breath today than on admission.   Endocrine: Negative.    Hematologic/Lymphatic: Negative.    Skin: Negative.    Musculoskeletal: Negative.    Gastrointestinal: Negative.    Genitourinary: Negative.    Neurological: Negative.    Psychiatric/Behavioral: Negative.    Allergic/Immunologic: Negative.    All other systems reviewed and are negative.    Objective   Objective      Vital Signs  Temp:  [97.5 °F (36.4 °C)-98.4 °F (36.9 °C)] 97.6 °F (36.4 °C)  Heart Rate:  [112-151] 120  Resp:  [18-20] 18  BP: ()/(48-80) 126/63  Oxygen Therapy  SpO2: 100 %  Pulse Oximetry Type: Continuous  Device (Oxygen Therapy): nasal cannula  Flow (L/min): 4  ETCO2 (mmHg): (!) 0 mmHg  Flowsheet Rows      First Filed Value   Admission Height  177.8 cm (70\") Documented at 01/13/2021 1222   Admission Weight  80.6 kg (177 lb " 11.1 oz) Documented at 01/13/2021 1222        Intake & Output (last 3 days)       01/11 0701 - 01/12 0700 01/12 0701 - 01/13 0700 01/13 0701 - 01/14 0700 01/14 0701 - 01/15 0700    P.O.   240 150    Total Intake(mL/kg)   240 (3.1) 150 (2)    Urine (mL/kg/hr)   1550 1000 (1.3)    Total Output   1550 1000    Net   -1310 -850                Lines, Drains & Airways    Active LDAs     Name:   Placement date:   Placement time:   Site:   Days:    Peripheral IV 01/13/21 1250 Right Antecubital   01/13/21    1250    Antecubital   1    Peripheral IV 01/14/21 1312 Anterior;Right Forearm   01/14/21    1312    Forearm   less than 1              Physical Exam:  Physical Exam  Vitals signs and nursing note reviewed.   Constitutional:       General: He is not in acute distress.     Appearance: Normal appearance. He is well-developed. He is not ill-appearing, toxic-appearing or diaphoretic.   HENT:      Head: Normocephalic and atraumatic.      Right Ear: Ear canal and external ear normal.      Left Ear: Ear canal and external ear normal.      Nose: Nose normal. No congestion or rhinorrhea.      Mouth/Throat:      Mouth: Mucous membranes are moist.      Pharynx: No oropharyngeal exudate.   Eyes:      General: No scleral icterus.        Right eye: No discharge.         Left eye: No discharge.      Extraocular Movements: Extraocular movements intact.      Conjunctiva/sclera: Conjunctivae normal.      Pupils: Pupils are equal, round, and reactive to light.   Neck:      Musculoskeletal: Normal range of motion and neck supple. No neck rigidity or muscular tenderness.      Thyroid: No thyromegaly.      Vascular: No carotid bruit or JVD.      Trachea: No tracheal deviation.   Cardiovascular:      Rate and Rhythm: Tachycardia present. Rhythm irregular.      Pulses: Normal pulses.      Heart sounds: Normal heart sounds. No murmur. No friction rub. No gallop.       Comments: Atrial fib by the monitor with rates between 120 and 140 despite the  amiodarone drip.  Pulmonary:      Effort: Pulmonary effort is normal. No respiratory distress.      Breath sounds: No stridor. No wheezing, rhonchi or rales.      Comments: Patient still with decreased breath sounds on the right but no active wheezing.  There is no isolated wheeze.  Chest:      Chest wall: No tenderness.   Abdominal:      General: Bowel sounds are normal. There is no distension.      Palpations: Abdomen is soft. There is no mass.      Tenderness: There is no abdominal tenderness. There is no guarding or rebound.      Hernia: No hernia is present.   Musculoskeletal: Normal range of motion.         General: No swelling, tenderness, deformity or signs of injury.      Right lower leg: No edema.      Left lower leg: No edema.   Lymphadenopathy:      Cervical: No cervical adenopathy.   Skin:     General: Skin is warm and dry.      Coloration: Skin is not jaundiced or pale.      Findings: No bruising, erythema or rash.   Neurological:      General: No focal deficit present.      Mental Status: He is alert and oriented to person, place, and time. Mental status is at baseline.      Cranial Nerves: No cranial nerve deficit.      Sensory: No sensory deficit.      Motor: No weakness or abnormal muscle tone.      Coordination: Coordination normal.   Psychiatric:         Mood and Affect: Mood normal.         Behavior: Behavior normal.         Thought Content: Thought content normal.         Judgment: Judgment normal.       Procedures:  Procedure(s):  BRONCHOSCOPY with bronchio alveolar lavage of right lower lung    Results Review:     I reviewed the patient's new clinical results.    Lab Results (last 24 hours)     Procedure Component Value Units Date/Time    Respiratory Panel, PCR (WITHOUT COVID) - Lavage, Lung, Right Lower Lobe [[id number]]  (Normal) Collected: 01/14/21 1347    Specimen: Lavage from Lung, Right Lower Lobe Updated: 01/14/21 1542     ADENOVIRUS, PCR Not Detected     Coronavirus 229E Not Detected      Coronavirus HKU1 Not Detected     Coronavirus NL63 Not Detected     Coronavirus OC43 Not Detected     Human Metapneumovirus Not Detected     Human Rhinovirus/Enterovirus Not Detected     Influenza B PCR Not Detected     Parainfluenza Virus 1 Not Detected     Parainfluenza Virus 2 Not Detected     Parainfluenza Virus 3 Not Detected     Parainfluenza Virus 4 Not Detected     Bordetella pertussis pcr Not Detected     Chlamydophila pneumoniae PCR Not Detected     Mycoplasma pneumo by PCR Not Detected     Influenza A PCR Not Detected     RSV, PCR Not Detected     Bordetella parapertussis PCR Not Detected    Narrative:      The coronavirus on the RVP is NOT COVID-19 and is NOT indicative of infection with COVID-19.     Lactic Acid, Plasma [[id number]]  (Abnormal) Collected: 01/14/21 1500    Specimen: Blood Updated: 01/14/21 1532     Lactate 2.4 mmol/L     BAL Culture, Quantitative - Lavage, Lung, Right Lower Lobe [[id number]] Collected: 01/14/21 1347    Specimen: Lavage from Lung, Right Lower Lobe Updated: 01/14/21 1515     Gram Stain Rare (1+) WBCs per low power field      No organisms seen    Pneumocystis PCR - Lavage, Lung, Right Lower Lobe [[id number]] Collected: 01/14/21 1347    Specimen: Lavage from Lung, Right Lower Lobe Updated: 01/14/21 1444    Fungus Culture - Lavage, Lung, Right Lower Lobe [[id number]] Collected: 01/14/21 1347    Specimen: Lavage from Lung, Right Lower Lobe Updated: 01/14/21 1442    AFB Culture - Lavage, Lung, Right Lower Lobe [[id number]] Collected: 01/14/21 1347    Specimen: Lavage from Lung, Right Lower Lobe Updated: 01/14/21 1442    Cytomegalovirus (CMV) By PCR - Lavage, Lung, Right Lower Lobe [[id number]] Collected: 01/14/21 1347    Specimen: Lavage from Lung, Right Lower Lobe Updated: 01/14/21 1442    Virus Culture - Lavage, Lung, Right Lower Lobe [[id number]] Collected: 01/14/21 1347    Specimen: Lavage from Lung, Right Lower Lobe Updated: 01/14/21 1442    Blood Culture - Blood, Arm, Right  [[id number]] Collected: 01/13/21 1310    Specimen: Blood from Arm, Right Updated: 01/14/21 1315     Blood Culture No growth at 24 hours    Blood Culture - Blood, Arm, Right [[id number]] Collected: 01/13/21 1302    Specimen: Blood from Arm, Right Updated: 01/14/21 1315     Blood Culture No growth at 24 hours    Basic Metabolic Panel [[id number]]  (Abnormal) Collected: 01/14/21 0134    Specimen: Blood Updated: 01/14/21 0226     Glucose 190 mg/dL      BUN 21 mg/dL      Creatinine 1.33 mg/dL      Sodium 139 mmol/L      Potassium 4.3 mmol/L      Comment: Slight hemolysis detected by analyzer. Results may be affected.        Chloride 100 mmol/L      CO2 26.0 mmol/L      Calcium 9.4 mg/dL      eGFR Non African Amer 52 mL/min/1.73      BUN/Creatinine Ratio 15.8     Anion Gap 13.0 mmol/L     Narrative:      GFR Normal >60  Chronic Kidney Disease <60  Kidney Failure <15      Troponin [[id number]]  (Normal) Collected: 01/14/21 0134    Specimen: Blood Updated: 01/14/21 0224     Troponin T <0.010 ng/mL     Narrative:      Troponin T Reference Range:  <= 0.03 ng/mL-   Negative for AMI  >0.03 ng/mL-     Abnormal for myocardial necrosis.  Clinicians would have to utilize clinical acumen, EKG, Troponin and serial changes to determine if it is an Acute Myocardial Infarction or myocardial injury due to an underlying chronic condition.       Results may be falsely decreased if patient taking Biotin.      Magnesium [[id number]]  (Normal) Collected: 01/14/21 0134    Specimen: Blood Updated: 01/14/21 0224     Magnesium 1.9 mg/dL     BNP [[id number]]  (Abnormal) Collected: 01/14/21 0134    Specimen: Blood Updated: 01/14/21 0218     proBNP 10,748.0 pg/mL     Narrative:      Among patients with dyspnea, NT-proBNP is highly sensitive for the detection of acute congestive heart failure. In addition NT-proBNP of <300 pg/ml effectively rules out acute congestive heart failure with 99% negative predictive value.    Results may be falsely decreased if  patient taking Biotin.      CBC (No Diff) [[id number]]  (Abnormal) Collected: 01/14/21 0134    Specimen: Blood Updated: 01/14/21 0157     WBC 4.20 10*3/mm3      RBC 4.46 10*6/mm3      Hemoglobin 12.7 g/dL      Hematocrit 39.2 %      MCV 87.9 fL      MCH 28.5 pg      MCHC 32.4 g/dL      RDW 16.1 %      RDW-SD 49.9 fl      MPV 9.5 fL      Platelets 236 10*3/mm3     Lactic Acid, Reflex [[id number]]  (Abnormal) Collected: 01/13/21 2202    Specimen: Blood Updated: 01/13/21 2247     Lactate 3.1 mmol/L     Troponin [[id number]]  (Normal) Collected: 01/13/21 2202    Specimen: Blood Updated: 01/13/21 2230     Troponin T 0.016 ng/mL     Narrative:      Troponin T Reference Range:  <= 0.03 ng/mL-   Negative for AMI  >0.03 ng/mL-     Abnormal for myocardial necrosis.  Clinicians would have to utilize clinical acumen, EKG, Troponin and serial changes to determine if it is an Acute Myocardial Infarction or myocardial injury due to an underlying chronic condition.       Results may be falsely decreased if patient taking Biotin.      Lactic Acid, Reflex Timer (This will reflex a repeat order 3-3:15 hours after ordered.) [[id number]] Collected: 01/13/21 1725    Specimen: Blood Updated: 01/13/21 2100     Hold Tube Hold for add-ons.     Comment: Auto resulted.       Respiratory Panel, PCR (WITHOUT COVID) - Swab, Nasopharynx [[id number]]  (Normal) Collected: 01/13/21 1824    Specimen: Swab from Nasopharynx Updated: 01/13/21 1940     ADENOVIRUS, PCR Not Detected     Coronavirus 229E Not Detected     Coronavirus HKU1 Not Detected     Coronavirus NL63 Not Detected     Coronavirus OC43 Not Detected     Human Metapneumovirus Not Detected     Human Rhinovirus/Enterovirus Not Detected     Influenza B PCR Not Detected     Parainfluenza Virus 1 Not Detected     Parainfluenza Virus 2 Not Detected     Parainfluenza Virus 3 Not Detected     Parainfluenza Virus 4 Not Detected     Bordetella pertussis pcr Not Detected     Chlamydophila pneumoniae PCR Not  Detected     Mycoplasma pneumo by PCR Not Detected     Influenza A PCR Not Detected     RSV, PCR Not Detected     Bordetella parapertussis PCR Not Detected    Narrative:      The coronavirus on the RVP is NOT COVID-19 and is NOT indicative of infection with COVID-19.     Troponin [[id number]]  (Normal) Collected: 01/13/21 1725    Specimen: Blood Updated: 01/13/21 1753     Troponin T 0.025 ng/mL     Narrative:      Troponin T Reference Range:  <= 0.03 ng/mL-   Negative for AMI  >0.03 ng/mL-     Abnormal for myocardial necrosis.  Clinicians would have to utilize clinical acumen, EKG, Troponin and serial changes to determine if it is an Acute Myocardial Infarction or myocardial injury due to an underlying chronic condition.       Results may be falsely decreased if patient taking Biotin.      Lactic Acid, Plasma [[id number]]  (Abnormal) Collected: 01/13/21 1725    Specimen: Blood Updated: 01/13/21 1749     Lactate 2.7 mmol/L         No results found for: HGBA1C  Results from last 7 days   Lab Units 01/13/21  1310   INR  1.04           Lab Results   Component Value Date    LIPASE 19 (L) 05/18/2019     Lab Results   Component Value Date    CHOL 121 07/09/2020    TRIG 75 07/09/2020    HDL 36 (L) 07/09/2020    LDL 70 07/09/2020       No results found for: INTRAOP, PREDX, FINALDX, COMDX    Microbiology Results (last 10 days)     Procedure Component Value - Date/Time    BAL Culture, Quantitative - Lavage, Lung, Right Lower Lobe [[id number]] Collected: 01/14/21 1347    Lab Status: Preliminary result Specimen: Lavage from Lung, Right Lower Lobe Updated: 01/14/21 1515     Gram Stain Rare (1+) WBCs per low power field      No organisms seen    Respiratory Panel, PCR (WITHOUT COVID) - Lavage, Lung, Right Lower Lobe [[id number]]  (Normal) Collected: 01/14/21 1347    Lab Status: Final result Specimen: Lavage from Lung, Right Lower Lobe Updated: 01/14/21 1542     ADENOVIRUS, PCR Not Detected     Coronavirus 229E Not Detected      Coronavirus HKU1 Not Detected     Coronavirus NL63 Not Detected     Coronavirus OC43 Not Detected     Human Metapneumovirus Not Detected     Human Rhinovirus/Enterovirus Not Detected     Influenza B PCR Not Detected     Parainfluenza Virus 1 Not Detected     Parainfluenza Virus 2 Not Detected     Parainfluenza Virus 3 Not Detected     Parainfluenza Virus 4 Not Detected     Bordetella pertussis pcr Not Detected     Chlamydophila pneumoniae PCR Not Detected     Mycoplasma pneumo by PCR Not Detected     Influenza A PCR Not Detected     RSV, PCR Not Detected     Bordetella parapertussis PCR Not Detected    Narrative:      The coronavirus on the RVP is NOT COVID-19 and is NOT indicative of infection with COVID-19.     Respiratory Panel, PCR (WITHOUT COVID) - Swab, Nasopharynx [[id number]]  (Normal) Collected: 01/13/21 1821    Lab Status: Final result Specimen: Swab from Nasopharynx Updated: 01/13/21 1940     ADENOVIRUS, PCR Not Detected     Coronavirus 229E Not Detected     Coronavirus HKU1 Not Detected     Coronavirus NL63 Not Detected     Coronavirus OC43 Not Detected     Human Metapneumovirus Not Detected     Human Rhinovirus/Enterovirus Not Detected     Influenza B PCR Not Detected     Parainfluenza Virus 1 Not Detected     Parainfluenza Virus 2 Not Detected     Parainfluenza Virus 3 Not Detected     Parainfluenza Virus 4 Not Detected     Bordetella pertussis pcr Not Detected     Chlamydophila pneumoniae PCR Not Detected     Mycoplasma pneumo by PCR Not Detected     Influenza A PCR Not Detected     RSV, PCR Not Detected     Bordetella parapertussis PCR Not Detected    Narrative:      The coronavirus on the RVP is NOT COVID-19 and is NOT indicative of infection with COVID-19.     Blood Culture - Blood, Arm, Right [[id number]] Collected: 01/13/21 1310    Lab Status: Preliminary result Specimen: Blood from Arm, Right Updated: 01/14/21 1315     Blood Culture No growth at 24 hours    COVID-19,[employer] IN-HOUSE,Nasal Swab No  [employer] Media 3-4 HR TAT - Swab, Nasal Cavity [[id number]]  (Normal) Collected: 01/13/21 1303    Lab Status: Final result Specimen: Swab from Nasal Cavity Updated: 01/13/21 1343     COVID19 Not Detected    Narrative:      Fact sheet for providers: https://www.fda.gov/media/372568/download     Fact sheet for patients: https://www.fda.gov/media/481952/download    Test performed by PCR.    Blood Culture - Blood, Arm, Right [[id number]] Collected: 01/13/21 1302    Lab Status: Preliminary result Specimen: Blood from Arm, Right Updated: 01/14/21 1315     Blood Culture No growth at 24 hours          ECG/EMG Results (most recent)     Procedure Component Value Units Date/Time    Adult Transthoracic Echo Complete W/ Cont if Necessary Per Protocol [[id number]] Collected: 01/13/21 1727     Updated: 01/13/21 1837     BSA 2.0 m^2      RVIDd 2.1 cm      IVSd 0.9 cm      LVIDd 5.5 cm      LVIDs 5.2 cm      LVPWd 0.91 cm      IVS/LVPW 1.0     FS 5.1 %      EDV(Teich) 146.6 ml      ESV(Teich) 129.8 ml      EF(Teich) 11.5 %      EDV(cubed) 165.2 ml      ESV(cubed) 141.1 ml      EF(cubed) 14.6 %      LV mass(C)d 186.0 grams      LV mass(C)dI 93.9 grams/m^2      SV(Teich) 16.8 ml      SI(Teich) 8.5 ml/m^2      SV(cubed) 24.1 ml      SI(cubed) 12.2 ml/m^2      Ao root diam 3.8 cm      Ao root area 11.5 cm^2      LVOT diam 2.0 cm      LVOT area 3.1 cm^2      EDV(MOD-sp4) 125.6 ml      ESV(MOD-sp4) 87.5 ml      EF(MOD-sp4) 30.3 %      SV(MOD-sp4) 38.0 ml      SI(MOD-sp4) 19.2 ml/m^2      Ao root area (BSA corrected) 1.9     LV Knight Vol (BSA corrected) 63.4 ml/m^2      LV Sys Vol (BSA corrected) 44.2 ml/m^2      Aortic R-R 0.51 sec      Aortic .5 BPM      MV V2 max 90.8 cm/sec      MV max PG 3.3 mmHg      MV V2 mean 56.8 cm/sec      MV mean PG 1.5 mmHg      MV V2 VTI 17.5 cm      MVA(VTI) 2.9 cm^2      Ao pk lamont 267.9 cm/sec      Ao max PG 28.7 mmHg      Ao max PG (full) 25.4 mmHg      Ao V2 mean 186.7 cm/sec      Ao mean PG 15.8 mmHg       Ao mean PG (full) 14.0 mmHg      Ao V2 VTI 51.9 cm      IQRA(I,A) 0.97 cm^2      IQRA(I,D) 0.97 cm^2      IQRA(V,A) 1.0 cm^2      IQRA(V,D) 1.0 cm^2      AI max lamont 389.1 cm/sec      AI max PG 60.5 mmHg      AI dec slope 287.4 cm/sec^2      AI dec time 1.4 sec      AI P1/2t 396.5 msec      LV V1 max PG 3.3 mmHg      LV V1 mean PG 1.8 mmHg      LV V1 max 90.4 cm/sec      LV V1 mean 63.5 cm/sec      LV V1 VTI 16.4 cm      MR max lamont 514.5 cm/sec      MR max .9 mmHg      CO(Ao) 70.8 l/min      CI(Ao) 35.7 l/min/m^2      SV(Ao) 597.3 ml      SI(Ao) 301.4 ml/m^2      CO(LVOT) 6.0 l/min      CI(LVOT) 3.0 l/min/m^2      SV(LVOT) 50.4 ml      SI(LVOT) 25.4 ml/m^2      PA V2 max 68.8 cm/sec      PA max PG 1.9 mmHg      PA max PG (full) -0.6 mmHg      PA V2 mean 47.5 cm/sec      PA mean PG 1.0 mmHg      PA mean PG (full) -0.25 mmHg      PA V2 VTI 14.3 cm      RV V1 max PG 2.5 mmHg      RV V1 mean PG 1.3 mmHg      RV V1 max 78.9 cm/sec      RV V1 mean 52.8 cm/sec      RV V1 VTI 15.3 cm      TR max lamont 291.0 cm/sec      RVSP(TR) 36.9 mmHg      RAP systole 3.0 mmHg       CV ECHO KATT - BZI_BMI 25.4 kilograms/m^2       CV ECHO KATT - BSA(HAYCOCK) 2.0 m^2       CV ECHO KATT - BZI_METRIC_WEIGHT 80.3 kg       CV ECHO KATT - BZI_METRIC_HEIGHT 177.8 cm      EF(MOD-bp) 30.0 %      LA dimension(2D) 3.3 cm     ECG 12 Lead [[id number]] Collected: 01/14/21 0154     Updated: 01/14/21 0157     QT Interval 354 ms     Narrative:      HEART RATE= 140  bpm  RR Interval= 432  ms  ID Interval= 146  ms  P Horizontal Axis= -51  deg  P Front Axis= 41  deg  QRSD Interval= 102  ms  QT Interval= 354  ms  QRS Axis= 49  deg  T Wave Axis= 256  deg  - ABNORMAL ECG -  Sinus tachycardia  Multiform ventricular premature complexes  Repolarization abnormality, prob rate related  Prolonged QT interval  Electronically Signed By:   Date and Time of Study: 2021-01-14 01:54:00    ECG 12 Lead [[id number]] Collected: 01/13/21 1235     Updated: 01/14/21 0828      QT Interval 350 ms     Narrative:      HEART RATE= 108  bpm  RR Interval= 556  ms  ND Interval= 159  ms  P Horizontal Axis= 22  deg  P Front Axis= 44  deg  QRSD Interval= 95  ms  QT Interval= 350  ms  QRS Axis= 58  deg  T Wave Axis= 103  deg  - ABNORMAL ECG -  Sinus tachycardia  Multiple ventricular premature complexes  Probable LVH with secondary repol abnrm  Anterior Q waves, possibly due to LVH  When compared with ECG of 18-May-2019 9:03:54,  No significant change  Electronically Signed By: Dakota Rodrigues (IGNACIO) 14-Jan-2021 08:26:08  Date and Time of Study: 2021-01-13 12:35:42              Xr Chest 1 View    Result Date: 1/13/2021   1. Interval development of a moderate-sized right pleural effusion. 2. Worsening consolidation in the right lung particularly in the mid and lower lung zone. This is likely due to a combination of compressive atelectasis with superimposed pneumonia. 3. Masslike area of consolidation has been present the right hilar region on previous imaging studies consistent with treated lung cancer.  Electronically Signed By-Kel Fair MD On:1/13/2021 2:19 PM This report was finalized on [id number] by  Kel Fair MD.  Xrays, labs reviewed personally by physician.    Medication Review:   I have reviewed the patient's current medication list    Scheduled Meds  budesonide, 0.5 mg, Nebulization, BID - RT  cefepime, 2 g, Intravenous, Q12H  finasteride, 5 mg, Oral, Daily  guaiFENesin, 1,200 mg, Oral, Q12H  ipratropium-albuterol, 3 mL, Nebulization, Q4H - RT  lidocaine, , ,   Lidocaine HCl (Cardiac) PF, , ,   methylPREDNISolone sodium succinate, 40 mg, Intravenous, Q8H  multivitamin with minerals, 1 tablet, Oral, Daily  rosuvastatin, 40 mg, Oral, Nightly  sodium chloride, 10 mL, Intravenous, Q12H  tamsulosin, 0.4 mg, Oral, Daily With Dinner  theophylline, 300 mg, Oral, Daily        Meds Infusions  amiodarone, 1 mg/min, Last Rate: 1 mg/min (01/14/21 1352)  sodium chloride, 9 mL/hr, Last Rate: 9  mL/hr (01/14/21 1315)        Meds PRN  •  acetaminophen **OR** acetaminophen **OR** acetaminophen  •  benzonatate  •  ipratropium-albuterol  •  magnesium sulfate **OR** magnesium sulfate in D5W 1g/100mL (PREMIX)  •  nitroglycerin  •  ondansetron **OR** ondansetron  •  potassium chloride  •  [COMPLETED] Insert peripheral IV **AND** sodium chloride  •  sodium chloride        Assessment/Plan   Assessment/Plan     Active Hospital Problems    Diagnosis  POA   • **Acute respiratory distress [R06.03]  Yes   • Chest pain [R07.9]  Yes   • Atrial fibrillation with RVR (CMS/HCC) [I48.91]  Yes   • BPH without obstruction/lower urinary tract symptoms [N40.0]  Yes   • History of lung cancer [Z85.118]  Not Applicable   • COPD exacerbation (CMS/HCC) [J44.1]  Yes   • Malignant 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       MEDICAL DECISION MAKING COMPLEXITY BY PROBLEM:   1.  Acute respiratory distress  -This is likely related to the patient's large right-sided pleural effusion  -Pulmonary consult was obtained and the patient underwent bronchoscopy today  -Patient had copious amounts of secretions suctioned out of his airway.  -Continue empiric antibiotics  -Await culture results and cytology    2.  Atrial fibrillation with RVR  -EKG shows atrial fibrillation with RVR  -Patient remains on an amiodarone drip  -Rate remains elevated greater than 120  -Consult cardiology pending       -Given the complexity of the patient's situation and need for additional procedures will leave decision about anticoagulation to cardiology.    3.  Acute chest pain  -Serial troponins negative  -Resolution of chest pain    4.  Coronary artery disease  -Patient has had several stents placed in the  past  -Cardiology consulted    5.  History of lung cancer  -Status post right upper lobe resection       -This was followed by radiation as well as chemotherapy  -If pleural effusion does not improve after therapeutic bronchoscopy consider thoracentesis sending the pleural fluid for cytology    6.  Abdominal aortic aneurysm  -Patient was having this followed but has missed his appointment last year and requests having the study done while he is here.  Given that he may need anticoagulation will need to know the status of this as well.    7.  Bilateral prostatic hypertrophy  -Continue Flomax and Proscar    VTE Prophylaxis -   Mechanical Order History:      Ordered        01/13/21 1708  Place Sequential Compression Device  Once         01/13/21 1708  Maintain Sequential Compression Device  Continuous                 Pharmalogical Order History:      Ordered     Dose Route Frequency Stop    01/13/21 1502  enoxaparin (LOVENOX) syringe 80 mg      1 mg/kg SC Once 01/13/21 1520              Code Status -   Code Status and Medical Interventions:   Ordered at: 01/13/21 1610     Limited Support to NOT Include:    Intubation     Level Of Support Discussed With:    Patient     Code Status:    CPR     Medical Interventions (Level of Support Prior to Arrest):    Limited     This patient has been examined wearing appropriate Personal Protective Equipment and discussed with hospital infection control department. 01/14/21    Discharge Planning  To be determined    Electronically signed by Diane Tamez MD, 01/14/21, 16:50 EST.  [facility] Hospitalist Team

## 2021-01-14 NOTE — OP NOTE
Bronchoscopy Procedure Note    Procedure:  1. Bronchoscopy, Therapeutic  2. BAL right lower lobe    Pre-Operative Diagnosis: Right hilar density    Post-Operative Diagnosis:   Copious amount of mucopurulent secretions were noted especially from the right lower lobe area and suctioned therapeutically  No endobronchial masses  Minor bleed which resolved with suctioning    Anesthesia: Moderate Sedation    Procedure Details: Patient was consented for the procedure with all risk and benefit of the procedure explained in detail.  Patient was given the opportunity to ask questions and all concerns were answered.  The bronchocope was inserted into the main airway via the oropharynx. An anatomical survey was done of the main airways and the subsegmental bronchus to at least the first subsegmental level of all five lobes of both lungs.  The findings are reported below.  A bronchoalveolar lavage was performed using aliquots of normal saline instilled into the airways then aspirated back.    Findings:  Bronchoscope passed into oral cavity to the level of the vocal cords.  Lidocaine used for local anesthetic over vocal cords.  Bronchoscope was passed between the vocal cords into the trachea.  All airways were visualized to at least the first subsegment level of all 5 lobes of both lungs.     Copious amount of mucopurulent secretions were noted especially from the right lower lobe area and suctioned therapeutically  No endobronchial masses  Minor bleed which resolved with suctioning    Patient tolerated procedure well    Estimated Blood Loss:  Minimal           Specimens:  Sent purulent fluid                Complications:  None; patient tolerated the procedure well.           Disposition: PACU - hemodynamically stable.      Patient tolerated the procedure well.    Sanjay Gonsales MD  1/14/2021  14:25 EST

## 2021-01-14 NOTE — PLAN OF CARE
Patient currently on amio gtt. 2 doses of IV digoxin at 250 given. No change after the first dose, second dose currently being given. Patient S/P bronchoscopy and is currently on 3L. IV abx continued. Patient to have ultrasound tonight to examine AAA. No distress at this time, will continue to monitor.

## 2021-01-14 NOTE — ANESTHESIA POSTPROCEDURE EVALUATION
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        Post Anesthesia Care and Evaluation    Patient location during evaluation: PACU  Patient participation: complete - patient participated  Level of consciousness: sleepy but conscious  Pain management: adequate  Airway patency: patent  Anesthetic complications: No anesthetic complications  PONV Status: controlled  Cardiovascular status: acceptable  Respiratory status: acceptable  Hydration status: acceptable

## 2021-01-14 NOTE — PROGRESS NOTES
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 fib with RVR       Plan    Bronchoscopy completed 1/14/2021  Copious amount of mucopurulent secretions were noted especially from the right lower lobe area and suctioned therapeutically  No endobronchial masses  Minor bleed which resolved with suctioning    Antibiotics empirically on cefepime  Check BAL culture    Steroids IV Solu-Medrol 40 mg every 8        CT scan 1/12/2021 at [facility]              LOS: 1 day     Subjective         Objective     Vital signs for last 24 hours:  Vitals:    01/14/21 1358 01/14/21 1405 01/14/21 1415 01/14/21 1417   BP: 113/58 105/80 100/68 108/67   BP Location:       Patient Position:       Pulse: (!) 122 (!) 140 (!) 135 (!) 139   Resp:       Temp:       TempSrc:       SpO2: 97% 93% 93% 94%   Weight:       Height:           Intake/Output last 3 shifts:  I/O last 3 completed shifts:  In: 240 [P.O.:240]  Out: 1550 [Urine:1550]  Intake/Output this shift:  I/O this shift:  In: 150 [P.O.:150]  Out: 1000 [Urine:1000]      Radiology  Imaging Results (Last 24 Hours)     ** No results found for the last 24 hours. **          Labs:  Results from last 7 days   Lab  Units 01/14/21  0134   WBC 10*3/mm3 4.20   HEMOGLOBIN g/dL 12.7*   HEMATOCRIT % 39.2   PLATELETS 10*3/mm3 236     Results from last 7 days   Lab Units 01/14/21  0134 01/13/21  1302   SODIUM mmol/L 139 140   POTASSIUM mmol/L 4.3 4.1   CHLORIDE mmol/L 100 104   CO2 mmol/L 26.0 25.0   BUN mg/dL 21 11   CREATININE mg/dL 1.33* 0.92   CALCIUM mg/dL 9.4 9.1   BILIRUBIN mg/dL  --  0.6   ALK PHOS U/L  --  67   ALT (SGPT) U/L  --  22   AST (SGOT) U/L  --  26   GLUCOSE mg/dL 190* 102*         Results from last 7 days   Lab Units 01/13/21  1302   ALBUMIN g/dL 3.90     Results from last 7 days   Lab Units 01/14/21  0134 01/13/21  2202 01/13/21  1725   TROPONIN T ng/mL <0.010 0.016 0.025         Results from last 7 days   Lab Units 01/14/21  0134   MAGNESIUM mg/dL 1.9     Results from last 7 days   Lab Units 01/13/21  1310 01/13/21  1302   INR  1.04  --    APTT seconds  --  23.9*               Meds:   SCHEDULE  budesonide, 0.5 mg, Nebulization, BID - RT  cefepime, 2 g, Intravenous, Q12H  finasteride, 5 mg, Oral, Daily  guaiFENesin, 1,200 mg, Oral, Q12H  ipratropium-albuterol, 3 mL, Nebulization, Q4H - RT  lidocaine, , ,   Lidocaine HCl (Cardiac) PF, , ,   methylPREDNISolone sodium succinate, 40 mg, Intravenous, Q8H  multivitamin with minerals, 1 tablet, Oral, Daily  rosuvastatin, 40 mg, Oral, Nightly  sodium chloride, 10 mL, Intravenous, Q12H  tamsulosin, 0.4 mg, Oral, Daily With Dinner  theophylline, 300 mg, Oral, Daily      Infusions  amiodarone, 1 mg/min, Last Rate: 1 mg/min (01/14/21 1352)  sodium chloride, 9 mL/hr, Last Rate: 9 mL/hr (01/14/21 1315)      PRNs  •  acetaminophen **OR** acetaminophen **OR** acetaminophen  •  benzonatate  •  ipratropium-albuterol  •  magnesium sulfate **OR** magnesium sulfate in D5W 1g/100mL (PREMIX)  •  nitroglycerin  •  ondansetron **OR** ondansetron  •  ondansetron  •  potassium chloride  •  [COMPLETED] Insert peripheral IV **AND** sodium chloride  •  sodium chloride    Physical  Exam:  Physical Exam  Pulmonary:      Breath sounds: Rhonchi and rales present.         ROS  Review of Systems   Respiratory: Positive for cough and shortness of breath.              Total time spent with patient greater than: 45 Minutes

## 2021-01-14 NOTE — CONSULTS
Referring Provider: Hospitalist  Reason for Consultation: Shortness of breath and atrial fibrillation    Patient Care Team:  Camron Pollard MD as PCP - General (Family Medicine)  Alvarado Cai MD as Consulting Physician (Cardiology)    Chief complaint shortness of breath    Subjective .     History of present illness:  Lenny Larson is a 80 y.o. male with history of coronary disease COPD lung cancer in the past hypertension hyperlipidemia and atrial fibrillation presented to the hospital complains of shortness of breath of inclusively for the last several days.  Patient does not have any symptoms of chest pain.  No complains of any PND orthopnea.  He has occasional palpitation without any dizziness syncope or swelling of the feet.  Is been taking his medicines regularly.  In the emergency room patient had a chest x-ray which showed pleural effusion and consolidation.  He is seen by pulmonologist.  He is also noted to have atrial fibrillation with rapid response and was admitted to the hospital.  His BNP was high.  He had a Covid test which was negative.  Review of Systems   Constitution: Negative for fever and malaise/fatigue.   HENT: Negative for ear pain and nosebleeds.    Eyes: Negative for blurred vision and double vision.   Cardiovascular: Positive for palpitations. Negative for chest pain and dyspnea on exertion.   Respiratory: Positive for shortness of breath. Negative for cough.    Skin: Negative for rash.   Musculoskeletal: Negative for joint pain.   Gastrointestinal: Negative for abdominal pain, nausea and vomiting.   Neurological: Negative for focal weakness and headaches.   Psychiatric/Behavioral: Negative for depression. The patient is not nervous/anxious.    All other systems reviewed and are negative.      History  Past Medical History:   Diagnosis Date   • COPD (chronic obstructive pulmonary disease) (CMS/HCC)    • Coronary artery disease    • Hyperlipidemia    • Hypertension        Past  Surgical History:   Procedure Laterality Date   • CARDIAC CATHETERIZATION      PCI       History reviewed. No pertinent family history.    Social History     Tobacco Use   • Smoking status: Never Smoker   • Smokeless tobacco: Never Used   Substance Use Topics   • Alcohol use: Not Currently   • Drug use: Never        Medications Prior to Admission   Medication Sig Dispense Refill Last Dose   • calcium carbonate (OS-GILMA) 600 MG tablet Take 600 mg by mouth Daily.      • dilTIAZem CD (CARDIZEM CD) 240 MG 24 hr capsule TAKE 1 CAPSULE DAILY 90 capsule 3    • finasteride (PROSCAR) 5 MG tablet Take 5 mg by mouth Daily.      • multivitamin with minerals tablet tablet Take 1 tablet by mouth Daily.      • rosuvastatin (Crestor) 40 MG tablet Take 1 tablet by mouth Daily. 90 tablet 3    • tamsulosin (FLOMAX) 0.4 MG capsule 24 hr capsule Take 1 capsule by mouth every night at bedtime.      • theophylline (THEODUR) 300 MG 12 hr tablet Take 300 mg by mouth Daily.            Ativan [lorazepam]    Scheduled Meds:budesonide, 0.5 mg, Nebulization, BID - RT  cefepime, 2 g, Intravenous, Q12H  digoxin, 250 mcg, Intravenous, Once  finasteride, 5 mg, Oral, Daily  guaiFENesin, 1,200 mg, Oral, Q12H  ipratropium-albuterol, 3 mL, Nebulization, Q4H - RT  lidocaine, , ,   Lidocaine HCl (Cardiac) PF, , ,   methylPREDNISolone sodium succinate, 40 mg, Intravenous, Q8H  multivitamin with minerals, 1 tablet, Oral, Daily  rosuvastatin, 40 mg, Oral, Nightly  sodium chloride, 10 mL, Intravenous, Q12H  tamsulosin, 0.4 mg, Oral, Daily With Dinner  theophylline, 300 mg, Oral, Daily      Continuous Infusions:amiodarone, 1 mg/min, Last Rate: 1 mg/min (01/14/21 1352)  sodium chloride, 75 mL/hr, Last Rate: 9 mL/hr (01/14/21 1315)      PRN Meds:.•  acetaminophen **OR** acetaminophen **OR** acetaminophen  •  benzonatate  •  ipratropium-albuterol  •  magnesium sulfate **OR** magnesium sulfate in D5W 1g/100mL (PREMIX)  •  nitroglycerin  •  ondansetron **OR**  "ondansetron  •  potassium chloride  •  [COMPLETED] Insert peripheral IV **AND** sodium chloride  •  sodium chloride    Objective     VITAL SIGNS  Vitals:    01/14/21 1415 01/14/21 1417 01/14/21 1451 01/14/21 1739   BP: 100/68 108/67 126/63 113/67   BP Location:       Patient Position:       Pulse: (!) 135 (!) 139 120 (!) 130   Resp:   18 18   Temp:   97.6 °F (36.4 °C) 97.7 °F (36.5 °C)   TempSrc:   Oral Oral   SpO2: 93% 94% 100% 99%   Weight:       Height:           Flowsheet Rows      First Filed Value   Admission Height  177.8 cm (70\") Documented at 01/13/2021 1222   Admission Weight  80.6 kg (177 lb 11.1 oz) Documented at 01/13/2021 1222           TELEMETRY: Atrial fibrillation with rapid response    Physical Exam:  Constitutional:       Appearance: Well-developed.   Eyes:      General: No scleral icterus.     Conjunctiva/sclera: Conjunctivae normal.      Pupils: Pupils are equal, round, and reactive to light.   HENT:      Head: Normocephalic and atraumatic.   Neck:      Musculoskeletal: Normal range of motion and neck supple.      Vascular: No carotid bruit or JVD.   Pulmonary:      Effort: Pulmonary effort is normal.      Breath sounds: Normal breath sounds. No wheezing. No rales.   Cardiovascular:      Normal rate. Irregularly irregular rhythm.      Murmurs: There is a systolic murmur.   Pulses:     Intact distal pulses.   Abdominal:      General: Bowel sounds are normal.      Palpations: Abdomen is soft.   Musculoskeletal: Normal range of motion.   Skin:     General: Skin is warm and dry.      Findings: No rash.   Neurological:      Mental Status: Alert.      Comments: No focal deficits          Results Review:   I reviewed the patient's new clinical results.  Lab Results (last 24 hours)     Procedure Component Value Units Date/Time    Respiratory Panel, PCR (WITHOUT COVID) - Lavage, Lung, Right Lower Lobe [[id number]]  (Normal) Collected: 01/14/21 1347    Specimen: Lavage from Lung, Right Lower Lobe Updated: " 01/14/21 1542     ADENOVIRUS, PCR Not Detected     Coronavirus 229E Not Detected     Coronavirus HKU1 Not Detected     Coronavirus NL63 Not Detected     Coronavirus OC43 Not Detected     Human Metapneumovirus Not Detected     Human Rhinovirus/Enterovirus Not Detected     Influenza B PCR Not Detected     Parainfluenza Virus 1 Not Detected     Parainfluenza Virus 2 Not Detected     Parainfluenza Virus 3 Not Detected     Parainfluenza Virus 4 Not Detected     Bordetella pertussis pcr Not Detected     Chlamydophila pneumoniae PCR Not Detected     Mycoplasma pneumo by PCR Not Detected     Influenza A PCR Not Detected     RSV, PCR Not Detected     Bordetella parapertussis PCR Not Detected    Narrative:      The coronavirus on the RVP is NOT COVID-19 and is NOT indicative of infection with COVID-19.     Lactic Acid, Plasma [[id number]]  (Abnormal) Collected: 01/14/21 1500    Specimen: Blood Updated: 01/14/21 1532     Lactate 2.4 mmol/L     BAL Culture, Quantitative - Lavage, Lung, Right Lower Lobe [[id number]] Collected: 01/14/21 1347    Specimen: Lavage from Lung, Right Lower Lobe Updated: 01/14/21 1515     Gram Stain Rare (1+) WBCs per low power field      No organisms seen    Pneumocystis PCR - Lavage, Lung, Right Lower Lobe [[id number]] Collected: 01/14/21 1347    Specimen: Lavage from Lung, Right Lower Lobe Updated: 01/14/21 1444    Fungus Culture - Lavage, Lung, Right Lower Lobe [[id number]] Collected: 01/14/21 1347    Specimen: Lavage from Lung, Right Lower Lobe Updated: 01/14/21 1442    AFB Culture - Lavage, Lung, Right Lower Lobe [[id number]] Collected: 01/14/21 1347    Specimen: Lavage from Lung, Right Lower Lobe Updated: 01/14/21 1442    Cytomegalovirus (CMV) By PCR - Lavage, Lung, Right Lower Lobe [[id number]] Collected: 01/14/21 1347    Specimen: Lavage from Lung, Right Lower Lobe Updated: 01/14/21 1442    Virus Culture - Lavage, Lung, Right Lower Lobe [[id number]] Collected: 01/14/21 1347    Specimen: Lavage from  Lung, Right Lower Lobe Updated: 01/14/21 1442    Blood Culture - Blood, Arm, Right [[id number]] Collected: 01/13/21 1310    Specimen: Blood from Arm, Right Updated: 01/14/21 1315     Blood Culture No growth at 24 hours    Blood Culture - Blood, Arm, Right [[id number]] Collected: 01/13/21 1302    Specimen: Blood from Arm, Right Updated: 01/14/21 1315     Blood Culture No growth at 24 hours    Basic Metabolic Panel [[id number]]  (Abnormal) Collected: 01/14/21 0134    Specimen: Blood Updated: 01/14/21 0226     Glucose 190 mg/dL      BUN 21 mg/dL      Creatinine 1.33 mg/dL      Sodium 139 mmol/L      Potassium 4.3 mmol/L      Comment: Slight hemolysis detected by analyzer. Results may be affected.        Chloride 100 mmol/L      CO2 26.0 mmol/L      Calcium 9.4 mg/dL      eGFR Non African Amer 52 mL/min/1.73      BUN/Creatinine Ratio 15.8     Anion Gap 13.0 mmol/L     Narrative:      GFR Normal >60  Chronic Kidney Disease <60  Kidney Failure <15      Troponin [[id number]]  (Normal) Collected: 01/14/21 0134    Specimen: Blood Updated: 01/14/21 0224     Troponin T <0.010 ng/mL     Narrative:      Troponin T Reference Range:  <= 0.03 ng/mL-   Negative for AMI  >0.03 ng/mL-     Abnormal for myocardial necrosis.  Clinicians would have to utilize clinical acumen, EKG, Troponin and serial changes to determine if it is an Acute Myocardial Infarction or myocardial injury due to an underlying chronic condition.       Results may be falsely decreased if patient taking Biotin.      Magnesium [[id number]]  (Normal) Collected: 01/14/21 0134    Specimen: Blood Updated: 01/14/21 0224     Magnesium 1.9 mg/dL     BNP [[id number]]  (Abnormal) Collected: 01/14/21 0134    Specimen: Blood Updated: 01/14/21 0218     proBNP 10,748.0 pg/mL     Narrative:      Among patients with dyspnea, NT-proBNP is highly sensitive for the detection of acute congestive heart failure. In addition NT-proBNP of <300 pg/ml effectively rules out acute congestive heart  failure with 99% negative predictive value.    Results may be falsely decreased if patient taking Biotin.      CBC (No Diff) [[id number]]  (Abnormal) Collected: 01/14/21 0134    Specimen: Blood Updated: 01/14/21 0157     WBC 4.20 10*3/mm3      RBC 4.46 10*6/mm3      Hemoglobin 12.7 g/dL      Hematocrit 39.2 %      MCV 87.9 fL      MCH 28.5 pg      MCHC 32.4 g/dL      RDW 16.1 %      RDW-SD 49.9 fl      MPV 9.5 fL      Platelets 236 10*3/mm3     Lactic Acid, Reflex [[id number]]  (Abnormal) Collected: 01/13/21 2202    Specimen: Blood Updated: 01/13/21 2247     Lactate 3.1 mmol/L     Troponin [[id number]]  (Normal) Collected: 01/13/21 2202    Specimen: Blood Updated: 01/13/21 2230     Troponin T 0.016 ng/mL     Narrative:      Troponin T Reference Range:  <= 0.03 ng/mL-   Negative for AMI  >0.03 ng/mL-     Abnormal for myocardial necrosis.  Clinicians would have to utilize clinical acumen, EKG, Troponin and serial changes to determine if it is an Acute Myocardial Infarction or myocardial injury due to an underlying chronic condition.       Results may be falsely decreased if patient taking Biotin.      Lactic Acid, Reflex Timer (This will reflex a repeat order 3-3:15 hours after ordered.) [[id number]] Collected: 01/13/21 1725    Specimen: Blood Updated: 01/13/21 2100     Hold Tube Hold for add-ons.     Comment: Auto resulted.       Respiratory Panel, PCR (WITHOUT COVID) - Swab, Nasopharynx [[id number]]  (Normal) Collected: 01/13/21 1824    Specimen: Swab from Nasopharynx Updated: 01/13/21 1940     ADENOVIRUS, PCR Not Detected     Coronavirus 229E Not Detected     Coronavirus HKU1 Not Detected     Coronavirus NL63 Not Detected     Coronavirus OC43 Not Detected     Human Metapneumovirus Not Detected     Human Rhinovirus/Enterovirus Not Detected     Influenza B PCR Not Detected     Parainfluenza Virus 1 Not Detected     Parainfluenza Virus 2 Not Detected     Parainfluenza Virus 3 Not Detected     Parainfluenza Virus 4 Not  Detected     Bordetella pertussis pcr Not Detected     Chlamydophila pneumoniae PCR Not Detected     Mycoplasma pneumo by PCR Not Detected     Influenza A PCR Not Detected     RSV, PCR Not Detected     Bordetella parapertussis PCR Not Detected    Narrative:      The coronavirus on the RVP is NOT COVID-19 and is NOT indicative of infection with COVID-19.     Troponin [[id number]]  (Normal) Collected: 01/13/21 1725    Specimen: Blood Updated: 01/13/21 1753     Troponin T 0.025 ng/mL     Narrative:      Troponin T Reference Range:  <= 0.03 ng/mL-   Negative for AMI  >0.03 ng/mL-     Abnormal for myocardial necrosis.  Clinicians would have to utilize clinical acumen, EKG, Troponin and serial changes to determine if it is an Acute Myocardial Infarction or myocardial injury due to an underlying chronic condition.       Results may be falsely decreased if patient taking Biotin.      Lactic Acid, Plasma [[id number]]  (Abnormal) Collected: 01/13/21 1725    Specimen: Blood Updated: 01/13/21 1749     Lactate 2.7 mmol/L           Imaging Results (Last 24 Hours)     ** No results found for the last 24 hours. 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  Patient also has acute respiratory distress and was seen by  the pulmonologist and has a large right pleural effusion  Patient is having a bronchoscopy performed.  Patient had a lung cancer status post lobectomy in the past  Patient has history of coronary disease status post and placement to the LAD in the past  Patient is being ruled out for MI by EKG and enzymes  Patient is also having an echocardiogram for LV function valvular abnormalities  Patient has atrial fibrillation with rapid response and is on amiodarone drip and will be started on digoxin also  Patient has history of abdominal aortic aneurysm and is followed by vascular surgeon.    I discussed the patients findings and my recommendations with patient and nurse    Alvarado Cai MD  01/14/21  17:49 EST

## 2021-01-14 NOTE — PROGRESS NOTES
Discharge Planning Assessment  [facility]     Patient Name: Lenny Larson  MRN: [id number]  Today's Date: 1/14/2021    Admit Date: 1/13/2021    Discharge Needs Assessment     Row Name 01/14/21 1015       Living Environment    Lives With  spouse    Current Living Arrangements  home/apartment/condo    Quality of Family Relationships  supportive    Able to Return to Prior Arrangements  yes       Resource/Environmental Concerns    Transportation Concerns  car, none       Transition Planning    Patient/Family Anticipates Transition to  home with family    Patient/Family Anticipated Services at Transition  none    Transportation Anticipated  family or friend will provide       Discharge Needs Assessment    Readmission Within the Last 30 Days  no previous admission in last 30 days    Equipment Currently Used at Home  none        Discharge Plan     Row Name 01/14/21 1015       Plan    Plan  D/C Plan : Pending PT eval . Watch for home o2 needs    Patient/Family in Agreement with Plan  yes    Plan Comments  Barrier to D/C : Pt was admitted for resp . distress and had a proBNP of 7,372. Pt is also on a Amio gtt and 3l of o2 . Plan for a bronch today        Continued Care and Services - Admitted Since 1/13/2021    Coordination has not been started for this encounter.       Expected Discharge Date and Time     Expected Discharge Date Expected Discharge Time    Jan 18, 2021         Demographic Summary     Row Name 01/14/21 1014       General Information    Admission Type  inpatient    Arrived From  emergency department    Required Notices Provided  Important Message from Medicare    Preferred Language  English     Used During This Interaction  no        Functional Status     Row Name 01/14/21 1014       Functional Status    Usual Activity Tolerance  good    Current Activity Tolerance  moderate       Mental Status    General Appearance WDL  WDL       Mental Status Summary    Recent Changes in Mental Status/Cognitive  Functioning  no changes                Amparo Crandall RN

## 2021-01-15 ENCOUNTER — APPOINTMENT (OUTPATIENT)
Dept: GENERAL RADIOLOGY | Facility: HOSPITAL | Age: 81
End: 2021-01-15

## 2021-01-15 LAB
ANION GAP SERPL CALCULATED.3IONS-SCNC: 10 MMOL/L (ref 5–15)
ANION GAP SERPL CALCULATED.3IONS-SCNC: 9 MMOL/L (ref 5–15)
APPEARANCE FLD: ABNORMAL
BASOPHILS # BLD AUTO: 0 10*3/MM3 (ref 0–0.2)
BASOPHILS NFR BLD AUTO: 0.1 % (ref 0–1.5)
BUN SERPL-MCNC: 32 MG/DL (ref 8–23)
BUN SERPL-MCNC: 43 MG/DL (ref 8–23)
BUN/CREAT SERPL: 22.5 (ref 7–25)
BUN/CREAT SERPL: 28.9 (ref 7–25)
CALCIUM SPEC-SCNC: 9.1 MG/DL (ref 8.6–10.5)
CALCIUM SPEC-SCNC: 9.3 MG/DL (ref 8.6–10.5)
CHLORIDE SERPL-SCNC: 100 MMOL/L (ref 98–107)
CHLORIDE SERPL-SCNC: 100 MMOL/L (ref 98–107)
CO2 SERPL-SCNC: 29 MMOL/L (ref 22–29)
CO2 SERPL-SCNC: 30 MMOL/L (ref 22–29)
COLOR FLD: YELLOW
CREAT SERPL-MCNC: 1.42 MG/DL (ref 0.76–1.27)
CREAT SERPL-MCNC: 1.49 MG/DL (ref 0.76–1.27)
D-LACTATE SERPL-SCNC: 1.1 MMOL/L (ref 0.5–2)
DEPRECATED RDW RBC AUTO: 48.6 FL (ref 37–54)
EOSINOPHIL # BLD AUTO: 0 10*3/MM3 (ref 0–0.4)
EOSINOPHIL NFR BLD AUTO: 0 % (ref 0.3–6.2)
ERYTHROCYTE [DISTWIDTH] IN BLOOD BY AUTOMATED COUNT: 15.7 % (ref 12.3–15.4)
GFR SERPL CREATININE-BSD FRML MDRD: 46 ML/MIN/1.73
GFR SERPL CREATININE-BSD FRML MDRD: 48 ML/MIN/1.73
GLUCOSE FLD-MCNC: 179 MG/DL
GLUCOSE SERPL-MCNC: 118 MG/DL (ref 65–99)
GLUCOSE SERPL-MCNC: 152 MG/DL (ref 65–99)
HCT VFR BLD AUTO: 35.9 % (ref 37.5–51)
HGB BLD-MCNC: 11.8 G/DL (ref 13–17.7)
LDH FLD-CCNC: 99 U/L
LYMPHOCYTES # BLD AUTO: 0.2 10*3/MM3 (ref 0.7–3.1)
LYMPHOCYTES NFR BLD AUTO: 2 % (ref 19.6–45.3)
LYMPHOCYTES NFR FLD MANUAL: 24 %
MAGNESIUM SERPL-MCNC: 2 MG/DL (ref 1.6–2.4)
MCH RBC QN AUTO: 28.5 PG (ref 26.6–33)
MCHC RBC AUTO-ENTMCNC: 32.9 G/DL (ref 31.5–35.7)
MCV RBC AUTO: 86.7 FL (ref 79–97)
MESOTHL CELL NFR FLD MANUAL: 3 %
METHOD: ABNORMAL
MONOCYTES # BLD AUTO: 0.4 10*3/MM3 (ref 0.1–0.9)
MONOCYTES NFR BLD AUTO: 3.6 % (ref 5–12)
MONOCYTES NFR FLD: 2 %
NEUTROPHILS NFR BLD AUTO: 10.1 10*3/MM3 (ref 1.7–7)
NEUTROPHILS NFR BLD AUTO: 94.3 % (ref 42.7–76)
NEUTROPHILS NFR FLD MANUAL: 71 %
NRBC BLD AUTO-RTO: 0 /100 WBC (ref 0–0.2)
NUC CELL # FLD: 415 /MM3
PH FLD: 8 [PH] (ref 6.5–7.5)
PLATELET # BLD AUTO: 222 10*3/MM3 (ref 140–450)
PMV BLD AUTO: 9 FL (ref 6–12)
POTASSIUM SERPL-SCNC: 3.9 MMOL/L (ref 3.5–5.2)
POTASSIUM SERPL-SCNC: 4.2 MMOL/L (ref 3.5–5.2)
PROT FLD-MCNC: 2.8 G/DL
RBC # BLD AUTO: 4.14 10*6/MM3 (ref 4.14–5.8)
SODIUM SERPL-SCNC: 139 MMOL/L (ref 136–145)
SODIUM SERPL-SCNC: 139 MMOL/L (ref 136–145)
WBC # BLD AUTO: 10.8 10*3/MM3 (ref 3.4–10.8)

## 2021-01-15 PROCEDURE — 85025 COMPLETE CBC W/AUTO DIFF WBC: CPT | Performed by: INTERNAL MEDICINE

## 2021-01-15 PROCEDURE — 83986 ASSAY PH BODY FLUID NOS: CPT | Performed by: INTERNAL MEDICINE

## 2021-01-15 PROCEDURE — 0W9930Z DRAINAGE OF RIGHT PLEURAL CAVITY WITH DRAINAGE DEVICE, PERCUTANEOUS APPROACH: ICD-10-PCS | Performed by: INTERNAL MEDICINE

## 2021-01-15 PROCEDURE — 97162 PT EVAL MOD COMPLEX 30 MIN: CPT

## 2021-01-15 PROCEDURE — 82945 GLUCOSE OTHER FLUID: CPT | Performed by: INTERNAL MEDICINE

## 2021-01-15 PROCEDURE — 88184 FLOWCYTOMETRY/ TC 1 MARKER: CPT

## 2021-01-15 PROCEDURE — 25010000002 METHYLPREDNISOLONE PER 40 MG: Performed by: INTERNAL MEDICINE

## 2021-01-15 PROCEDURE — 84478 ASSAY OF TRIGLYCERIDES: CPT | Performed by: INTERNAL MEDICINE

## 2021-01-15 PROCEDURE — 87206 SMEAR FLUORESCENT/ACID STAI: CPT | Performed by: INTERNAL MEDICINE

## 2021-01-15 PROCEDURE — 71045 X-RAY EXAM CHEST 1 VIEW: CPT

## 2021-01-15 PROCEDURE — 83615 LACTATE (LD) (LDH) ENZYME: CPT | Performed by: INTERNAL MEDICINE

## 2021-01-15 PROCEDURE — 94799 UNLISTED PULMONARY SVC/PX: CPT

## 2021-01-15 PROCEDURE — 89051 BODY FLUID CELL COUNT: CPT | Performed by: INTERNAL MEDICINE

## 2021-01-15 PROCEDURE — 99233 SBSQ HOSP IP/OBS HIGH 50: CPT | Performed by: INTERNAL MEDICINE

## 2021-01-15 PROCEDURE — 80048 BASIC METABOLIC PNL TOTAL CA: CPT | Performed by: INTERNAL MEDICINE

## 2021-01-15 PROCEDURE — 88108 CYTOPATH CONCENTRATE TECH: CPT | Performed by: INTERNAL MEDICINE

## 2021-01-15 PROCEDURE — 25010000002 ENOXAPARIN PER 10 MG: Performed by: INTERNAL MEDICINE

## 2021-01-15 PROCEDURE — 93005 ELECTROCARDIOGRAM TRACING: CPT | Performed by: INTERNAL MEDICINE

## 2021-01-15 PROCEDURE — 87205 SMEAR GRAM STAIN: CPT | Performed by: INTERNAL MEDICINE

## 2021-01-15 PROCEDURE — 25010000002 CEFEPIME PER 500 MG: Performed by: INTERNAL MEDICINE

## 2021-01-15 PROCEDURE — 25010000002 AMIODARONE PER 30 MG: Performed by: INTERNAL MEDICINE

## 2021-01-15 PROCEDURE — 84157 ASSAY OF PROTEIN OTHER: CPT | Performed by: INTERNAL MEDICINE

## 2021-01-15 PROCEDURE — 83735 ASSAY OF MAGNESIUM: CPT | Performed by: INTERNAL MEDICINE

## 2021-01-15 PROCEDURE — 87116 MYCOBACTERIA CULTURE: CPT | Performed by: INTERNAL MEDICINE

## 2021-01-15 PROCEDURE — 83605 ASSAY OF LACTIC ACID: CPT | Performed by: INTERNAL MEDICINE

## 2021-01-15 PROCEDURE — 87070 CULTURE OTHR SPECIMN AEROBIC: CPT | Performed by: INTERNAL MEDICINE

## 2021-01-15 PROCEDURE — 88185 FLOWCYTOMETRY/TC ADD-ON: CPT

## 2021-01-15 PROCEDURE — 97116 GAIT TRAINING THERAPY: CPT

## 2021-01-15 PROCEDURE — 93010 ELECTROCARDIOGRAM REPORT: CPT | Performed by: INTERNAL MEDICINE

## 2021-01-15 RX ORDER — HYDROCODONE BITARTRATE AND ACETAMINOPHEN 10; 325 MG/1; MG/1
1 TABLET ORAL EVERY 4 HOURS PRN
Status: DISPENSED | OUTPATIENT
Start: 2021-01-15 | End: 2021-01-22

## 2021-01-15 RX ORDER — DIGOXIN 125 MCG
125 TABLET ORAL DAILY
Status: DISCONTINUED | OUTPATIENT
Start: 2021-01-15 | End: 2021-01-22 | Stop reason: HOSPADM

## 2021-01-15 RX ORDER — DILTIAZEM HYDROCHLORIDE 120 MG/1
120 CAPSULE, COATED, EXTENDED RELEASE ORAL
Status: DISCONTINUED | OUTPATIENT
Start: 2021-01-15 | End: 2021-01-17

## 2021-01-15 RX ORDER — HYDROCODONE BITARTRATE AND ACETAMINOPHEN 5; 325 MG/1; MG/1
1 TABLET ORAL EVERY 4 HOURS PRN
Status: DISPENSED | OUTPATIENT
Start: 2021-01-15 | End: 2021-01-22

## 2021-01-15 RX ADMIN — ACETAMINOPHEN 650 MG: 325 TABLET, FILM COATED ORAL at 15:07

## 2021-01-15 RX ADMIN — Medication 10 ML: at 21:33

## 2021-01-15 RX ADMIN — BUDESONIDE 0.5 MG: 0.5 INHALANT RESPIRATORY (INHALATION) at 07:09

## 2021-01-15 RX ADMIN — IPRATROPIUM BROMIDE AND ALBUTEROL SULFATE 3 ML: 2.5; .5 SOLUTION RESPIRATORY (INHALATION) at 07:07

## 2021-01-15 RX ADMIN — AMIODARONE HYDROCHLORIDE 1 MG/MIN: 50 INJECTION, SOLUTION INTRAVENOUS at 19:36

## 2021-01-15 RX ADMIN — Medication 10 ML: at 08:59

## 2021-01-15 RX ADMIN — ENOXAPARIN SODIUM 80 MG: 80 INJECTION SUBCUTANEOUS at 15:07

## 2021-01-15 RX ADMIN — AMIODARONE HYDROCHLORIDE 1 MG/MIN: 50 INJECTION, SOLUTION INTRAVENOUS at 02:40

## 2021-01-15 RX ADMIN — IPRATROPIUM BROMIDE AND ALBUTEROL SULFATE 3 ML: 2.5; .5 SOLUTION RESPIRATORY (INHALATION) at 23:55

## 2021-01-15 RX ADMIN — DILTIAZEM HYDROCHLORIDE 120 MG: 120 CAPSULE, COATED, EXTENDED RELEASE ORAL at 17:36

## 2021-01-15 RX ADMIN — FINASTERIDE 5 MG: 5 TABLET, FILM COATED ORAL at 08:58

## 2021-01-15 RX ADMIN — DIGOXIN 125 MCG: 125 TABLET ORAL at 10:58

## 2021-01-15 RX ADMIN — GUAIFENESIN 1200 MG: 600 TABLET, EXTENDED RELEASE ORAL at 08:58

## 2021-01-15 RX ADMIN — IPRATROPIUM BROMIDE AND ALBUTEROL SULFATE 3 ML: 2.5; .5 SOLUTION RESPIRATORY (INHALATION) at 12:17

## 2021-01-15 RX ADMIN — MAGNESIUM HYDROXIDE 10 ML: 2400 SUSPENSION ORAL at 17:29

## 2021-01-15 RX ADMIN — MULTIPLE VITAMINS W/ MINERALS TAB 1 TABLET: TAB at 08:58

## 2021-01-15 RX ADMIN — THEOPHYLLINE 300 MG: 300 TABLET, EXTENDED RELEASE ORAL at 08:58

## 2021-01-15 RX ADMIN — ROSUVASTATIN CALCIUM 40 MG: 10 TABLET, FILM COATED ORAL at 20:13

## 2021-01-15 RX ADMIN — TAMSULOSIN HYDROCHLORIDE 0.4 MG: 0.4 CAPSULE ORAL at 17:14

## 2021-01-15 RX ADMIN — CEFEPIME HYDROCHLORIDE 2 G: 2 INJECTION, POWDER, FOR SOLUTION INTRAVENOUS at 05:42

## 2021-01-15 RX ADMIN — GUAIFENESIN 1200 MG: 600 TABLET, EXTENDED RELEASE ORAL at 17:14

## 2021-01-15 RX ADMIN — METHYLPREDNISOLONE SODIUM SUCCINATE 40 MG: 40 INJECTION, POWDER, FOR SOLUTION INTRAMUSCULAR; INTRAVENOUS at 15:07

## 2021-01-15 RX ADMIN — CEFEPIME HYDROCHLORIDE 2 G: 2 INJECTION, POWDER, FOR SOLUTION INTRAVENOUS at 17:14

## 2021-01-15 RX ADMIN — METHYLPREDNISOLONE SODIUM SUCCINATE 40 MG: 40 INJECTION, POWDER, FOR SOLUTION INTRAMUSCULAR; INTRAVENOUS at 21:32

## 2021-01-15 RX ADMIN — METHYLPREDNISOLONE SODIUM SUCCINATE 40 MG: 40 INJECTION, POWDER, FOR SOLUTION INTRAMUSCULAR; INTRAVENOUS at 05:42

## 2021-01-15 NOTE — PROGRESS NOTES
Continued Stay Note  [facility]     Patient Name: Lenny Larson  MRN: [id number]  Today's Date: 1/15/2021    Admit Date: 1/13/2021    Discharge Plan     Row Name 01/15/21 1242       Plan    Plan  D/C Plan : New referralto [facility] . New referral to [place] for a rolling walker . Watch for home o2 needs .        Discharge Codes    No documentation.       Expected Discharge Date and Time     Expected Discharge Date Expected Discharge Time    Jan 18, 2021             Amparo Crandall RN

## 2021-01-15 NOTE — DISCHARGE PLACEMENT REQUEST
"Tracie Mims KB (80 y.o. Male)     Date of Birth Social Security Number Address Home Phone MRN    1940  [address] [id number] [phone] [id number]    Zoroastrian Marital Status          Lutheran        Admission Date Admission Type Admitting Provider Attending Provider Department, Room/Bed    1/13/21 Emergency Diane Tamez MD Lackey, Diana, MD [facility], 2102/1    Discharge Date Discharge Disposition Discharge Destination                       Attending Provider: Diane Tamez MD    Allergies: Ativan [Lorazepam]    Isolation: None   Infection: None   Code Status: CPR    Ht: 177.8 cm (70\")   Wt: 76.8 kg (169 lb 5 oz)    Admission Cmt: None   Principal Problem: Acute respiratory distress [R06.03]                 Active Insurance as of 1/13/2021     Primary Coverage     Payor Plan Insurance Group Employer/Plan Group    HUMANA MEDICARE REPLACEMENT HUMANA MEDICARE REPLACEMENT [id number]     Payor Plan Address Payor Plan Phone Number Payor Plan Fax Number Effective Dates    [address] [address] [phone]  1/1/2018 - None Entered    [facility] [phone]       Subscriber Name Subscriber Birth Date Member ID       TRACIE MIMS 1940 [id number]                 Emergency Contacts      (Rel.) Home Phone Work Phone Mobile Phone    Marsha Italia (Spouse) -- -- [phone]              "

## 2021-01-15 NOTE — PROGRESS NOTES
Daily Progress Note        Acute respiratory distress    Chronic obstructive pulmonary disease (CMS/HCC)    Coronary artery disease    Hyperlipidemia    Hypertension    Chest pain    Atrial fibrillation with RVR (CMS/HCC)    BPH without obstruction/lower urinary tract symptoms    History of lung cancer    COPD exacerbation (CMS/HCC)    Malignant neoplasm of upper lobe of right lung (CMS/HCC)      Assessment    Worsening right hilar alveolar infiltrate possible pneumonia versus recurrent malignancy    Bronchoscopy completed 1/14/2021  Copious amount of mucopurulent secretions were noted especially from the right lower lobe area and suctioned therapeutically  No endobronchial masses  Minor bleed which resolved with suctioning     New onset large loculated right pleural effusion  Status post ultrasound-guided chest catheter placement with drainage of 1800 cc of serosanguineous fluid     COVID-19 test is negative     h/o lung ca s/p RUL lobectomy, last CT 9/2018 with R perihilar density, repeat CT scan 01/23/2020 no change in right upper lobe posterior hilar density compatible with radiation pneumonitis     COPD  ex-smoker   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 fib with RVR       Plan        Antibiotics empirically on cefepime  Check BAL culture    Steroids IV Solu-Medrol 40 mg every 8        CT scan 1/12/2021 at [facility]              LOS: 2 days     Subjective         Objective     Vital signs for last 24 hours:  Vitals:    01/15/21 1058 01/15/21 1217 01/15/21 1222 01/15/21 1416   BP: 113/48   123/61   BP Location:    Right arm   Patient Position:    Sitting   Pulse: (!) 125 118 (!) 125    Resp:  18 18 18   Temp:    97.7 °F (36.5 °C)   TempSrc:    Oral   SpO2:  99% 100%    Weight:       Height:           Intake/Output last 3 shifts:  I/O last 3 completed shifts:  In: 1315 [P.O.:690; I.V.:625]  Out: 2675 [Urine:2675]  Intake/Output this shift:  I/O this shift:  In: 600 [P.O.:600]  Out: 2150 [Urine:350; Chest  Tube:1800]      Radiology  Imaging Results (Last 24 Hours)     Procedure Component Value Units Date/Time    XR Chest 1 View [[id number]] Collected: 01/15/21 1509     Updated: 01/15/21 1512    Narrative:      DATE OF EXAM:  1/15/2021 2:43 PM     PROCEDURE:  XR CHEST 1 VW-     INDICATIONS:  Thoracentesis; J44.1-Chronic obstructive pulmonary disease with (acute)  exacerbation; R06.03-Acute respiratory distress; I48.91-Unspecified  atrial fibrillation; I20-Ajcgqze effusion, not elsewhere classified;  C34.11-Malignant neoplasm of upper lobe, right bronchus or lung       COMPARISON:  AP portable chest 01/15/2021.     TECHNIQUE:   Single radiographic view of the chest was obtained.     FINDINGS:  Right basilar pleural pigtail drainage catheter has been placed. Right  pleural effusion has resolved. There is mild residual opacity in the  right base favored to represent atelectasis, and there is significantly  improved aeration in the right lower lobe compared earlier today. Left  lung remains clear. Heart size is normal. No pneumothorax is seen.       Impression:      Significantly improved aeration in the right hemithorax, that is post  pleural drain placement. Mild residual atelectasis remains. No visible  pleural fluid or pneumothorax.     Electronically Signed By-Maura Esteves MD On:1/15/2021 3:10 PM  This report was finalized on [id number] by  Maura Esteves MD.    XR Chest 1 View [[id number]] Collected: 01/15/21 0752     Updated: 01/15/21 0755    Narrative:      DATE OF EXAM:  1/15/2021 6:06 AM     PROCEDURE:  XR CHEST 1 VW-     INDICATIONS:  Follow-up status post bronchoscopy; R06.03-Acute respiratory distress;  I48.91-Unspecified atrial fibrillation; J44.1-Chronic obstructive  pulmonary disease with (acute) exacerbation; M32-Kpugnmj effusion, not  elsewhere classified; C34.11-Malignant neoplasm of upper lobe, right  bronchus or lung history smoking. Coronary artery stent. History of lung  cancer. Hypertension.  COPD. Cough today. Follow-up bronchoscopy.     COMPARISON:  Single frontal view chest performed on 01/13/2021     TECHNIQUE:   Single radiographic AP view of the chest was obtained.     FINDINGS:  Single frontal view chest reveals that there is a moderate-large  infiltrate and pleural effusion involving the right midlung zone and  right lower lobe along less prominent than on previous study. No acute  or focal arising left lung. Heart and mediastinum are normal. No  evidence of pneumothorax.        Impression:         1. Moderate-large sized infiltrate pleural effusion involving the right  midlung zone right lower lobe lung has improved slightly since previous  study performed on 01/13/2021     Electronically Signed By-Baron Diggs MD On:1/15/2021 7:53 AM  This report was finalized on [id number] by  Baron Diggs MD.    US Aorta Limited [[id number]] Collected: 01/14/21 2214     Updated: 01/15/21 0018    Narrative:      EXAM: Abdominal aortic ultrasound    DATE: January 14, 2021    HISTORY: Follow-up abdominal aortic aneurysm    COMPARISON: May 18, 2019    TECHNIQUE: Grayscale and color flow ultrasonographic imaging was performed of the abdominal aorta    FINDINGS:    There is severe atherosclerotic disease within the abdominal aorta. There is redemonstration of an infrarenal abdominal aortic aneurysm measuring 5.3 x 4.9 cm. The iliac arteries are well characterized secondary to overlying bowel gas. The visualized   grafted iliac arteries are patent.      Impression:      1. Redemonstration of a distal abdominal aortic aneurysm measuring 5.3 x 4.9 cm. This is very similar in size to that described on May 18, 2019.  2. Severe atherosclerotic disease.  3. There are limitations to this study secondary to overlying bowel gas.        Slot 63    Electronically signed by:  Vladislav Rich M.D.    1/14/2021 10:17 PM          Labs:  Results from last 7 days   Lab Units 01/15/21  0318   WBC 10*3/mm3 10.80    HEMOGLOBIN g/dL 11.8*   HEMATOCRIT % 35.9*   PLATELETS 10*3/mm3 222     Results from last 7 days   Lab Units 01/15/21  0318  01/13/21  1302   SODIUM mmol/L 139   < > 140   POTASSIUM mmol/L 3.9   < > 4.1   CHLORIDE mmol/L 100   < > 104   CO2 mmol/L 30.0*   < > 25.0   BUN mg/dL 32*   < > 11   CREATININE mg/dL 1.42*   < > 0.92   CALCIUM mg/dL 9.1   < > 9.1   BILIRUBIN mg/dL  --   --  0.6   ALK PHOS U/L  --   --  67   ALT (SGPT) U/L  --   --  22   AST (SGOT) U/L  --   --  26   GLUCOSE mg/dL 152*   < > 102*    < > = values in this interval not displayed.         Results from last 7 days   Lab Units 01/13/21  1302   ALBUMIN g/dL 3.90     Results from last 7 days   Lab Units 01/14/21  0134 01/13/21  2202 01/13/21  1725   TROPONIN T ng/mL <0.010 0.016 0.025         Results from last 7 days   Lab Units 01/15/21  0318   MAGNESIUM mg/dL 2.0     Results from last 7 days   Lab Units 01/13/21  1310 01/13/21  1302   INR  1.04  --    APTT seconds  --  23.9*               Meds:   SCHEDULE  budesonide, 0.5 mg, Nebulization, BID - RT  cefepime, 2 g, Intravenous, Q12H  digoxin, 125 mcg, Oral, Daily  enoxaparin, 1 mg/kg, Subcutaneous, Q12H  finasteride, 5 mg, Oral, Daily  guaiFENesin, 1,200 mg, Oral, Q12H  ipratropium-albuterol, 3 mL, Nebulization, Q4H - RT  methylPREDNISolone sodium succinate, 40 mg, Intravenous, Q8H  multivitamin with minerals, 1 tablet, Oral, Daily  rosuvastatin, 40 mg, Oral, Nightly  sodium chloride, 10 mL, Intravenous, Q12H  tamsulosin, 0.4 mg, Oral, Daily With Dinner  theophylline, 300 mg, Oral, Daily      Infusions  amiodarone, 1 mg/min, Last Rate: 1 mg/min (01/15/21 0240)  sodium chloride, 75 mL/hr, Last Rate: Stopped (01/15/21 0900)      PRNs  •  acetaminophen **OR** acetaminophen **OR** acetaminophen  •  benzonatate  •  ipratropium-albuterol  •  magnesium hydroxide  •  magnesium sulfate **OR** magnesium sulfate in D5W 1g/100mL (PREMIX)  •  nitroglycerin  •  ondansetron **OR** ondansetron  •  potassium  chloride  •  [COMPLETED] Insert peripheral IV **AND** sodium chloride  •  sodium chloride    Physical Exam:  Physical Exam  Pulmonary:      Breath sounds: Rhonchi and rales present.         ROS  Review of Systems   Respiratory: Positive for cough and shortness of breath.              Total time spent with patient greater than: 45 Minutes

## 2021-01-15 NOTE — DISCHARGE PLACEMENT REQUEST
"Tracie Mims KB (80 y.o. Male)     Date of Birth Social Security Number Address Home Phone MRN    1940  [address] [id number] [phone] [id number]    Yarsani Marital Status          Caodaism        Admission Date Admission Type Admitting Provider Attending Provider Department, Room/Bed    1/13/21 Emergency Diane Tamez MD Lackey, Diana, MD [facility], 2102/1    Discharge Date Discharge Disposition Discharge Destination                       Attending Provider: Diane Tamez MD    Allergies: Ativan [Lorazepam]    Isolation: None   Infection: None   Code Status: CPR    Ht: 177.8 cm (70\")   Wt: 76.8 kg (169 lb 5 oz)    Admission Cmt: None   Principal Problem: Acute respiratory distress [R06.03]                 Active Insurance as of 1/13/2021     Primary Coverage     Payor Plan Insurance Group Employer/Plan Group    HUMANA MEDICARE REPLACEMENT HUMANA MEDICARE REPLACEMENT [id number]     Payor Plan Address Payor Plan Phone Number Payor Plan Fax Number Effective Dates    [address] [address] [phone]  1/1/2018 - None Entered    [facility] [id number]       Subscriber Name Subscriber Birth Date Member ID       TRACIE MIMS 1940 [id number]                 Emergency Contacts      (Rel.) Home Phone Work Phone Mobile Phone    Marsha Italia (Spouse) -- -- [phone]              "

## 2021-01-15 NOTE — PROGRESS NOTES
Referring Provider: Hospitalist    Reason for follow-up: Shortness of breath     Patient Care Team:  Camron Pollard MD as PCP - General (Family Medicine)  Alvarado Cai MD as Consulting Physician (Cardiology)    Subjective .  Still has shortness of breath    Objective  Lying in bed comfortable     Review of Systems   Constitution: Negative for fever and malaise/fatigue.   HENT: Negative for ear pain and nosebleeds.    Eyes: Negative for blurred vision and double vision.   Cardiovascular: Negative for chest pain, dyspnea on exertion and palpitations.   Respiratory: Positive for shortness of breath. Negative for cough.    Skin: Negative for rash.   Musculoskeletal: Negative for joint pain.   Gastrointestinal: Negative for abdominal pain, nausea and vomiting.   Neurological: Negative for focal weakness and headaches.   Psychiatric/Behavioral: Negative for depression. The patient is not nervous/anxious.    All other systems reviewed and are negative.      Ativan [lorazepam]    Scheduled Meds:budesonide, 0.5 mg, Nebulization, BID - RT  cefepime, 2 g, Intravenous, Q12H  digoxin, 125 mcg, Oral, Daily  finasteride, 5 mg, Oral, Daily  guaiFENesin, 1,200 mg, Oral, Q12H  ipratropium-albuterol, 3 mL, Nebulization, Q4H - RT  methylPREDNISolone sodium succinate, 40 mg, Intravenous, Q8H  multivitamin with minerals, 1 tablet, Oral, Daily  rosuvastatin, 40 mg, Oral, Nightly  sodium chloride, 10 mL, Intravenous, Q12H  tamsulosin, 0.4 mg, Oral, Daily With Dinner  theophylline, 300 mg, Oral, Daily      Continuous Infusions:amiodarone, 1 mg/min, Last Rate: 1 mg/min (01/15/21 0240)  sodium chloride, 75 mL/hr, Last Rate: 75 mL/hr (01/15/21 0201)      PRN Meds:.•  acetaminophen **OR** acetaminophen **OR** acetaminophen  •  benzonatate  •  ipratropium-albuterol  •  magnesium sulfate **OR** magnesium sulfate in D5W 1g/100mL (PREMIX)  •  nitroglycerin  •  ondansetron **OR** ondansetron  •  potassium chloride  •  [COMPLETED] Insert  "peripheral IV **AND** sodium chloride  •  sodium chloride        VITAL SIGNS  Vitals:    01/15/21 0709 01/15/21 0726 01/15/21 1036 01/15/21 1058   BP:   113/79 113/48   BP Location:   Left arm    Patient Position:   Sitting    Pulse: 101 117  (!) 125   Resp: 18 18 18    Temp:       TempSrc:       SpO2:       Weight:       Height:           Flowsheet Rows      First Filed Value   Admission Height  177.8 cm (70\") Documented at 01/13/2021 1222   Admission Weight  80.6 kg (177 lb 11.1 oz) Documented at 01/13/2021 1222           TELEMETRY: Atrial fibrillation with rapid response    Physical Exam:  Constitutional:       Appearance: Well-developed.   Eyes:      General: No scleral icterus.     Conjunctiva/sclera: Conjunctivae normal.      Pupils: Pupils are equal, round, and reactive to light.   HENT:      Head: Normocephalic and atraumatic.   Neck:      Musculoskeletal: Normal range of motion and neck supple.      Vascular: No carotid bruit or JVD.   Pulmonary:      Effort: Pulmonary effort is normal.      Breath sounds: Normal breath sounds. No wheezing. No rales.   Cardiovascular:      Normal rate. Irregularly irregular rhythm.   Pulses:     Intact distal pulses.   Abdominal:      General: Bowel sounds are normal.      Palpations: Abdomen is soft.   Musculoskeletal: Normal range of motion.   Skin:     General: Skin is warm and dry.      Findings: No rash.   Neurological:      Mental Status: Alert.      Comments: No focal deficits          Results Review:   I reviewed the patient's new clinical results.  Lab Results (last 24 hours)     Procedure Component Value Units Date/Time    Non-gynecologic Cytology [[id number]] Collected: 01/14/21 1347    Specimen: Lavage from Lung, Right Lower Lobe Updated: 01/15/21 0926    BAL Culture, Quantitative - Lavage, Lung, Right Lower Lobe [[id number]] Collected: 01/14/21 1347    Specimen: Lavage from Lung, Right Lower Lobe Updated: 01/15/21 0918     BAL Culture Scant growth (1+) Normal " Respiratory Terri: NO S.aureus/MRSA or Pseudomonas aeruginosa     Gram Stain Rare (1+) WBCs per low power field      No organisms seen    Basic Metabolic Panel [[id number]]  (Abnormal) Collected: 01/15/21 0318    Specimen: Blood Updated: 01/15/21 0359     Glucose 152 mg/dL      BUN 32 mg/dL      Creatinine 1.42 mg/dL      Sodium 139 mmol/L      Potassium 3.9 mmol/L      Chloride 100 mmol/L      CO2 30.0 mmol/L      Calcium 9.1 mg/dL      eGFR Non African Amer 48 mL/min/1.73      BUN/Creatinine Ratio 22.5     Anion Gap 9.0 mmol/L     Narrative:      GFR Normal >60  Chronic Kidney Disease <60  Kidney Failure <15      Magnesium [[id number]]  (Normal) Collected: 01/15/21 0318    Specimen: Blood Updated: 01/15/21 0353     Magnesium 2.0 mg/dL     Lactic Acid, Plasma [[id number]]  (Normal) Collected: 01/15/21 0318    Specimen: Blood Updated: 01/15/21 0348     Lactate 1.1 mmol/L     CBC & Differential [[id number]]  (Abnormal) Collected: 01/15/21 0318    Specimen: Blood Updated: 01/15/21 0332    Narrative:      The following orders were created for panel order CBC & Differential.  Procedure                               Abnormality         Status                     ---------                               -----------         ------                     CBC Auto Differential[[id number]]        Abnormal            Final result                 Please view results for these tests on the individual orders.    CBC Auto Differential [[id number]]  (Abnormal) Collected: 01/15/21 0318    Specimen: Blood Updated: 01/15/21 0332     WBC 10.80 10*3/mm3      Comment: Result checked         RBC 4.14 10*6/mm3      Hemoglobin 11.8 g/dL      Hematocrit 35.9 %      MCV 86.7 fL      MCH 28.5 pg      MCHC 32.9 g/dL      RDW 15.7 %      RDW-SD 48.6 fl      MPV 9.0 fL      Platelets 222 10*3/mm3      Neutrophil % 94.3 %      Lymphocyte % 2.0 %      Monocyte % 3.6 %      Eosinophil % 0.0 %      Basophil % 0.1 %      Neutrophils, Absolute 10.10 10*3/mm3       Lymphocytes, Absolute 0.20 10*3/mm3      Monocytes, Absolute 0.40 10*3/mm3      Eosinophils, Absolute 0.00 10*3/mm3      Basophils, Absolute 0.00 10*3/mm3      nRBC 0.0 /100 WBC     Urinalysis With Culture If Indicated - Urine, Catheter In/Out [[id number]]  (Abnormal) Collected: 01/14/21 2306    Specimen: Urine, Catheter In/Out Updated: 01/14/21 2317     Color, UA Yellow     Appearance, UA Clear     pH, UA 5.5     Specific Gravity, UA 1.021     Glucose, UA Negative     Ketones, UA Trace     Bilirubin, UA Negative     Blood, UA Small (1+)     Protein, UA Negative     Leuk Esterase, UA Negative     Nitrite, UA Negative     Urobilinogen, UA 0.2 E.U./dL    Urinalysis, Microscopic Only - Urine, Catheter In/Out [[id number]]  (Abnormal) Collected: 01/14/21 2306    Specimen: Urine, Catheter In/Out Updated: 01/14/21 2317     RBC, UA 13-20 /HPF      WBC, UA 0-2 /HPF      Bacteria, UA None Seen /HPF      Squamous Epithelial Cells, UA None Seen /HPF      Hyaline Casts, UA 0-2 /LPF      Methodology Automated Microscopy    Respiratory Panel, PCR (WITHOUT COVID) - Lavage, Lung, Right Lower Lobe [[id number]]  (Normal) Collected: 01/14/21 1347    Specimen: Lavage from Lung, Right Lower Lobe Updated: 01/14/21 1542     ADENOVIRUS, PCR Not Detected     Coronavirus 229E Not Detected     Coronavirus HKU1 Not Detected     Coronavirus NL63 Not Detected     Coronavirus OC43 Not Detected     Human Metapneumovirus Not Detected     Human Rhinovirus/Enterovirus Not Detected     Influenza B PCR Not Detected     Parainfluenza Virus 1 Not Detected     Parainfluenza Virus 2 Not Detected     Parainfluenza Virus 3 Not Detected     Parainfluenza Virus 4 Not Detected     Bordetella pertussis pcr Not Detected     Chlamydophila pneumoniae PCR Not Detected     Mycoplasma pneumo by PCR Not Detected     Influenza A PCR Not Detected     RSV, PCR Not Detected     Bordetella parapertussis PCR Not Detected    Narrative:      The coronavirus on the RVP is NOT  COVID-19 and is NOT indicative of infection with COVID-19.     Lactic Acid, Plasma [[id number]]  (Abnormal) Collected: 01/14/21 1500    Specimen: Blood Updated: 01/14/21 1532     Lactate 2.4 mmol/L     Pneumocystis PCR - Lavage, Lung, Right Lower Lobe [[id number]] Collected: 01/14/21 1347    Specimen: Lavage from Lung, Right Lower Lobe Updated: 01/14/21 1444    Fungus Culture - Lavage, Lung, Right Lower Lobe [[id number]] Collected: 01/14/21 1347    Specimen: Lavage from Lung, Right Lower Lobe Updated: 01/14/21 1442    AFB Culture - Lavage, Lung, Right Lower Lobe [[id number]] Collected: 01/14/21 1347    Specimen: Lavage from Lung, Right Lower Lobe Updated: 01/14/21 1442    Cytomegalovirus (CMV) By PCR - Lavage, Lung, Right Lower Lobe [[id number]] Collected: 01/14/21 1347    Specimen: Lavage from Lung, Right Lower Lobe Updated: 01/14/21 1442    Virus Culture - Lavage, Lung, Right Lower Lobe [[id number]] Collected: 01/14/21 1347    Specimen: Lavage from Lung, Right Lower Lobe Updated: 01/14/21 1442    Blood Culture - Blood, Arm, Right [[id number]] Collected: 01/13/21 1310    Specimen: Blood from Arm, Right Updated: 01/14/21 1315     Blood Culture No growth at 24 hours    Blood Culture - Blood, Arm, Right [[id number]] Collected: 01/13/21 1302    Specimen: Blood from Arm, Right Updated: 01/14/21 1315     Blood Culture No growth at 24 hours          Imaging Results (Last 24 Hours)     Procedure Component Value Units Date/Time    XR Chest 1 View [[id number]] Collected: 01/15/21 0752     Updated: 01/15/21 0755    Narrative:      DATE OF EXAM:  1/15/2021 6:06 AM     PROCEDURE:  XR CHEST 1 VW-     INDICATIONS:  Follow-up status post bronchoscopy; R06.03-Acute respiratory distress;  I48.91-Unspecified atrial fibrillation; J44.1-Chronic obstructive  pulmonary disease with (acute) exacerbation; B23-Svnrvzs effusion, not  elsewhere classified; C34.11-Malignant neoplasm of upper lobe, right  bronchus or lung history smoking. Coronary  artery stent. History of lung  cancer. Hypertension. COPD. Cough today. Follow-up bronchoscopy.     COMPARISON:  Single frontal view chest performed on 01/13/2021     TECHNIQUE:   Single radiographic AP view of the chest was obtained.     FINDINGS:  Single frontal view chest reveals that there is a moderate-large  infiltrate and pleural effusion involving the right midlung zone and  right lower lobe along less prominent than on previous study. No acute  or focal arising left lung. Heart and mediastinum are normal. No  evidence of pneumothorax.        Impression:         1. Moderate-large sized infiltrate pleural effusion involving the right  midlung zone right lower lobe lung has improved slightly since previous  study performed on 01/13/2021     Electronically Signed By-Baron Diggs MD On:1/15/2021 7:53 AM  This report was finalized on [id number] by  Baron Diggs MD.    US Aorta Limited [[id number]] Collected: 01/14/21 2214     Updated: 01/15/21 0018    Narrative:      EXAM: Abdominal aortic ultrasound    DATE: January 14, 2021    HISTORY: Follow-up abdominal aortic aneurysm    COMPARISON: May 18, 2019    TECHNIQUE: Grayscale and color flow ultrasonographic imaging was performed of the abdominal aorta    FINDINGS:    There is severe atherosclerotic disease within the abdominal aorta. There is redemonstration of an infrarenal abdominal aortic aneurysm measuring 5.3 x 4.9 cm. The iliac arteries are well characterized secondary to overlying bowel gas. The visualized   grafted iliac arteries are patent.      Impression:      1. Redemonstration of a distal abdominal aortic aneurysm measuring 5.3 x 4.9 cm. This is very similar in size to that described on May 18, 2019.  2. Severe atherosclerotic disease.  3. There are limitations to this study secondary to overlying bowel gas.        Slot 63    Electronically signed by:  Vladislav Rich M.D.    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  He is not on beta-blocker because of low blood pressure.  I will start him on low-dose Coreg when his blood pressure is better  Patient also has history of lung cancer with pleural effusion and is followed by the pulmonologist  Patient's lipid levels are followed by the primary care doctor.  Patient needs anticoagulation for his atrial fibrillation once his work-up is complete    I discussed the patients findings and my recommendations with patient and nurse    Alvarado Cai MD  01/15/21  10:59 EST

## 2021-01-15 NOTE — PROCEDURES
Ultrasound guided Chest Tube Insertion Procedure Note    Indications:  Clinically significant Effusion    Pre-operative Diagnosis: Effusion    Post-operative Diagnosis: Effusion    Procedure Details   Informed consent was obtained for the procedure, including sedation.  Risks of lung perforation, hemorrhage, arrhythmia, and adverse drug reaction were discussed.     After sterile skin prep, using standard technique, a 8   Ultrasound was used to identify the fluid location was marked   Lidocaine was used, the needle was introduced above the rib identify the fluid  Larger needle was introduced and then a guidewire was passed through the needle which was removed dilator was used over the guidewire after removing the dilator pigtail catheter was placed and the guidewire was removed    Malian tube was placed in the right lateral 8 rib space.    Findings:  1800 ml of serosanguinous fluid obtained    Estimated Blood Loss:  Minimal           Specimens:  Sent serosanguinous fluid                Complications:  None; patient tolerated the procedure well.           Disposition: PACU - hemodynamically stable.           Condition: stable    Attending Attestation: I performed the procedure.

## 2021-01-15 NOTE — PLAN OF CARE
Goal Outcome Evaluation:  Plan of Care Reviewed With: patient  Progress: improving  Outcome Summary: Pt is a 81 YO M admitted with acute resp distress. Pt reports living at home with spouse in a senior apartment that is ground level. Pt reports wife has back problems and is not able to assist physically. Pt reports typcailly being independent with all ADLs, ambulates without AD and drives. Pt this date demonstrates decent functional mobilty requiring SBA when performing transfers and ambulation with RWx. Pt attempted gait without RWx and ambulated 60 feet with LOB able to self correct. Pt with signficant improvement with ambulation while using RWx, anticipate RWx usage needed following d/c. Recommendation is HHPT following d/c. PPE worn includes gloves and mask with goggles.

## 2021-01-15 NOTE — THERAPY EVALUATION
Patient Name: Lenny Larson  : 1940    MRN: [id number]                              Today's Date: 1/15/2021       Admit Date: 2021    Visit Dx:     ICD-10-CM ICD-9-CM   1. Chronic obstructive pulmonary disease with acute exacerbation (CMS/HCC)  J44.1 491.21   2. Acute respiratory distress  R06.03 518.82   3. Atrial fibrillation with RVR (CMS/HCC)  I48.91 427.31   4. Pleural effusion  J90 511.9   5. 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 obstruction/lower urinary tract symptoms   • History of lung cancer   • COPD exacerbation (CMS/HCC)   • Malignant neoplasm of upper lobe of right lung (CMS/HCC)     Past Medical History:   Diagnosis Date   • COPD (chronic obstructive pulmonary disease) (CMS/HCC)    • Coronary artery disease    • Hyperlipidemia    • Hypertension      Past Surgical History:   Procedure Laterality Date   • BRONCHOSCOPY N/A 2021    Procedure: BRONCHOSCOPY with bronchio alveolar lavage of right lower lung;  Surgeon: Sanjay Gonsales MD;  Location: [facility];  Service: Pulmonary;  Laterality: N/A;  post op: pneumonia   • CARDIAC CATHETERIZATION      PCI     General Information     Row Name 01/15/21 1236          Physical Therapy Time and Intention    Document Type  evaluation  -EL     Mode of Treatment  individual therapy;physical therapy  -EL     Row Name 01/15/21 1236          General Information    Patient Profile Reviewed  yes  -EL     Prior Level of Function  independent:;ADL's;driving;all household mobility  -EL      Row Name 01/15/21 1236          Living Environment    Lives With  spouse  -EL     Row Name 01/15/21 1236          Home Main Entrance    Number of Stairs, Main Entrance  none  -EL     Row Name 01/15/21 1236          Stairs Within Home, Primary    Number of Stairs, Within Home, Primary  none  -EL     Row Name 01/15/21 1236          Cognition    Orientation Status (Cognition)  oriented x 4  -EL     Row Name 01/15/21 1236          Safety Issues, Functional Mobility    Impairments Affecting Function (Mobility)  balance;endurance/activity tolerance;strength  -EL       User Key  (r) = Recorded By, (t) = Taken By, (c) = Cosigned By    Initials Name Provider Type    Larry Gardner, PT Physical Therapist        Mobility     Row Name 01/15/21 1240          Bed Mobility    Comment (Bed Mobility)  Sitting EOB when PT entered  -EL     Row Name 01/15/21 1240          Bed-Chair Transfer    Bed-Chair [place] (Transfers)  standby assist  -EL     Assistive Device (Bed-Chair Transfers)  walker, front-wheeled  -EL     Row Name 01/15/21 1240          Sit-Stand Transfer    Sit-Stand [place] (Transfers)  standby assist  -EL     Row Name 01/15/21 1240          Gait/Stairs (Locomotion)    [place] Level (Gait)  standby assist  -EL     Assistive Device (Gait)  walker, front-wheeled  -EL     Distance in Feet (Gait)  60 feet with no AD CGA, and SBA with RWx  -EL     Deviations/Abnormal Patterns (Gait)  festinating/shuffling;gait speed decreased  -EL     Bilateral Gait Deviations  forward flexed posture  -EL     Comment (Gait/Stairs)  One LOB when ambulating without AD, able to self recover  -EL       User Key  (r) = Recorded By, (t) = Taken By, (c) = Cosigned By    Initials Name Provider Type    Larry Gardner, PT Physical Therapist        Obj/Interventions     Row Name 01/15/21 1244          Range of Motion Comprehensive    General Range of Motion  bilateral lower extremity ROM WFL  -EL     Row Name 01/15/21 1244           Strength Comprehensive (MMT)    General Manual Muscle Testing (MMT) Assessment  lower extremity strength deficits identified  -EL     Comment, General Manual Muscle Testing (MMT) Assessment  BLE 4/5 gross  -EL     Row Name 01/15/21 1244          Balance    Balance Assessment  sitting static balance;standing static balance;standing dynamic balance  -EL     Static Sitting Balance  WFL  -EL     Static Standing Balance  WFL  -EL     Dynamic Standing Balance  mild impairment;supported  -EL     Row Name 01/15/21 1244          Sensory Assessment (Somatosensory)    Sensory Assessment (Somatosensory)  sensation intact  -EL       User Key  (r) = Recorded By, (t) = Taken By, (c) = Cosigned By    Initials Name Provider Type    Larry Gardner, PT Physical Therapist        Goals/Plan     Row Name 01/15/21 1252          Bed Mobility Goal 1 (PT)    Activity/Assistive Device (Bed Mobility Goal 1, PT)  bed mobility activities, all  -EL     [place] Level/Cues Needed (Bed Mobility Goal 1, PT)  modified independence  -EL     Time Frame (Bed Mobility Goal 1, PT)  long term goal (LTG);2 weeks  -EL     Row Name 01/15/21 1252          Transfer Goal 1 (PT)    Activity/Assistive Device (Transfer Goal 1, PT)  transfers, all;walker, rolling  -EL     [place] Level/Cues Needed (Transfer Goal 1, PT)  modified independence  -EL     Time Frame (Transfer Goal 1, PT)  long term goal (LTG);2 weeks  -EL     Row Name 01/15/21 1252          Gait Training Goal 1 (PT)    Activity/Assistive Device (Gait Training Goal 1, PT)  gait (walking locomotion);walker, rolling  -EL     [place] Level (Gait Training Goal 1, PT)  modified independence  -EL     Distance (Gait Training Goal 1, PT)  100  -EL     Time Frame (Gait Training Goal 1, PT)  long term goal (LTG);2 weeks  -EL       User Key  (r) = Recorded By, (t) = Taken By, (c) = Cosigned By    Initials Name Provider Type    Larry Gardner PT Physical Therapist        Clinical Impression     Row Name  01/15/21 1245          Pain    Additional Documentation  Pain Scale: FACES Pre/Post-Treatment (Group)  -EL     Row Name 01/15/21 1245          Pain Scale: FACES Pre/Post-Treatment    Pain: FACES Scale, Pretreatment  0-->no hurt  -EL     Posttreatment Pain Rating  0-->no hurt  -EL     Row Name 01/15/21 1245          Plan of Care Review    Plan of Care Reviewed With  patient  -EL     Outcome Summary  Pt is a 79 YO M admitted with acute resp distress. Pt reports living at home with spouse in a senior apartment that is ground level. Pt reports wife has back problems and is not able to assist physically. Pt reports typcailly being independent with all ADLs, ambulates without AD and drives. Pt this date demonstrates decent functional mobilty requiring SBA when performing transfers and ambulation with RWx. Pt attempted gait without RWx and ambulated 60 feet with LOB able to self correct. Pt with signficant improvement with ambulation while using RWx, anticipate RWx usage needed following d/c. Recommendation is HHPT following d/c. PPE worn includes gloves and mask with goggles.  -     Row Name 01/15/21 1245          Therapy Assessment/Plan (PT)    Rehab Potential (PT)  good, to achieve stated therapy goals  -EL     Criteria for Skilled Interventions Met (PT)  yes  -EL     Predicted Duration of Therapy Intervention (PT)  Until d/c  -     Row Name 01/15/21 1245          Vital Signs    Pre SpO2 (%)  100  -EL     O2 Delivery Pre Treatment  supplemental O2  -EL     Intra SpO2 (%)  98  -EL     O2 Delivery Intra Treatment  room air  -EL     Post SpO2 (%)  100  -EL     O2 Delivery Post Treatment  supplemental O2  -EL     Pre Patient Position  Sitting  -EL     Intra Patient Position  Standing  -EL     Post Patient Position  Sitting  -EL     Row Name 01/15/21 1245          Positioning and Restraints    Pre-Treatment Position  in bed  -EL     Post Treatment Position  chair  -EL     In Chair  notified nsg;sitting;call light within  reach;encouraged to call for assist  -       User Key  (r) = Recorded By, (t) = Taken By, (c) = Cosigned By    Initials Name Provider Type    Larry Gardner PT Physical Therapist        Outcome Measures     Row Name 01/15/21 1254          How much help from another person do you currently need...    Turning from your back to your side while in flat bed without using bedrails?  3  -EL     Moving from lying on back to sitting on the side of a flat bed without bedrails?  3  -EL     Moving to and from a bed to a chair (including a wheelchair)?  4  -EL     Standing up from a chair using your arms (e.g., wheelchair, bedside chair)?  4  -EL     Climbing 3-5 steps with a railing?  3  -EL     To walk in hospital room?  4  -EL     AM-PAC 6 Clicks Score (PT)  21  -EL     Row Name 01/15/21 1254          Functional Assessment    Outcome Measure Options  AM-PAC 6 Clicks Basic Mobility (PT)  -       User Key  (r) = Recorded By, (t) = Taken By, (c) = Cosigned By    Initials Name Provider Type    Larry Gardner PT Physical Therapist        Physical Therapy Education                 Title: PT OT SLP Therapies (Done)     Topic: Physical Therapy (Done)     Point: Mobility training (Done)     Learning Progress Summary           Patient Acceptance, E,TB, VU by  at 1/15/2021 1254                   Point: Precautions (Done)     Learning Progress Summary           Patient Acceptance, E,TB, VU by  at 1/15/2021 1254                               User Key     Initials Effective Dates Name Provider Type [facility] 06/23/20 -  Larry Queen PT Physical Therapist PT              PT Recommendation and Plan  Planned Therapy Interventions (PT): balance training, neuromuscular re-education, bed mobility training, transfer training, gait training, patient/family education, strengthening  Plan of Care Reviewed With: patient  Outcome Summary: Pt is a 79 YO M admitted with acute resp distress. Pt reports living at home with spouse in a senior  apartment that is ground level. Pt reports wife has back problems and is not able to assist physically. Pt reports typcailly being independent with all ADLs, ambulates without AD and drives. Pt this date demonstrates decent functional mobilty requiring SBA when performing transfers and ambulation with RWx. Pt attempted gait without RWx and ambulated 60 feet with LOB able to self correct. Pt with signficant improvement with ambulation while using RWx, anticipate RWx usage needed following d/c. Recommendation is HHPT following d/c. PPE worn includes gloves and mask with goggles.     Time Calculation:   PT Charges     Row Name 01/15/21 1255             Time Calculation    Start Time  0957  -EL      Stop Time  1029  -EL      Time Calculation (min)  32 min  -EL      PT Received On  01/15/21  -EL      PT - Next Appointment  01/18/21  -EL      PT Goal Re-Cert Due Date  01/29/21  -EL         Time Calculation- PT    Total Timed Code Minutes- PT  8 minute(s)  -EL        User Key  (r) = Recorded By, (t) = Taken By, (c) = Cosigned By    Initials Name Provider Type    Larry Gardner, PT Physical Therapist        Therapy Charges for Today     Code Description Service Date Service Provider Modifiers Qty    65007899926 HC PT EVAL MOD COMPLEXITY 4 1/15/2021 Larry Queen, PT GP 1    84011669181 HC GAIT TRAINING EA 15 MIN 1/15/2021 Larry Queen, PT GP 1          PT G-Codes  Outcome Measure Options: AM-PAC 6 Clicks Basic Mobility (PT)  AM-PAC 6 Clicks Score (PT): 21    Larry Queen PT  1/15/2021

## 2021-01-15 NOTE — PROGRESS NOTES
[facility] Medicine Services Daily Progress Note      Hospitalist Team  LOS 2 days      Patient Care Team:  Camron Pollard MD as PCP - General (Family Medicine)  Alvarado Cai MD as Consulting Physician (Cardiology)    Patient Location: 2102/1      Subjective   Subjective     Chief Complaint / Subjective  Chief Complaint   Patient presents with   • Shortness of Breath     Brief Synopsis of Hospital Course/HPI  The patient is a 80-year-old male who has underlying coronary artery disease with atrial fibrillation, stent placement and an abdominal aortic aneurysm.  The patient also is status post right upper lobectomy for previous lung carcinoma which was also treated with radiation and chemotherapy.  The patient presents with atrial for with rapid ventricular response as well as a new right lower lobe effusion.      Date:  1/15/2021  The patient had a chest tube placed today for his right pleural effusion.  It continues to drain.  He is having pain with deep breathing related to the chest tube.  The patient does report that his breathing is better and that his heart is not racing as much as it was on admission.    Review of Systems   Constitution: Negative.   HENT: Negative.    Eyes: Negative.    Cardiovascular: Negative.         The patient continues to have tachycardia despite being on the amiodarone drip.  Some of this may be driven by the pain that he is experiencing and hopefully this will resolve when he actually starts taking the pain medication as directed.   Respiratory: Negative.         Other than pain with inspiration which is to be expected with the patient's chest tube he has no complaints.   Endocrine: Negative.    Hematologic/Lymphatic: Negative.    Skin: Negative.    Musculoskeletal: Positive for muscle weakness.   Gastrointestinal: Negative.    Genitourinary: Negative.    Neurological: Negative.    Psychiatric/Behavioral: Negative.    Allergic/Immunologic: Negative.  "   All other systems reviewed and are negative.    Objective   Objective      Vital Signs  Temp:  [97.5 °F (36.4 °C)-97.8 °F (36.6 °C)] 97.7 °F (36.5 °C)  Heart Rate:  [] 125  Resp:  [16-20] 20  BP: ()/(43-86) 135/62  Oxygen Therapy  SpO2: 100 %  Pulse Oximetry Type: Continuous  Device (Oxygen Therapy): nasal cannula  Flow (L/min): 2  ETCO2 (mmHg): (!) 0 mmHg  Flowsheet Rows      First Filed Value   Admission Height  177.8 cm (70\") Documented at 01/13/2021 1222   Admission Weight  80.6 kg (177 lb 11.1 oz) Documented at 01/13/2021 1222        Intake & Output (last 3 days)       01/12 0701 - 01/13 0700 01/13 0701 - 01/14 0700 01/14 0701 - 01/15 0700 01/15 0701 - 01/16 0700    P.O.  240 450 840    I.V. (mL/kg)   625 (8.1)     Total Intake(mL/kg)  240 (3.1) 1075 (14) 840 (10.9)    Urine (mL/kg/hr)  1550 1525 (0.8) 600 (0.7)    Chest Tube    1900    Total Output  1550 1525 2500    Net  -1310 -450 -1660            Urine Unmeasured Occurrence   0 x         Lines, Drains & Airways    Active LDAs     Name:   Placement date:   Placement time:   Site:   Days:    Peripheral IV 01/13/21 1250 Right Antecubital   01/13/21    1250    Antecubital   1    Peripheral IV 01/14/21 1312 Anterior;Right Forearm   01/14/21    1312    Forearm   less than 1              Physical Exam:  Physical Exam  Vitals signs and nursing note reviewed.   Constitutional:       General: He is not in acute distress.     Appearance: Normal appearance. He is well-developed. He is not ill-appearing, toxic-appearing or diaphoretic.   HENT:      Head: Normocephalic and atraumatic.      Right Ear: Ear canal and external ear normal.      Left Ear: Ear canal and external ear normal.      Nose: Nose normal. No congestion or rhinorrhea.      Mouth/Throat:      Mouth: Mucous membranes are moist.      Pharynx: No oropharyngeal exudate.   Eyes:      General: No scleral icterus.        Right eye: No discharge.         Left eye: No discharge.      Extraocular " Movements: Extraocular movements intact.      Conjunctiva/sclera: Conjunctivae normal.      Pupils: Pupils are equal, round, and reactive to light.   Neck:      Musculoskeletal: Normal range of motion and neck supple. No neck rigidity or muscular tenderness.      Thyroid: No thyromegaly.      Vascular: No carotid bruit or JVD.      Trachea: No tracheal deviation.   Cardiovascular:      Rate and Rhythm: Tachycardia present. Rhythm irregular.      Pulses: Normal pulses.      Heart sounds: Normal heart sounds. No murmur. No friction rub. No gallop.       Comments: Atrial fib by the monitor with rates between 100 and 120 despite the amiodarone drip.  There is no friction rub or murmur.  Pulmonary:      Effort: Pulmonary effort is normal. No respiratory distress.      Breath sounds: No stridor. No wheezing, rhonchi or rales.      Comments: There is no isolated wheeze.  Decreased breath sounds persist on the right side in the base.  There is left thoracostomy tube in place on the right.  It is continuing to be hooked up to suction and is continuing to drain.  Chest:      Chest wall: No tenderness.   Abdominal:      General: Bowel sounds are normal. There is no distension.      Palpations: Abdomen is soft. There is no mass.      Tenderness: There is no abdominal tenderness. There is no guarding or rebound.      Hernia: No hernia is present.   Musculoskeletal: Normal range of motion.         General: No swelling, tenderness, deformity or signs of injury.      Right lower leg: No edema.      Left lower leg: No edema.   Lymphadenopathy:      Cervical: No cervical adenopathy.   Skin:     General: Skin is warm and dry.      Coloration: Skin is not jaundiced or pale.      Findings: No bruising, erythema or rash.   Neurological:      General: No focal deficit present.      Mental Status: He is alert and oriented to person, place, and time. Mental status is at baseline.      Cranial Nerves: No cranial nerve deficit.      Sensory:  No sensory deficit.      Motor: No weakness or abnormal muscle tone.      Coordination: Coordination normal.   Psychiatric:         Mood and Affect: Mood normal.         Behavior: Behavior normal.         Thought Content: Thought content normal.         Judgment: Judgment normal.       Procedures:  Procedure(s):  BRONCHOSCOPY with bronchio alveolar lavage of right lower lung    Results Review:     I reviewed the patient's new clinical results.    Lab Results (last 24 hours)     Procedure Component Value Units Date/Time    Body Fluid Cell Count With Differential - Body Fluid, Pleural Cavity [[id number]]  (Abnormal) Collected: 01/15/21 1509    Specimen: Body Fluid from Pleural Cavity Updated: 01/15/21 1638    Narrative:      The following orders were created for panel order Body Fluid Cell Count With Differential - Body Fluid, Pleural Cavity.  Procedure                               Abnormality         Status                     ---------                               -----------         ------                     Body fluid cell count - ...[[id number]]  Abnormal            Final result               Body fluid differential ...[[id number]]                      Final result                 Please view results for these tests on the individual orders.    Body fluid differential - Body Fluid, Pleural Cavity [[id number]] Collected: 01/15/21 1509    Specimen: Body Fluid from Pleural Cavity Updated: 01/15/21 1638     Neutrophils, Fluid 71 %      Lymphocytes, Fluid 24 %      Monocytes, Fluid 2 %      Mesothelial Cells, Fluid 3 %     Narrative:      Concentrated Smear by Cytocentrifuge Prep.    Body fluid cell count - Body Fluid, Pleural Cavity [[id number]]  (Abnormal) Collected: 01/15/21 1509    Specimen: Body Fluid from Pleural Cavity Updated: 01/15/21 1624     Color, Fluid Yellow     Appearance, Fluid Cloudy     Nucleated Cells, Fluid 415 /mm3      Method: Automated DXH Analyzer    pH, Body Fluid - Body Fluid, Pleural Cavity  [[id number]]  (Abnormal) Collected: 01/15/21 1509    Specimen: Body Fluid from Pleural Cavity Updated: 01/15/21 1606     pH, Fluid 8.00    Triglycerides, Body Fluid - Pleural Fluid, Pleural Cavity [[id number]] Collected: 01/15/21 1511    Specimen: Pleural Fluid from Pleural Cavity Updated: 01/15/21 1518    Lactate Dehydrogenase, Body Fluid - Body Fluid, Pleural Cavity [[id number]] Collected: 01/15/21 1509    Specimen: Body Fluid from Pleural Cavity Updated: 01/15/21 1517    Protein, Body Fluid - Pleural Fluid, Pleural Cavity [[id number]] Collected: 01/15/21 1511    Specimen: Pleural Fluid from Pleural Cavity Updated: 01/15/21 1517    Glucose, Body Fluid - Pleural Fluid, Pleural Cavity [[id number]] Collected: 01/15/21 1511    Specimen: Pleural Fluid from Pleural Cavity Updated: 01/15/21 1517    Body Fluid Culture - Body Fluid, Pleural Cavity [[id number]] Collected: 01/15/21 1509    Specimen: Body Fluid from Pleural Cavity Updated: 01/15/21 1517    AFB Culture - Body Fluid, Pleural Cavity [[id number]] Collected: 01/15/21 1509    Specimen: Body Fluid from Pleural Cavity Updated: 01/15/21 1517    Flow Cytometry [[id number]] Collected: 01/15/21 1508    Specimen: Body Fluid from Pleural Cavity Updated: 01/15/21 1517    AFB Culture - Lavage, Lung, Right Lower Lobe [[id number]] Collected: 01/14/21 1347    Specimen: Lavage from Lung, Right Lower Lobe Updated: 01/15/21 1324     AFB Stain No acid fast bacilli seen    Blood Culture - Blood, Arm, Right [[id number]] Collected: 01/13/21 1310    Specimen: Blood from Arm, Right Updated: 01/15/21 1315     Blood Culture No growth at 2 days    Blood Culture - Blood, Arm, Right [[id number]] Collected: 01/13/21 1302    Specimen: Blood from Arm, Right Updated: 01/15/21 1315     Blood Culture No growth at 2 days    Non-gynecologic Cytology [[id number]] Collected: 01/14/21 1347    Specimen: Lavage from Lung, Right Lower Lobe Updated: 01/15/21 0926    BAL Culture, Quantitative - Lavage, Lung, Right  Lower Lobe [[id number]] Collected: 01/14/21 1347    Specimen: Lavage from Lung, Right Lower Lobe Updated: 01/15/21 0918     BAL Culture Scant growth (1+) Normal Respiratory Terri: NO S.aureus/MRSA or Pseudomonas aeruginosa     Gram Stain Rare (1+) WBCs per low power field      No organisms seen    Basic Metabolic Panel [[id number]]  (Abnormal) Collected: 01/15/21 0318    Specimen: Blood Updated: 01/15/21 0359     Glucose 152 mg/dL      BUN 32 mg/dL      Creatinine 1.42 mg/dL      Sodium 139 mmol/L      Potassium 3.9 mmol/L      Chloride 100 mmol/L      CO2 30.0 mmol/L      Calcium 9.1 mg/dL      eGFR Non African Amer 48 mL/min/1.73      BUN/Creatinine Ratio 22.5     Anion Gap 9.0 mmol/L     Narrative:      GFR Normal >60  Chronic Kidney Disease <60  Kidney Failure <15      Magnesium [[id number]]  (Normal) Collected: 01/15/21 0318    Specimen: Blood Updated: 01/15/21 0353     Magnesium 2.0 mg/dL     Lactic Acid, Plasma [[id number]]  (Normal) Collected: 01/15/21 0318    Specimen: Blood Updated: 01/15/21 0348     Lactate 1.1 mmol/L     CBC & Differential [[id number]]  (Abnormal) Collected: 01/15/21 0318    Specimen: Blood Updated: 01/15/21 0332    Narrative:      The following orders were created for panel order CBC & Differential.  Procedure                               Abnormality         Status                     ---------                               -----------         ------                     CBC Auto Differential[[id number]]        Abnormal            Final result                 Please view results for these tests on the individual orders.    CBC Auto Differential [[id number]]  (Abnormal) Collected: 01/15/21 0318    Specimen: Blood Updated: 01/15/21 0332     WBC 10.80 10*3/mm3      Comment: Result checked         RBC 4.14 10*6/mm3      Hemoglobin 11.8 g/dL      Hematocrit 35.9 %      MCV 86.7 fL      MCH 28.5 pg      MCHC 32.9 g/dL      RDW 15.7 %      RDW-SD 48.6 fl      MPV 9.0 fL      Platelets 222 10*3/mm3       Neutrophil % 94.3 %      Lymphocyte % 2.0 %      Monocyte % 3.6 %      Eosinophil % 0.0 %      Basophil % 0.1 %      Neutrophils, Absolute 10.10 10*3/mm3      Lymphocytes, Absolute 0.20 10*3/mm3      Monocytes, Absolute 0.40 10*3/mm3      Eosinophils, Absolute 0.00 10*3/mm3      Basophils, Absolute 0.00 10*3/mm3      nRBC 0.0 /100 WBC     Urinalysis With Culture If Indicated - Urine, Catheter In/Out [[id number]]  (Abnormal) Collected: 01/14/21 2306    Specimen: Urine, Catheter In/Out Updated: 01/14/21 2317     Color, UA Yellow     Appearance, UA Clear     pH, UA 5.5     Specific Gravity, UA 1.021     Glucose, UA Negative     Ketones, UA Trace     Bilirubin, UA Negative     Blood, UA Small (1+)     Protein, UA Negative     Leuk Esterase, UA Negative     Nitrite, UA Negative     Urobilinogen, UA 0.2 E.U./dL    Urinalysis, Microscopic Only - Urine, Catheter In/Out [[id number]]  (Abnormal) Collected: 01/14/21 2306    Specimen: Urine, Catheter In/Out Updated: 01/14/21 2317     RBC, UA 13-20 /HPF      WBC, UA 0-2 /HPF      Bacteria, UA None Seen /HPF      Squamous Epithelial Cells, UA None Seen /HPF      Hyaline Casts, UA 0-2 /LPF      Methodology Automated Microscopy        No results found for: HGBA1C  Results from last 7 days   Lab Units 01/13/21  1310   INR  1.04           Lab Results   Component Value Date    LIPASE 19 (L) 05/18/2019     Lab Results   Component Value Date    CHOL 121 07/09/2020    TRIG 75 07/09/2020    HDL 36 (L) 07/09/2020    LDL 70 07/09/2020       No results found for: INTRAOP, PREDX, FINALDX, COMDX    Microbiology Results (last 10 days)     Procedure Component Value - Date/Time    AFB Culture - Lavage, Lung, Right Lower Lobe [[id number]] Collected: 01/14/21 1347    Lab Status: Preliminary result Specimen: Lavage from Lung, Right Lower Lobe Updated: 01/15/21 1324     AFB Stain No acid fast bacilli seen    BAL Culture, Quantitative - Lavage, Lung, Right Lower Lobe [[id number]] Collected: 01/14/21  1347    Lab Status: Preliminary result Specimen: Lavage from Lung, Right Lower Lobe Updated: 01/15/21 0918     BAL Culture Scant growth (1+) Normal Respiratory Terri: NO S.aureus/MRSA or Pseudomonas aeruginosa     Gram Stain Rare (1+) WBCs per low power field      No organisms seen    Respiratory Panel, PCR (WITHOUT COVID) - Lavage, Lung, Right Lower Lobe [[id number]]  (Normal) Collected: 01/14/21 1347    Lab Status: Final result Specimen: Lavage from Lung, Right Lower Lobe Updated: 01/14/21 1542     ADENOVIRUS, PCR Not Detected     Coronavirus 229E Not Detected     Coronavirus HKU1 Not Detected     Coronavirus NL63 Not Detected     Coronavirus OC43 Not Detected     Human Metapneumovirus Not Detected     Human Rhinovirus/Enterovirus Not Detected     Influenza B PCR Not Detected     Parainfluenza Virus 1 Not Detected     Parainfluenza Virus 2 Not Detected     Parainfluenza Virus 3 Not Detected     Parainfluenza Virus 4 Not Detected     Bordetella pertussis pcr Not Detected     Chlamydophila pneumoniae PCR Not Detected     Mycoplasma pneumo by PCR Not Detected     Influenza A PCR Not Detected     RSV, PCR Not Detected     Bordetella parapertussis PCR Not Detected    Narrative:      The coronavirus on the RVP is NOT COVID-19 and is NOT indicative of infection with COVID-19.     Respiratory Panel, PCR (WITHOUT COVID) - Swab, Nasopharynx [[id number]]  (Normal) Collected: 01/13/21 1824    Lab Status: Final result Specimen: Swab from Nasopharynx Updated: 01/13/21 1940     ADENOVIRUS, PCR Not Detected     Coronavirus 229E Not Detected     Coronavirus HKU1 Not Detected     Coronavirus NL63 Not Detected     Coronavirus OC43 Not Detected     Human Metapneumovirus Not Detected     Human Rhinovirus/Enterovirus Not Detected     Influenza B PCR Not Detected     Parainfluenza Virus 1 Not Detected     Parainfluenza Virus 2 Not Detected     Parainfluenza Virus 3 Not Detected     Parainfluenza Virus 4 Not Detected     Bordetella  pertussis pcr Not Detected     Chlamydophila pneumoniae PCR Not Detected     Mycoplasma pneumo by PCR Not Detected     Influenza A PCR Not Detected     RSV, PCR Not Detected     Bordetella parapertussis PCR Not Detected    Narrative:      The coronavirus on the RVP is NOT COVID-19 and is NOT indicative of infection with COVID-19.     Blood Culture - Blood, Arm, Right [[id number]] Collected: 01/13/21 1310    Lab Status: Preliminary result Specimen: Blood from Arm, Right Updated: 01/15/21 1315     Blood Culture No growth at 2 days    COVID-19,[employer] IN-HOUSE,Nasal Swab No Transport Media 3-4 HR TAT - Swab, Nasal Cavity [[id number]]  (Normal) Collected: 01/13/21 1303    Lab Status: Final result Specimen: Swab from Nasal Cavity Updated: 01/13/21 1343     COVID19 Not Detected    Narrative:      Fact sheet for providers: https://www.fda.gov/media/878441/download     Fact sheet for patients: https://www.fda.gov/media/490143/download    Test performed by PCR.    Blood Culture - Blood, Arm, Right [[id number]] Collected: 01/13/21 1302    Lab Status: Preliminary result Specimen: Blood from Arm, Right Updated: 01/15/21 1315     Blood Culture No growth at 2 days          ECG/EMG Results (most recent)     Procedure Component Value Units Date/Time    ECG 12 Lead [[id number]] Collected: 01/14/21 0154     Updated: 01/14/21 0157     QT Interval 354 ms     Narrative:      HEART RATE= 140  bpm  RR Interval= 432  ms  AL Interval= 146  ms  P Horizontal Axis= -51  deg  P Front Axis= 41  deg  QRSD Interval= 102  ms  QT Interval= 354  ms  QRS Axis= 49  deg  T Wave Axis= 256  deg  - ABNORMAL ECG -  Sinus tachycardia  Multiform ventricular premature complexes  Repolarization abnormality, prob rate related  Prolonged QT interval  Electronically Signed By:   Date and Time of Study: 2021-01-14 01:54:00    ECG 12 Lead [[id number]] Collected: 01/13/21 1235     Updated: 01/14/21 0828     QT Interval 350 ms     Narrative:      HEART RATE= 108  bpm  RR  Interval= 556  ms  NV Interval= 159  ms  P Horizontal Axis= 22  deg  P Front Axis= 44  deg  QRSD Interval= 95  ms  QT Interval= 350  ms  QRS Axis= 58  deg  T Wave Axis= 103  deg  - ABNORMAL ECG -  Sinus tachycardia  Multiple ventricular premature complexes  Probable LVH with secondary repol abnrm  Anterior Q waves, possibly due to LVH  When compared with ECG of 18-May-2019 9:03:54,  No significant change  Electronically Signed By: Dakota Rodrigues (IGNACIO) 14-Jan-2021 08:26:08  Date and Time of Study: 2021-01-13 12:35:42    Adult Transthoracic Echo Complete W/ Cont if Necessary Per Protocol [[id number]] Collected: 01/13/21 1727     Updated: 01/14/21 1705     BSA 2.0 m^2      RVIDd 2.1 cm      IVSd 0.9 cm      LVIDd 5.5 cm      LVIDs 5.2 cm      LVPWd 0.91 cm      IVS/LVPW 1.0     FS 5.1 %      EDV(Teich) 146.6 ml      ESV(Teich) 129.8 ml      EF(Teich) 11.5 %      EDV(cubed) 165.2 ml      ESV(cubed) 141.1 ml      EF(cubed) 14.6 %      LV mass(C)d 186.0 grams      LV mass(C)dI 93.9 grams/m^2      SV(Teich) 16.8 ml      SI(Teich) 8.5 ml/m^2      SV(cubed) 24.1 ml      SI(cubed) 12.2 ml/m^2      Ao root diam 3.8 cm      Ao root area 11.5 cm^2      LVOT diam 2.0 cm      LVOT area 3.1 cm^2      EDV(MOD-sp4) 125.6 ml      ESV(MOD-sp4) 87.5 ml      EF(MOD-sp4) 30.3 %      SV(MOD-sp4) 38.0 ml      SI(MOD-sp4) 19.2 ml/m^2      Ao root area (BSA corrected) 1.9     LV Knight Vol (BSA corrected) 63.4 ml/m^2      LV Sys Vol (BSA corrected) 44.2 ml/m^2      Aortic R-R 0.51 sec      Aortic .5 BPM      MV V2 max 90.8 cm/sec      MV max PG 3.3 mmHg      MV V2 mean 56.8 cm/sec      MV mean PG 1.5 mmHg      MV V2 VTI 17.5 cm      MVA(VTI) 2.9 cm^2      Ao pk lamont 267.9 cm/sec      Ao max PG 28.7 mmHg      Ao max PG (full) 25.4 mmHg      Ao V2 mean 186.7 cm/sec      Ao mean PG 15.8 mmHg      Ao mean PG (full) 14.0 mmHg      Ao V2 VTI 51.9 cm      IQRA(I,A) 0.97 cm^2      IQRA(I,D) 0.97 cm^2      IQRA(V,A) 1.0 cm^2      IQRA(V,D) 1.0 cm^2       AI max lamont 389.1 cm/sec      AI max PG 60.5 mmHg      AI dec slope 287.4 cm/sec^2      AI dec time 1.4 sec      AI P1/2t 396.5 msec      LV V1 max PG 3.3 mmHg      LV V1 mean PG 1.8 mmHg      LV V1 max 90.4 cm/sec      LV V1 mean 63.5 cm/sec      LV V1 VTI 16.4 cm      MR max lamont 514.5 cm/sec      MR max .9 mmHg      CO(Ao) 70.8 l/min      CI(Ao) 35.7 l/min/m^2      SV(Ao) 597.3 ml      SI(Ao) 301.4 ml/m^2      CO(LVOT) 6.0 l/min      CI(LVOT) 3.0 l/min/m^2      SV(LVOT) 50.4 ml      SI(LVOT) 25.4 ml/m^2      PA V2 max 68.8 cm/sec      PA max PG 1.9 mmHg      PA max PG (full) -0.6 mmHg      PA V2 mean 47.5 cm/sec      PA mean PG 1.0 mmHg      PA mean PG (full) -0.25 mmHg      PA V2 VTI 14.3 cm      RV V1 max PG 2.5 mmHg      RV V1 mean PG 1.3 mmHg      RV V1 max 78.9 cm/sec      RV V1 mean 52.8 cm/sec      RV V1 VTI 15.3 cm      TR max lamont 291.0 cm/sec      RVSP(TR) 36.9 mmHg      RAP systole 3.0 mmHg       CV ECHO KATT - BZI_BMI 25.4 kilograms/m^2       CV ECHO KATT - BSA(NoxenCOCK) 2.0 m^2       CV ECHO KATT - BZI_METRIC_WEIGHT 80.3 kg       CV ECHO KATT - BZI_METRIC_HEIGHT 177.8 cm      LA dimension(2D) 3.3 cm     Narrative:      · Left ventricular ejection fraction appears to be 21 - 25%.  · Severe mitral valve regurgitation is present.  · Mild dilation of the aortic root is present.  · There is a large (>2cm) pericardial effusion adjacent to the right   atrium.  · The following left ventricular wall segments are hypokinetic: mid   anterior, apical anterior, basal anterolateral, mid anterolateral, apical   lateral, basal inferolateral, mid inferolateral, apical inferior, mid   inferior, apical septal, basal inferoseptal, mid inferoseptal, apex   hypokinetic, mid anteroseptal, basal anterior, basal inferior and basal   inferoseptal.  · No pericardial tamponade noted       ECG 12 Lead [[id number]] Collected: 01/15/21 0549     Updated: 01/15/21 0550     QT Interval 380 ms     Narrative:      HEART RATE=  92  bpm  RR Interval= 651  ms  TX Interval=   ms  P Horizontal Axis=   deg  P Front Axis=   deg  QRSD Interval= 102  ms  QT Interval= 380  ms  QRS Axis= 64  deg  T Wave Axis= 249  deg  - ABNORMAL ECG -  Atrial fibrillation  Probable left ventricular hypertrophy  Nonspecific T abnormalities, inferior leads  Electronically Signed By:   Date and Time of Study: 2021-01-15 05:49:00           Results for orders placed during the hospital encounter of 01/13/21   Adult Transthoracic Echo Complete W/ Cont if Necessary Per Protocol    Narrative · Left ventricular ejection fraction appears to be 21 - 25%.  · Severe mitral valve regurgitation is present.  · Mild dilation of the aortic root is present.  · There is a large (>2cm) pericardial effusion adjacent to the right   atrium.  · The following left ventricular wall segments are hypokinetic: mid   anterior, apical anterior, basal anterolateral, mid anterolateral, apical   lateral, basal inferolateral, mid inferolateral, apical inferior, mid   inferior, apical septal, basal inferoseptal, mid inferoseptal, apex   hypokinetic, mid anteroseptal, basal anterior, basal inferior and basal   inferoseptal.  · No pericardial tamponade noted        Xr Chest 1 View    Result Date: 1/15/2021  Significantly improved aeration in the right hemithorax, that is post pleural drain placement. Mild residual atelectasis remains. No visible pleural fluid or pneumothorax.  Electronically Signed By-Maura Esteves MD On:1/15/2021 3:10 PM This report was finalized on [id number] by  Maura Esteves MD.    Xr Chest 1 View    Result Date: 1/15/2021   1. Moderate-large sized infiltrate pleural effusion involving the right midlung zone right lower lobe lung has improved slightly since previous study performed on 01/13/2021  Electronically Signed By-Baron Diggs MD On:1/15/2021 7:53 AM This report was finalized on [id number] by  Baron Diggs MD.    Xr Chest 1 View    Result Date:  1/13/2021   1. Interval development of a moderate-sized right pleural effusion. 2. Worsening consolidation in the right lung particularly in the mid and lower lung zone. This is likely due to a combination of compressive atelectasis with superimposed pneumonia. 3. Masslike area of consolidation has been present the right hilar region on previous imaging studies consistent with treated lung cancer.  Electronically Signed By-eKl Fair MD On:1/13/2021 2:19 PM This report was finalized on [id number] by  Kel Fair MD.    Us Aorta Limited    Result Date: 1/14/2021  1. Redemonstration of a distal abdominal aortic aneurysm measuring 5.3 x 4.9 cm. This is very similar in size to that described on May 18, 2019. 2. Severe atherosclerotic disease. 3. There are limitations to this study secondary to overlying bowel gas. Slot [id number] Electronically signed by:  Vladislav Rich M.D.  1/14/2021 10:17 PM  Xrays, labs reviewed personally by physician.    Medication Review:   I have reviewed the patient's current medication list    Scheduled Meds  budesonide, 0.5 mg, Nebulization, BID - RT  cefepime, 2 g, Intravenous, Q12H  digoxin, 125 mcg, Oral, Daily  dilTIAZem CD, 120 mg, Oral, Q24H  enoxaparin, 1 mg/kg, Subcutaneous, Q12H  finasteride, 5 mg, Oral, Daily  guaiFENesin, 1,200 mg, Oral, Q12H  ipratropium-albuterol, 3 mL, Nebulization, Q4H - RT  methylPREDNISolone sodium succinate, 40 mg, Intravenous, Q8H  multivitamin with minerals, 1 tablet, Oral, Daily  rosuvastatin, 40 mg, Oral, Nightly  sodium chloride, 10 mL, Intravenous, Q12H  tamsulosin, 0.4 mg, Oral, Daily With Dinner  theophylline, 300 mg, Oral, Daily        Meds Infusions  amiodarone, 1 mg/min, Last Rate: 1 mg/min (01/15/21 0240)  sodium chloride, 75 mL/hr, Last Rate: Stopped (01/15/21 0900)        Meds PRN  •  acetaminophen **OR** acetaminophen **OR** acetaminophen  •  benzonatate  •  ipratropium-albuterol  •  magnesium hydroxide  •  magnesium sulfate **OR** magnesium  sulfate in D5W 1g/100mL (PREMIX)  •  nitroglycerin  •  ondansetron **OR** ondansetron  •  potassium chloride  •  [COMPLETED] Insert peripheral IV **AND** sodium chloride  •  sodium chloride        Assessment/Plan   Assessment/Plan     Active Hospital Problems    Diagnosis  POA   • **Acute respiratory distress [R06.03]  Yes   • Chest pain [R07.9]  Yes   • Atrial fibrillation with RVR (CMS/HCC) [I48.91]  Yes   • BPH without obstruction/lower urinary tract symptoms [N40.0]  Yes   • History of lung cancer [Z85.118]  Not Applicable   • COPD exacerbation (CMS/HCC) [J44.1]  Yes   • Malignant 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       MEDICAL DECISION MAKING COMPLEXITY BY PROBLEM:   1.  Acute respiratory distress  -This is likely related to the patient's large right-sided pleural effusion  -Pulmonary consult was obtained and the patient underwent bronchoscopy today  -Patient had copious amounts of secretions suctioned out of his airway.  -Continue empiric antibiotics  -Await culture results and cytology    2.  Atrial fibrillation with RVR  -EKG shows atrial fibrillation with RVR  -Patient remains on an amiodarone drip  -Rate remains elevated greater than 120  -Consult cardiology pending       -Given the complexity of the patient's situation and need for additional procedures will leave decision about anticoagulation to cardiology.       -Cardiology has decided on full dose Lovenox and this has been started.    3.  Acute chest pain  -Serial troponins negative  -Resolution of chest pain  -Current chest pain seems to be associated with a right thoracostomy tube.    4.  Coronary artery disease  -Patient has had several stents placed in the past  -Cardiology  consulted    5.  History of lung cancer  -Status post right upper lobe resection       -This was followed by radiation as well as chemotherapy  -If pleural effusion does not improve after therapeutic bronchoscopy consider thoracentesis sending the pleural fluid for cytology       -Patient has a thoracostomy tube in place that is draining serosanguineous fluid.  It is hooked up to suction.    6.  Abdominal aortic aneurysm  -Patient was having this followed but has missed his appointment last year and requests having the study done while he is here.  Given that he may need anticoagulation will need to know the status of this as well.  -The patient had an abdominal ultrasound and his aneurysm is 5.3 x 4.9 cm which is very similar in size to see what was seen in May 2019.  Therefore we will have the patient follow-up as an outpatient with his vascular surgeon.    7.  Bilateral prostatic hypertrophy  -Continue Flomax and Proscar    VTE Prophylaxis -   Mechanical Order History:      Ordered        01/13/21 1708  Place Sequential Compression Device  Once         01/13/21 1708  Maintain Sequential Compression Device  Continuous                 Pharmalogical Order History:      Ordered     Dose Route Frequency Stop    01/13/21 1502  enoxaparin (LOVENOX) syringe 80 mg      1 mg/kg SC Once 01/13/21 1520              Code Status -   Code Status and Medical Interventions:   Ordered at: 01/13/21 1610     Limited Support to NOT Include:    Intubation     Level Of Support Discussed With:    Patient     Code Status:    CPR     Medical Interventions (Level of Support Prior to Arrest):    Limited     This patient has been examined wearing appropriate Personal Protective Equipment and discussed with hospital infection control department. 01/15/21    Discharge Planning  To be determined    Electronically signed by Diane Tamez MD, 01/15/21, 17:50 EST.  [facility] Hospitalist Team

## 2021-01-16 ENCOUNTER — APPOINTMENT (OUTPATIENT)
Dept: GENERAL RADIOLOGY | Facility: HOSPITAL | Age: 81
End: 2021-01-16

## 2021-01-16 LAB
ANION GAP SERPL CALCULATED.3IONS-SCNC: 9 MMOL/L (ref 5–15)
BACTERIA SPEC AEROBE CULT: NORMAL
BASOPHILS # BLD AUTO: 0 10*3/MM3 (ref 0–0.2)
BASOPHILS NFR BLD AUTO: 0.2 % (ref 0–1.5)
BUN SERPL-MCNC: 38 MG/DL (ref 8–23)
BUN/CREAT SERPL: 29.2 (ref 7–25)
CALCIUM SPEC-SCNC: 8.9 MG/DL (ref 8.6–10.5)
CHLORIDE SERPL-SCNC: 101 MMOL/L (ref 98–107)
CO2 SERPL-SCNC: 28 MMOL/L (ref 22–29)
CREAT SERPL-MCNC: 1.3 MG/DL (ref 0.76–1.27)
DEPRECATED RDW RBC AUTO: 49 FL (ref 37–54)
EOSINOPHIL # BLD AUTO: 0 10*3/MM3 (ref 0–0.4)
EOSINOPHIL NFR BLD AUTO: 0 % (ref 0.3–6.2)
ERYTHROCYTE [DISTWIDTH] IN BLOOD BY AUTOMATED COUNT: 15.9 % (ref 12.3–15.4)
GFR SERPL CREATININE-BSD FRML MDRD: 53 ML/MIN/1.73
GLUCOSE SERPL-MCNC: 187 MG/DL (ref 65–99)
GRAM STN SPEC: NORMAL
GRAM STN SPEC: NORMAL
HCT VFR BLD AUTO: 36.3 % (ref 37.5–51)
HGB BLD-MCNC: 11.8 G/DL (ref 13–17.7)
LYMPHOCYTES # BLD AUTO: 0.2 10*3/MM3 (ref 0.7–3.1)
LYMPHOCYTES NFR BLD AUTO: 1.3 % (ref 19.6–45.3)
MAGNESIUM SERPL-MCNC: 2.2 MG/DL (ref 1.6–2.4)
MCH RBC QN AUTO: 28.6 PG (ref 26.6–33)
MCHC RBC AUTO-ENTMCNC: 32.5 G/DL (ref 31.5–35.7)
MCV RBC AUTO: 87.9 FL (ref 79–97)
MONOCYTES # BLD AUTO: 0.6 10*3/MM3 (ref 0.1–0.9)
MONOCYTES NFR BLD AUTO: 4.7 % (ref 5–12)
NEUTROPHILS NFR BLD AUTO: 11.8 10*3/MM3 (ref 1.7–7)
NEUTROPHILS NFR BLD AUTO: 93.8 % (ref 42.7–76)
NRBC BLD AUTO-RTO: 0 /100 WBC (ref 0–0.2)
PLATELET # BLD AUTO: 210 10*3/MM3 (ref 140–450)
PMV BLD AUTO: 9.2 FL (ref 6–12)
POTASSIUM SERPL-SCNC: 4.1 MMOL/L (ref 3.5–5.2)
RBC # BLD AUTO: 4.13 10*6/MM3 (ref 4.14–5.8)
SODIUM SERPL-SCNC: 138 MMOL/L (ref 136–145)
WBC # BLD AUTO: 12.5 10*3/MM3 (ref 3.4–10.8)

## 2021-01-16 PROCEDURE — 94799 UNLISTED PULMONARY SVC/PX: CPT

## 2021-01-16 PROCEDURE — 99232 SBSQ HOSP IP/OBS MODERATE 35: CPT | Performed by: INTERNAL MEDICINE

## 2021-01-16 PROCEDURE — 25010000002 AMIODARONE PER 30 MG: Performed by: INTERNAL MEDICINE

## 2021-01-16 PROCEDURE — 71045 X-RAY EXAM CHEST 1 VIEW: CPT

## 2021-01-16 PROCEDURE — 85025 COMPLETE CBC W/AUTO DIFF WBC: CPT | Performed by: INTERNAL MEDICINE

## 2021-01-16 PROCEDURE — 93005 ELECTROCARDIOGRAM TRACING: CPT | Performed by: INTERNAL MEDICINE

## 2021-01-16 PROCEDURE — 25010000002 ENOXAPARIN PER 10 MG: Performed by: INTERNAL MEDICINE

## 2021-01-16 PROCEDURE — 25010000003 HYALURONIDASE OVINE 200 UNIT/ML SOLUTION: Performed by: NURSE PRACTITIONER

## 2021-01-16 PROCEDURE — 25010000002 METHYLPREDNISOLONE PER 40 MG: Performed by: INTERNAL MEDICINE

## 2021-01-16 PROCEDURE — 83735 ASSAY OF MAGNESIUM: CPT | Performed by: INTERNAL MEDICINE

## 2021-01-16 PROCEDURE — 80048 BASIC METABOLIC PNL TOTAL CA: CPT | Performed by: INTERNAL MEDICINE

## 2021-01-16 PROCEDURE — 93010 ELECTROCARDIOGRAM REPORT: CPT | Performed by: INTERNAL MEDICINE

## 2021-01-16 PROCEDURE — 25010000002 CEFEPIME PER 500 MG: Performed by: INTERNAL MEDICINE

## 2021-01-16 RX ADMIN — ENOXAPARIN SODIUM 80 MG: 80 INJECTION SUBCUTANEOUS at 04:02

## 2021-01-16 RX ADMIN — ROSUVASTATIN CALCIUM 40 MG: 10 TABLET, FILM COATED ORAL at 20:53

## 2021-01-16 RX ADMIN — CEFEPIME HYDROCHLORIDE 2 G: 2 INJECTION, POWDER, FOR SOLUTION INTRAVENOUS at 16:52

## 2021-01-16 RX ADMIN — Medication 10 ML: at 19:14

## 2021-01-16 RX ADMIN — CEFEPIME HYDROCHLORIDE 2 G: 2 INJECTION, POWDER, FOR SOLUTION INTRAVENOUS at 05:54

## 2021-01-16 RX ADMIN — Medication 10 ML: at 20:56

## 2021-01-16 RX ADMIN — IPRATROPIUM BROMIDE AND ALBUTEROL SULFATE 3 ML: 2.5; .5 SOLUTION RESPIRATORY (INHALATION) at 10:33

## 2021-01-16 RX ADMIN — THEOPHYLLINE 300 MG: 300 TABLET, EXTENDED RELEASE ORAL at 09:10

## 2021-01-16 RX ADMIN — METOPROLOL TARTRATE 25 MG: 25 TABLET, FILM COATED ORAL at 20:54

## 2021-01-16 RX ADMIN — IPRATROPIUM BROMIDE AND ALBUTEROL SULFATE 3 ML: 2.5; .5 SOLUTION RESPIRATORY (INHALATION) at 19:06

## 2021-01-16 RX ADMIN — ENOXAPARIN SODIUM 80 MG: 80 INJECTION SUBCUTANEOUS at 16:49

## 2021-01-16 RX ADMIN — Medication 10 ML: at 05:59

## 2021-01-16 RX ADMIN — MULTIPLE VITAMINS W/ MINERALS TAB 1 TABLET: TAB at 09:10

## 2021-01-16 RX ADMIN — TAMSULOSIN HYDROCHLORIDE 0.4 MG: 0.4 CAPSULE ORAL at 16:50

## 2021-01-16 RX ADMIN — Medication 10 ML: at 20:52

## 2021-01-16 RX ADMIN — GUAIFENESIN 1200 MG: 600 TABLET, EXTENDED RELEASE ORAL at 05:59

## 2021-01-16 RX ADMIN — METOPROLOL TARTRATE 25 MG: 25 TABLET, FILM COATED ORAL at 12:12

## 2021-01-16 RX ADMIN — IPRATROPIUM BROMIDE AND ALBUTEROL SULFATE 3 ML: 2.5; .5 SOLUTION RESPIRATORY (INHALATION) at 23:12

## 2021-01-16 RX ADMIN — HYDROCODONE BITARTRATE AND ACETAMINOPHEN 1 TABLET: 10; 325 TABLET ORAL at 12:12

## 2021-01-16 RX ADMIN — DILTIAZEM HYDROCHLORIDE 120 MG: 120 CAPSULE, COATED, EXTENDED RELEASE ORAL at 09:10

## 2021-01-16 RX ADMIN — IPRATROPIUM BROMIDE AND ALBUTEROL SULFATE 3 ML: 2.5; .5 SOLUTION RESPIRATORY (INHALATION) at 07:13

## 2021-01-16 RX ADMIN — AMIODARONE HYDROCHLORIDE 1 MG/MIN: 50 INJECTION, SOLUTION INTRAVENOUS at 12:29

## 2021-01-16 RX ADMIN — DIGOXIN 125 MCG: 125 TABLET ORAL at 09:10

## 2021-01-16 RX ADMIN — HYALURONIDASE, OVINE 200 UNITS: 200 INJECTION, SOLUTION SUBCUTANEOUS at 04:52

## 2021-01-16 RX ADMIN — BUDESONIDE 0.5 MG: 0.5 INHALANT RESPIRATORY (INHALATION) at 19:06

## 2021-01-16 RX ADMIN — Medication 10 ML: at 05:53

## 2021-01-16 RX ADMIN — METHYLPREDNISOLONE SODIUM SUCCINATE 40 MG: 40 INJECTION, POWDER, FOR SOLUTION INTRAMUSCULAR; INTRAVENOUS at 16:49

## 2021-01-16 RX ADMIN — FINASTERIDE 5 MG: 5 TABLET, FILM COATED ORAL at 09:10

## 2021-01-16 RX ADMIN — METHYLPREDNISOLONE SODIUM SUCCINATE 40 MG: 40 INJECTION, POWDER, FOR SOLUTION INTRAMUSCULAR; INTRAVENOUS at 05:58

## 2021-01-16 RX ADMIN — GUAIFENESIN 1200 MG: 600 TABLET, EXTENDED RELEASE ORAL at 16:50

## 2021-01-16 RX ADMIN — IPRATROPIUM BROMIDE AND ALBUTEROL SULFATE 3 ML: 2.5; .5 SOLUTION RESPIRATORY (INHALATION) at 02:50

## 2021-01-16 RX ADMIN — AMIODARONE HYDROCHLORIDE 1 MG/MIN: 50 INJECTION, SOLUTION INTRAVENOUS at 04:05

## 2021-01-16 RX ADMIN — BUDESONIDE 0.5 MG: 0.5 INHALANT RESPIRATORY (INHALATION) at 07:18

## 2021-01-16 RX ADMIN — METHYLPREDNISOLONE SODIUM SUCCINATE 40 MG: 40 INJECTION, POWDER, FOR SOLUTION INTRAMUSCULAR; INTRAVENOUS at 21:00

## 2021-01-16 RX ADMIN — AMIODARONE HYDROCHLORIDE 1 MG/MIN: 50 INJECTION, SOLUTION INTRAVENOUS at 19:14

## 2021-01-16 RX ADMIN — IPRATROPIUM BROMIDE AND ALBUTEROL SULFATE 3 ML: 2.5; .5 SOLUTION RESPIRATORY (INHALATION) at 14:42

## 2021-01-16 NOTE — NURSING NOTE
Spoke with Ms. Giselle Garcia re: patient IV site (R) upper arm where amiodarone IV was running. Noted to be infiltrated. Per Giselle she will order medication per protocol.

## 2021-01-16 NOTE — PLAN OF CARE
Goal Outcome Evaluation:  Plan of Care Reviewed With: patient  Progress: improving    Pt still on Afib on the monitor with intermittent RVR. On Amiodarone drip. Patient IV site where the amiodarone IV was running got infiltrated. IV antidote given. will continue to monitor.

## 2021-01-16 NOTE — NURSING NOTE
Received patient sitting on bed, No voiced complaint. Not in acute respiratory distress. Call light in reach.

## 2021-01-16 NOTE — PLAN OF CARE
Patient continues on amio gtt. PO Digoxin and Cardizem given this shift to bring heart rate down. Patient heart rate has gotten up to the 150's,  Patient hasn't had a BM since he's been here, so prune juice and milk of mag given today.   Patient has had abdominal discomfort and trouble urinating and bladder scan showed 373, so straight cath performed and 250 out.   No distress at this time, will continue to monitor.

## 2021-01-16 NOTE — PROGRESS NOTES
Referring Provider: Hospitalist    Reason for follow-up: Shortness of breath     Patient Care Team:  Camron Pollard MD as PCP - General (Family Medicine)  Alvarado Cai MD as Consulting Physician (Cardiology)    Subjective .  Still has shortness of breath    Objective  Lying in bed comfortable     Review of Systems   Constitution: Negative for chills and fever.   HENT: Negative for ear discharge and nosebleeds.    Eyes: Negative for discharge and redness.   Cardiovascular: Positive for palpitations. Negative for chest pain, orthopnea, paroxysmal nocturnal dyspnea and syncope.   Respiratory: Positive for shortness of breath. Negative for cough and wheezing.    Endocrine: Negative for heat intolerance.   Skin: Negative for rash.   Musculoskeletal: Negative for arthritis and myalgias.   Gastrointestinal: Negative for abdominal pain, melena, nausea and vomiting.   Genitourinary: Negative for dysuria and hematuria.   Neurological: Negative for dizziness, light-headedness, numbness and tremors.   Psychiatric/Behavioral: Negative for depression. The patient is not nervous/anxious.        Ativan [lorazepam]    Scheduled Meds:budesonide, 0.5 mg, Nebulization, BID - RT  cefepime, 2 g, Intravenous, Q12H  digoxin, 125 mcg, Oral, Daily  dilTIAZem CD, 120 mg, Oral, Q24H  enoxaparin, 1 mg/kg, Subcutaneous, Q12H  finasteride, 5 mg, Oral, Daily  guaiFENesin, 1,200 mg, Oral, Q12H  ipratropium-albuterol, 3 mL, Nebulization, Q4H - RT  methylPREDNISolone sodium succinate, 40 mg, Intravenous, Q8H  metoprolol tartrate, 25 mg, Oral, Q12H  multivitamin with minerals, 1 tablet, Oral, Daily  rosuvastatin, 40 mg, Oral, Nightly  sodium chloride, 10 mL, Intravenous, Q12H  tamsulosin, 0.4 mg, Oral, Daily With Dinner  theophylline, 300 mg, Oral, Daily      Continuous Infusions:amiodarone, 1 mg/min, Last Rate: 1 mg/min (01/16/21 1229)  sodium chloride, 75 mL/hr, Last Rate: Stopped (01/15/21 0900)      PRN Meds:.•  acetaminophen **OR**  "acetaminophen **OR** acetaminophen  •  benzonatate  •  HYDROcodone-acetaminophen  •  HYDROcodone-acetaminophen  •  ipratropium-albuterol  •  magnesium hydroxide  •  magnesium sulfate **OR** magnesium sulfate in D5W 1g/100mL (PREMIX)  •  nitroglycerin  •  ondansetron **OR** ondansetron  •  potassium chloride  •  [COMPLETED] Insert peripheral IV **AND** sodium chloride  •  sodium chloride        VITAL SIGNS  Vitals:    01/16/21 1422 01/16/21 1442 01/16/21 1445 01/16/21 1722   BP: 119/66   128/53   BP Location:       Patient Position:       Pulse: 95 95 92 (!) 128   Resp: 18 20 20 20   Temp: 97.8 °F (36.6 °C)   97.5 °F (36.4 °C)   TempSrc:       SpO2: 95% 99% 96% 99%   Weight:       Height:           Flowsheet Rows      First Filed Value   Admission Height  177.8 cm (70\") Documented at 01/13/2021 1222   Admission Weight  80.6 kg (177 lb 11.1 oz) Documented at 01/13/2021 1222           TELEMETRY: Atrial fibrillation with rapid response    Physical Exam:  Constitutional:       Appearance: Well-developed.   Eyes:      General: No scleral icterus.        Right eye: No discharge.   HENT:      Head: Normocephalic and atraumatic.   Neck:      Thyroid: No thyromegaly.      Lymphadenopathy: No cervical adenopathy.   Pulmonary:      Effort: Pulmonary effort is normal. No respiratory distress.      Breath sounds: No wheezing. Rhonchi present. No rales.   Cardiovascular:      Normal rate. Irregularly irregular rhythm.      No gallop.   Abdominal:      Tenderness: There is no abdominal tenderness.   Skin:     Findings: No erythema or rash.   Neurological:      Mental Status: Alert and oriented to person, place, and time.          Results Review:   I reviewed the patient's new clinical results.  Lab Results (last 24 hours)     Procedure Component Value Units Date/Time    BAL Culture, Quantitative - Lavage, Lung, Right Lower Lobe [[id number]] Collected: 01/14/21 1347    Specimen: Lavage from Lung, Right Lower Lobe Updated: 01/16/21 " 1322     BAL Culture Scant growth (1+) Normal Respiratory Terri: NO S.aureus/MRSA or Pseudomonas aeruginosa     Gram Stain Rare (1+) WBCs per low power field      No organisms seen    Narrative:      Not streaked for quantitation    Blood Culture - Blood, Arm, Right [[id number]] Collected: 01/13/21 1310    Specimen: Blood from Arm, Right Updated: 01/16/21 1315     Blood Culture No growth at 3 days    Blood Culture - Blood, Arm, Right [[id number]] Collected: 01/13/21 1302    Specimen: Blood from Arm, Right Updated: 01/16/21 1315     Blood Culture No growth at 3 days    AFB Culture - Body Fluid, Pleural Cavity [[id number]] Collected: 01/15/21 1509    Specimen: Body Fluid from Pleural Cavity Updated: 01/16/21 0954     AFB Stain No acid fast bacilli seen    Body Fluid Culture - Body Fluid, Pleural Cavity [[id number]] Collected: 01/15/21 1509    Specimen: Body Fluid from Pleural Cavity Updated: 01/16/21 0630     Body Fluid Culture No growth at less than 24 hours     Gram Stain No organisms seen    Magnesium [[id number]]  (Normal) Collected: 01/16/21 0307    Specimen: Blood Updated: 01/16/21 0336     Magnesium 2.2 mg/dL     Basic Metabolic Panel [[id number]]  (Abnormal) Collected: 01/16/21 0307    Specimen: Blood Updated: 01/16/21 0336     Glucose 187 mg/dL      BUN 38 mg/dL      Creatinine 1.30 mg/dL      Sodium 138 mmol/L      Potassium 4.1 mmol/L      Comment: Slight hemolysis detected by analyzer. Results may be affected.        Chloride 101 mmol/L      CO2 28.0 mmol/L      Calcium 8.9 mg/dL      eGFR Non African Amer 53 mL/min/1.73      BUN/Creatinine Ratio 29.2     Anion Gap 9.0 mmol/L     Narrative:      GFR Normal >60  Chronic Kidney Disease <60  Kidney Failure <15      CBC & Differential [[id number]]  (Abnormal) Collected: 01/16/21 0307    Specimen: Blood Updated: 01/16/21 0316    Narrative:      The following orders were created for panel order CBC & Differential.  Procedure                               Abnormality          Status                     ---------                               -----------         ------                     CBC Auto Differential[[id number]]        Abnormal            Final result                 Please view results for these tests on the individual orders.    CBC Auto Differential [[id number]]  (Abnormal) Collected: 01/16/21 0307    Specimen: Blood Updated: 01/16/21 0316     WBC 12.50 10*3/mm3      RBC 4.13 10*6/mm3      Hemoglobin 11.8 g/dL      Hematocrit 36.3 %      MCV 87.9 fL      MCH 28.6 pg      MCHC 32.5 g/dL      RDW 15.9 %      RDW-SD 49.0 fl      MPV 9.2 fL      Platelets 210 10*3/mm3      Neutrophil % 93.8 %      Lymphocyte % 1.3 %      Monocyte % 4.7 %      Eosinophil % 0.0 %      Basophil % 0.2 %      Neutrophils, Absolute 11.80 10*3/mm3      Lymphocytes, Absolute 0.20 10*3/mm3      Monocytes, Absolute 0.60 10*3/mm3      Eosinophils, Absolute 0.00 10*3/mm3      Basophils, Absolute 0.00 10*3/mm3      nRBC 0.0 /100 WBC     Protein, Body Fluid - Pleural Fluid, Pleural Cavity [[id number]] Collected: 01/15/21 1511    Specimen: Pleural Fluid from Pleural Cavity Updated: 01/15/21 2000     Protein, Total, Fluid 2.8 g/dL     Narrative:      No Reference Ranges Established.    A serous fluid total fluid (TP) greater than 50 percent of the serum TP suggests the fluid is an exudate.      1. Pleural TP/Serum TP >0.5  2. Pleural LD/Serum LD >0.6  3. Pleural LD >2/3 of the upper limit of normal for serum LDH    This test was developed, it performance characteristics determined and judged suitable for clinical purposes by [facility].  It has not been cleared or approved by the FDA.  The laboratory is regulated under CLIA as qualified to perform high-complexity testing.     Glucose, Body Fluid - Pleural Fluid, Pleural Cavity [[id number]] Collected: 01/15/21 1511    Specimen: Pleural Fluid from Pleural Cavity Updated: 01/15/21 2000     Glucose, Fluid 179 mg/dL     Narrative:      No  Reference Ranges Established.    Serous fluid glucose less than 60 mg/dL or less than 30 mg/dL below serum glucose suggests an infectious or malignant exudate.     This test was developed, it performance characteristics determined and judged suitable for clinical purposes by [facility].  It has not been cleared or approved by the FDA.  The laboratory is regulated under CLIA as qualified to perform high-complexity testing.     Lactate Dehydrogenase, Body Fluid - Body Fluid, Pleural Cavity [[id number]] Collected: 01/15/21 1509    Specimen: Body Fluid from Pleural Cavity Updated: 01/15/21 2000     Lactate Dehydrogenase (LD), Fluid 99 U/L     Narrative:      No Reference Ranges Established.    Serous fluid LDH greater than 60 percent of the serum LDH or serous fluid LDH two-thirds of the upper limit of normal for serum LDH suggests the fluid is an exudate.     1. Pleural TP/Serum TP >0.5  2. Pleural LD/Serum LD >0.6  3. Pleural LD >2/3 of the upper limit of normal for serum LDH    This test was developed, it performance characteristics determined and judged suitable for clinical purposes by [facility].  It has not been cleared or approved by the FDA.  The laboratory is regulated under CLIA as qualified to perform high-complexity testing.     Basic Metabolic Panel [[id number]]  (Abnormal) Collected: 01/15/21 1926    Specimen: Blood Updated: 01/15/21 1955     Glucose 118 mg/dL      BUN 43 mg/dL      Creatinine 1.49 mg/dL      Sodium 139 mmol/L      Potassium 4.2 mmol/L      Chloride 100 mmol/L      CO2 29.0 mmol/L      Calcium 9.3 mg/dL      eGFR Non African Amer 46 mL/min/1.73      BUN/Creatinine Ratio 28.9     Anion Gap 10.0 mmol/L     Narrative:      GFR Normal >60  Chronic Kidney Disease <60  Kidney Failure <15            Imaging Results (Last 24 Hours)     Procedure Component Value Units Date/Time    XR Chest 1 View [[id number]] Collected: 01/16/21 1054     Updated:  01/16/21 1101    Narrative:      XR CHEST 1 VW-     Date of Exam: 1/16/2021 4:14 AM     Indication: chest catheter; J44.1-Chronic obstructive pulmonary disease  with (acute) exacerbation; R06.03-Acute respiratory distress;  I48.91-Unspecified atrial fibrillation; W65-Ayjfsig effusion, not  elsewhere classified; C34.11-Malignant neoplasm of upper lobe, right  bronchus or lung     Comparison: 1/15/2021 x 2, 01/13/2021, 08/23/2018.     Technique: 1 view(s) of the chest were obtained.     FINDINGS: Small-caliber chest tube is present in the right lung  base. There is improved aeration. No pleural effusion or pneumothorax is  demonstrated. There is hilar retraction and ulna with postsurgical  changes similar to prior studies this patient with a history of treated  lung cancer. No acute infiltrates. Heart size and mediastinal  silhouettes are unremarkable. Pulmonary vascularity is normal. The  trachea is midline. Bony thorax is intact.       Impression:         1. Improved aeration with small caliber chest tube in the right lung  base. A pneumothorax or pleural effusion is not demonstrated.  2. Persistent postsurgical and treatment related changes in the right  superhilar region, this is likely treatment related changes, is similar  radiographically when compared with the study from 08/23/2018.     Electronically Signed By-Donny Abdi DO On:1/16/2021 10:59 AM  This report was finalized on [id number] by  Donny Abdi DO.          EKG      I personally viewed and interpreted the patient's EKG/Telemetry data:    ECHOCARDIOGRAM:    STRESS MYOVIEW:    CARDIAC CATHETERIZATION:    OTHER:         Assessment/Plan     Principal Problem:    Acute respiratory distress  Active Problems:    Chronic obstructive pulmonary disease (CMS/HCC)    Coronary artery disease    Hyperlipidemia    Hypertension    Chest pain    Atrial fibrillation with RVR (CMS/HCC)    BPH without obstruction/lower urinary tract symptoms    History of lung  cancer    COPD exacerbation (CMS/HCC)    Malignant neoplasm of upper lobe of right lung (CMS/HCC)       Assessment:    Afib RVR  HFrEF  Pericardial Effusion  Hx Lung CA    Plan:    Patient presented with shortness of breath and had elevated lactate level and BNP level  Patient had an echocardiogram which showed LV systolic dysfunction with an EF of 20 to 25%  Patient also has pericardial effusion  Patient is currently on digoxin and diuretics.  He is not on beta-blocker because of low blood pressure.       Patient also has history of lung cancer with pleural effusion and is followed by the pulmonologist     Currently anticoagulated with Lovenox 80 mg q 12 hr.   Start low dose BB for rate control    Additional recommendattions per Dr. Rahman    Patient is seen and examined and findings are verified.  Patient has mild shortness of breath but overall stable.    Patient is still in atrial fibrillation and heart rate is relatively fast.  Blood pressure is stable    Normal S1 and S2.  Heart rate is irregular.  Occasional expiratory rhonchi.  No leg edema noted.    I would recommend to start Lopressor 25 mg twice daily.  Patient is on amiodarone.  If needed I would consider intravenous Cardizem.  We shall follow         Daniel Rahman MD  01/16/21  17:47 EST

## 2021-01-16 NOTE — PROGRESS NOTES
[facility] Medicine Services Daily Progress Note      Hospitalist Team  LOS 3 days      Patient Care Team:  Camron Pollard MD as PCP - General (Family Medicine)  Alvarado Cai MD as Consulting Physician (Cardiology)    Patient Location: 2102/1      Subjective   Subjective     Chief Complaint / Subjective  Chief Complaint   Patient presents with   • Shortness of Breath     Brief Synopsis of Hospital Course/HPI  The patient is a 80-year-old male who has underlying coronary artery disease with atrial fibrillation, stent placement and an abdominal aortic aneurysm.  The patient also is status post right upper lobectomy for previous lung carcinoma which was also treated with radiation and chemotherapy.  The patient presents with atrial for with rapid ventricular response as well as a new right lower lobe effusion.      Date:  1/15/2021  The patient had a chest tube placed today for his right pleural effusion.  It continues to drain.  He is having pain with deep breathing related to the chest tube.  The patient does report that his breathing is better and that his heart is not racing as much as it was on admission.    1/16/2021  The patient is in tears as he has been in pain.  I did check with nursing and the patient has not taken anything for pain nor is he asked for anything.  They are going to start offering it to the patient.  Other than that the patient has no additional issues.  I did explain to him that he needs to ask them and should not be shy about it as it is painful to have a chest tube in place.    Review of Systems   Constitution: Negative.   HENT: Negative.    Eyes: Negative.    Cardiovascular: Negative.         The patient continues to have tachycardia despite being on the amiodarone drip.  Some of this may be driven by the pain that he is experiencing and hopefully this will resolve when he actually starts taking the pain medication as directed.   Respiratory: Negative.   "       Other than pain with inspiration which is to be expected with the patient's chest tube he has no complaints.   Endocrine: Negative.    Hematologic/Lymphatic: Negative.    Skin: Negative.    Musculoskeletal: Negative.    Gastrointestinal: Negative.    Genitourinary: Negative.    Neurological: Negative.    Psychiatric/Behavioral: Negative.    Allergic/Immunologic: Negative.    All other systems reviewed and are negative.    Objective   Objective      Vital Signs  Temp:  [97.4 °F (36.3 °C)-97.8 °F (36.6 °C)] 97.8 °F (36.6 °C)  Heart Rate:  [] 92  Resp:  [16-22] 20  BP: (119-135)/(50-75) 119/66  Oxygen Therapy  SpO2: 96 %  Pulse Oximetry Type: Continuous  Device (Oxygen Therapy): nasal cannula  Flow (L/min): 2  ETCO2 (mmHg): (!) 0 mmHg  Flowsheet Rows      First Filed Value   Admission Height  177.8 cm (70\") Documented at 01/13/2021 1222   Admission Weight  80.6 kg (177 lb 11.1 oz) Documented at 01/13/2021 1222        Intake & Output (last 3 days)       01/13 0701 - 01/14 0700 01/14 0701 - 01/15 0700 01/15 0701 - 01/16 0700 01/16 0701 - 01/17 0700    P.O.  240    I.V. (mL/kg)  625 (8.1)      Total Intake(mL/kg) 240 (3.1) 1075 (14) 1040 (13.7) 240 (3.2)    Urine (mL/kg/hr) 1550 1525 (0.8) 750 (0.4) 50 (0.1)    Chest Tube   1900     Total Output 1550 1525 2650 50    Net -1310 -450 -1610 +190            Urine Unmeasured Occurrence  0 x          Lines, Drains & Airways    Active LDAs     Name:   Placement date:   Placement time:   Site:   Days:    Peripheral IV 01/13/21 1250 Right Antecubital   01/13/21    1250    Antecubital   1    Peripheral IV 01/14/21 1312 Anterior;Right Forearm   01/14/21    1312    Forearm   less than 1              Physical Exam:  Physical Exam  Vitals signs and nursing note reviewed.   Constitutional:       General: He is not in acute distress.     Appearance: Normal appearance. He is well-developed. He is not ill-appearing, toxic-appearing or diaphoretic.   HENT:      Head: " Normocephalic and atraumatic.      Right Ear: Ear canal and external ear normal.      Left Ear: Ear canal and external ear normal.      Nose: Nose normal. No congestion or rhinorrhea.      Mouth/Throat:      Mouth: Mucous membranes are moist.      Pharynx: No oropharyngeal exudate.   Eyes:      General: No scleral icterus.        Right eye: No discharge.         Left eye: No discharge.      Extraocular Movements: Extraocular movements intact.      Conjunctiva/sclera: Conjunctivae normal.      Pupils: Pupils are equal, round, and reactive to light.   Neck:      Musculoskeletal: Normal range of motion and neck supple. No neck rigidity or muscular tenderness.      Thyroid: No thyromegaly.      Vascular: No carotid bruit or JVD.      Trachea: No tracheal deviation.   Cardiovascular:      Rate and Rhythm: Tachycardia present. Rhythm irregular.      Pulses: Normal pulses.      Heart sounds: Normal heart sounds. No murmur. No friction rub. No gallop.       Comments: Atrial fib by the monitor with rates between 100 and 120 despite the amiodarone drip.  There is no friction rub or murmur.  Pulmonary:      Effort: Pulmonary effort is normal. No respiratory distress.      Breath sounds: No stridor. No wheezing, rhonchi or rales.      Comments: There is no isolated wheeze.  Decreased breath sounds persist on the right side in the base.  There is left thoracostomy tube in place on the right.  It is continuing to be hooked up to suction and is continuing to drain.  Chest:      Chest wall: No tenderness.   Abdominal:      General: Bowel sounds are normal. There is no distension.      Palpations: Abdomen is soft. There is no mass.      Tenderness: There is no abdominal tenderness. There is no guarding or rebound.      Hernia: No hernia is present.   Musculoskeletal: Normal range of motion.         General: No swelling, tenderness, deformity or signs of injury.      Right lower leg: No edema.      Left lower leg: No edema.    Lymphadenopathy:      Cervical: No cervical adenopathy.   Skin:     General: Skin is warm and dry.      Coloration: Skin is not jaundiced or pale.      Findings: No bruising, erythema or rash.   Neurological:      General: No focal deficit present.      Mental Status: He is alert and oriented to person, place, and time. Mental status is at baseline.      Cranial Nerves: No cranial nerve deficit.      Sensory: No sensory deficit.      Motor: No weakness or abnormal muscle tone.      Coordination: Coordination normal.   Psychiatric:         Mood and Affect: Mood normal.         Behavior: Behavior normal.         Thought Content: Thought content normal.         Judgment: Judgment normal.       Procedures:  Procedure(s):  BRONCHOSCOPY with bronchio alveolar lavage of right lower lung    Results Review:     I reviewed the patient's new clinical results.    Lab Results (last 24 hours)     Procedure Component Value Units Date/Time    BAL Culture, Quantitative - Lavage, Lung, Right Lower Lobe [[id number]] Collected: 01/14/21 1347    Specimen: Lavage from Lung, Right Lower Lobe Updated: 01/16/21 1322     BAL Culture Scant growth (1+) Normal Respiratory Terri: NO S.aureus/MRSA or Pseudomonas aeruginosa     Gram Stain Rare (1+) WBCs per low power field      No organisms seen    Narrative:      Not streaked for quantitation    Blood Culture - Blood, Arm, Right [[id number]] Collected: 01/13/21 1310    Specimen: Blood from Arm, Right Updated: 01/16/21 1315     Blood Culture No growth at 3 days    Blood Culture - Blood, Arm, Right [[id number]] Collected: 01/13/21 1302    Specimen: Blood from Arm, Right Updated: 01/16/21 1315     Blood Culture No growth at 3 days    AFB Culture - Body Fluid, Pleural Cavity [[id number]] Collected: 01/15/21 1509    Specimen: Body Fluid from Pleural Cavity Updated: 01/16/21 0954     AFB Stain No acid fast bacilli seen    Body Fluid Culture - Body Fluid, Pleural Cavity [[id number]] Collected: 01/15/21  1509    Specimen: Body Fluid from Pleural Cavity Updated: 01/16/21 0630     Body Fluid Culture No growth at less than 24 hours     Gram Stain No organisms seen    Magnesium [[id number]]  (Normal) Collected: 01/16/21 0307    Specimen: Blood Updated: 01/16/21 0336     Magnesium 2.2 mg/dL     Basic Metabolic Panel [[id number]]  (Abnormal) Collected: 01/16/21 0307    Specimen: Blood Updated: 01/16/21 0336     Glucose 187 mg/dL      BUN 38 mg/dL      Creatinine 1.30 mg/dL      Sodium 138 mmol/L      Potassium 4.1 mmol/L      Comment: Slight hemolysis detected by analyzer. Results may be affected.        Chloride 101 mmol/L      CO2 28.0 mmol/L      Calcium 8.9 mg/dL      eGFR Non African Amer 53 mL/min/1.73      BUN/Creatinine Ratio 29.2     Anion Gap 9.0 mmol/L     Narrative:      GFR Normal >60  Chronic Kidney Disease <60  Kidney Failure <15      CBC & Differential [[id number]]  (Abnormal) Collected: 01/16/21 0307    Specimen: Blood Updated: 01/16/21 0316    Narrative:      The following orders were created for panel order CBC & Differential.  Procedure                               Abnormality         Status                     ---------                               -----------         ------                     CBC Auto Differential[[id number]]        Abnormal            Final result                 Please view results for these tests on the individual orders.    CBC Auto Differential [[id number]]  (Abnormal) Collected: 01/16/21 0307    Specimen: Blood Updated: 01/16/21 0316     WBC 12.50 10*3/mm3      RBC 4.13 10*6/mm3      Hemoglobin 11.8 g/dL      Hematocrit 36.3 %      MCV 87.9 fL      MCH 28.6 pg      MCHC 32.5 g/dL      RDW 15.9 %      RDW-SD 49.0 fl      MPV 9.2 fL      Platelets 210 10*3/mm3      Neutrophil % 93.8 %      Lymphocyte % 1.3 %      Monocyte % 4.7 %      Eosinophil % 0.0 %      Basophil % 0.2 %      Neutrophils, Absolute 11.80 10*3/mm3      Lymphocytes, Absolute 0.20 10*3/mm3      Monocytes, Absolute  0.60 10*3/mm3      Eosinophils, Absolute 0.00 10*3/mm3      Basophils, Absolute 0.00 10*3/mm3      nRBC 0.0 /100 WBC     Protein, Body Fluid - Pleural Fluid, Pleural Cavity [[id number]] Collected: 01/15/21 1511    Specimen: Pleural Fluid from Pleural Cavity Updated: 01/15/21 2000     Protein, Total, Fluid 2.8 g/dL     Narrative:      No Reference Ranges Established.    A serous fluid total fluid (TP) greater than 50 percent of the serum TP suggests the fluid is an exudate.      1. Pleural TP/Serum TP >0.5  2. Pleural LD/Serum LD >0.6  3. Pleural LD >2/3 of the upper limit of normal for serum LDH    This test was developed, it performance characteristics determined and judged suitable for clinical purposes by [facility].  It has not been cleared or approved by the FDA.  The laboratory is regulated under CLIA as qualified to perform high-complexity testing.     Glucose, Body Fluid - Pleural Fluid, Pleural Cavity [[id number]] Collected: 01/15/21 1511    Specimen: Pleural Fluid from Pleural Cavity Updated: 01/15/21 2000     Glucose, Fluid 179 mg/dL     Narrative:      No Reference Ranges Established.    Serous fluid glucose less than 60 mg/dL or less than 30 mg/dL below serum glucose suggests an infectious or malignant exudate.     This test was developed, it performance characteristics determined and judged suitable for clinical purposes by [facility].  It has not been cleared or approved by the FDA.  The laboratory is regulated under CLIA as qualified to perform high-complexity testing.     Lactate Dehydrogenase, Body Fluid - Body Fluid, Pleural Cavity [[id number]] Collected: 01/15/21 1509    Specimen: Body Fluid from Pleural Cavity Updated: 01/15/21 2000     Lactate Dehydrogenase (LD), Fluid 99 U/L     Narrative:      No Reference Ranges Established.    Serous fluid LDH greater than 60 percent of the serum LDH or serous fluid LDH two-thirds of the upper limit of normal  for serum LDH suggests the fluid is an exudate.     1. Pleural TP/Serum TP >0.5  2. Pleural LD/Serum LD >0.6  3. Pleural LD >2/3 of the upper limit of normal for serum LDH    This test was developed, it performance characteristics determined and judged suitable for clinical purposes by [facility].  It has not been cleared or approved by the FDA.  The laboratory is regulated under CLIA as qualified to perform high-complexity testing.     Basic Metabolic Panel [[id number]]  (Abnormal) Collected: 01/15/21 1926    Specimen: Blood Updated: 01/15/21 1955     Glucose 118 mg/dL      BUN 43 mg/dL      Creatinine 1.49 mg/dL      Sodium 139 mmol/L      Potassium 4.2 mmol/L      Chloride 100 mmol/L      CO2 29.0 mmol/L      Calcium 9.3 mg/dL      eGFR Non African Amer 46 mL/min/1.73      BUN/Creatinine Ratio 28.9     Anion Gap 10.0 mmol/L     Narrative:      GFR Normal >60  Chronic Kidney Disease <60  Kidney Failure <15          No results found for: HGBA1C  Results from last 7 days   Lab Units 01/13/21  1310   INR  1.04           Lab Results   Component Value Date    LIPASE 19 (L) 05/18/2019     Lab Results   Component Value Date    CHOL 121 07/09/2020    TRIG 75 07/09/2020    HDL 36 (L) 07/09/2020    LDL 70 07/09/2020       No results found for: INTRAOP, PREDX, FINALDX, COMDX    Microbiology Results (last 10 days)     Procedure Component Value - Date/Time    Body Fluid Culture - Body Fluid, Pleural Cavity [[id number]] Collected: 01/15/21 1509    Lab Status: Preliminary result Specimen: Body Fluid from Pleural Cavity Updated: 01/16/21 0630     Body Fluid Culture No growth at less than 24 hours     Gram Stain No organisms seen    AFB Culture - Body Fluid, Pleural Cavity [[id number]] Collected: 01/15/21 1509    Lab Status: Preliminary result Specimen: Body Fluid from Pleural Cavity Updated: 01/16/21 0954     AFB Stain No acid fast bacilli seen    AFB Culture - Lavage, Lung, Right Lower Lobe [[id number]]  Collected: 01/14/21 1347    Lab Status: Preliminary result Specimen: Lavage from Lung, Right Lower Lobe Updated: 01/15/21 1324     AFB Stain No acid fast bacilli seen    BAL Culture, Quantitative - Lavage, Lung, Right Lower Lobe [[id number]] Collected: 01/14/21 1347    Lab Status: Final result Specimen: Lavage from Lung, Right Lower Lobe Updated: 01/16/21 1322     BAL Culture Scant growth (1+) Normal Respiratory Terri: NO S.aureus/MRSA or Pseudomonas aeruginosa     Gram Stain Rare (1+) WBCs per low power field      No organisms seen    Narrative:      Not streaked for quantitation    Respiratory Panel, PCR (WITHOUT COVID) - Lavage, Lung, Right Lower Lobe [[id number]]  (Normal) Collected: 01/14/21 1347    Lab Status: Final result Specimen: Lavage from Lung, Right Lower Lobe Updated: 01/14/21 1542     ADENOVIRUS, PCR Not Detected     Coronavirus 229E Not Detected     Coronavirus HKU1 Not Detected     Coronavirus NL63 Not Detected     Coronavirus OC43 Not Detected     Human Metapneumovirus Not Detected     Human Rhinovirus/Enterovirus Not Detected     Influenza B PCR Not Detected     Parainfluenza Virus 1 Not Detected     Parainfluenza Virus 2 Not Detected     Parainfluenza Virus 3 Not Detected     Parainfluenza Virus 4 Not Detected     Bordetella pertussis pcr Not Detected     Chlamydophila pneumoniae PCR Not Detected     Mycoplasma pneumo by PCR Not Detected     Influenza A PCR Not Detected     RSV, PCR Not Detected     Bordetella parapertussis PCR Not Detected    Narrative:      The coronavirus on the RVP is NOT COVID-19 and is NOT indicative of infection with COVID-19.     Respiratory Panel, PCR (WITHOUT COVID) - Swab, Nasopharynx [[id number]]  (Normal) Collected: 01/13/21 1824    Lab Status: Final result Specimen: Swab from Nasopharynx Updated: 01/13/21 1940     ADENOVIRUS, PCR Not Detected     Coronavirus 229E Not Detected     Coronavirus HKU1 Not Detected     Coronavirus NL63 Not Detected     Coronavirus OC43 Not  Detected     Human Metapneumovirus Not Detected     Human Rhinovirus/Enterovirus Not Detected     Influenza B PCR Not Detected     Parainfluenza Virus 1 Not Detected     Parainfluenza Virus 2 Not Detected     Parainfluenza Virus 3 Not Detected     Parainfluenza Virus 4 Not Detected     Bordetella pertussis pcr Not Detected     Chlamydophila pneumoniae PCR Not Detected     Mycoplasma pneumo by PCR Not Detected     Influenza A PCR Not Detected     RSV, PCR Not Detected     Bordetella parapertussis PCR Not Detected    Narrative:      The coronavirus on the RVP is NOT COVID-19 and is NOT indicative of infection with COVID-19.     Blood Culture - Blood, Arm, Right [[id number]] Collected: 01/13/21 1310    Lab Status: Preliminary result Specimen: Blood from Arm, Right Updated: 01/16/21 1315     Blood Culture No growth at 3 days    COVID-19,[facility] [employer] IN-HOUSE,Nasal Swab No Transport Media 3-4 HR TAT - Swab, Nasal Cavity [[id number]]  (Normal) Collected: 01/13/21 1303    Lab Status: Final result Specimen: Swab from Nasal Cavity Updated: 01/13/21 1343     COVID19 Not Detected    Narrative:      Fact sheet for providers: https://www.fda.gov/media/511971/download     Fact sheet for patients: https://www.fda.gov/media/326324/download    Test performed by PCR.    Blood Culture - Blood, Arm, Right [[id number]] Collected: 01/13/21 1302    Lab Status: Preliminary result Specimen: Blood from Arm, Right Updated: 01/16/21 1315     Blood Culture No growth at 3 days          ECG/EMG Results (most recent)     Procedure Component Value Units Date/Time    ECG 12 Lead [[id number]] Collected: 01/14/21 0154     Updated: 01/14/21 0157     QT Interval 354 ms     Narrative:      HEART RATE= 140  bpm  RR Interval= 432  ms  PA Interval= 146  ms  P Horizontal Axis= -51  deg  P Front Axis= 41  deg  QRSD Interval= 102  ms  QT Interval= 354  ms  QRS Axis= 49  deg  T Wave Axis= 256  deg  - ABNORMAL ECG -  Sinus tachycardia  Multiform ventricular premature  complexes  Repolarization abnormality, prob rate related  Prolonged QT interval  Electronically Signed By:   Date and Time of Study: 2021-01-14 01:54:00    ECG 12 Lead [[id number]] Collected: 01/13/21 1235     Updated: 01/14/21 0828     QT Interval 350 ms     Narrative:      HEART RATE= 108  bpm  RR Interval= 556  ms  NJ Interval= 159  ms  P Horizontal Axis= 22  deg  P Front Axis= 44  deg  QRSD Interval= 95  ms  QT Interval= 350  ms  QRS Axis= 58  deg  T Wave Axis= 103  deg  - ABNORMAL ECG -  Sinus tachycardia  Multiple ventricular premature complexes  Probable LVH with secondary repol abnrm  Anterior Q waves, possibly due to LVH  When compared with ECG of 18-May-2019 9:03:54,  No significant change  Electronically Signed By: Dakota Rodrigues ([facility]) 14-Jan-2021 08:26:08  Date and Time of Study: 2021-01-13 12:35:42    Adult Transthoracic Echo Complete W/ Cont if Necessary Per Protocol [[id number]] Collected: 01/13/21 1727     Updated: 01/14/21 1705     BSA 2.0 m^2      RVIDd 2.1 cm      IVSd 0.9 cm      LVIDd 5.5 cm      LVIDs 5.2 cm      LVPWd 0.91 cm      IVS/LVPW 1.0     FS 5.1 %      EDV(Teich) 146.6 ml      ESV(Teich) 129.8 ml      EF(Teich) 11.5 %      EDV(cubed) 165.2 ml      ESV(cubed) 141.1 ml      EF(cubed) 14.6 %      LV mass(C)d 186.0 grams      LV mass(C)dI 93.9 grams/m^2      SV(Teich) 16.8 ml      SI(Teich) 8.5 ml/m^2      SV(cubed) 24.1 ml      SI(cubed) 12.2 ml/m^2      Ao root diam 3.8 cm      Ao root area 11.5 cm^2      LVOT diam 2.0 cm      LVOT area 3.1 cm^2      EDV(MOD-sp4) 125.6 ml      ESV(MOD-sp4) 87.5 ml      EF(MOD-sp4) 30.3 %      SV(MOD-sp4) 38.0 ml      SI(MOD-sp4) 19.2 ml/m^2      Ao root area (BSA corrected) 1.9     LV Knight Vol (BSA corrected) 63.4 ml/m^2      LV Sys Vol (BSA corrected) 44.2 ml/m^2      Aortic R-R 0.51 sec      Aortic .5 BPM      MV V2 max 90.8 cm/sec      MV max PG 3.3 mmHg      MV V2 mean 56.8 cm/sec      MV mean PG 1.5 mmHg      MV V2 VTI 17.5 cm      MVA(VTI) 2.9  cm^2      Ao pk lamont 267.9 cm/sec      Ao max PG 28.7 mmHg      Ao max PG (full) 25.4 mmHg      Ao V2 mean 186.7 cm/sec      Ao mean PG 15.8 mmHg      Ao mean PG (full) 14.0 mmHg      Ao V2 VTI 51.9 cm      IQRA(I,A) 0.97 cm^2      IQRA(I,D) 0.97 cm^2      IQRA(V,A) 1.0 cm^2      QIRA(V,D) 1.0 cm^2      AI max lamont 389.1 cm/sec      AI max PG 60.5 mmHg      AI dec slope 287.4 cm/sec^2      AI dec time 1.4 sec      AI P1/2t 396.5 msec      LV V1 max PG 3.3 mmHg      LV V1 mean PG 1.8 mmHg      LV V1 max 90.4 cm/sec      LV V1 mean 63.5 cm/sec      LV V1 VTI 16.4 cm      MR max lamont 514.5 cm/sec      MR max .9 mmHg      CO(Ao) 70.8 l/min      CI(Ao) 35.7 l/min/m^2      SV(Ao) 597.3 ml      SI(Ao) 301.4 ml/m^2      CO(LVOT) 6.0 l/min      CI(LVOT) 3.0 l/min/m^2      SV(LVOT) 50.4 ml      SI(LVOT) 25.4 ml/m^2      PA V2 max 68.8 cm/sec      PA max PG 1.9 mmHg      PA max PG (full) -0.6 mmHg      PA V2 mean 47.5 cm/sec      PA mean PG 1.0 mmHg      PA mean PG (full) -0.25 mmHg      PA V2 VTI 14.3 cm      RV V1 max PG 2.5 mmHg      RV V1 mean PG 1.3 mmHg      RV V1 max 78.9 cm/sec      RV V1 mean 52.8 cm/sec      RV V1 VTI 15.3 cm      TR max lamont 291.0 cm/sec      RVSP(TR) 36.9 mmHg      RAP systole 3.0 mmHg       CV ECHO KATT - BZI_BMI 25.4 kilograms/m^2       CV ECHO KATT - BSA([place]) 2.0 m^2       CV ECHO KATT - BZI_METRIC_WEIGHT 80.3 kg       CV ECHO KATT - BZI_METRIC_HEIGHT 177.8 cm      LA dimension(2D) 3.3 cm     Narrative:      · Left ventricular ejection fraction appears to be 21 - 25%.  · Severe mitral valve regurgitation is present.  · Mild dilation of the aortic root is present.  · There is a large (>2cm) pericardial effusion adjacent to the right   atrium.  · The following left ventricular wall segments are hypokinetic: mid   anterior, apical anterior, basal anterolateral, mid anterolateral, apical   lateral, basal inferolateral, mid inferolateral, apical inferior, mid   inferior, apical septal,  basal inferoseptal, mid inferoseptal, apex   hypokinetic, mid anteroseptal, basal anterior, basal inferior and basal   inferoseptal.  · No pericardial tamponade noted       ECG 12 Lead [[id number]] Collected: 01/15/21 0549     Updated: 01/15/21 0550     QT Interval 380 ms     Narrative:      HEART RATE= 92  bpm  RR Interval= 651  ms  MA Interval=   ms  P Horizontal Axis=   deg  P Front Axis=   deg  QRSD Interval= 102  ms  QT Interval= 380  ms  QRS Axis= 64  deg  T Wave Axis= 249  deg  - ABNORMAL ECG -  Atrial fibrillation  Probable left ventricular hypertrophy  Nonspecific T abnormalities, inferior leads  Electronically Signed By:   Date and Time of Study: 2021-01-15 05:49:00    ECG 12 Lead [[id number]] Collected: 01/16/21 0533     Updated: 01/16/21 0535     QT Interval 356 ms     Narrative:      HEART RATE= 123  bpm  RR Interval= 487  ms  MA Interval=   ms  P Horizontal Axis=   deg  P Front Axis=   deg  QRSD Interval= 91  ms  QT Interval= 356  ms  QRS Axis= 86  deg  T Wave Axis= -86  deg  - ABNORMAL ECG -  Atrial fibrillation  Anterior infarct, old  Repol abnrm suggests ischemia, inferior leads  Borderline ST elevation, lateral leads  Prolonged QT interval  Electronically Signed By:   Date and Time of Study: 2021-01-16 05:33:41           Results for orders placed during the hospital encounter of 01/13/21   Adult Transthoracic Echo Complete W/ Cont if Necessary Per Protocol    Narrative · Left ventricular ejection fraction appears to be 21 - 25%.  · Severe mitral valve regurgitation is present.  · Mild dilation of the aortic root is present.  · There is a large (>2cm) pericardial effusion adjacent to the right   atrium.  · The following left ventricular wall segments are hypokinetic: mid   anterior, apical anterior, basal anterolateral, mid anterolateral, apical   lateral, basal inferolateral, mid inferolateral, apical inferior, mid   inferior, apical septal, basal inferoseptal, mid inferoseptal, apex   hypokinetic,  mid anteroseptal, basal anterior, basal inferior and basal   inferoseptal.  · No pericardial tamponade noted        Xr Chest 1 View    Result Date: 1/16/2021   1. Improved aeration with small caliber chest tube in the right lung base. A pneumothorax or pleural effusion is not demonstrated. 2. Persistent postsurgical and treatment related changes in the right superhilar region, this is likely treatment related changes, is similar radiographically when compared with the study from 08/23/2018.  Electronically Signed By-Donny Abdi DO On:1/16/2021 10:59 AM This report was finalized on [id number] by  Donny Abdi DO.    Xr Chest 1 View    Result Date: 1/15/2021  Significantly improved aeration in the right hemithorax, that is post pleural drain placement. Mild residual atelectasis remains. No visible pleural fluid or pneumothorax.  Electronically Signed By-Maura Esteves MD On:1/15/2021 3:10 PM This report was finalized on [id number] by  Maura Esteves MD.    Xr Chest 1 View    Result Date: 1/15/2021   1. Moderate-large sized infiltrate pleural effusion involving the right midlung zone right lower lobe lung has improved slightly since previous study performed on 01/13/2021  Electronically Signed By-Baron Diggs MD On:1/15/2021 7:53 AM This report was finalized on [id number] by  Baron Diggs MD.    Xr Chest 1 View    Result Date: 1/13/2021   1. Interval development of a moderate-sized right pleural effusion. 2. Worsening consolidation in the right lung particularly in the mid and lower lung zone. This is likely due to a combination of compressive atelectasis with superimposed pneumonia. 3. Masslike area of consolidation has been present the right hilar region on previous imaging studies consistent with treated lung cancer.  Electronically Signed By-Kel Fair MD On:1/13/2021 2:19 PM This report was finalized on [id number] by  Kel Fair MD.    Us Aorta Limited    Result Date:  1/14/2021  1. Redemonstration of a distal abdominal aortic aneurysm measuring 5.3 x 4.9 cm. This is very similar in size to that described on May 18, 2019. 2. Severe atherosclerotic disease. 3. There are limitations to this study secondary to overlying bowel gas. Slot [id number] Electronically signed by:  Vladislav Rich M.D.  1/14/2021 10:17 PM  Xrays, labs reviewed personally by physician.    Medication Review:   I have reviewed the patient's current medication list    Scheduled Meds  budesonide, 0.5 mg, Nebulization, BID - RT  cefepime, 2 g, Intravenous, Q12H  digoxin, 125 mcg, Oral, Daily  dilTIAZem CD, 120 mg, Oral, Q24H  enoxaparin, 1 mg/kg, Subcutaneous, Q12H  finasteride, 5 mg, Oral, Daily  guaiFENesin, 1,200 mg, Oral, Q12H  ipratropium-albuterol, 3 mL, Nebulization, Q4H - RT  methylPREDNISolone sodium succinate, 40 mg, Intravenous, Q8H  metoprolol tartrate, 25 mg, Oral, Q12H  multivitamin with minerals, 1 tablet, Oral, Daily  rosuvastatin, 40 mg, Oral, Nightly  sodium chloride, 10 mL, Intravenous, Q12H  tamsulosin, 0.4 mg, Oral, Daily With Dinner  theophylline, 300 mg, Oral, Daily        Meds Infusions  amiodarone, 1 mg/min, Last Rate: 1 mg/min (01/16/21 1229)  sodium chloride, 75 mL/hr, Last Rate: Stopped (01/15/21 0900)        Meds PRN  •  acetaminophen **OR** acetaminophen **OR** acetaminophen  •  benzonatate  •  HYDROcodone-acetaminophen  •  HYDROcodone-acetaminophen  •  ipratropium-albuterol  •  magnesium hydroxide  •  magnesium sulfate **OR** magnesium sulfate in D5W 1g/100mL (PREMIX)  •  nitroglycerin  •  ondansetron **OR** ondansetron  •  potassium chloride  •  [COMPLETED] Insert peripheral IV **AND** sodium chloride  •  sodium chloride        Assessment/Plan   Assessment/Plan     Active Hospital Problems    Diagnosis  POA   • **Acute respiratory distress [R06.03]  Yes   • Chest pain [R07.9]  Yes   • Atrial fibrillation with RVR (CMS/HCC) [I48.91]  Yes   • BPH without obstruction/lower urinary tract symptoms  [N40.0]  Yes   • History of lung cancer [Z85.118]  Not Applicable   • COPD exacerbation (CMS/HCC) [J44.1]  Yes   • Malignant 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       MEDICAL DECISION MAKING COMPLEXITY BY PROBLEM:   1.  Acute respiratory distress  -This is likely related to the patient's large right-sided pleural effusion  -Pulmonary consult was obtained and the patient underwent bronchoscopy today  -Patient had copious amounts of secretions suctioned out of his airway.  -Continue empiric antibiotics  -Await culture results and cytology    2.  Atrial fibrillation with RVR  -EKG shows atrial fibrillation with RVR  -Patient remains on an amiodarone drip  -Rate remains elevated greater than 120  -Consult cardiology pending       -Given the complexity of the patient's situation and need for additional procedures will leave decision about anticoagulation to cardiology.       -Cardiology has decided on full dose Lovenox and this has been started.    3.  Acute chest pain  -Serial troponins negative  -Resolution of chest pain  -Current chest pain seems to be associated with a right thoracostomy tube.    4.  Coronary artery disease  -Patient has had several stents placed in the past  -Cardiology consulted    5.  History of lung cancer  -Status post right upper lobe resection       -This was followed by radiation as well as chemotherapy  -If pleural effusion does not improve after therapeutic bronchoscopy consider thoracentesis sending the pleural fluid for cytology       -Patient has a thoracostomy tube in place that is draining serosanguineous fluid.  It is hooked up to suction.    6.  Abdominal aortic aneurysm  -Patient was having this followed but has missed his  appointment last year and requests having the study done while he is here.  Given that he may need anticoagulation will need to know the status of this as well.  -The patient had an abdominal ultrasound and his aneurysm is 5.3 x 4.9 cm which is very similar in size to see what was seen in May 2019.  Therefore we will have the patient follow-up as an outpatient with his vascular surgeon.    7.  Bilateral prostatic hypertrophy  -Continue Flomax and Proscar    VTE Prophylaxis -   Mechanical Order History:      Ordered        01/13/21 1708  Place Sequential Compression Device  Once         01/13/21 1708  Maintain Sequential Compression Device  Continuous                 Pharmalogical Order History:      Ordered     Dose Route Frequency Stop    01/13/21 1502  enoxaparin (LOVENOX) syringe 80 mg      1 mg/kg SC Once 01/13/21 1520              Code Status -   Code Status and Medical Interventions:   Ordered at: 01/13/21 1610     Limited Support to NOT Include:    Intubation     Level Of Support Discussed With:    Patient     Code Status:    CPR     Medical Interventions (Level of Support Prior to Arrest):    Limited     This patient has been examined wearing appropriate Personal Protective Equipment and discussed with hospital infection control department. 01/16/21    Discharge Planning  To be determined    Electronically signed by Diane Tamez MD, 01/16/21, 16:53 EST.  [facility] Hospitalist Team

## 2021-01-17 ENCOUNTER — APPOINTMENT (OUTPATIENT)
Dept: GENERAL RADIOLOGY | Facility: HOSPITAL | Age: 81
End: 2021-01-17

## 2021-01-17 LAB
ANION GAP SERPL CALCULATED.3IONS-SCNC: 9 MMOL/L (ref 5–15)
BASOPHILS # BLD AUTO: 0 10*3/MM3 (ref 0–0.2)
BASOPHILS NFR BLD AUTO: 0.1 % (ref 0–1.5)
BUN SERPL-MCNC: 32 MG/DL (ref 8–23)
BUN/CREAT SERPL: 27.4 (ref 7–25)
CALCIUM SPEC-SCNC: 8.9 MG/DL (ref 8.6–10.5)
CHLORIDE SERPL-SCNC: 98 MMOL/L (ref 98–107)
CO2 SERPL-SCNC: 27 MMOL/L (ref 22–29)
CREAT SERPL-MCNC: 1.17 MG/DL (ref 0.76–1.27)
DEPRECATED RDW RBC AUTO: 49.4 FL (ref 37–54)
EOSINOPHIL # BLD AUTO: 0 10*3/MM3 (ref 0–0.4)
EOSINOPHIL NFR BLD AUTO: 0 % (ref 0.3–6.2)
ERYTHROCYTE [DISTWIDTH] IN BLOOD BY AUTOMATED COUNT: 16.3 % (ref 12.3–15.4)
GFR SERPL CREATININE-BSD FRML MDRD: 60 ML/MIN/1.73
GLUCOSE SERPL-MCNC: 154 MG/DL (ref 65–99)
HCT VFR BLD AUTO: 34.8 % (ref 37.5–51)
HGB BLD-MCNC: 11.6 G/DL (ref 13–17.7)
LYMPHOCYTES # BLD AUTO: 0.1 10*3/MM3 (ref 0.7–3.1)
LYMPHOCYTES NFR BLD AUTO: 0.9 % (ref 19.6–45.3)
MAGNESIUM SERPL-MCNC: 2.2 MG/DL (ref 1.6–2.4)
MCH RBC QN AUTO: 29 PG (ref 26.6–33)
MCHC RBC AUTO-ENTMCNC: 33.3 G/DL (ref 31.5–35.7)
MCV RBC AUTO: 87 FL (ref 79–97)
MONOCYTES # BLD AUTO: 0.3 10*3/MM3 (ref 0.1–0.9)
MONOCYTES NFR BLD AUTO: 4 % (ref 5–12)
NEUTROPHILS NFR BLD AUTO: 8 10*3/MM3 (ref 1.7–7)
NEUTROPHILS NFR BLD AUTO: 95 % (ref 42.7–76)
NRBC BLD AUTO-RTO: 0 /100 WBC (ref 0–0.2)
PLATELET # BLD AUTO: 201 10*3/MM3 (ref 140–450)
PMV BLD AUTO: 9.3 FL (ref 6–12)
POTASSIUM SERPL-SCNC: 3.9 MMOL/L (ref 3.5–5.2)
RBC # BLD AUTO: 4 10*6/MM3 (ref 4.14–5.8)
SODIUM SERPL-SCNC: 134 MMOL/L (ref 136–145)
WBC # BLD AUTO: 8.5 10*3/MM3 (ref 3.4–10.8)

## 2021-01-17 PROCEDURE — 85025 COMPLETE CBC W/AUTO DIFF WBC: CPT | Performed by: INTERNAL MEDICINE

## 2021-01-17 PROCEDURE — 71045 X-RAY EXAM CHEST 1 VIEW: CPT

## 2021-01-17 PROCEDURE — 93005 ELECTROCARDIOGRAM TRACING: CPT | Performed by: INTERNAL MEDICINE

## 2021-01-17 PROCEDURE — 83735 ASSAY OF MAGNESIUM: CPT | Performed by: INTERNAL MEDICINE

## 2021-01-17 PROCEDURE — 25010000002 CEFEPIME PER 500 MG: Performed by: INTERNAL MEDICINE

## 2021-01-17 PROCEDURE — 25010000002 METHYLPREDNISOLONE PER 40 MG: Performed by: INTERNAL MEDICINE

## 2021-01-17 PROCEDURE — 25010000002 ENOXAPARIN PER 10 MG: Performed by: INTERNAL MEDICINE

## 2021-01-17 PROCEDURE — 99232 SBSQ HOSP IP/OBS MODERATE 35: CPT | Performed by: INTERNAL MEDICINE

## 2021-01-17 PROCEDURE — 94799 UNLISTED PULMONARY SVC/PX: CPT

## 2021-01-17 PROCEDURE — 25010000002 AMIODARONE PER 30 MG: Performed by: INTERNAL MEDICINE

## 2021-01-17 PROCEDURE — 93010 ELECTROCARDIOGRAM REPORT: CPT | Performed by: INTERNAL MEDICINE

## 2021-01-17 PROCEDURE — 80048 BASIC METABOLIC PNL TOTAL CA: CPT | Performed by: INTERNAL MEDICINE

## 2021-01-17 RX ADMIN — AMIODARONE HYDROCHLORIDE 1 MG/MIN: 50 INJECTION, SOLUTION INTRAVENOUS at 04:06

## 2021-01-17 RX ADMIN — IPRATROPIUM BROMIDE AND ALBUTEROL SULFATE 3 ML: 2.5; .5 SOLUTION RESPIRATORY (INHALATION) at 07:00

## 2021-01-17 RX ADMIN — Medication 10 ML: at 21:37

## 2021-01-17 RX ADMIN — CEFEPIME HYDROCHLORIDE 2 G: 2 INJECTION, POWDER, FOR SOLUTION INTRAVENOUS at 16:38

## 2021-01-17 RX ADMIN — METHYLPREDNISOLONE SODIUM SUCCINATE 40 MG: 40 INJECTION, POWDER, FOR SOLUTION INTRAMUSCULAR; INTRAVENOUS at 16:35

## 2021-01-17 RX ADMIN — METOPROLOL TARTRATE 25 MG: 25 TABLET, FILM COATED ORAL at 09:42

## 2021-01-17 RX ADMIN — DILTIAZEM HYDROCHLORIDE 120 MG: 120 CAPSULE, COATED, EXTENDED RELEASE ORAL at 09:42

## 2021-01-17 RX ADMIN — FINASTERIDE 5 MG: 5 TABLET, FILM COATED ORAL at 09:42

## 2021-01-17 RX ADMIN — Medication 10 ML: at 05:57

## 2021-01-17 RX ADMIN — GUAIFENESIN 1200 MG: 600 TABLET, EXTENDED RELEASE ORAL at 05:56

## 2021-01-17 RX ADMIN — TAMSULOSIN HYDROCHLORIDE 0.4 MG: 0.4 CAPSULE ORAL at 16:36

## 2021-01-17 RX ADMIN — METOPROLOL TARTRATE 25 MG: 25 TABLET, FILM COATED ORAL at 21:36

## 2021-01-17 RX ADMIN — BUDESONIDE 0.5 MG: 0.5 INHALANT RESPIRATORY (INHALATION) at 20:02

## 2021-01-17 RX ADMIN — CEFEPIME HYDROCHLORIDE 2 G: 2 INJECTION, POWDER, FOR SOLUTION INTRAVENOUS at 05:56

## 2021-01-17 RX ADMIN — IPRATROPIUM BROMIDE AND ALBUTEROL SULFATE 3 ML: 2.5; .5 SOLUTION RESPIRATORY (INHALATION) at 22:57

## 2021-01-17 RX ADMIN — METHYLPREDNISOLONE SODIUM SUCCINATE 40 MG: 40 INJECTION, POWDER, FOR SOLUTION INTRAMUSCULAR; INTRAVENOUS at 21:37

## 2021-01-17 RX ADMIN — GUAIFENESIN 1200 MG: 600 TABLET, EXTENDED RELEASE ORAL at 16:36

## 2021-01-17 RX ADMIN — AMIODARONE HYDROCHLORIDE 1 MG/MIN: 50 INJECTION, SOLUTION INTRAVENOUS at 19:32

## 2021-01-17 RX ADMIN — DIGOXIN 125 MCG: 125 TABLET ORAL at 09:42

## 2021-01-17 RX ADMIN — HYDROCODONE BITARTRATE AND ACETAMINOPHEN 1 TABLET: 5; 325 TABLET ORAL at 00:25

## 2021-01-17 RX ADMIN — METOPROLOL TARTRATE 25 MG: 25 TABLET, FILM COATED ORAL at 16:36

## 2021-01-17 RX ADMIN — ROSUVASTATIN CALCIUM 40 MG: 10 TABLET, FILM COATED ORAL at 21:37

## 2021-01-17 RX ADMIN — IPRATROPIUM BROMIDE AND ALBUTEROL SULFATE 3 ML: 2.5; .5 SOLUTION RESPIRATORY (INHALATION) at 03:22

## 2021-01-17 RX ADMIN — THEOPHYLLINE 300 MG: 300 TABLET, EXTENDED RELEASE ORAL at 09:42

## 2021-01-17 RX ADMIN — MULTIPLE VITAMINS W/ MINERALS TAB 1 TABLET: TAB at 09:42

## 2021-01-17 RX ADMIN — IPRATROPIUM BROMIDE AND ALBUTEROL SULFATE 3 ML: 2.5; .5 SOLUTION RESPIRATORY (INHALATION) at 10:45

## 2021-01-17 RX ADMIN — ENOXAPARIN SODIUM 80 MG: 80 INJECTION SUBCUTANEOUS at 02:12

## 2021-01-17 RX ADMIN — IPRATROPIUM BROMIDE AND ALBUTEROL SULFATE 3 ML: 2.5; .5 SOLUTION RESPIRATORY (INHALATION) at 20:01

## 2021-01-17 RX ADMIN — BUDESONIDE 0.5 MG: 0.5 INHALANT RESPIRATORY (INHALATION) at 07:04

## 2021-01-17 RX ADMIN — METHYLPREDNISOLONE SODIUM SUCCINATE 40 MG: 40 INJECTION, POWDER, FOR SOLUTION INTRAMUSCULAR; INTRAVENOUS at 05:57

## 2021-01-17 RX ADMIN — DILTIAZEM HYDROCHLORIDE 30 MG: 30 TABLET, FILM COATED ORAL at 16:38

## 2021-01-17 RX ADMIN — HYDROCODONE BITARTRATE AND ACETAMINOPHEN 1 TABLET: 10; 325 TABLET ORAL at 09:42

## 2021-01-17 NOTE — PROGRESS NOTES
[facility] Daily Progress Note      Hospitalist Team  LOS 4 days      Patient Care Team:  Camron Pollard MD as PCP - General (Family Medicine)  Alvarado Cai MD as Consulting Physician (Cardiology)    Patient Location: 2102/1      Subjective   Subjective     Chief Complaint / Subjective  Chief Complaint   Patient presents with   • Shortness of Breath     Brief Synopsis of Hospital Course/HPI  The patient is a 80-year-old male who has underlying coronary artery disease with atrial fibrillation, stent placement and an abdominal aortic aneurysm.  The patient also is status post right upper lobectomy for previous lung carcinoma which was also treated with radiation and chemotherapy.  The patient presents with atrial for with rapid ventricular response as well as a new right lower lobe effusion.      Date:  1/15/2021  The patient had a chest tube placed today for his right pleural effusion.  It continues to drain.  He is having pain with deep breathing related to the chest tube.  The patient does report that his breathing is better and that his heart is not racing as much as it was on admission.    1/16/2021  The patient is in tears as he has been in pain.  I did check with nursing and the patient has not taken anything for pain nor is he asked for anything.  They are going to start offering it to the patient.  Other than that the patient has no additional issues.  I did explain to him that he needs to ask them and should not be shy about it as it is painful to have a chest tube in place.    1/17/2021  The patient's pain is markedly improved over yesterday.  The patient's tachycardia is improved as well.  The patient does have a lot of lower extremity edema and was encouraged to place his legs up on an elevation so that hopefully this can resolve/improve.    Review of Systems   Constitution: Negative.   HENT: Negative.    Eyes: Negative.    Cardiovascular: Negative.   "       The patient has had resolution of the tachycardia.  Patient has much less chest pain.  The chest pain he did have was related to the underlying chest tube placement.   Respiratory: Negative.         The patient's breathing is improved.  His chest tube seems to be hooked to waterseal today.   Endocrine: Negative.    Hematologic/Lymphatic: Negative.    Skin: Negative.    Musculoskeletal: Negative.    Gastrointestinal: Negative.    Genitourinary: Negative.    Neurological: Negative.    Psychiatric/Behavioral: Negative.    Allergic/Immunologic: Negative.    All other systems reviewed and are negative.    Objective   Objective      Vital Signs  Temp:  [97.4 °F (36.3 °C)-97.8 °F (36.6 °C)] 97.7 °F (36.5 °C)  Heart Rate:  [] 108  Resp:  [15-20] 18  BP: ()/(49-69) 93/49  Oxygen Therapy  SpO2: 99 %  Pulse Oximetry Type: Intermittent  Device (Oxygen Therapy): room air  Flow (L/min): 2  ETCO2 (mmHg): (!) 0 mmHg  Flowsheet Rows      First Filed Value   Admission Height  177.8 cm (70\") Documented at 01/13/2021 1222   Admission Weight  80.6 kg (177 lb 11.1 oz) Documented at 01/13/2021 1222        Intake & Output (last 3 days)       01/14 0701 - 01/15 0700 01/15 0701 - 01/16 0700 01/16 0701 - 01/17 0700 01/17 0701 - 01/18 0700    P.O. 450 1040 580     I.V. (mL/kg) 625 (8.1)       Total Intake(mL/kg) 1075 (14) 1040 (13.7) 580 (7.6)     Urine (mL/kg/hr) 1525 (0.8) 750 (0.4) 1300 (0.7)     Chest Tube  1900 870     Total Output 1525 2650 2170     Net -450 -1610 -1590             Urine Unmeasured Occurrence 0 x           Lines, Drains & Airways    Active LDAs     Name:   Placement date:   Placement time:   Site:   Days:    Peripheral IV 01/13/21 1250 Right Antecubital   01/13/21    1250    Antecubital   1    Peripheral IV 01/14/21 1312 Anterior;Right Forearm   01/14/21    1312    Forearm   less than 1              Physical Exam:  Physical Exam  Vitals signs and nursing note reviewed.   Constitutional:       General: " He is not in acute distress.     Appearance: Normal appearance. He is well-developed. He is not ill-appearing, toxic-appearing or diaphoretic.   HENT:      Head: Normocephalic and atraumatic.      Right Ear: Ear canal and external ear normal.      Left Ear: Ear canal and external ear normal.      Nose: Nose normal. No congestion or rhinorrhea.      Mouth/Throat:      Mouth: Mucous membranes are moist.      Pharynx: No oropharyngeal exudate.   Eyes:      General: No scleral icterus.        Right eye: No discharge.         Left eye: No discharge.      Extraocular Movements: Extraocular movements intact.      Conjunctiva/sclera: Conjunctivae normal.      Pupils: Pupils are equal, round, and reactive to light.   Neck:      Musculoskeletal: Normal range of motion and neck supple. No neck rigidity or muscular tenderness.      Thyroid: No thyromegaly.      Vascular: No carotid bruit or JVD.      Trachea: No tracheal deviation.   Cardiovascular:      Rate and Rhythm: Tachycardia present. Rhythm irregular.      Pulses: Normal pulses.      Heart sounds: Normal heart sounds. No murmur. No friction rub. No gallop.       Comments: Atrial fib by the monitor with rates between 100 and 120 despite the amiodarone drip.  There is no friction rub or murmur.  Pulmonary:      Effort: Pulmonary effort is normal. No respiratory distress.      Breath sounds: No stridor. No wheezing, rhonchi or rales.      Comments: Equal breath sounds bilaterally.  Good and equal chest wall expansion..  Chest:      Chest wall: No tenderness.   Abdominal:      General: Bowel sounds are normal. There is no distension.      Palpations: Abdomen is soft. There is no mass.      Tenderness: There is no abdominal tenderness. There is no guarding or rebound.      Hernia: No hernia is present.   Musculoskeletal: Normal range of motion.         General: No swelling, tenderness, deformity or signs of injury.      Right lower leg: No edema.      Left lower leg: No  edema.   Lymphadenopathy:      Cervical: No cervical adenopathy.   Skin:     General: Skin is warm and dry.      Coloration: Skin is not jaundiced or pale.      Findings: No bruising, erythema or rash.   Neurological:      General: No focal deficit present.      Mental Status: He is alert and oriented to person, place, and time. Mental status is at baseline.      Cranial Nerves: No cranial nerve deficit.      Sensory: No sensory deficit.      Motor: No weakness or abnormal muscle tone.      Coordination: Coordination normal.   Psychiatric:         Mood and Affect: Mood normal.         Behavior: Behavior normal.         Thought Content: Thought content normal.         Judgment: Judgment normal.       Procedures:  Procedure(s):  BRONCHOSCOPY with bronchio alveolar lavage of right lower lung  Thoracostomy tube placed    Results Review:     I reviewed the patient's new clinical results.    Lab Results (last 24 hours)     Procedure Component Value Units Date/Time    Blood Culture - Blood, Arm, Right [[id number]] Collected: 01/13/21 1310    Specimen: Blood from Arm, Right Updated: 01/17/21 1317     Blood Culture No growth at 4 days    Blood Culture - Blood, Arm, Right [[id number]] Collected: 01/13/21 1302    Specimen: Blood from Arm, Right Updated: 01/17/21 1317     Blood Culture No growth at 4 days    Body Fluid Culture - Body Fluid, Pleural Cavity [[id number]] Collected: 01/15/21 1509    Specimen: Body Fluid from Pleural Cavity Updated: 01/17/21 0721     Body Fluid Culture No growth at 24 hours     Gram Stain No organisms seen    Magnesium [[id number]]  (Normal) Collected: 01/17/21 0309    Specimen: Blood Updated: 01/17/21 0407     Magnesium 2.2 mg/dL     Basic Metabolic Panel [[id number]]  (Abnormal) Collected: 01/17/21 0309    Specimen: Blood Updated: 01/17/21 0407     Glucose 154 mg/dL      BUN 32 mg/dL      Creatinine 1.17 mg/dL      Sodium 134 mmol/L      Potassium 3.9 mmol/L      Comment: Slight hemolysis detected by  analyzer. Results may be affected.        Chloride 98 mmol/L      CO2 27.0 mmol/L      Calcium 8.9 mg/dL      eGFR Non African Amer 60 mL/min/1.73      BUN/Creatinine Ratio 27.4     Anion Gap 9.0 mmol/L     Narrative:      GFR Normal >60  Chronic Kidney Disease <60  Kidney Failure <15      CBC & Differential [[id number]]  (Abnormal) Collected: 01/17/21 0309    Specimen: Blood Updated: 01/17/21 0335    Narrative:      The following orders were created for panel order CBC & Differential.  Procedure                               Abnormality         Status                     ---------                               -----------         ------                     CBC Auto Differential[[id number]]        Abnormal            Final result                 Please view results for these tests on the individual orders.    CBC Auto Differential [[id number]]  (Abnormal) Collected: 01/17/21 0309    Specimen: Blood Updated: 01/17/21 0335     WBC 8.50 10*3/mm3      RBC 4.00 10*6/mm3      Hemoglobin 11.6 g/dL      Hematocrit 34.8 %      MCV 87.0 fL      MCH 29.0 pg      MCHC 33.3 g/dL      RDW 16.3 %      RDW-SD 49.4 fl      MPV 9.3 fL      Platelets 201 10*3/mm3      Neutrophil % 95.0 %      Lymphocyte % 0.9 %      Monocyte % 4.0 %      Eosinophil % 0.0 %      Basophil % 0.1 %      Neutrophils, Absolute 8.00 10*3/mm3      Lymphocytes, Absolute 0.10 10*3/mm3      Monocytes, Absolute 0.30 10*3/mm3      Eosinophils, Absolute 0.00 10*3/mm3      Basophils, Absolute 0.00 10*3/mm3      nRBC 0.0 /100 WBC         No results found for: HGBA1C  Results from last 7 days   Lab Units 01/13/21  1310   INR  1.04           Lab Results   Component Value Date    LIPASE 19 (L) 05/18/2019     Lab Results   Component Value Date    CHOL 121 07/09/2020    TRIG 75 07/09/2020    HDL 36 (L) 07/09/2020    LDL 70 07/09/2020       No results found for: INTRAOP, PREDX, FINALDX, COMDX    Microbiology Results (last 10 days)     Procedure Component Value - Date/Time     Body Fluid Culture - Body Fluid, Pleural Cavity [[id number]] Collected: 01/15/21 1509    Lab Status: Preliminary result Specimen: Body Fluid from Pleural Cavity Updated: 01/17/21 0721     Body Fluid Culture No growth at 24 hours     Gram Stain No organisms seen    AFB Culture - Body Fluid, Pleural Cavity [[id number]] Collected: 01/15/21 1509    Lab Status: Preliminary result Specimen: Body Fluid from Pleural Cavity Updated: 01/16/21 0954     AFB Stain No acid fast bacilli seen    AFB Culture - Lavage, Lung, Right Lower Lobe [[id number]] Collected: 01/14/21 1347    Lab Status: Preliminary result Specimen: Lavage from Lung, Right Lower Lobe Updated: 01/15/21 1324     AFB Stain No acid fast bacilli seen    BAL Culture, Quantitative - Lavage, Lung, Right Lower Lobe [[id number]] Collected: 01/14/21 1347    Lab Status: Final result Specimen: Lavage from Lung, Right Lower Lobe Updated: 01/16/21 1322     BAL Culture Scant growth (1+) Normal Respiratory Terri: NO S.aureus/MRSA or Pseudomonas aeruginosa     Gram Stain Rare (1+) WBCs per low power field      No organisms seen    Narrative:      Not streaked for quantitation    Respiratory Panel, PCR (WITHOUT COVID) - Lavage, Lung, Right Lower Lobe [[id number]]  (Normal) Collected: 01/14/21 1347    Lab Status: Final result Specimen: Lavage from Lung, Right Lower Lobe Updated: 01/14/21 1542     ADENOVIRUS, PCR Not Detected     Coronavirus 229E Not Detected     Coronavirus HKU1 Not Detected     Coronavirus NL63 Not Detected     Coronavirus OC43 Not Detected     Human Metapneumovirus Not Detected     Human Rhinovirus/Enterovirus Not Detected     Influenza B PCR Not Detected     Parainfluenza Virus 1 Not Detected     Parainfluenza Virus 2 Not Detected     Parainfluenza Virus 3 Not Detected     Parainfluenza Virus 4 Not Detected     Bordetella pertussis pcr Not Detected     Chlamydophila pneumoniae PCR Not Detected     Mycoplasma pneumo by PCR Not Detected     Influenza A PCR Not  Detected     RSV, PCR Not Detected     Bordetella parapertussis PCR Not Detected    Narrative:      The coronavirus on the RVP is NOT COVID-19 and is NOT indicative of infection with COVID-19.     Respiratory Panel, PCR (WITHOUT COVID) - Swab, Nasopharynx [[id number]]  (Normal) Collected: 01/13/21 1824    Lab Status: Final result Specimen: Swab from Nasopharynx Updated: 01/13/21 1940     ADENOVIRUS, PCR Not Detected     Coronavirus 229E Not Detected     Coronavirus HKU1 Not Detected     Coronavirus NL63 Not Detected     Coronavirus OC43 Not Detected     Human Metapneumovirus Not Detected     Human Rhinovirus/Enterovirus Not Detected     Influenza B PCR Not Detected     Parainfluenza Virus 1 Not Detected     Parainfluenza Virus 2 Not Detected     Parainfluenza Virus 3 Not Detected     Parainfluenza Virus 4 Not Detected     Bordetella pertussis pcr Not Detected     Chlamydophila pneumoniae PCR Not Detected     Mycoplasma pneumo by PCR Not Detected     Influenza A PCR Not Detected     RSV, PCR Not Detected     Bordetella parapertussis PCR Not Detected    Narrative:      The coronavirus on the RVP is NOT COVID-19 and is NOT indicative of infection with COVID-19.     Blood Culture - Blood, Arm, Right [[id number]] Collected: 01/13/21 1310    Lab Status: Preliminary result Specimen: Blood from Arm, Right Updated: 01/17/21 1317     Blood Culture No growth at 4 days    COVID-19,[employer] IN-HOUSE,Nasal Swab No Transport Media 3-4 HR TAT - Swab, Nasal Cavity [[id number]]  (Normal) Collected: 01/13/21 1303    Lab Status: Final result Specimen: Swab from Nasal Cavity Updated: 01/13/21 1343     COVID19 Not Detected    Narrative:      Fact sheet for providers: https://www.fda.gov/media/700745/download     Fact sheet for patients: https://www.fda.gov/media/164825/download    Test performed by PCR.    Blood Culture - Blood, Arm, Right [[id number]] Collected: 01/13/21 1302    Lab Status: Preliminary result Specimen: Blood from Arm, Right  Updated: 01/17/21 1317     Blood Culture No growth at 4 days          ECG/EMG Results (most recent)     Procedure Component Value Units Date/Time    ECG 12 Lead [[id number]] Collected: 01/14/21 0154     Updated: 01/14/21 0157     QT Interval 354 ms     Narrative:      HEART RATE= 140  bpm  RR Interval= 432  ms  ID Interval= 146  ms  P Horizontal Axis= -51  deg  P Front Axis= 41  deg  QRSD Interval= 102  ms  QT Interval= 354  ms  QRS Axis= 49  deg  T Wave Axis= 256  deg  - ABNORMAL ECG -  Sinus tachycardia  Multiform ventricular premature complexes  Repolarization abnormality, prob rate related  Prolonged QT interval  Electronically Signed By:   Date and Time of Study: 2021-01-14 01:54:00    ECG 12 Lead [[id number]] Collected: 01/13/21 1235     Updated: 01/14/21 0828     QT Interval 350 ms     Narrative:      HEART RATE= 108  bpm  RR Interval= 556  ms  ID Interval= 159  ms  P Horizontal Axis= 22  deg  P Front Axis= 44  deg  QRSD Interval= 95  ms  QT Interval= 350  ms  QRS Axis= 58  deg  T Wave Axis= 103  deg  - ABNORMAL ECG -  Sinus tachycardia  Multiple ventricular premature complexes  Probable LVH with secondary repol abnrm  Anterior Q waves, possibly due to LVH  When compared with ECG of 18-May-2019 9:03:54,  No significant change  Electronically Signed By: Dakota Rodrigues ([facility]) 14-Jan-2021 08:26:08  Date and Time of Study: 2021-01-13 12:35:42    Adult Transthoracic Echo Complete W/ Cont if Necessary Per Protocol [[id number]] Collected: 01/13/21 1727     Updated: 01/14/21 1705     BSA 2.0 m^2      RVIDd 2.1 cm      IVSd 0.9 cm      LVIDd 5.5 cm      LVIDs 5.2 cm      LVPWd 0.91 cm      IVS/LVPW 1.0     FS 5.1 %      EDV(Teich) 146.6 ml      ESV(Teich) 129.8 ml      EF(Teich) 11.5 %      EDV(cubed) 165.2 ml      ESV(cubed) 141.1 ml      EF(cubed) 14.6 %      LV mass(C)d 186.0 grams      LV mass(C)dI 93.9 grams/m^2      SV(Teich) 16.8 ml      SI(Teich) 8.5 ml/m^2      SV(cubed) 24.1 ml      SI(cubed) 12.2 ml/m^2      Ao  root diam 3.8 cm      Ao root area 11.5 cm^2      LVOT diam 2.0 cm      LVOT area 3.1 cm^2      EDV(MOD-sp4) 125.6 ml      ESV(MOD-sp4) 87.5 ml      EF(MOD-sp4) 30.3 %      SV(MOD-sp4) 38.0 ml      SI(MOD-sp4) 19.2 ml/m^2      Ao root area (BSA corrected) 1.9     LV Knight Vol (BSA corrected) 63.4 ml/m^2      LV Sys Vol (BSA corrected) 44.2 ml/m^2      Aortic R-R 0.51 sec      Aortic .5 BPM      MV V2 max 90.8 cm/sec      MV max PG 3.3 mmHg      MV V2 mean 56.8 cm/sec      MV mean PG 1.5 mmHg      MV V2 VTI 17.5 cm      MVA(VTI) 2.9 cm^2      Ao pk lamont 267.9 cm/sec      Ao max PG 28.7 mmHg      Ao max PG (full) 25.4 mmHg      Ao V2 mean 186.7 cm/sec      Ao mean PG 15.8 mmHg      Ao mean PG (full) 14.0 mmHg      Ao V2 VTI 51.9 cm      IQRA(I,A) 0.97 cm^2      IQRA(I,D) 0.97 cm^2      IQRA(V,A) 1.0 cm^2      IQRA(V,D) 1.0 cm^2      AI max lamont 389.1 cm/sec      AI max PG 60.5 mmHg      AI dec slope 287.4 cm/sec^2      AI dec time 1.4 sec      AI P1/2t 396.5 msec      LV V1 max PG 3.3 mmHg      LV V1 mean PG 1.8 mmHg      LV V1 max 90.4 cm/sec      LV V1 mean 63.5 cm/sec      LV V1 VTI 16.4 cm      MR max lamont 514.5 cm/sec      MR max .9 mmHg      CO(Ao) 70.8 l/min      CI(Ao) 35.7 l/min/m^2      SV(Ao) 597.3 ml      SI(Ao) 301.4 ml/m^2      CO(LVOT) 6.0 l/min      CI(LVOT) 3.0 l/min/m^2      SV(LVOT) 50.4 ml      SI(LVOT) 25.4 ml/m^2      PA V2 max 68.8 cm/sec      PA max PG 1.9 mmHg      PA max PG (full) -0.6 mmHg      PA V2 mean 47.5 cm/sec      PA mean PG 1.0 mmHg      PA mean PG (full) -0.25 mmHg      PA V2 VTI 14.3 cm      RV V1 max PG 2.5 mmHg      RV V1 mean PG 1.3 mmHg      RV V1 max 78.9 cm/sec      RV V1 mean 52.8 cm/sec      RV V1 VTI 15.3 cm      TR max lamont 291.0 cm/sec      RVSP(TR) 36.9 mmHg      RAP systole 3.0 mmHg       CV ECHO KATT - BZI_BMI 25.4 kilograms/m^2       CV ECHO KATT - BSA(HAYCOCK) 2.0 m^2       CV ECHO KATT - BZI_METRIC_WEIGHT 80.3 kg       CV ECHO KATT - BZI_METRIC_HEIGHT  177.8 cm      LA dimension(2D) 3.3 cm     Narrative:      · Left ventricular ejection fraction appears to be 21 - 25%.  · Severe mitral valve regurgitation is present.  · Mild dilation of the aortic root is present.  · There is a large (>2cm) pericardial effusion adjacent to the right   atrium.  · The following left ventricular wall segments are hypokinetic: mid   anterior, apical anterior, basal anterolateral, mid anterolateral, apical   lateral, basal inferolateral, mid inferolateral, apical inferior, mid   inferior, apical septal, basal inferoseptal, mid inferoseptal, apex   hypokinetic, mid anteroseptal, basal anterior, basal inferior and basal   inferoseptal.  · No pericardial tamponade noted       ECG 12 Lead [[id number]] Collected: 01/15/21 0549     Updated: 01/15/21 0550     QT Interval 380 ms     Narrative:      HEART RATE= 92  bpm  RR Interval= 651  ms  VA Interval=   ms  P Horizontal Axis=   deg  P Front Axis=   deg  QRSD Interval= 102  ms  QT Interval= 380  ms  QRS Axis= 64  deg  T Wave Axis= 249  deg  - ABNORMAL ECG -  Atrial fibrillation  Probable left ventricular hypertrophy  Nonspecific T abnormalities, inferior leads  Electronically Signed By:   Date and Time of Study: 2021-01-15 05:49:00    ECG 12 Lead [[id number]] Collected: 01/16/21 0533     Updated: 01/16/21 0535     QT Interval 356 ms     Narrative:      HEART RATE= 123  bpm  RR Interval= 487  ms  VA Interval=   ms  P Horizontal Axis=   deg  P Front Axis=   deg  QRSD Interval= 91  ms  QT Interval= 356  ms  QRS Axis= 86  deg  T Wave Axis= -86  deg  - ABNORMAL ECG -  Atrial fibrillation  Anterior infarct, old  Repol abnrm suggests ischemia, inferior leads  Borderline ST elevation, lateral leads  Prolonged QT interval  Electronically Signed By:   Date and Time of Study: 2021-01-16 05:33:41    ECG 12 Lead [[id number]] Collected: 01/17/21 0612     Updated: 01/17/21 0614     QT Interval 418 ms     Narrative:      HEART RATE= 106  bpm  RR Interval= 567   ms  KS Interval=   ms  P Horizontal Axis=   deg  P Front Axis=   deg  QRSD Interval= 105  ms  QT Interval= 418  ms  QRS Axis= 67  deg  T Wave Axis= 260  deg  - ABNORMAL ECG -  Atrial fibrillation  Probable LVH with secondary repol abnrm  Prolonged QT interval  When compared with ECG of 16-Jan-2021 5:33:41,  Significant repolarization change  Significant axis, voltage or hypertrophy change  Electronically Signed By:   Date and Time of Study: 2021-01-17 06:12:07           Results for orders placed during the hospital encounter of 01/13/21   Adult Transthoracic Echo Complete W/ Cont if Necessary Per Protocol    Narrative · Left ventricular ejection fraction appears to be 21 - 25%.  · Severe mitral valve regurgitation is present.  · Mild dilation of the aortic root is present.  · There is a large (>2cm) pericardial effusion adjacent to the right   atrium.  · The following left ventricular wall segments are hypokinetic: mid   anterior, apical anterior, basal anterolateral, mid anterolateral, apical   lateral, basal inferolateral, mid inferolateral, apical inferior, mid   inferior, apical septal, basal inferoseptal, mid inferoseptal, apex   hypokinetic, mid anteroseptal, basal anterior, basal inferior and basal   inferoseptal.  · No pericardial tamponade noted        Xr Chest 1 View    Result Date: 1/17/2021  1.Right basilar chest tube appears in similar position. No significant pneumothorax or pleural effusion is seen. 2.Postsurgical/post therapy changes in the right lung, as before.  Electronically Signed By-Brant Dao MD On:1/17/2021 8:34 AM This report was finalized on [id number] by  Brant Dao MD.    Xr Chest 1 View    Result Date: 1/16/2021   1. Improved aeration with small caliber chest tube in the right lung base. A pneumothorax or pleural effusion is not demonstrated. 2. Persistent postsurgical and treatment related changes in the right superhilar region, this is likely treatment related changes, is  similar radiographically when compared with the study from 08/23/2018.  Electronically Signed By-Donny Abdi DO On:1/16/2021 10:59 AM This report was finalized on [id number] by  Donny Abdi DO.    Xr Chest 1 View    Result Date: 1/15/2021  Significantly improved aeration in the right hemithorax, that is post pleural drain placement. Mild residual atelectasis remains. No visible pleural fluid or pneumothorax.  Electronically Signed By-Maura Esteves MD On:1/15/2021 3:10 PM This report was finalized on [id number] by  Maura Esteves MD.    Xr Chest 1 View    Result Date: 1/15/2021   1. Moderate-large sized infiltrate pleural effusion involving the right midlung zone right lower lobe lung has improved slightly since previous study performed on 01/13/2021  Electronically Signed By-Baron Diggs MD On:1/15/2021 7:53 AM This report was finalized on [id number] by  Baron Diggs MD.    Xr Chest 1 View    Result Date: 1/13/2021   1. Interval development of a moderate-sized right pleural effusion. 2. Worsening consolidation in the right lung particularly in the mid and lower lung zone. This is likely due to a combination of compressive atelectasis with superimposed pneumonia. 3. Masslike area of consolidation has been present the right hilar region on previous imaging studies consistent with treated lung cancer.  Electronically Signed By-Kel Fair MD On:1/13/2021 2:19 PM This report was finalized on [id number] by  Kel Fair MD.    Us Aorta Limited    Result Date: 1/14/2021  1. Redemonstration of a distal abdominal aortic aneurysm measuring 5.3 x 4.9 cm. This is very similar in size to that described on May 18, 2019. 2. Severe atherosclerotic disease. 3. There are limitations to this study secondary to overlying bowel gas. Slot 63 Electronically signed by:  Vladislav Rich M.D.  1/14/2021 10:17 PM  Xrays, labs reviewed personally by physician.    Medication Review:   I have reviewed the  patient's current medication list    Scheduled Meds  budesonide, 0.5 mg, Nebulization, BID - RT  cefepime, 2 g, Intravenous, Q12H  digoxin, 125 mcg, Oral, Daily  dilTIAZem, 30 mg, Oral, Q6H  enoxaparin, 1 mg/kg, Subcutaneous, Q12H  finasteride, 5 mg, Oral, Daily  guaiFENesin, 1,200 mg, Oral, Q12H  ipratropium-albuterol, 3 mL, Nebulization, Q4H - RT  methylPREDNISolone sodium succinate, 40 mg, Intravenous, Q8H  metoprolol tartrate, 25 mg, Oral, Q8H  multivitamin with minerals, 1 tablet, Oral, Daily  rosuvastatin, 40 mg, Oral, Nightly  sodium chloride, 10 mL, Intravenous, Q12H  tamsulosin, 0.4 mg, Oral, Daily With Dinner  theophylline, 300 mg, Oral, Daily        Meds Infusions  amiodarone, 1 mg/min, Last Rate: 1 mg/min (01/17/21 0406)        Meds PRN  •  acetaminophen **OR** acetaminophen **OR** acetaminophen  •  benzonatate  •  HYDROcodone-acetaminophen  •  HYDROcodone-acetaminophen  •  ipratropium-albuterol  •  magnesium hydroxide  •  magnesium sulfate **OR** magnesium sulfate in D5W 1g/100mL (PREMIX)  •  nitroglycerin  •  ondansetron **OR** ondansetron  •  potassium chloride  •  [COMPLETED] Insert peripheral IV **AND** sodium chloride  •  sodium chloride        Assessment/Plan   Assessment/Plan     Active Hospital Problems    Diagnosis  POA   • **Acute respiratory distress [R06.03]  Yes   • Chest pain [R07.9]  Yes   • Atrial fibrillation with RVR (CMS/HCC) [I48.91]  Yes   • BPH without obstruction/lower urinary tract symptoms [N40.0]  Yes   • History of lung cancer [Z85.118]  Not Applicable   • COPD exacerbation (CMS/HCC) [J44.1]  Yes   • Malignant 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       MEDICAL DECISION MAKING COMPLEXITY BY PROBLEM:   1.  Acute  respiratory distress  -This is likely related to the patient's large right-sided pleural effusion  -Pulmonary consult was obtained and the patient underwent bronchoscopy today       -We will defer to pulmonary on 1 to discontinue the chest tube.  -Patient had copious amounts of secretions suctioned out of his airway.  -Continue empiric antibiotics  -Await culture results and cytology    2.  Atrial fibrillation with RVR  -EKG shows atrial fibrillation with RVR  -Patient remains on an amiodarone drip  -Rate has now come down to 70-90.  -Consult cardiology pending       -Given the complexity of the patient's situation and need for additional procedures will leave decision about anticoagulation to cardiology.       -Cardiology has decided on full dose Lovenox and this has been started.    3.  Acute chest pain  -Serial troponins negative  -Resolution of chest pain  -Current chest pain seems to be associated with a right thoracostomy tube.    4.  Coronary artery disease  -Patient has had several stents placed in the past  -Cardiology consulted    5.  History of lung cancer  -Status post right upper lobe resection       -This was followed by radiation as well as chemotherapy  -If pleural effusion does not improve after therapeutic bronchoscopy consider thoracentesis sending the pleural fluid for cytology       -Patient has a thoracostomy tube in place that is draining serosanguineous fluid.  It is hooked up to suction.  -Await cytology on the pleural fluid initially obtained with placement of the chest tube.    6.  Abdominal aortic aneurysm  -Patient was having this followed but has missed his appointment last year and requests having the study done while he is here.  Given that he may need anticoagulation will need to know the status of this as well.  -The patient had an abdominal ultrasound and his aneurysm is 5.3 x 4.9 cm which is very similar in size to see what was seen in May 2019.  Therefore we will have the patient  follow-up as an outpatient with his vascular surgeon.    7.  Bilateral prostatic hypertrophy  -Continue Flomax and Proscar    VTE Prophylaxis -   Mechanical Order History:      Ordered        01/13/21 1708  Place Sequential Compression Device  Once         01/13/21 1708  Maintain Sequential Compression Device  Continuous                 Pharmalogical Order History:      Ordered     Dose Route Frequency Stop    01/13/21 1502  enoxaparin (LOVENOX) syringe 80 mg      1 mg/kg SC Once 01/13/21 1520              Code Status -   Code Status and Medical Interventions:   Ordered at: 01/13/21 1610     Limited Support to NOT Include:    Intubation     Level Of Support Discussed With:    Patient     Code Status:    CPR     Medical Interventions (Level of Support Prior to Arrest):    Limited     This patient has been examined wearing appropriate Personal Protective Equipment and discussed with hospital infection control department. 01/17/21    Discharge Planning  To be determined    Electronically signed by Diane Tamez MD, 01/17/21, 14:55 EST.  [facility] Hospitalist Team

## 2021-01-17 NOTE — PROGRESS NOTES
Referring Provider: Hospitalist    Reason for follow-up: Shortness of breath     Patient Care Team:  Camron Pollard MD as PCP - General (Family Medicine)  Alvarado Cai MD as Consulting Physician (Cardiology)    Subjective .  Shortness of breath is slowly improving    Objective Sitting up in chair     Review of Systems   Constitution: Negative for chills and fever.   HENT: Negative for ear discharge and nosebleeds.    Eyes: Negative for discharge and redness.   Cardiovascular: Negative for chest pain, orthopnea, palpitations, paroxysmal nocturnal dyspnea and syncope.   Respiratory: Positive for shortness of breath. Negative for cough and wheezing.    Endocrine: Negative for heat intolerance.   Skin: Negative for rash.   Musculoskeletal: Negative for arthritis and myalgias.   Gastrointestinal: Negative for abdominal pain, melena, nausea and vomiting.   Genitourinary: Negative for dysuria and hematuria.   Neurological: Negative for dizziness, light-headedness, numbness and tremors.   Psychiatric/Behavioral: Negative for depression. The patient is not nervous/anxious.        Ativan [lorazepam]    Scheduled Meds:budesonide, 0.5 mg, Nebulization, BID - RT  cefepime, 2 g, Intravenous, Q12H  digoxin, 125 mcg, Oral, Daily  dilTIAZem, 30 mg, Oral, Q6H  enoxaparin, 1 mg/kg, Subcutaneous, Q12H  finasteride, 5 mg, Oral, Daily  guaiFENesin, 1,200 mg, Oral, Q12H  ipratropium-albuterol, 3 mL, Nebulization, Q4H - RT  methylPREDNISolone sodium succinate, 40 mg, Intravenous, Q8H  metoprolol tartrate, 25 mg, Oral, Q8H  multivitamin with minerals, 1 tablet, Oral, Daily  rosuvastatin, 40 mg, Oral, Nightly  sodium chloride, 10 mL, Intravenous, Q12H  tamsulosin, 0.4 mg, Oral, Daily With Dinner  theophylline, 300 mg, Oral, Daily      Continuous Infusions:amiodarone, 1 mg/min, Last Rate: 1 mg/min (01/17/21 0406)      PRN Meds:.•  acetaminophen **OR** acetaminophen **OR** acetaminophen  •  benzonatate  •   "HYDROcodone-acetaminophen  •  HYDROcodone-acetaminophen  •  ipratropium-albuterol  •  magnesium hydroxide  •  magnesium sulfate **OR** magnesium sulfate in D5W 1g/100mL (PREMIX)  •  nitroglycerin  •  ondansetron **OR** ondansetron  •  potassium chloride  •  [COMPLETED] Insert peripheral IV **AND** sodium chloride  •  sodium chloride        VITAL SIGNS  Vitals:    01/17/21 0706 01/17/21 1011 01/17/21 1045 01/17/21 1049   BP:  93/49     BP Location:       Patient Position:       Pulse: 106 118 106 108   Resp: 18 18 18 18   Temp:  97.7 °F (36.5 °C)     TempSrc:  Oral     SpO2: 100% 99% 99%    Weight:       Height:           Flowsheet Rows      First Filed Value   Admission Height  177.8 cm (70\") Documented at 01/13/2021 1222   Admission Weight  80.6 kg (177 lb 11.1 oz) Documented at 01/13/2021 1222           TELEMETRY: Atrial fibrillation with rapid response    Physical Exam:  Constitutional:       Appearance: Well-developed.   Eyes:      General: No scleral icterus.        Right eye: No discharge.   HENT:      Head: Normocephalic and atraumatic.   Neck:      Thyroid: No thyromegaly.      Lymphadenopathy: No cervical adenopathy.   Pulmonary:      Effort: Pulmonary effort is normal. No respiratory distress.      Breath sounds: No wheezing. Rhonchi present. No rales.   Cardiovascular:      Normal rate. Irregularly irregular rhythm.      No gallop.   Abdominal:      Tenderness: There is no abdominal tenderness.   Skin:     Findings: No erythema or rash.   Neurological:      Mental Status: Alert and oriented to person, place, and time.          Results Review:   I reviewed the patient's new clinical results.  Lab Results (last 24 hours)     Procedure Component Value Units Date/Time    Blood Culture - Blood, Arm, Right [[id number]] Collected: 01/13/21 1310    Specimen: Blood from Arm, Right Updated: 01/17/21 1317     Blood Culture No growth at 4 days    Blood Culture - Blood, Arm, Right [[id number]] Collected: 01/13/21 1302 "    Specimen: Blood from Arm, Right Updated: 01/17/21 1317     Blood Culture No growth at 4 days    Body Fluid Culture - Body Fluid, Pleural Cavity [[id number]] Collected: 01/15/21 1509    Specimen: Body Fluid from Pleural Cavity Updated: 01/17/21 0721     Body Fluid Culture No growth at 24 hours     Gram Stain No organisms seen    Magnesium [[id number]]  (Normal) Collected: 01/17/21 0309    Specimen: Blood Updated: 01/17/21 0407     Magnesium 2.2 mg/dL     Basic Metabolic Panel [[id number]]  (Abnormal) Collected: 01/17/21 0309    Specimen: Blood Updated: 01/17/21 0407     Glucose 154 mg/dL      BUN 32 mg/dL      Creatinine 1.17 mg/dL      Sodium 134 mmol/L      Potassium 3.9 mmol/L      Comment: Slight hemolysis detected by analyzer. Results may be affected.        Chloride 98 mmol/L      CO2 27.0 mmol/L      Calcium 8.9 mg/dL      eGFR Non African Amer 60 mL/min/1.73      BUN/Creatinine Ratio 27.4     Anion Gap 9.0 mmol/L     Narrative:      GFR Normal >60  Chronic Kidney Disease <60  Kidney Failure <15      CBC & Differential [[id number]]  (Abnormal) Collected: 01/17/21 0309    Specimen: Blood Updated: 01/17/21 0335    Narrative:      The following orders were created for panel order CBC & Differential.  Procedure                               Abnormality         Status                     ---------                               -----------         ------                     CBC Auto Differential[[id number]]        Abnormal            Final result                 Please view results for these tests on the individual orders.    CBC Auto Differential [[id number]]  (Abnormal) Collected: 01/17/21 0309    Specimen: Blood Updated: 01/17/21 0335     WBC 8.50 10*3/mm3      RBC 4.00 10*6/mm3      Hemoglobin 11.6 g/dL      Hematocrit 34.8 %      MCV 87.0 fL      MCH 29.0 pg      MCHC 33.3 g/dL      RDW 16.3 %      RDW-SD 49.4 fl      MPV 9.3 fL      Platelets 201 10*3/mm3      Neutrophil % 95.0 %      Lymphocyte % 0.9 %       Monocyte % 4.0 %      Eosinophil % 0.0 %      Basophil % 0.1 %      Neutrophils, Absolute 8.00 10*3/mm3      Lymphocytes, Absolute 0.10 10*3/mm3      Monocytes, Absolute 0.30 10*3/mm3      Eosinophils, Absolute 0.00 10*3/mm3      Basophils, Absolute 0.00 10*3/mm3      nRBC 0.0 /100 WBC           Imaging Results (Last 24 Hours)     Procedure Component Value Units Date/Time    XR Chest 1 View [[id number]] Collected: 01/17/21 0832     Updated: 01/17/21 0836    Narrative:      DATE OF EXAM:  1/17/2021 2:50 AM     PROCEDURE:  XR CHEST 1 VW-     INDICATIONS:  chest catheter; J44.1-Chronic obstructive pulmonary disease with (acute)  exacerbation; R06.03-Acute respiratory distress; I48.91-Unspecified  atrial fibrillation; N94-Uhtylpx effusion, not elsewhere classified;  C34.11-Malignant neoplasm of upper lobe, right bronchus or lung     COMPARISON:  01/16/2021     TECHNIQUE:   Single radiographic view of the chest was obtained.     FINDINGS:  There is a right basilar chest tube in similar position. No significant  pneumothorax or pleural effusion is seen.  There is elevation of the  right diaphragm with postsurgical changes of right lobectomy, as before.  There are suspected postsurgical and posttreatment changes at the right  hilum, similar to recent prior exams.  No definite new left lung  infiltrate is seen. Heart size and mediastinal contour appear within  normal limits.       Impression:      1.Right basilar chest tube appears in similar position. No significant  pneumothorax or pleural effusion is seen.  2.Postsurgical/post therapy changes in the right lung, as before.     Electronically Signed By-Brant Dao MD On:1/17/2021 8:34 AM  This report was finalized on [id number] by  Brant Dao MD.          EKG      I personally viewed and interpreted the patient's EKG/Telemetry data:    ECHOCARDIOGRAM:    STRESS MYOVIEW:    CARDIAC CATHETERIZATION:    OTHER:         Assessment/Plan     Principal Problem:    Acute  respiratory distress  Active Problems:    Chronic obstructive pulmonary disease (CMS/HCC)    Coronary artery disease    Hyperlipidemia    Hypertension    Chest pain    Atrial fibrillation with RVR (CMS/HCC)    BPH without obstruction/lower urinary tract symptoms    History of lung cancer    COPD exacerbation (CMS/HCC)    Malignant neoplasm of upper lobe of right lung (CMS/HCC)       Assessment:    Afib RVR  HFrEF  Pericardial Effusion  Hx Lung CA    Plan:    Patient presented with shortness of breath and had elevated lactate level and BNP level  Patient had an echocardiogram which showed LV systolic dysfunction with an EF of 20 to 25%  Patient also has pericardial effusion  Patient is currently on digoxin and diuretics.  He is not on beta-blocker because of low blood pressure.       Patient also has history of lung cancer with pleural effusion and is followed by the pulmonologist     Patient is sitting up in chair.  Heart rate is still elevated.  I would increase Lopressor to 25 mg every 8.  Cardizem would be increased to 30 mg every 6.  Cardizem CD would be discontinued.  Patient still have chest tube       Daniel Rahman MD  01/17/21  14:13 EST

## 2021-01-17 NOTE — NURSING NOTE
Patient has c/o blood in urine. Patient dribbled some bloody urine. Bladder scanned patient.patient had greater than 300 in bladder and per urology placed dawkins cath.

## 2021-01-17 NOTE — NURSING NOTE
Pt has difficulty urinating. bladder scan  was more 500 ml. Straight cath was initiated - output was 650 ml. Per patient he feels a lot better.

## 2021-01-17 NOTE — PLAN OF CARE
Goal Outcome Evaluation:  Plan of Care Reviewed With: patient  Progress: improving    Patient complains of difficulty urinating. Patient was bladder scanned as well as Straight Cath with good outcome (650 ml) He still Afib on the monitor with intermittent RVR. Currently on Amiodarone Drip. Will continue to monitor.

## 2021-01-17 NOTE — PROGRESS NOTES
Daily Progress Note        Acute respiratory distress    Chronic obstructive pulmonary disease (CMS/HCC)    Coronary artery disease    Hyperlipidemia    Hypertension    Chest pain    Atrial fibrillation with RVR (CMS/HCC)    BPH without obstruction/lower urinary tract symptoms    History of lung cancer    COPD exacerbation (CMS/HCC)    Malignant neoplasm of upper lobe of right lung (CMS/HCC)      Assessment    Worsening right hilar alveolar infiltrate possible pneumonia versus recurrent malignancy    Bronchoscopy completed 1/14/2021  Copious amount of mucopurulent secretions were noted especially from the right lower lobe area and suctioned therapeutically  No endobronchial masses  Minor bleed which resolved with suctioning     New onset large loculated right pleural effusion  Status post ultrasound-guided chest catheter placement with drainage of 2400 cc of serosanguineous fluid     COVID-19 test is negative     h/o lung ca s/p RUL lobectomy, last CT 9/2018 with R perihilar density, repeat CT scan 01/23/2020 no change in right upper lobe posterior hilar density compatible with radiation pneumonitis     COPD  ex-smoker   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 fib with RVR       Plan    Cont Chest catheter to suction  Check cytology    Antibiotics empirically on cefepime  Check BAL culture    Steroids IV Solu-Medrol 40 mg every 8      CT scan 1/12/2021 at priority radiology                LOS: 3 days     Subjective         Objective     Vital signs for last 24 hours:  Vitals:    01/16/21 1722 01/16/21 1902 01/16/21 1907 01/16/21 2053   BP: 128/53   138/57   BP Location:       Patient Position:       Pulse: (!) 128 109 110 118   Resp: 20 20 18    Temp: 97.5 °F (36.4 °C)      TempSrc:       SpO2: 99% 96% 96%    Weight:       Height:           Intake/Output last 3 shifts:  I/O last 3 completed shifts:  In: 1520 [P.O.:1520]  Out: 2700 [Urine:800; Chest Tube:1900]  Intake/Output this shift:  No intake/output data  recorded.      Radiology  Imaging Results (Last 24 Hours)     Procedure Component Value Units Date/Time    XR Chest 1 View [[id number]] Collected: 01/16/21 1054     Updated: 01/16/21 1101    Narrative:      XR CHEST 1 VW-     Date of Exam: 1/16/2021 4:14 AM     Indication: chest catheter; J44.1-Chronic obstructive pulmonary disease  with (acute) exacerbation; R06.03-Acute respiratory distress;  I48.91-Unspecified atrial fibrillation; L49-Ybzuryy effusion, not  elsewhere classified; C34.11-Malignant neoplasm of upper lobe, right  bronchus or lung     Comparison: 1/15/2021 x 2, 01/13/2021, 08/23/2018.     Technique: 1 view(s) of the chest were obtained.     FINDINGS: Small-caliber chest tube is present in the right lung  base. There is improved aeration. No pleural effusion or pneumothorax is  demonstrated. There is hilar retraction and ulna with postsurgical  changes similar to prior studies this patient with a history of treated  lung cancer. No acute infiltrates. Heart size and mediastinal  silhouettes are unremarkable. Pulmonary vascularity is normal. The  trachea is midline. Bony thorax is intact.       Impression:         1. Improved aeration with small caliber chest tube in the right lung  base. A pneumothorax or pleural effusion is not demonstrated.  2. Persistent postsurgical and treatment related changes in the right  superhilar region, this is likely treatment related changes, is similar  radiographically when compared with the study from 08/23/2018.     Electronically Signed By-Donny Abdi DO On:1/16/2021 10:59 AM  This report was finalized on [id number] by  Donny Abdi DO.          Labs:  Results from last 7 days   Lab Units 01/16/21  0307   WBC 10*3/mm3 12.50*   HEMOGLOBIN g/dL 11.8*   HEMATOCRIT % 36.3*   PLATELETS 10*3/mm3 210     Results from last 7 days   Lab Units 01/16/21  0307  01/13/21  1302   SODIUM mmol/L 138   < > 140   POTASSIUM mmol/L 4.1   < > 4.1   CHLORIDE mmol/L 101   < > 104    CO2 mmol/L 28.0   < > 25.0   BUN mg/dL 38*   < > 11   CREATININE mg/dL 1.30*   < > 0.92   CALCIUM mg/dL 8.9   < > 9.1   BILIRUBIN mg/dL  --   --  0.6   ALK PHOS U/L  --   --  67   ALT (SGPT) U/L  --   --  22   AST (SGOT) U/L  --   --  26   GLUCOSE mg/dL 187*   < > 102*    < > = values in this interval not displayed.         Results from last 7 days   Lab Units 01/13/21  1302   ALBUMIN g/dL 3.90     Results from last 7 days   Lab Units 01/14/21  0134 01/13/21  2202 01/13/21  1725   TROPONIN T ng/mL <0.010 0.016 0.025         Results from last 7 days   Lab Units 01/16/21  0307   MAGNESIUM mg/dL 2.2     Results from last 7 days   Lab Units 01/13/21  1310 01/13/21  1302   INR  1.04  --    APTT seconds  --  23.9*               Meds:   SCHEDULE  budesonide, 0.5 mg, Nebulization, BID - RT  cefepime, 2 g, Intravenous, Q12H  digoxin, 125 mcg, Oral, Daily  dilTIAZem CD, 120 mg, Oral, Q24H  enoxaparin, 1 mg/kg, Subcutaneous, Q12H  finasteride, 5 mg, Oral, Daily  guaiFENesin, 1,200 mg, Oral, Q12H  ipratropium-albuterol, 3 mL, Nebulization, Q4H - RT  methylPREDNISolone sodium succinate, 40 mg, Intravenous, Q8H  metoprolol tartrate, 25 mg, Oral, Q12H  multivitamin with minerals, 1 tablet, Oral, Daily  rosuvastatin, 40 mg, Oral, Nightly  sodium chloride, 10 mL, Intravenous, Q12H  tamsulosin, 0.4 mg, Oral, Daily With Dinner  theophylline, 300 mg, Oral, Daily      Infusions  amiodarone, 1 mg/min, Last Rate: 1 mg/min (01/16/21 1914)  sodium chloride, 75 mL/hr, Last Rate: Stopped (01/15/21 0900)      PRNs  •  acetaminophen **OR** acetaminophen **OR** acetaminophen  •  benzonatate  •  HYDROcodone-acetaminophen  •  HYDROcodone-acetaminophen  •  ipratropium-albuterol  •  magnesium hydroxide  •  magnesium sulfate **OR** magnesium sulfate in D5W 1g/100mL (PREMIX)  •  nitroglycerin  •  ondansetron **OR** ondansetron  •  potassium chloride  •  [COMPLETED] Insert peripheral IV **AND** sodium chloride  •  sodium chloride    Physical  Exam:  Physical Exam  Pulmonary:      Breath sounds: Rhonchi and rales present.         ROS  Review of Systems   Respiratory: Positive for cough and shortness of breath.              Total time spent with patient greater than: 45 Minutes

## 2021-01-18 ENCOUNTER — APPOINTMENT (OUTPATIENT)
Dept: GENERAL RADIOLOGY | Facility: HOSPITAL | Age: 81
End: 2021-01-18

## 2021-01-18 LAB
ANION GAP SERPL CALCULATED.3IONS-SCNC: 8 MMOL/L (ref 5–15)
BACTERIA SPEC AEROBE CULT: NORMAL
BACTERIA SPEC AEROBE CULT: NORMAL
BASOPHILS # BLD AUTO: 0 10*3/MM3 (ref 0–0.2)
BASOPHILS NFR BLD AUTO: 0.2 % (ref 0–1.5)
BUN SERPL-MCNC: 31 MG/DL (ref 8–23)
BUN/CREAT SERPL: 27.9 (ref 7–25)
CALCIUM SPEC-SCNC: 8.6 MG/DL (ref 8.6–10.5)
CHLORIDE SERPL-SCNC: 100 MMOL/L (ref 98–107)
CMV DNA SPEC QL NAA+PROBE: NEGATIVE
CO2 SERPL-SCNC: 27 MMOL/L (ref 22–29)
CREAT SERPL-MCNC: 1.11 MG/DL (ref 0.76–1.27)
DEPRECATED RDW RBC AUTO: 50.8 FL (ref 37–54)
EOSINOPHIL # BLD AUTO: 0 10*3/MM3 (ref 0–0.4)
EOSINOPHIL NFR BLD AUTO: 0 % (ref 0.3–6.2)
ERYTHROCYTE [DISTWIDTH] IN BLOOD BY AUTOMATED COUNT: 16.4 % (ref 12.3–15.4)
GFR SERPL CREATININE-BSD FRML MDRD: 64 ML/MIN/1.73
GLUCOSE SERPL-MCNC: 154 MG/DL (ref 65–99)
HCT VFR BLD AUTO: 34.6 % (ref 37.5–51)
HGB BLD-MCNC: 11.5 G/DL (ref 13–17.7)
LAB AP CASE REPORT: NORMAL
LAB AP CLINICAL INFORMATION: NORMAL
LAB AP FLOW CYTOMETRY SUMMARY: NORMAL
LYMPHOCYTES # BLD AUTO: 0.1 10*3/MM3 (ref 0.7–3.1)
LYMPHOCYTES NFR BLD AUTO: 1 % (ref 19.6–45.3)
MAGNESIUM SERPL-MCNC: 2.2 MG/DL (ref 1.6–2.4)
MCH RBC QN AUTO: 29.1 PG (ref 26.6–33)
MCHC RBC AUTO-ENTMCNC: 33.2 G/DL (ref 31.5–35.7)
MCV RBC AUTO: 87.8 FL (ref 79–97)
MONOCYTES # BLD AUTO: 0.4 10*3/MM3 (ref 0.1–0.9)
MONOCYTES NFR BLD AUTO: 4.3 % (ref 5–12)
NEUTROPHILS NFR BLD AUTO: 8.3 10*3/MM3 (ref 1.7–7)
NEUTROPHILS NFR BLD AUTO: 94.5 % (ref 42.7–76)
NRBC BLD AUTO-RTO: 0.2 /100 WBC (ref 0–0.2)
PATH REPORT.FINAL DX SPEC: NORMAL
PATH REPORT.GROSS SPEC: NORMAL
PLATELET # BLD AUTO: 206 10*3/MM3 (ref 140–450)
PMV BLD AUTO: 9.1 FL (ref 6–12)
POTASSIUM SERPL-SCNC: 3.9 MMOL/L (ref 3.5–5.2)
RBC # BLD AUTO: 3.94 10*6/MM3 (ref 4.14–5.8)
REF LAB TEST METHOD: NORMAL
SODIUM SERPL-SCNC: 135 MMOL/L (ref 136–145)
SPECIMEN SOURCE: NORMAL
WBC # BLD AUTO: 8.8 10*3/MM3 (ref 3.4–10.8)

## 2021-01-18 PROCEDURE — 25010000002 AMIODARONE PER 30 MG: Performed by: INTERNAL MEDICINE

## 2021-01-18 PROCEDURE — 25010000002 ENOXAPARIN PER 10 MG: Performed by: INTERNAL MEDICINE

## 2021-01-18 PROCEDURE — 94799 UNLISTED PULMONARY SVC/PX: CPT

## 2021-01-18 PROCEDURE — 83735 ASSAY OF MAGNESIUM: CPT | Performed by: INTERNAL MEDICINE

## 2021-01-18 PROCEDURE — 85025 COMPLETE CBC W/AUTO DIFF WBC: CPT | Performed by: INTERNAL MEDICINE

## 2021-01-18 PROCEDURE — 0TJB8ZZ INSPECTION OF BLADDER, VIA NATURAL OR ARTIFICIAL OPENING ENDOSCOPIC: ICD-10-PCS | Performed by: INTERNAL MEDICINE

## 2021-01-18 PROCEDURE — 71045 X-RAY EXAM CHEST 1 VIEW: CPT

## 2021-01-18 PROCEDURE — 80048 BASIC METABOLIC PNL TOTAL CA: CPT | Performed by: INTERNAL MEDICINE

## 2021-01-18 PROCEDURE — 25010000002 METHYLPREDNISOLONE PER 40 MG: Performed by: INTERNAL MEDICINE

## 2021-01-18 PROCEDURE — 25010000002 CEFTRIAXONE PER 250 MG: Performed by: INTERNAL MEDICINE

## 2021-01-18 PROCEDURE — 99232 SBSQ HOSP IP/OBS MODERATE 35: CPT | Performed by: INTERNAL MEDICINE

## 2021-01-18 PROCEDURE — 25010000002 CEFEPIME PER 500 MG: Performed by: INTERNAL MEDICINE

## 2021-01-18 PROCEDURE — 97110 THERAPEUTIC EXERCISES: CPT

## 2021-01-18 PROCEDURE — 99232 SBSQ HOSP IP/OBS MODERATE 35: CPT | Performed by: HOSPITALIST

## 2021-01-18 RX ORDER — AMIODARONE HYDROCHLORIDE 200 MG/1
200 TABLET ORAL 2 TIMES DAILY WITH MEALS
Status: DISCONTINUED | OUTPATIENT
Start: 2021-01-18 | End: 2021-01-18

## 2021-01-18 RX ORDER — POLYETHYLENE GLYCOL 3350 17 G/17G
17 POWDER, FOR SOLUTION ORAL 2 TIMES DAILY
Status: DISCONTINUED | OUTPATIENT
Start: 2021-01-18 | End: 2021-01-22 | Stop reason: HOSPADM

## 2021-01-18 RX ORDER — AMIODARONE HYDROCHLORIDE 200 MG/1
200 TABLET ORAL 2 TIMES DAILY WITH MEALS
Status: DISCONTINUED | OUTPATIENT
Start: 2021-01-18 | End: 2021-01-22 | Stop reason: HOSPADM

## 2021-01-18 RX ORDER — BISACODYL 10 MG
10 SUPPOSITORY, RECTAL RECTAL DAILY
Status: DISCONTINUED | OUTPATIENT
Start: 2021-01-18 | End: 2021-01-22 | Stop reason: HOSPADM

## 2021-01-18 RX ADMIN — DILTIAZEM HYDROCHLORIDE 30 MG: 30 TABLET, FILM COATED ORAL at 12:30

## 2021-01-18 RX ADMIN — METOPROLOL TARTRATE 25 MG: 25 TABLET, FILM COATED ORAL at 14:23

## 2021-01-18 RX ADMIN — GUAIFENESIN 1200 MG: 600 TABLET, EXTENDED RELEASE ORAL at 05:24

## 2021-01-18 RX ADMIN — ROSUVASTATIN CALCIUM 40 MG: 10 TABLET, FILM COATED ORAL at 22:44

## 2021-01-18 RX ADMIN — POLYETHYLENE GLYCOL 3350 17 G: 17 POWDER, FOR SOLUTION ORAL at 11:41

## 2021-01-18 RX ADMIN — METHYLPREDNISOLONE SODIUM SUCCINATE 40 MG: 40 INJECTION, POWDER, FOR SOLUTION INTRAMUSCULAR; INTRAVENOUS at 22:42

## 2021-01-18 RX ADMIN — AMIODARONE HYDROCHLORIDE 1 MG/MIN: 50 INJECTION, SOLUTION INTRAVENOUS at 11:21

## 2021-01-18 RX ADMIN — DIGOXIN 125 MCG: 125 TABLET ORAL at 09:43

## 2021-01-18 RX ADMIN — FINASTERIDE 5 MG: 5 TABLET, FILM COATED ORAL at 09:43

## 2021-01-18 RX ADMIN — METHYLPREDNISOLONE SODIUM SUCCINATE 40 MG: 40 INJECTION, POWDER, FOR SOLUTION INTRAMUSCULAR; INTRAVENOUS at 14:23

## 2021-01-18 RX ADMIN — ENOXAPARIN SODIUM 80 MG: 80 INJECTION SUBCUTANEOUS at 02:33

## 2021-01-18 RX ADMIN — IPRATROPIUM BROMIDE AND ALBUTEROL SULFATE 3 ML: 2.5; .5 SOLUTION RESPIRATORY (INHALATION) at 06:30

## 2021-01-18 RX ADMIN — DILTIAZEM HYDROCHLORIDE 30 MG: 30 TABLET, FILM COATED ORAL at 05:23

## 2021-01-18 RX ADMIN — Medication 10 ML: at 22:44

## 2021-01-18 RX ADMIN — Medication 10 ML: at 05:25

## 2021-01-18 RX ADMIN — MULTIPLE VITAMINS W/ MINERALS TAB 1 TABLET: TAB at 09:43

## 2021-01-18 RX ADMIN — GUAIFENESIN 1200 MG: 600 TABLET, EXTENDED RELEASE ORAL at 17:31

## 2021-01-18 RX ADMIN — DILTIAZEM HYDROCHLORIDE 30 MG: 30 TABLET, FILM COATED ORAL at 17:31

## 2021-01-18 RX ADMIN — Medication 10 ML: at 09:43

## 2021-01-18 RX ADMIN — CEFEPIME HYDROCHLORIDE 2 G: 2 INJECTION, POWDER, FOR SOLUTION INTRAVENOUS at 05:24

## 2021-01-18 RX ADMIN — IPRATROPIUM BROMIDE AND ALBUTEROL SULFATE 3 ML: 2.5; .5 SOLUTION RESPIRATORY (INHALATION) at 18:54

## 2021-01-18 RX ADMIN — AMIODARONE HYDROCHLORIDE 1 MG/MIN: 50 INJECTION, SOLUTION INTRAVENOUS at 03:01

## 2021-01-18 RX ADMIN — CEFTRIAXONE SODIUM 2 G: 2 INJECTION, POWDER, FOR SOLUTION INTRAMUSCULAR; INTRAVENOUS at 14:23

## 2021-01-18 RX ADMIN — Medication 10 ML: at 22:45

## 2021-01-18 RX ADMIN — POLYETHYLENE GLYCOL 3350 17 G: 17 POWDER, FOR SOLUTION ORAL at 22:44

## 2021-01-18 RX ADMIN — BUDESONIDE 0.5 MG: 0.5 INHALANT RESPIRATORY (INHALATION) at 06:35

## 2021-01-18 RX ADMIN — BUDESONIDE 0.5 MG: 0.5 INHALANT RESPIRATORY (INHALATION) at 18:54

## 2021-01-18 RX ADMIN — IPRATROPIUM BROMIDE AND ALBUTEROL SULFATE 3 ML: 2.5; .5 SOLUTION RESPIRATORY (INHALATION) at 14:27

## 2021-01-18 RX ADMIN — IPRATROPIUM BROMIDE AND ALBUTEROL SULFATE 3 ML: 2.5; .5 SOLUTION RESPIRATORY (INHALATION) at 23:47

## 2021-01-18 RX ADMIN — METHYLPREDNISOLONE SODIUM SUCCINATE 40 MG: 40 INJECTION, POWDER, FOR SOLUTION INTRAMUSCULAR; INTRAVENOUS at 05:23

## 2021-01-18 RX ADMIN — IPRATROPIUM BROMIDE AND ALBUTEROL SULFATE 3 ML: 2.5; .5 SOLUTION RESPIRATORY (INHALATION) at 11:31

## 2021-01-18 RX ADMIN — THEOPHYLLINE 300 MG: 300 TABLET, EXTENDED RELEASE ORAL at 09:43

## 2021-01-18 RX ADMIN — TAMSULOSIN HYDROCHLORIDE 0.4 MG: 0.4 CAPSULE ORAL at 17:31

## 2021-01-18 RX ADMIN — AMIODARONE HYDROCHLORIDE 200 MG: 200 TABLET ORAL at 11:47

## 2021-01-18 RX ADMIN — IPRATROPIUM BROMIDE AND ALBUTEROL SULFATE 3 ML: 2.5; .5 SOLUTION RESPIRATORY (INHALATION) at 03:05

## 2021-01-18 RX ADMIN — DILTIAZEM HYDROCHLORIDE 30 MG: 30 TABLET, FILM COATED ORAL at 00:53

## 2021-01-18 RX ADMIN — BISACODYL 10 MG: 10 SUPPOSITORY RECTAL at 11:41

## 2021-01-18 RX ADMIN — METOPROLOL TARTRATE 25 MG: 25 TABLET, FILM COATED ORAL at 05:24

## 2021-01-18 RX ADMIN — ENOXAPARIN SODIUM 80 MG: 80 INJECTION SUBCUTANEOUS at 15:23

## 2021-01-18 NOTE — PROGRESS NOTES
Continued Stay Note   [place]     Patient Name: Lenny Larson  MRN: [id number]  Today's Date: 1/18/2021    Admit Date: 1/13/2021    Discharge Plan     Row Name 01/18/21 1150       Plan    Plan  D/C Plan : Home with [facility] ( they have accepted pt) . Rolling walker with [place]. Pt is on R/A .    Plan Comments  Barrier to D/C : Pt is on a [employer] gtt .        Discharge Codes    No documentation.       Expected Discharge Date and Time     Expected Discharge Date Expected Discharge Time    Jan 20, 2021             Amparo Crandall RN

## 2021-01-18 NOTE — OP NOTE
OP Note  Preop Dx: Urinary retention hematuria  Postop Dx: Dx same  Procedure:  Cystoscopy  Surgeon: Dami Parikh  Anesthesia:  Local  Complications: None  Findings: Patient had an enlarged prostate measured about 45 mm in length but no signs of any bladder cancer or stones.  He did have a small clot in his bladder which hopefully will build to void out.  He is to continue his Flomax.  If he does not empty his bladder the nursing staff will replace his [place] catheter.  Hopefully he will agree to a CT scan as an outpatient since he does not want one at this point.  We will see him in the office.    Estimate blood loss 0     Description of procedure:  Patient was at his bedside and his groin areas prepped draped usual sterile fashion after his [place] catheter was removed.  Cystoscopy carried out finding a normal urethra and large prostate measurement 45 mm in length.  He did not have any bladder cancer or stones.  He had a small clot which he hopefully will build to void out.  Plan is as above

## 2021-01-18 NOTE — PROGRESS NOTES
Daily Progress Note        Acute respiratory distress    Chronic obstructive pulmonary disease (CMS/HCC)    Coronary artery disease    Hyperlipidemia    Hypertension    Chest pain    Atrial fibrillation with RVR (CMS/HCC)    BPH without obstruction/lower urinary tract symptoms    History of lung cancer    COPD exacerbation (CMS/HCC)    Malignant neoplasm of upper lobe of right lung (CMS/HCC)      Assessment    Worsening right hilar alveolar infiltrate possible pneumonia versus recurrent malignancy    Bronchoscopy completed 1/14/2021  Copious amount of mucopurulent secretions were noted especially from the right lower lobe area and suctioned therapeutically  No endobronchial masses  Minor bleed which resolved with suctioning     New onset large loculated right pleural effusion  Status post ultrasound-guided chest catheter placement with drainage of 2400 cc of serosanguineous fluid    Exudate with elevated protein normal pH and glucose     COVID-19 test is negative     h/o lung ca s/p RUL lobectomy, last CT 9/2018 with R perihilar density, repeat CT scan 01/23/2020 no change in right upper lobe posterior hilar density compatible with radiation pneumonitis     COPD  ex-smoker   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 fib with RVR       Plan    Cont Chest catheter to suction  Check cytology from pleural fluid    Antibiotics empirically on cefepime  Check BAL culture   check cytology from BAL    Steroids IV Solu-Medrol 40 mg every 8                    LOS: 4 days     Subjective         Objective     Vital signs for last 24 hours:  Vitals:    01/17/21 1049 01/17/21 1455 01/17/21 1736 01/17/21 2001   BP:  126/57 118/44    Pulse: 108 81 71 64   Resp: 18 18 20 18   Temp:  98 °F (36.7 °C) 98 °F (36.7 °C)    TempSrc:  Oral Oral    SpO2:  96% 97% 92%   Weight:       Height:           Intake/Output last 3 shifts:  I/O last 3 completed shifts:  In: 1620 [P.O.:820; Other:800]  Out: 2170 [Urine:1300; Chest Tube:870]  Intake/Output this  shift:  No intake/output data recorded.      Radiology  Imaging Results (Last 24 Hours)     Procedure Component Value Units Date/Time    XR Chest 1 View [[id number]] Collected: 01/17/21 0832     Updated: 01/17/21 0836    Narrative:      DATE OF EXAM:  1/17/2021 2:50 AM     PROCEDURE:  XR CHEST 1 VW-     INDICATIONS:  chest catheter; J44.1-Chronic obstructive pulmonary disease with (acute)  exacerbation; R06.03-Acute respiratory distress; I48.91-Unspecified  atrial fibrillation; B83-Fqfmysr effusion, not elsewhere classified;  C34.11-Malignant neoplasm of upper lobe, right bronchus or lung     COMPARISON:  01/16/2021     TECHNIQUE:   Single radiographic view of the chest was obtained.     FINDINGS:  There is a right basilar chest tube in similar position. No significant  pneumothorax or pleural effusion is seen.  There is elevation of the  right diaphragm with postsurgical changes of right lobectomy, as before.  There are suspected postsurgical and posttreatment changes at the right  hilum, similar to recent prior exams.  No definite new left lung  infiltrate is seen. Heart size and mediastinal contour appear within  normal limits.       Impression:      1.Right basilar chest tube appears in similar position. No significant  pneumothorax or pleural effusion is seen.  2.Postsurgical/post therapy changes in the right lung, as before.     Electronically Signed By-Brant Dao MD On:1/17/2021 8:34 AM  This report was finalized on [id number] by  Brant Dao MD.          Labs:  Results from last 7 days   Lab Units 01/17/21  0309   WBC 10*3/mm3 8.50   HEMOGLOBIN g/dL 11.6*   HEMATOCRIT % 34.8*   PLATELETS 10*3/mm3 201     Results from last 7 days   Lab Units 01/17/21  0309  01/13/21  1302   SODIUM mmol/L 134*   < > 140   POTASSIUM mmol/L 3.9   < > 4.1   CHLORIDE mmol/L 98   < > 104   CO2 mmol/L 27.0   < > 25.0   BUN mg/dL 32*   < > 11   CREATININE mg/dL 1.17   < > 0.92   CALCIUM mg/dL 8.9   < > 9.1   BILIRUBIN mg/dL   --   --  0.6   ALK PHOS U/L  --   --  67   ALT (SGPT) U/L  --   --  22   AST (SGOT) U/L  --   --  26   GLUCOSE mg/dL 154*   < > 102*    < > = values in this interval not displayed.         Results from last 7 days   Lab Units 01/13/21  1302   ALBUMIN g/dL 3.90     Results from last 7 days   Lab Units 01/14/21  0134 01/13/21  2202 01/13/21  1725   TROPONIN T ng/mL <0.010 0.016 0.025         Results from last 7 days   Lab Units 01/17/21  0309   MAGNESIUM mg/dL 2.2     Results from last 7 days   Lab Units 01/13/21  1310 01/13/21  1302   INR  1.04  --    APTT seconds  --  23.9*               Meds:   SCHEDULE  budesonide, 0.5 mg, Nebulization, BID - RT  cefepime, 2 g, Intravenous, Q12H  digoxin, 125 mcg, Oral, Daily  dilTIAZem, 30 mg, Oral, Q6H  enoxaparin, 1 mg/kg, Subcutaneous, Q12H  finasteride, 5 mg, Oral, Daily  guaiFENesin, 1,200 mg, Oral, Q12H  ipratropium-albuterol, 3 mL, Nebulization, Q4H - RT  methylPREDNISolone sodium succinate, 40 mg, Intravenous, Q8H  metoprolol tartrate, 25 mg, Oral, Q8H  multivitamin with minerals, 1 tablet, Oral, Daily  rosuvastatin, 40 mg, Oral, Nightly  sodium chloride, 10 mL, Intravenous, Q12H  tamsulosin, 0.4 mg, Oral, Daily With Dinner  theophylline, 300 mg, Oral, Daily      Infusions  amiodarone, 1 mg/min, Last Rate: 1 mg/min (01/17/21 1932)      PRNs  •  acetaminophen **OR** acetaminophen **OR** acetaminophen  •  benzonatate  •  HYDROcodone-acetaminophen  •  HYDROcodone-acetaminophen  •  ipratropium-albuterol  •  magnesium hydroxide  •  magnesium sulfate **OR** magnesium sulfate in D5W 1g/100mL (PREMIX)  •  nitroglycerin  •  ondansetron **OR** ondansetron  •  potassium chloride  •  [COMPLETED] Insert peripheral IV **AND** sodium chloride  •  sodium chloride    Physical Exam:  Physical Exam  Pulmonary:      Breath sounds: Rhonchi and rales present.         ROS  Review of Systems   Respiratory: Positive for cough and shortness of breath.              Total time spent with patient  greater than: 45 Minutes

## 2021-01-18 NOTE — PLAN OF CARE
Goal Outcome Evaluation:  Plan of Care Reviewed With: patient, spouse  Progress: improving  Outcome Summary: Patient has been very anxious today with some confusion. This am, he had dawkins catheter removed and cysto performed. Per urology, just blood clots that patient should be able to pass. Patient voided shortly after but only 125 that had large clots. Patient has been unable to void since dawkins removed. Bladder scan revealed 275. After several attempts to void, straight cathed patient and got 200 out that was green tint. Patient stated this am that he had not had a bowel movement since he was admitted. Wife called and stated he had no bm and was concerned that he had not received anything. Miralax and suppository given. Patient had 2 separate bms that were small. Wife has called several times and I spoke with her and updated her about her . Wife seems very anxious and continues to ask when is his procedure. Patient started on PO amiodorone and IV amio was decreased to 0.5mg /hr and will be stopped at 1730 per yovani. Chest tube has had over 300cc serous out today. Patient has remained on room air entire day with oxygen sats in the upper 91s. IV cefepime was changed to IV rocephin due to confusion. Patient stated to pulmonologist that food was sticking in his throat. Diet changed to mech soft with boost plus TID. Continue to monitor.

## 2021-01-18 NOTE — THERAPY TREATMENT NOTE
Patient Name: Lenny Larson  : 1940    MRN: [id number]                              Today's Date: 2021       Admit Date: 2021    Visit Dx:     ICD-10-CM ICD-9-CM   1. Chronic obstructive pulmonary disease with acute exacerbation (CMS/HCC)  J44.1 491.21   2. Acute respiratory distress  R06.03 518.82   3. Atrial fibrillation with RVR (CMS/HCC)  I48.91 427.31   4. Pleural effusion  J90 511.9   5. 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 obstruction/lower urinary tract symptoms   • History of lung cancer   • COPD exacerbation (CMS/HCC)   • Malignant neoplasm of upper lobe of right lung (CMS/HCC)     Past Medical History:   Diagnosis Date   • COPD (chronic obstructive pulmonary disease) (CMS/HCC)    • Coronary artery disease    • Hyperlipidemia    • Hypertension      Past Surgical History:   Procedure Laterality Date   • BRONCHOSCOPY N/A 2021    Procedure: BRONCHOSCOPY with bronchio alveolar lavage of right lower lung;  Surgeon: Sanjay Gonsales MD;  Location: [facility];  Service: Pulmonary;  Laterality: N/A;  post op: pneumonia   • CARDIAC CATHETERIZATION      PCI     General Information     Row Name 21 1130          Physical Therapy Time and Intention    Document Type  therapy note (daily note)  -EL     Mode of Treatment  individual therapy;physical therapy  -EL     Row Name 21 1130          General Information    Patient Profile Reviewed  yes  -EL       User Key  (r) = Recorded By, (t) = Taken By, (c) = Cosigned By     Initials Name Provider Type    Larry Gardner, JAKI Physical Therapist        Mobility     Row Name 01/18/21 1130          Bed Mobility    Bed Mobility  bed mobility (all) activities  -EL     All Activities, [place] (Bed Mobility)  standby assist  -EL     Row Name 01/18/21 1130          Sit-Stand Transfer    Sit-Stand [place] (Transfers)  standby assist  -EL     Row Name 01/18/21 1130          Gait/Stairs (Locomotion)    Distance in Feet (Gait)  Marched in place this date, due to chest tube  -EL       User Key  (r) = Recorded By, (t) = Taken By, (c) = Cosigned By    Initials Name Provider Type    Larry Gardner PT Physical Therapist        Obj/Interventions     Row Name 01/18/21 1131          Motor Skills    Therapeutic Exercise  other (see comments) Standing marches, seated marches, ankle pumps, LAQ.  -EL     Row Name 01/18/21 1131          Balance    Balance Assessment  sitting static balance;standing static balance;standing dynamic balance  -EL     Static Sitting Balance  WFL  -EL     Static Standing Balance  WFL;supported  -EL     Dynamic Standing Balance  mild impairment;supported  -EL     Comment, Balance  No LOB with standing marches, balance appeared improved this date  -EL       User Key  (r) = Recorded By, (t) = Taken By, (c) = Cosigned By    Initials Name Provider Type    Larry Gardner PT Physical Therapist        Goals/Plan    No documentation.       Clinical Impression     Row Name 01/18/21 1133          Pain    Additional Documentation  Pain Scale: FACES Pre/Post-Treatment (Group)  -[facility] Name 01/18/21 1133          Pain Scale: FACES Pre/Post-Treatment    Pain: FACES Scale, Pretreatment  0-->no hurt  -EL     Posttreatment Pain Rating  0-->no hurt  -EL     Row Name 01/18/21 1133          Plan of Care Review    Plan of Care Reviewed With  patient  -EL     Progress  improving  -     Outcome Summary  Pt tolerated treatment well this date, was sleeping when PT entered and awoke in somewhat of  a startle. Pt believed he was at home, had to be reoriented multiple times. This confusion is a change from evaluation. Despite the confusion, pt demonstrated good functional moblity this date, performs all bed moblity with SBA, came to standing and performed standing ther ex with SBA. Pt with decreased endurance, requiring multiple seated rest breaks between sets. This date recommendation remains HHPT following d/c. PPE worn includes gloves and mask with goggles.  -EL     Row Name 01/18/21 1133          Therapy Assessment/Plan (PT)    Rehab Potential (PT)  good, to achieve stated therapy goals  -EL     Criteria for Skilled Interventions Met (PT)  yes  -EL     Row Name 01/18/21 1133          Vital Signs    Pre Patient Position  Supine  -EL     Intra Patient Position  Standing  -EL     Post Patient Position  Supine  -EL     Row Name 01/18/21 1133          Positioning and Restraints    Pre-Treatment Position  in bed  -EL     Post Treatment Position  bed  -EL     In Bed  notified nsg;supine;call light within reach;encouraged to call for assist;exit alarm on  -EL       User Key  (r) = Recorded By, (t) = Taken By, (c) = Cosigned By    Initials Name Provider Type    Larry Gardner, PT Physical Therapist        Outcome Measures     Row Name 01/18/21 1141          How much help from another person do you currently need...    Turning from your back to your side while in flat bed without using bedrails?  4  -EL     Moving from lying on back to sitting on the side of a flat bed without bedrails?  4  -EL     Moving to and from a bed to a chair (including a wheelchair)?  4  -EL     Standing up from a chair using your arms (e.g., wheelchair, bedside chair)?  4  -EL     Climbing 3-5 steps with a railing?  3  -EL     To walk in hospital room?  4  -EL     AM-PAC 6 Clicks Score (PT)  23  -EL     Row Name 01/18/21 1141          Functional Assessment    Outcome Measure Options  AM-PAC 6 Clicks Basic Mobility (PT)  -EL       User Key   (r) = Recorded By, (t) = Taken By, (c) = Cosigned By    Initials Name Provider Type    EL Larry Queen PT Physical Therapist        Physical Therapy Education                 Title: PT OT SLP Therapies (Done)     Topic: Physical Therapy (Done)     Point: Mobility training (Done)     Learning Progress Summary           Patient Acceptance, E,TB, VU by  at 1/18/2021 1142    Acceptance, E,TB, VU by EL at 1/15/2021 1254                   Point: Precautions (Done)     Learning Progress Summary           Patient Acceptance, E,TB, VU by  at 1/18/2021 1142    Acceptance, E,TB, VU by  at 1/15/2021 1254                               User Key     Initials Effective Dates Name Provider Type Discipline     06/23/20 -  Larry Queen PT Physical Therapist PT              PT Recommendation and Plan  Planned Therapy Interventions (PT): balance training, neuromuscular re-education, bed mobility training, transfer training, gait training, patient/family education, strengthening  Plan of Care Reviewed With: patient  Progress: improving  Outcome Summary: Pt tolerated treatment well this date, was sleeping when PT entered and awoke in somewhat of a startle. Pt believed he was at home, had to be reoriented multiple times. This confusion is a change from evaluation. Despite the confusion, pt demonstrated good functional moblity this date, performs all bed moblity with SBA, came to standing and performed standing ther ex with SBA. Pt with decreased endurance, requiring multiple seated rest breaks between sets. This date recommendation remains HHPT following d/c. PPE worn includes gloves and mask with goggles.     Time Calculation:   PT Charges     Row Name 01/18/21 1142             Time Calculation    Start Time  0952  -EL      Stop Time  1010  -EL      Time Calculation (min)  18 min  -EL      PT Received On  01/18/21  -EL      PT - Next Appointment  01/20/21  -         Time Calculation- PT    Total Timed Code Minutes- PT  18  minute(s)  -MICHELLE        User Key  (r) = Recorded By, (t) = Taken By, (c) = Cosigned By    Initials Name Provider Type    Larry Gardner, PT Physical Therapist        Therapy Charges for Today     Code Description Service Date Service Provider Modifiers Qty    [id number]  PT THER PROC EA 15 MIN 1/18/2021 Larry Queen, PT GP 1          PT G-Codes  Outcome Measure Options: AM-PAC 6 Clicks Basic Mobility (PT)  AM-PAC 6 Clicks Score (PT): 23    Larry Queen PT  1/18/2021

## 2021-01-18 NOTE — PLAN OF CARE
Goal Outcome Evaluation:  Plan of Care Reviewed With: patient  Progress: improving  Outcome Summary: Pt tolerated treatment well this date, was sleeping when PT entered and awoke in somewhat of a startle. Pt believed he was at home, had to be reoriented multiple times. This confusion is a change from evaluation. Despite the confusion, pt demonstrated good functional moblity this date, performs all bed moblity with SBA, came to standing and performed standing ther ex with SBA. Pt with decreased endurance, requiring multiple seated rest breaks between sets. This date recommendation remains HHPT following d/c. PPE worn includes gloves and mask with goggles.

## 2021-01-18 NOTE — PROGRESS NOTES
[facility] Daily Progress Note      Hospitalist Team  LOS 5 days      Patient Care Team:  Camron Pollard MD as PCP - General (Family Medicine)  Alvarado Cai MD as Consulting Physician (Cardiology)    Patient Location: 2102/1      Subjective   Subjective     Chief Complaint / Subjective  Chief Complaint   Patient presents with   • Shortness of Breath     Brief Synopsis of Hospital Course/HPI  The patient is a 80-year-old male who has underlying coronary artery disease with atrial fibrillation, stent placement and an abdominal aortic aneurysm.  The patient also is status post right upper lobectomy for previous lung carcinoma which was also treated with radiation and chemotherapy.  The patient presents with atrial fibrillation with rapid ventricular response as well as a new right lower lobe effusion.      Date:  1/15/2021  The patient had a chest tube placed today for his right pleural effusion.  It continues to drain.  He is having pain with deep breathing related to the chest tube.  The patient does report that his breathing is better and that his heart is not racing as much as it was on admission.    1/16/2021  The patient is in tears as he has been in pain.  I did check with nursing and the patient has not taken anything for pain nor is he asked for anything.  They are going to start offering it to the patient.  Other than that the patient has no additional issues.  I did explain to him that he needs to ask them and should not be shy about it as it is painful to have a chest tube in place.    1/17/2021  The patient's pain is markedly improved over yesterday.  The patient's tachycardia is improved as well.  The patient does have a lot of lower extremity edema and was encouraged to place his legs up on an elevation so that hopefully this can resolve/improve.    1/18/2021  Patient reports ongoing pain from his right chest tube site.  He does report feeling better  "after having had a couple bowel movements after receiving to collect suppository MiraLAX.  The nursing staff reports that he had been more confused today and requested antibiotic be changed from cefepime as a possible etiology for the confusion.  This was deferred to pulmonary and cefepime was changed to Rocephin.  Patient reports voiding several times after Beach was removed but it was just a small amount.  Post void residual showed 275 cc in and out cath performed with 200 cc removed.    ROS   12 point review of systems was reviewed and was negative except as above.      Objective   Objective      Vital Signs  Temp:  [97.4 °F (36.3 °C)-98 °F (36.7 °C)] 97.4 °F (36.3 °C)  Heart Rate:  [] 76  Resp:  [18-20] 18  BP: (106-126)/(44-57) 106/47  Oxygen Therapy  SpO2: 99 %  Pulse Oximetry Type: Continuous  Device (Oxygen Therapy): room air  Flow (L/min): 2  ETCO2 (mmHg): (!) 0 mmHg  Flowsheet Rows      First Filed Value   Admission Height  177.8 cm (70\") Documented at 01/13/2021 1222   Admission Weight  80.6 kg (177 lb 11.1 oz) Documented at 01/13/2021 1222        Intake & Output (last 3 days)       01/15 0701 - 01/16 0700 01/16 0701 - 01/17 0700 01/17 0701 - 01/18 0700 01/18 0701 - 01/19 0700    P.O. 1040 580 360     I.V. (mL/kg)        Other   800     Total Intake(mL/kg) 1040 (13.7) 580 (7.6) 1160 (14.6)     Urine (mL/kg/hr) 750 (0.4) 1300 (0.7) 350 (0.2) 125 (0.4)    Chest Tube 1900 870 310     Total Output 2650 2170 660 125    Net -1610 -1590 +500 -125                Lines, Drains & Airways    Active LDAs     Name:   Placement date:   Placement time:   Site:   Days:    Peripheral IV 01/13/21 1250 Right Antecubital   01/13/21    1250    Antecubital   1    Peripheral IV 01/14/21 1312 Anterior;Right Forearm   01/14/21    1312    Forearm   less than 1              Physical Exam:  Physical Exam   Well-developed well-nourished gentleman sitting up in the chair in no acute distress awake and alert; mucous membranes " moist; sclerae anicteric; lungs clear to auscultation bilaterally; CV regular rate and rhythm; abdomen soft nontender nondistended with active bowel sounds; extremities with trace bilateral ankle and pedal edema, no cyanosis or calf tenderness; palpable pedal pulses bilaterally; no Beach catheter.      Procedures:  Procedure(s):  BRONCHOSCOPY with bronchio alveolar lavage of right lower lung  Thoracostomy tube placed    Results Review:     I reviewed the patient's new clinical results.    Lab Results (last 24 hours)     Procedure Component Value Units Date/Time    Non-gynecologic Cytology [[id number]] Collected: 01/15/21 1510    Specimen: Body Fluid from Pleural Cavity Updated: 01/18/21 0930    Flow Cytometry [[id number]] Collected: 01/15/21 1508    Specimen: Body Fluid from Pleural Cavity Updated: 01/18/21 0910     Reference Lab Report See attached flow cytometry report from [facility].     Body Fluid Culture - Body Fluid, Pleural Cavity [[id number]] Collected: 01/15/21 1509    Specimen: Body Fluid from Pleural Cavity Updated: 01/18/21 0649     Body Fluid Culture No growth at 3 days     Gram Stain No organisms seen    Basic Metabolic Panel [[id number]]  (Abnormal) Collected: 01/18/21 0500    Specimen: Blood Updated: 01/18/21 0543     Glucose 154 mg/dL      BUN 31 mg/dL      Creatinine 1.11 mg/dL      Sodium 135 mmol/L      Potassium 3.9 mmol/L      Chloride 100 mmol/L      CO2 27.0 mmol/L      Calcium 8.6 mg/dL      eGFR Non African Amer 64 mL/min/1.73      BUN/Creatinine Ratio 27.9     Anion Gap 8.0 mmol/L     Narrative:      GFR Normal >60  Chronic Kidney Disease <60  Kidney Failure <15      Magnesium [[id number]]  (Normal) Collected: 01/18/21 0500    Specimen: Blood Updated: 01/18/21 0543     Magnesium 2.2 mg/dL     CBC & Differential [[id number]]  (Abnormal) Collected: 01/18/21 0500    Specimen: Blood Updated: 01/18/21 0516    Narrative:      The following orders were created for panel order CBC &  Differential.  Procedure                               Abnormality         Status                     ---------                               -----------         ------                     CBC Auto Differential[[id number]]        Abnormal            Final result                 Please view results for these tests on the individual orders.    CBC Auto Differential [[id number]]  (Abnormal) Collected: 01/18/21 0500    Specimen: Blood Updated: 01/18/21 0516     WBC 8.80 10*3/mm3      RBC 3.94 10*6/mm3      Hemoglobin 11.5 g/dL      Hematocrit 34.6 %      MCV 87.8 fL      MCH 29.1 pg      MCHC 33.2 g/dL      RDW 16.4 %      RDW-SD 50.8 fl      MPV 9.1 fL      Platelets 206 10*3/mm3      Neutrophil % 94.5 %      Lymphocyte % 1.0 %      Monocyte % 4.3 %      Eosinophil % 0.0 %      Basophil % 0.2 %      Neutrophils, Absolute 8.30 10*3/mm3      Lymphocytes, Absolute 0.10 10*3/mm3      Monocytes, Absolute 0.40 10*3/mm3      Eosinophils, Absolute 0.00 10*3/mm3      Basophils, Absolute 0.00 10*3/mm3      nRBC 0.2 /100 WBC     Urinalysis With Culture If Indicated - Urine, Catheter [[id number]] Updated: 01/17/21 1928    Specimen: Urine, Catheter     Virus Culture - Lavage, Lung, Right Lower Lobe [[id number]] Collected: 01/14/21 1347    Specimen: Lavage from Lung, Right Lower Lobe Updated: 01/17/21 1707     Viral Culture, General Comment     Comment: Preliminary Report:  No virus isolated at 24 hours. Next report to follow after 4 days.       Narrative:      Performed at:  [address] [place] [address]  [id number]  : Rick Willis MD, Phone:  [phone]    Blood Culture - Blood, Arm, Right [[id number]] Collected: 01/13/21 1310    Specimen: Blood from Arm, Right Updated: 01/17/21 1317     Blood Culture No growth at 4 days    Blood Culture - Blood, Arm, Right [[id number]] Collected: 01/13/21 1302    Specimen: Blood from Arm, Right Updated: 01/17/21 1317     Blood Culture No growth at 4 days         No results found for: HGBA1C  Results from last 7 days   Lab Units 01/13/21  1310   INR  1.04           Lab Results   Component Value Date    LIPASE 19 (L) 05/18/2019     Lab Results   Component Value Date    CHOL 121 07/09/2020    TRIG 75 07/09/2020    HDL 36 (L) 07/09/2020    LDL 70 07/09/2020       No results found for: INTRAOP, PREDX, FINALDX, COMDX    Microbiology Results (last 10 days)     Procedure Component Value - Date/Time    Body Fluid Culture - Body Fluid, Pleural Cavity [[id number]] Collected: 01/15/21 1509    Lab Status: Preliminary result Specimen: Body Fluid from Pleural Cavity Updated: 01/18/21 0649     Body Fluid Culture No growth at 3 days     Gram Stain No organisms seen    AFB Culture - Body Fluid, Pleural Cavity [[id number]] Collected: 01/15/21 1509    Lab Status: Preliminary result Specimen: Body Fluid from Pleural Cavity Updated: 01/16/21 0954     AFB Stain No acid fast bacilli seen    Virus Culture - Lavage, Lung, Right Lower Lobe [[id number]] Collected: 01/14/21 1347    Lab Status: Preliminary result Specimen: Lavage from Lung, Right Lower Lobe Updated: 01/17/21 1707     Viral Culture, General Comment     Comment: Preliminary Report:  No virus isolated at 24 hours. Next report to follow after 4 days.       Narrative:      Performed at:  [address] [place] [address]  [id number]  : Rick Willis MD, Phone:  [phone]    AFB Culture - Lavage, Lung, Right Lower Lobe [[id number]] Collected: 01/14/21 1347    Lab Status: Preliminary result Specimen: Lavage from Lung, Right Lower Lobe Updated: 01/15/21 1324     AFB Stain No acid fast bacilli seen    BAL Culture, Quantitative - Lavage, Lung, Right Lower Lobe [[id number]] Collected: 01/14/21 1347    Lab Status: Final result Specimen: Lavage from Lung, Right Lower Lobe Updated: 01/16/21 1322     BAL Culture Scant growth (1+) Normal Respiratory Terri: NO S.aureus/MRSA or Pseudomonas aeruginosa     Gram Stain  Rare (1+) WBCs per low power field      No organisms seen    Narrative:      Not streaked for quantitation    Respiratory Panel, PCR (WITHOUT COVID) - Lavage, Lung, Right Lower Lobe [[id number]]  (Normal) Collected: 01/14/21 1347    Lab Status: Final result Specimen: Lavage from Lung, Right Lower Lobe Updated: 01/14/21 1542     ADENOVIRUS, PCR Not Detected     Coronavirus 229E Not Detected     Coronavirus HKU1 Not Detected     Coronavirus NL63 Not Detected     Coronavirus OC43 Not Detected     Human Metapneumovirus Not Detected     Human Rhinovirus/Enterovirus Not Detected     Influenza B PCR Not Detected     Parainfluenza Virus 1 Not Detected     Parainfluenza Virus 2 Not Detected     Parainfluenza Virus 3 Not Detected     Parainfluenza Virus 4 Not Detected     Bordetella pertussis pcr Not Detected     Chlamydophila pneumoniae PCR Not Detected     Mycoplasma pneumo by PCR Not Detected     Influenza A PCR Not Detected     RSV, PCR Not Detected     Bordetella parapertussis PCR Not Detected    Narrative:      The coronavirus on the RVP is NOT COVID-19 and is NOT indicative of infection with COVID-19.     Respiratory Panel, PCR (WITHOUT COVID) - Swab, Nasopharynx [[id number]]  (Normal) Collected: 01/13/21 1824    Lab Status: Final result Specimen: Swab from Nasopharynx Updated: 01/13/21 1940     ADENOVIRUS, PCR Not Detected     Coronavirus 229E Not Detected     Coronavirus HKU1 Not Detected     Coronavirus NL63 Not Detected     Coronavirus OC43 Not Detected     Human Metapneumovirus Not Detected     Human Rhinovirus/Enterovirus Not Detected     Influenza B PCR Not Detected     Parainfluenza Virus 1 Not Detected     Parainfluenza Virus 2 Not Detected     Parainfluenza Virus 3 Not Detected     Parainfluenza Virus 4 Not Detected     Bordetella pertussis pcr Not Detected     Chlamydophila pneumoniae PCR Not Detected     Mycoplasma pneumo by PCR Not Detected     Influenza A PCR Not Detected     RSV, PCR Not Detected      Bordetella parapertussis PCR Not Detected    Narrative:      The coronavirus on the RVP is NOT COVID-19 and is NOT indicative of infection with COVID-19.     Blood Culture - Blood, Arm, Right [[id number]] Collected: 01/13/21 1310    Lab Status: Preliminary result Specimen: Blood from Arm, Right Updated: 01/17/21 1317     Blood Culture No growth at 4 days    COVID-19,[facility] IN-HOUSE,Nasal Swab No Transport Media 3-4 HR TAT - Swab, Nasal Cavity [[id number]]  (Normal) Collected: 01/13/21 1303    Lab Status: Final result Specimen: Swab from Nasal Cavity Updated: 01/13/21 1343     COVID19 Not Detected    Narrative:      Fact sheet for providers: https://www.fda.gov/media/530185/download     Fact sheet for patients: https://www.fda.gov/media/758418/download    Test performed by PCR.    Blood Culture - Blood, Arm, Right [[id number]] Collected: 01/13/21 1302    Lab Status: Preliminary result Specimen: Blood from Arm, Right Updated: 01/17/21 1317     Blood Culture No growth at 4 days          ECG/EMG Results (most recent)     Procedure Component Value Units Date/Time    ECG 12 Lead [[id number]] Collected: 01/14/21 0154     Updated: 01/14/21 0157     QT Interval 354 ms     Narrative:      HEART RATE= 140  bpm  RR Interval= 432  ms  DE Interval= 146  ms  P Horizontal Axis= -51  deg  P Front Axis= 41  deg  QRSD Interval= 102  ms  QT Interval= 354  ms  QRS Axis= 49  deg  T Wave Axis= 256  deg  - ABNORMAL ECG -  Sinus tachycardia  Multiform ventricular premature complexes  Repolarization abnormality, prob rate related  Prolonged QT interval  Electronically Signed By:   Date and Time of Study: 2021-01-14 01:54:00    ECG 12 Lead [[id number]] Collected: 01/13/21 1235     Updated: 01/14/21 0828     QT Interval 350 ms     Narrative:      HEART RATE= 108  bpm  RR Interval= 556  ms  DE Interval= 159  ms  P Horizontal Axis= 22  deg  P Front Axis= 44  deg  QRSD Interval= 95  ms  QT Interval= 350  ms  QRS Axis= 58  deg  T Wave Axis= 103   deg  - ABNORMAL ECG -  Sinus tachycardia  Multiple ventricular premature complexes  Probable LVH with secondary repol abnrm  Anterior Q waves, possibly due to LVH  When compared with ECG of 18-May-2019 9:03:54,  No significant change  Electronically Signed By: Dakota Rodrigues (IGNACIO) 14-Jan-2021 08:26:08  Date and Time of Study: 2021-01-13 12:35:42    Adult Transthoracic Echo Complete W/ Cont if Necessary Per Protocol [[id number]] Collected: 01/13/21 1727     Updated: 01/14/21 1705     BSA 2.0 m^2      RVIDd 2.1 cm      IVSd 0.9 cm      LVIDd 5.5 cm      LVIDs 5.2 cm      LVPWd 0.91 cm      IVS/LVPW 1.0     FS 5.1 %      EDV(Teich) 146.6 ml      ESV(Teich) 129.8 ml      EF(Teich) 11.5 %      EDV(cubed) 165.2 ml      ESV(cubed) 141.1 ml      EF(cubed) 14.6 %      LV mass(C)d 186.0 grams      LV mass(C)dI 93.9 grams/m^2      SV(Teich) 16.8 ml      SI(Teich) 8.5 ml/m^2      SV(cubed) 24.1 ml      SI(cubed) 12.2 ml/m^2      Ao root diam 3.8 cm      Ao root area 11.5 cm^2      LVOT diam 2.0 cm      LVOT area 3.1 cm^2      EDV(MOD-sp4) 125.6 ml      ESV(MOD-sp4) 87.5 ml      EF(MOD-sp4) 30.3 %      SV(MOD-sp4) 38.0 ml      SI(MOD-sp4) 19.2 ml/m^2      Ao root area (BSA corrected) 1.9     LV Knight Vol (BSA corrected) 63.4 ml/m^2      LV Sys Vol (BSA corrected) 44.2 ml/m^2      Aortic R-R 0.51 sec      Aortic .5 BPM      MV V2 max 90.8 cm/sec      MV max PG 3.3 mmHg      MV V2 mean 56.8 cm/sec      MV mean PG 1.5 mmHg      MV V2 VTI 17.5 cm      MVA(VTI) 2.9 cm^2      Ao pk lamont 267.9 cm/sec      Ao max PG 28.7 mmHg      Ao max PG (full) 25.4 mmHg      Ao V2 mean 186.7 cm/sec      Ao mean PG 15.8 mmHg      Ao mean PG (full) 14.0 mmHg      Ao V2 VTI 51.9 cm      IQRA(I,A) 0.97 cm^2      IQRA(I,D) 0.97 cm^2      IQRA(V,A) 1.0 cm^2      IQRA(V,D) 1.0 cm^2      AI max lamont 389.1 cm/sec      AI max PG 60.5 mmHg      AI dec slope 287.4 cm/sec^2      AI dec time 1.4 sec      AI P1/2t 396.5 msec      LV V1 max PG 3.3 mmHg      LV V1 mean  PG 1.8 mmHg      LV V1 max 90.4 cm/sec      LV V1 mean 63.5 cm/sec      LV V1 VTI 16.4 cm      MR max lamont 514.5 cm/sec      MR max .9 mmHg      CO(Ao) 70.8 l/min      CI(Ao) 35.7 l/min/m^2      SV(Ao) 597.3 ml      SI(Ao) 301.4 ml/m^2      CO(LVOT) 6.0 l/min      CI(LVOT) 3.0 l/min/m^2      SV(LVOT) 50.4 ml      SI(LVOT) 25.4 ml/m^2      PA V2 max 68.8 cm/sec      PA max PG 1.9 mmHg      PA max PG (full) -0.6 mmHg      PA V2 mean 47.5 cm/sec      PA mean PG 1.0 mmHg      PA mean PG (full) -0.25 mmHg      PA V2 VTI 14.3 cm      RV V1 max PG 2.5 mmHg      RV V1 mean PG 1.3 mmHg      RV V1 max 78.9 cm/sec      RV V1 mean 52.8 cm/sec      RV V1 VTI 15.3 cm      TR max lamont 291.0 cm/sec      RVSP(TR) 36.9 mmHg      RAP systole 3.0 mmHg       CV ECHO KATT - BZI_BMI 25.4 kilograms/m^2       CV ECHO KATT - BSA(HAYCOCK) 2.0 m^2       CV ECHO KATT - BZI_METRIC_WEIGHT 80.3 kg       CV ECHO KATT - BZI_METRIC_HEIGHT 177.8 cm      LA dimension(2D) 3.3 cm     Narrative:      · Left ventricular ejection fraction appears to be 21 - 25%.  · Severe mitral valve regurgitation is present.  · Mild dilation of the aortic root is present.  · There is a large (>2cm) pericardial effusion adjacent to the right   atrium.  · The following left ventricular wall segments are hypokinetic: mid   anterior, apical anterior, basal anterolateral, mid anterolateral, apical   lateral, basal inferolateral, mid inferolateral, apical inferior, mid   inferior, apical septal, basal inferoseptal, mid inferoseptal, apex   hypokinetic, mid anteroseptal, basal anterior, basal inferior and basal   inferoseptal.  · No pericardial tamponade noted       ECG 12 Lead [[id number]] Collected: 01/15/21 0549     Updated: 01/15/21 0550     QT Interval 380 ms     Narrative:      HEART RATE= 92  bpm  RR Interval= 651  ms  MS Interval=   ms  P Horizontal Axis=   deg  P Front Axis=   deg  QRSD Interval= 102  ms  QT Interval= 380  ms  QRS Axis= 64  deg  T Wave Axis=  249  deg  - ABNORMAL ECG -  Atrial fibrillation  Probable left ventricular hypertrophy  Nonspecific T abnormalities, inferior leads  Electronically Signed By:   Date and Time of Study: 2021-01-15 05:49:00    ECG 12 Lead [[id number]] Collected: 01/16/21 0533     Updated: 01/16/21 0535     QT Interval 356 ms     Narrative:      HEART RATE= 123  bpm  RR Interval= 487  ms  KY Interval=   ms  P Horizontal Axis=   deg  P Front Axis=   deg  QRSD Interval= 91  ms  QT Interval= 356  ms  QRS Axis= 86  deg  T Wave Axis= -86  deg  - ABNORMAL ECG -  Atrial fibrillation  Anterior infarct, old  Repol abnrm suggests ischemia, inferior leads  Borderline ST elevation, lateral leads  Prolonged QT interval  Electronically Signed By:   Date and Time of Study: 2021-01-16 05:33:41    ECG 12 Lead [[id number]] Collected: 01/17/21 0612     Updated: 01/17/21 0614     QT Interval 418 ms     Narrative:      HEART RATE= 106  bpm  RR Interval= 567  ms  KY Interval=   ms  P Horizontal Axis=   deg  P Front Axis=   deg  QRSD Interval= 105  ms  QT Interval= 418  ms  QRS Axis= 67  deg  T Wave Axis= 260  deg  - ABNORMAL ECG -  Atrial fibrillation  Probable LVH with secondary repol abnrm  Prolonged QT interval  When compared with ECG of 16-Jan-2021 5:33:41,  Significant repolarization change  Significant axis, voltage or hypertrophy change  Electronically Signed By:   Date and Time of Study: 2021-01-17 06:12:07           Results for orders placed during the hospital encounter of 01/13/21   Adult Transthoracic Echo Complete W/ Cont if Necessary Per Protocol    Narrative · Left ventricular ejection fraction appears to be 21 - 25%.  · Severe mitral valve regurgitation is present.  · Mild dilation of the aortic root is present.  · There is a large (>2cm) pericardial effusion adjacent to the right   atrium.  · The following left ventricular wall segments are hypokinetic: mid   anterior, apical anterior, basal anterolateral, mid anterolateral, apical   lateral,  basal inferolateral, mid inferolateral, apical inferior, mid   inferior, apical septal, basal inferoseptal, mid inferoseptal, apex   hypokinetic, mid anteroseptal, basal anterior, basal inferior and basal   inferoseptal.  · No pericardial tamponade noted        Xr Chest 1 View    Result Date: 1/18/2021  No interval change from yesterday's study as described above.  Electronically Signed By-Kel Fair MD On:1/18/2021 7:52 AM This report was finalized on [id number] by  Kel Fair MD.    Xr Chest 1 View    Result Date: 1/17/2021  1.Right basilar chest tube appears in similar position. No significant pneumothorax or pleural effusion is seen. 2.Postsurgical/post therapy changes in the right lung, as before.  Electronically Signed By-Brant Dao MD On:1/17/2021 8:34 AM This report was finalized on [id number] by  Brant Dao MD.    Xr Chest 1 View    Result Date: 1/16/2021   1. Improved aeration with small caliber chest tube in the right lung base. A pneumothorax or pleural effusion is not demonstrated. 2. Persistent postsurgical and treatment related changes in the right superhilar region, this is likely treatment related changes, is similar radiographically when compared with the study from 08/23/2018.  Electronically Signed By-Donny Abdi DO On:1/16/2021 10:59 AM This report was finalized on [id number] by  Donny Abdi DO.    Xr Chest 1 View    Result Date: 1/15/2021  Significantly improved aeration in the right hemithorax, that is post pleural drain placement. Mild residual atelectasis remains. No visible pleural fluid or pneumothorax.  Electronically Signed By-Maura Esteves MD On:1/15/2021 3:10 PM This report was finalized on [id number] by  Maura Esteves MD.    Xr Chest 1 View    Result Date: 1/15/2021   1. Moderate-large sized infiltrate pleural effusion involving the right midlung zone right lower lobe lung has improved slightly since previous study performed on 01/13/2021   Electronically Signed By-Baron Diggs MD On:1/15/2021 7:53 AM This report was finalized on [id number] by  Baron Digsg MD.    Xr Chest 1 View    Result Date: 1/13/2021   1. Interval development of a moderate-sized right pleural effusion. 2. Worsening consolidation in the right lung particularly in the mid and lower lung zone. This is likely due to a combination of compressive atelectasis with superimposed pneumonia. 3. Masslike area of consolidation has been present the right hilar region on previous imaging studies consistent with treated lung cancer.  Electronically Signed By-Kel Fair MD On:1/13/2021 2:19 PM This report was finalized on [id number] by  Kel Fair MD.    Us Aorta Limited    Result Date: 1/14/2021  1. Redemonstration of a distal abdominal aortic aneurysm measuring 5.3 x 4.9 cm. This is very similar in size to that described on May 18, 2019. 2. Severe atherosclerotic disease. 3. There are limitations to this study secondary to overlying bowel gas. Slot 63 Electronically signed by:  Vladislav Rich M.D.  1/14/2021 10:17 PM  Xrays, labs reviewed personally by physician.    Medication Review:   I have reviewed the patient's current medication list    Scheduled Meds  budesonide, 0.5 mg, Nebulization, BID - RT  cefepime, 2 g, Intravenous, Q12H  digoxin, 125 mcg, Oral, Daily  dilTIAZem, 30 mg, Oral, Q6H  enoxaparin, 1 mg/kg, Subcutaneous, Q12H  finasteride, 5 mg, Oral, Daily  guaiFENesin, 1,200 mg, Oral, Q12H  ipratropium-albuterol, 3 mL, Nebulization, Q4H - RT  methylPREDNISolone sodium succinate, 40 mg, Intravenous, Q8H  metoprolol tartrate, 25 mg, Oral, Q8H  multivitamin with minerals, 1 tablet, Oral, Daily  rosuvastatin, 40 mg, Oral, Nightly  sodium chloride, 10 mL, Intravenous, Q12H  tamsulosin, 0.4 mg, Oral, Daily With Dinner  theophylline, 300 mg, Oral, Daily        Meds Infusions  amiodarone, 1 mg/min, Last Rate: 1 mg/min (01/18/21 0301)        Meds PRN  •  acetaminophen  **OR** acetaminophen **OR** acetaminophen  •  benzonatate  •  HYDROcodone-acetaminophen  •  HYDROcodone-acetaminophen  •  ipratropium-albuterol  •  magnesium hydroxide  •  magnesium sulfate **OR** magnesium sulfate in D5W 1g/100mL (PREMIX)  •  nitroglycerin  •  ondansetron **OR** ondansetron  •  potassium chloride  •  [COMPLETED] Insert peripheral IV **AND** sodium chloride  •  sodium chloride        Assessment/Plan   Assessment/Plan     Active Hospital Problems    Diagnosis  POA   • **Acute respiratory distress [R06.03]  Yes   • Chest pain [R07.9]  Yes   • Atrial fibrillation with RVR (CMS/HCC) [I48.91]  Yes   • BPH without obstruction/lower urinary tract symptoms [N40.0]  Yes   • History of lung cancer [Z85.118]  Not Applicable   • COPD exacerbation (CMS/HCC) [J44.1]  Yes   • Malignant 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       MEDICAL DECISION MAKING COMPLEXITY BY PROBLEM:     Acute toxic and metabolic encephalopathy likely multifactorial secondary to pneumonia, acute respiratory failure, A. fib with RVR    Acute hypoxic respiratory failure multifactorial secondary to pneumonia and large right pleural effusion  -Pulmonary consulting and managing  -Status post chest tube placement for large right pleural effusion; body fluid culture and cytology pending  -Status post bronchoscopy 1/14/2021 with BAL showing normal respiratory keturah  -Patient on cefepime; pulmonary managing antibiotics  -Await cytology from BAL and results still pending for possible atypical infections   -Continue Solu-Medrol, guaifenesin, duo nebs and theophylline per pulmonary    Acute toxic metabolic encephalopathy may be secondary to cefepime on top of multiple medical conditions as  listed   -Defer to pulmonary for change of antibiotics if possible    Atrial fibrillation with RVR  -Cardiology consulting and managing  -Currently on amiodarone drip, digoxin, metoprolol and verapamil  -Patient on therapeutic dosage of Lovenox per cardiology    Acute chest pain, resolved; etiology not determined  -Serial troponins negative  -Current chest pain seems to be associated with a right thoracostomy tube.    Coronary artery disease status post PCI and stents  -Cardiology consulted  -Continue metoprolol, statin and Lovenox    Acute on chronic systolic congestive heart failure due to ischemic cardiomyopathy and valvular heart disease  -proBNP 7372 on 1/13/2021 and was 10,748 on 1/14/2021  -Echo 1/13/2021 showed LVEF of 21 to 25% with severe MVR, mild dilatation of the aortic root, large pericardial effusion greater than 2 cm adjacent to the right atrium, with no cardiac tamponade; RVSP 36.9 mmHg    History of lung cancer  -Status post right upper lobe resection followed by radiation as well as chemotherapy  -Status post bronchoscopy which showed evidence of pneumonia with no intrabronchial lesions  -Status post right thoracentesis with current thoracostomy tube in place that is draining serosanguineous fluid  -Await cytology on the pleural fluid and bronchoscopy    Abdominal aortic aneurysm  -Patient was having this followed but has missed his appointment last year   -The patient had an abdominal ultrasound and his aneurysm is 5.3 x 4.9 cm which is very similar in size to see what was seen in May 2019  -patient should continue follow-up as an outpatient with his vascular surgeon    Urinary retention secondary to bilateral prostatic hypertrophy with gross hematuria  -Urology consulted  -Status post cystoscopy 1/18/2021 which showed 45 mm prostate no evidence of bladder cancer or stones  -Voiding trial initiated 1/18/2021 with nursing instructions to replace [place] if unable to void  -Continue Flomax and  Proscar    Hyponatremia, mild  -Monitor    Acute constipation, new problem 1/18/2021  -Initiate MiraLAX and Dulcolax suppository    VTE Prophylaxis -   Mechanical Order History:      Ordered        01/13/21 1708  Place Sequential Compression Device  Once         01/13/21 1708  Maintain Sequential Compression Device  Continuous                 Pharmalogical Order History:      Ordered     Dose Route Frequency Stop    01/13/21 1502  enoxaparin (LOVENOX) syringe 80 mg      1 mg/kg SC Once 01/13/21 1520              Code Status -   Code Status and Medical Interventions:   Ordered at: 01/13/21 1610     Limited Support to NOT Include:    Intubation     Level Of Support Discussed With:    Patient     Code Status:    CPR     Medical Interventions (Level of Support Prior to Arrest):    Limited     This patient has been examined wearing appropriate Personal Protective Equipment and discussed with hospital infection control department. 01/18/21    Discharge Planning  To be determined    Electronically signed by Dottie Casillas MD, 01/18/21, 10:44 EST.  [facility] Hospitalist Team

## 2021-01-18 NOTE — PROGRESS NOTES
Referring Provider: Hospitalist    Reason for follow-up: Shortness of breath     Patient Care Team:  Camron Pollard MD as PCP - General (Family Medicine)  Alvarado Cai MD as Consulting Physician (Cardiology)    Subjective .  Shortness of breath is slowly improving    Objective Sitting up in chair     Review of Systems   Constitution: Negative for chills and fever.   HENT: Negative for ear discharge and nosebleeds.    Eyes: Negative for discharge and redness.   Cardiovascular: Negative for chest pain, orthopnea, palpitations, paroxysmal nocturnal dyspnea and syncope.   Respiratory: Positive for shortness of breath. Negative for cough and wheezing.    Endocrine: Negative for heat intolerance.   Skin: Negative for rash.   Musculoskeletal: Negative for arthritis and myalgias.   Gastrointestinal: Negative for abdominal pain, melena, nausea and vomiting.   Genitourinary: Negative for dysuria and hematuria.   Neurological: Negative for dizziness, light-headedness, numbness and tremors.   Psychiatric/Behavioral: Negative for depression. The patient is not nervous/anxious.        Ativan [lorazepam]    Scheduled Meds:amiodarone, 200 mg, Oral, BID With Meals  bisacodyl, 10 mg, Rectal, Daily  budesonide, 0.5 mg, Nebulization, BID - RT  cefTRIAXone, 2 g, Intravenous, Q24H  digoxin, 125 mcg, Oral, Daily  dilTIAZem, 30 mg, Oral, Q6H  enoxaparin, 1 mg/kg, Subcutaneous, Q12H  finasteride, 5 mg, Oral, Daily  guaiFENesin, 1,200 mg, Oral, Q12H  ipratropium-albuterol, 3 mL, Nebulization, Q4H - RT  methylPREDNISolone sodium succinate, 40 mg, Intravenous, Q8H  metoprolol tartrate, 25 mg, Oral, Q8H  multivitamin with minerals, 1 tablet, Oral, Daily  polyethylene glycol, 17 g, Oral, BID  rosuvastatin, 40 mg, Oral, Nightly  sodium chloride, 10 mL, Intravenous, Q12H  tamsulosin, 0.4 mg, Oral, Daily With Dinner  theophylline, 300 mg, Oral, Daily      Continuous Infusions:amiodarone, 0.5 mg/min, Last Rate: 0.5 mg/min (01/18/21  "1148)      PRN Meds:.•  acetaminophen **OR** acetaminophen **OR** acetaminophen  •  benzonatate  •  HYDROcodone-acetaminophen  •  HYDROcodone-acetaminophen  •  ipratropium-albuterol  •  magnesium hydroxide  •  magnesium sulfate **OR** magnesium sulfate in D5W 1g/100mL (PREMIX)  •  nitroglycerin  •  ondansetron **OR** ondansetron  •  potassium chloride  •  [COMPLETED] Insert peripheral IV **AND** sodium chloride  •  sodium chloride        VITAL SIGNS  Vitals:    01/18/21 1145 01/18/21 1421 01/18/21 1427 01/18/21 1431   BP: 123/56 123/51     BP Location: Left arm Left arm     Patient Position: Lying Sitting     Pulse: 77 83 82 82   Resp:   18 18   Temp:       TempSrc:       SpO2:  96% 97% 97%   Weight:       Height:           Flowsheet Rows      First Filed Value   Admission Height  177.8 cm (70\") Documented at 01/13/2021 1222   Admission Weight  80.6 kg (177 lb 11.1 oz) Documented at 01/13/2021 1222           TELEMETRY: Atrial fibrillation with rapid response    Physical Exam:  Constitutional:       Appearance: Well-developed.   Eyes:      General: No scleral icterus.        Right eye: No discharge.   HENT:      Head: Normocephalic and atraumatic.   Neck:      Thyroid: No thyromegaly.      Lymphadenopathy: No cervical adenopathy.   Pulmonary:      Effort: Pulmonary effort is normal. No respiratory distress.      Breath sounds: No wheezing. Rhonchi present. No rales.   Cardiovascular:      Normal rate. Irregularly irregular rhythm.      No gallop.   Abdominal:      Tenderness: There is no abdominal tenderness.   Skin:     Findings: No erythema or rash.   Neurological:      Mental Status: Alert and oriented to person, place, and time.          Results Review:   I reviewed the patient's new clinical results.  Lab Results (last 24 hours)     Procedure Component Value Units Date/Time    Blood Culture - Blood, Arm, Right [[id number]] Collected: 01/13/21 1310    Specimen: Blood from Arm, Right Updated: 01/18/21 1315     " "Blood Culture No growth at 5 days    Blood Culture - Blood, Arm, Right [[id number]] Collected: 01/13/21 1302    Specimen: Blood from Arm, Right Updated: 01/18/21 1315     Blood Culture No growth at 5 days    Non-gynecologic Cytology [[id number]] Collected: 01/14/21 1347    Specimen: Lavage from Lung, Right Lower Lobe Updated: 01/18/21 1222     Case Report --     Medical Cytology Report                           Case: [id number]                                  Authorizing Provider:  Sanjay Gonsales MD             Collected:           01/14/2021 01:47 PM          Ordering Location:     [facility]  Received:            01/15/2021 09:26 AM                                 SUITES                                                                       Pathologist:           Lenny Price MD                                                            Specimen:    Lung, Right Lower Lobe, RLL BAL split                                                       Final Diagnosis --     Right lower lobe, bronchoalveolar lavage with smears and cytospin:    Acute inflammation, mature squamous cells, pulmonary macrophages and bronchial epithelial cells    No malignancy identified    See flow cytometry summary    JULITO/tkd        Clinical Information --     pneumonia       Gross Description --     Received in carbowax and designated \"Bronchoalveolar lavage\" are 75 mL of cloudy, green colored fluid. Particulate matter is present. This specimen is processed as per protocol.         Flow Cytometry Summary --     Pleural fluid:    No significant lymphoid immunophenotypic abnormalities detected    See attached report from [facility] for further details      Non-gynecologic Cytology [[id number]] Collected: 01/15/21 1510    Specimen: Body Fluid from Pleural Cavity Updated: 01/18/21 0930    Flow Cytometry [[id number]] Collected: 01/15/21 1508    Specimen: Body Fluid from Pleural Cavity Updated: 01/18/21 0910     Reference Lab Report " See attached flow cytometry report from [facility].     Body Fluid Culture - Body Fluid, Pleural Cavity [[id number]] Collected: 01/15/21 1509    Specimen: Body Fluid from Pleural Cavity Updated: 01/18/21 0649     Body Fluid Culture No growth at 3 days     Gram Stain No organisms seen    Basic Metabolic Panel [[id number]]  (Abnormal) Collected: 01/18/21 0500    Specimen: Blood Updated: 01/18/21 0543     Glucose 154 mg/dL      BUN 31 mg/dL      Creatinine 1.11 mg/dL      Sodium 135 mmol/L      Potassium 3.9 mmol/L      Chloride 100 mmol/L      CO2 27.0 mmol/L      Calcium 8.6 mg/dL      eGFR Non African Amer 64 mL/min/1.73      BUN/Creatinine Ratio 27.9     Anion Gap 8.0 mmol/L     Narrative:      GFR Normal >60  Chronic Kidney Disease <60  Kidney Failure <15      Magnesium [[id number]]  (Normal) Collected: 01/18/21 0500    Specimen: Blood Updated: 01/18/21 0543     Magnesium 2.2 mg/dL     CBC & Differential [[id number]]  (Abnormal) Collected: 01/18/21 0500    Specimen: Blood Updated: 01/18/21 0516    Narrative:      The following orders were created for panel order CBC & Differential.  Procedure                               Abnormality         Status                     ---------                               -----------         ------                     CBC Auto Differential[[id number]]        Abnormal            Final result                 Please view results for these tests on the individual orders.    CBC Auto Differential [[id number]]  (Abnormal) Collected: 01/18/21 0500    Specimen: Blood Updated: 01/18/21 0516     WBC 8.80 10*3/mm3      RBC 3.94 10*6/mm3      Hemoglobin 11.5 g/dL      Hematocrit 34.6 %      MCV 87.8 fL      MCH 29.1 pg      MCHC 33.2 g/dL      RDW 16.4 %      RDW-SD 50.8 fl      MPV 9.1 fL      Platelets 206 10*3/mm3      Neutrophil % 94.5 %      Lymphocyte % 1.0 %      Monocyte % 4.3 %      Eosinophil % 0.0 %      Basophil % 0.2 %      Neutrophils, Absolute 8.30 10*3/mm3       Lymphocytes, Absolute 0.10 10*3/mm3      Monocytes, Absolute 0.40 10*3/mm3      Eosinophils, Absolute 0.00 10*3/mm3      Basophils, Absolute 0.00 10*3/mm3      nRBC 0.2 /100 WBC     Urinalysis With Culture If Indicated - Urine, Catheter [[id number]] Updated: 01/17/21 1928    Specimen: Urine, Catheter     Virus Culture - Lavage, Lung, Right Lower Lobe [[id number]] Collected: 01/14/21 1347    Specimen: Lavage from Lung, Right Lower Lobe Updated: 01/17/21 1707     Viral Culture, General Comment     Comment: Preliminary Report:  No virus isolated at 24 hours. Next report to follow after 4 days.       Narrative:      Performed at:  [address]  [id number]  : Rick Willis MD, Phone:  [phone]          Imaging Results (Last 24 Hours)     Procedure Component Value Units Date/Time    XR Chest 1 View [[id number]] Collected: 01/18/21 0750     Updated: 01/18/21 0754    Narrative:      DATE OF EXAM:  1/18/2021 5:48 AM     PROCEDURE:  XR CHEST 1 VW-     INDICATIONS:  Right chest tube, right lung cancer, previous right upper lobectomy,  chest pain, shortness of breath.      COMPARISON:  1/17/2021.     TECHNIQUE:   Single radiographic view of the chest was obtained.     FINDINGS:  There is a right basilar pigtail chest tube in place with no  pneumothorax or pleural effusion. There is increased density in the  right hilar region which is unchanged from the previous exam and felt to  be secondary to posttreatment changes. The heart size is felt to be  normal for technique. The pulmonary vascular markings are normal. The  left lung and pleural space are clear.  There is no interval change from  yesterday's study.       Impression:      No interval change from yesterday's study as described above.     Electronically Signed By-Kel Fair MD On:1/18/2021 7:52 AM  This report was finalized on [id number] by  Kel Fair MD.          EKG      I personally viewed and interpreted  the patient's EKG/Telemetry data:    ECHOCARDIOGRAM:    STRESS MYOVIEW:    CARDIAC CATHETERIZATION:    OTHER:         Assessment/Plan     Principal Problem:    Acute respiratory distress  Active Problems:    Chronic obstructive pulmonary disease (CMS/HCC)    Coronary artery disease    Hyperlipidemia    Hypertension    Chest pain    Atrial fibrillation with RVR (CMS/HCC)    BPH without obstruction/lower urinary tract symptoms    History of lung cancer    COPD exacerbation (CMS/HCC)    Malignant neoplasm of upper lobe of right lung (CMS/HCC)       Assessment:    Afib RVR  HFrEF  Pericardial Effusion  Hx Lung CA    Plan:    Patient presented with shortness of breath and had elevated lactate level and BNP level  Patient had an echocardiogram which showed LV systolic dysfunction with an EF of 20 to 25%  Patient also has pericardial effusion  Patient is currently on digoxin and diuretics.  He is not on beta-blocker because of low blood pressure.     Patient also has history of lung cancer with pleural effusion and is followed by the pulmonologist  Patient is currently on amiodarone digoxin Cardizem and metoprolol on heart rates are well controlled  Patient will need evaluation for his congestive heart rate with a cardia catheterization once he is stable.  Discussed with him about the procedure risks and benefits.    Alvarado Cai MD  01/18/21  16:42 EST

## 2021-01-18 NOTE — PLAN OF CARE
Goal Outcome Evaluation:  Plan of Care Reviewed With: patient  Progress: improving  Patient stable. No complaints or distress noted. Patient continues on Amio 1mg/min. F/C remains in place. Patient seemed to of slept well during my shift. However, he is easily to arouse. Will continue to monitor.

## 2021-01-18 NOTE — PROGRESS NOTES
"PULMONARY CRITICAL CARE Progress  NOTE      PATIENT IDENTIFICATION:  Name: Lenny Larson  MRN: [id number]  :  1940     Age: 80 y.o.  Sex: male    DATE OF Note:  2021   Referring Physician: Dottie Casillas MD                  Subjective:   More confused today, CT in place and patent, no SOB no, chest pain, no nausea or vomiting, no change in bowel habit, no dysuria,  no new  skin rash or itching.      Objective:  tMax 24 hrs: Temp (24hrs), Av.8 °F (36.6 °C), Min:97.4 °F (36.3 °C), Max:98 °F (36.7 °C)      Vitals Ranges:   Temp:  [97.4 °F (36.3 °C)-98 °F (36.7 °C)] 97.4 °F (36.3 °C)  Heart Rate:  [64-81] 77  Resp:  [17-20] 17  BP: (106-126)/(44-57) 123/56    Intake and Output Last 3 Shifts:   I/O last 3 completed shifts:  In: 1260 [P.O.:460; Other:800]  Out: 2780 [Urine:1600; Chest Tube:1180]    Exam:  /56 (BP Location: Left arm, Patient Position: Lying)   Pulse 77   Temp 97.4 °F (36.3 °C) (Oral)   Resp 17   Ht 177.8 cm (70\")   Wt 79.2 kg (174 lb 9.7 oz)   SpO2 97%   BMI 25.05 kg/m²     General Appearance:     HEENT:  Normocephalic, without obvious abnormality, Conjunctiva/corneas clear,.  Normal external ear canals, Nares normal, no drainage     Neck:  Supple, symmetrical, trachea midline. No JVD.  Lungs /Chest wall:   Bilateral basal rhonchi, respirations unlabored, CT in place draining,  symmetrical wall movement.     Heart:  Regular rate and rhythm, systolic murmur PMI left sternal border  Abdomen: Soft, non-tender, no masses, no organomegaly.    Extremities: Trace edema no clubbing or Cyanosis        Medications:    Current Facility-Administered Medications:   •  acetaminophen (TYLENOL) tablet 650 mg, 650 mg, Oral, Q4H PRN, 650 mg at 01/15/21 1507 **OR** acetaminophen (TYLENOL) 160 MG/5ML solution 650 mg, 650 mg, Oral, Q4H PRN **OR** acetaminophen (TYLENOL) suppository 650 mg, 650 mg, Rectal, Q4H PRN, Draw, MD Sanjay  •  amiodarone (PACERONE) tablet 200 mg, 200 mg, Oral, BID With " Meals, Alvarado Cai MD, 200 mg at 01/18/21 1147  •  [COMPLETED] amiodarone in dextrose 5% (NEXTERONE) loading dose 150mg/100mL, 150 mg, Intravenous, Once, 150 mg at 01/14/21 0045 **FOLLOWED BY** amiodarone 450 mg in 250 mL D5W infusion, 0.5 mg/min, Intravenous, Continuous, Alvarado Cai MD, Last Rate: 16.67 mL/hr at 01/18/21 1148, 0.5 mg/min at 01/18/21 1148  •  benzonatate (TESSALON) capsule 200 mg, 200 mg, Oral, TID PRN, Sanjay Gonsales MD  •  bisacodyl (DULCOLAX) suppository 10 mg, 10 mg, Rectal, Daily, Dottie Casillas MD, 10 mg at 01/18/21 1141  •  budesonide (PULMICORT) nebulizer solution 0.5 mg, 0.5 mg, Nebulization, BID - RT, Sanjay Gonsales MD, 0.5 mg at 01/18/21 0635  •  cefepime 2 gm IVPB in 100 ml NS (MBP), 2 g, Intravenous, Q12H, Sanjay Gonsales MD, 2 g at 01/18/21 0524  •  digoxin (LANOXIN) tablet 125 mcg, 125 mcg, Oral, Daily, Alvarado Cai MD, 125 mcg at 01/18/21 0943  •  dilTIAZem (CARDIZEM) tablet 30 mg, 30 mg, Oral, Q6H, Daniel Rahman MD, 30 mg at 01/18/21 1230  •  enoxaparin (LOVENOX) syringe 80 mg, 1 mg/kg, Subcutaneous, Q12H, Diane Tamez MD, 80 mg at 01/18/21 0233  •  finasteride (PROSCAR) tablet 5 mg, 5 mg, Oral, Daily, Sanjay Gonsales MD, 5 mg at 01/18/21 0943  •  guaiFENesin (MUCINEX) 12 hr tablet 1,200 mg, 1,200 mg, Oral, Q12H, Sanjay Gonsales MD, 1,200 mg at 01/18/21 0524  •  HYDROcodone-acetaminophen (NORCO)  MG per tablet 1 tablet, 1 tablet, Oral, Q4H PRN, Diane Tamez MD, 1 tablet at 01/17/21 0942  •  HYDROcodone-acetaminophen (NORCO) 5-325 MG per tablet 1 tablet, 1 tablet, Oral, Q4H PRN, Diane Tamez MD, 1 tablet at 01/17/21 0025  •  ipratropium-albuterol (DUO-NEB) nebulizer solution 3 mL, 3 mL, Nebulization, Q4H - RT, Sanjay Gonsales MD, 3 mL at 01/18/21 1131  •  ipratropium-albuterol (DUO-NEB) nebulizer solution 3 mL, 3 mL, Nebulization, Q2H PRN, Sanjay Gonsales MD  •  magnesium hydroxide (MILK OF MAGNESIA) suspension 2400 mg/10mL 10 mL, 10 mL, Oral, Daily PRN, Diane Tamez MD, 10 mL at  01/15/21 1729  •  Magnesium Sulfate 2 gram infusion - Mg less than or equal to 1.5 mg/dL, 2 g, Intravenous, PRN **OR** Magnesium Sulfate 1 gram infusion - Mg 1.6-1.9 mg/dL, 1 g, Intravenous, PRN, Sanjay Gonsales MD  •  methylPREDNISolone sodium succinate (SOLU-Medrol) injection 40 mg, 40 mg, Intravenous, Q8H, Sanjay Gonsales MD, 40 mg at 01/18/21 0523  •  metoprolol tartrate (LOPRESSOR) tablet 25 mg, 25 mg, Oral, Q8H, Daniel Rahman MD, 25 mg at 01/18/21 0524  •  multivitamin with minerals 1 tablet, 1 tablet, Oral, Daily, Sanjay Gonsales MD, 1 tablet at 01/18/21 0943  •  nitroglycerin (NITROSTAT) SL tablet 0.4 mg, 0.4 mg, Sublingual, Q5 Min PRN, Sanjay Gonsales MD  •  ondansetron (ZOFRAN) tablet 4 mg, 4 mg, Oral, Q6H PRN **OR** ondansetron (ZOFRAN) injection 4 mg, 4 mg, Intravenous, Q6H PRN, Sanjay Gonsales MD  •  polyethylene glycol (MIRALAX) packet 17 g, 17 g, Oral, BID, Dottie Casillas MD, 17 g at 01/18/21 1141  •  potassium chloride (K-DUR,KLOR-CON) CR tablet 40 mEq, 40 mEq, Oral, PRN, Sanjay Gonsales MD  •  rosuvastatin (CRESTOR) tablet 40 mg, 40 mg, Oral, Nightly, Sanjay Gonsales MD, 40 mg at 01/17/21 2137  •  [COMPLETED] Insert peripheral IV, , , Once **AND** sodium chloride 0.9 % flush 10 mL, 10 mL, Intravenous, PRN, Sanjay Gonsales MD, 10 mL at 01/16/21 2056  •  sodium chloride 0.9 % flush 10 mL, 10 mL, Intravenous, Q12H, Sanjay Gonsales MD, 10 mL at 01/18/21 0943  •  sodium chloride 0.9 % flush 10 mL, 10 mL, Intravenous, PRN, Sanjay Gonsales MD, 10 mL at 01/18/21 0525  •  tamsulosin (FLOMAX) 24 hr capsule 0.4 mg, 0.4 mg, Oral, Daily With Dinner, Sanjay Gonsales MD, 0.4 mg at 01/17/21 1636  •  theophylline (THEODUR) 12 hr tablet 300 mg, 300 mg, Oral, Daily, DrawSanjay MD, 300 mg at 01/18/21 0943    Data Review:  All labs (24hrs):   Recent Results (from the past 24 hour(s))   Magnesium    Collection Time: 01/18/21  5:00 AM    Specimen: Blood   Result Value Ref Range    Magnesium 2.2 1.6 - 2.4 mg/dL   Basic Metabolic Panel    Collection Time: 01/18/21   5:00 AM    Specimen: Blood   Result Value Ref Range    Glucose 154 (H) 65 - 99 mg/dL    BUN 31 (H) 8 - 23 mg/dL    Creatinine 1.11 0.76 - 1.27 mg/dL    Sodium 135 (L) 136 - 145 mmol/L    Potassium 3.9 3.5 - 5.2 mmol/L    Chloride 100 98 - 107 mmol/L    CO2 27.0 22.0 - 29.0 mmol/L    Calcium 8.6 8.6 - 10.5 mg/dL    eGFR Non African Amer 64 >60 mL/min/1.73    BUN/Creatinine Ratio 27.9 (H) 7.0 - 25.0    Anion Gap 8.0 5.0 - 15.0 mmol/L   CBC Auto Differential    Collection Time: 01/18/21  5:00 AM    Specimen: Blood   Result Value Ref Range    WBC 8.80 3.40 - 10.80 10*3/mm3    RBC 3.94 (L) 4.14 - 5.80 10*6/mm3    Hemoglobin 11.5 (L) 13.0 - 17.7 g/dL    Hematocrit 34.6 (L) 37.5 - 51.0 %    MCV 87.8 79.0 - 97.0 fL    MCH 29.1 26.6 - 33.0 pg    MCHC 33.2 31.5 - 35.7 g/dL    RDW 16.4 (H) 12.3 - 15.4 %    RDW-SD 50.8 37.0 - 54.0 fl    MPV 9.1 6.0 - 12.0 fL    Platelets 206 140 - 450 10*3/mm3    Neutrophil % 94.5 (H) 42.7 - 76.0 %    Lymphocyte % 1.0 (L) 19.6 - 45.3 %    Monocyte % 4.3 (L) 5.0 - 12.0 %    Eosinophil % 0.0 (L) 0.3 - 6.2 %    Basophil % 0.2 0.0 - 1.5 %    Neutrophils, Absolute 8.30 (H) 1.70 - 7.00 10*3/mm3    Lymphocytes, Absolute 0.10 (L) 0.70 - 3.10 10*3/mm3    Monocytes, Absolute 0.40 0.10 - 0.90 10*3/mm3    Eosinophils, Absolute 0.00 0.00 - 0.40 10*3/mm3    Basophils, Absolute 0.00 0.00 - 0.20 10*3/mm3    nRBC 0.2 0.0 - 0.2 /100 WBC        Imaging:  XR Chest 1 View  Narrative: DATE OF EXAM:  1/18/2021 5:48 AM     PROCEDURE:  XR CHEST 1 VW-     INDICATIONS:  Right chest tube, right lung cancer, previous right upper lobectomy,  chest pain, shortness of breath.      COMPARISON:  1/17/2021.     TECHNIQUE:   Single radiographic view of the chest was obtained.     FINDINGS:  There is a right basilar pigtail chest tube in place with no  pneumothorax or pleural effusion. There is increased density in the  right hilar region which is unchanged from the previous exam and felt to  be secondary to posttreatment changes.  The heart size is felt to be  normal for technique. The pulmonary vascular markings are normal. The  left lung and pleural space are clear.  There is no interval change from  yesterday's study.     Impression: No interval change from yesterday's study as described above.     Electronically Signed By-Kel Fair MD On:1/18/2021 7:52 AM  This report was finalized on [id number] by  Kel Fair MD.       ASSESSMENT:    Acute respiratory distress    Chronic obstructive pulmonary disease (CMS/HCC)    Coronary artery disease    Hyperlipidemia    Hypertension    Chest pain    Atrial fibrillation with RVR (CMS/HCC)    BPH without obstruction/lower urinary tract symptoms    History of lung cancer    COPD exacerbation (CMS/HCC)    Malignant neoplasm of upper lobe of right lung (CMS/HCC)         PLAN:  Change diet to mechanical soft   Change antibiotics   Add Boost   Cont CT to LWS, monitor   CT care daily   Monitor CT output  Check cytology from pleural fluid  Antibiotics empirically on cefepime but will discontinue and start ceftriaxone due to increased confusion  Check BAL culture and cytology pending  Steroids IV Solu-Medrol 40 mg every 8  Bronchodilator  Inhaled corticosteroids  Amiodarone to po   Electrolytes/ glycemic control  DVT and GI prophylaxis      Discussed with Dr Shima Garcia, APRN  1/18/2021  13:32 EST     I personally have examined  and interviewed the patient. I have reviewed the history, data, problems, assessment and plan with our NP.  Critical care time in direct medical management (   ) minutes   Louis Ronquillo MD, D,ABSM  1/18/2021

## 2021-01-18 NOTE — CONSULTS
Urology Consult Note    Patient:Lenny Larson :1940  Room:  Admit Date2021  Age:80 y.o.     SEX:male     DOS:2021     MR:[id number]     Visit:[id number]       Attending: Diane Tamez MD  Referring Provider: Dr Tamez  Reason for Consultation: Urinary retention    Patient Care Team:  Camron Pollard MD as PCP - General (Family Medicine)  Alvarado Cai MD as Consulting Physician (Cardiology)    Chief complaint urinary retention    Subjective .     History of present illness: Patient is a 80-year-old white male who has had urinary retention and some hematuria.  He had a Beach catheter in place and the urine is now clear.  He is refusing a CT scan but has agreed to doing the cystoscopy at the bedside.  He states he has no real problems voiding    Review of Systems  12 point review of systems were reviewed and are negative except for what is in HPI.    History  Past Medical History:   Diagnosis Date   • COPD (chronic obstructive pulmonary disease) (CMS/HCC)    • Coronary artery disease    • Hyperlipidemia    • Hypertension      Past Surgical History:   Procedure Laterality Date   • BRONCHOSCOPY N/A 2021    Procedure: BRONCHOSCOPY with bronchio alveolar lavage of right lower lung;  Surgeon: Sanjay Gonsales MD;  Location: [facility] ENDOSCOPY;  Service: Pulmonary;  Laterality: N/A;  post op: pneumonia   • CARDIAC CATHETERIZATION      PCI     Social History     Socioeconomic History   • Marital status:      Spouse name: Not on file   • Number of children: Not on file   • Years of education: Not on file   • Highest education level: Not on file   Tobacco Use   • Smoking status: Never Smoker   • Smokeless tobacco: Never Used   Substance and Sexual Activity   • Alcohol use: Not Currently   • Drug use: Never   • Sexual activity: Defer     History reviewed. No pertinent family history.  Allergies   Allergen Reactions   • Ativan [Lorazepam] Mental Status Change     Prior to Admission  medications    Medication Sig Start Date End Date Taking? Authorizing Provider   calcium carbonate (OS-GILMA) 600 MG tablet Take 600 mg by mouth Daily.   Yes Nabil Quiroz MD   dilTIAZem CD (CARDIZEM CD) 240 MG 24 hr capsule TAKE 1 CAPSULE DAILY 8/3/20  Yes Alvarado Cai MD   finasteride (PROSCAR) 5 MG tablet Take 5 mg by mouth Daily.   Yes Nabil Quiroz MD   multivitamin with minerals tablet tablet Take 1 tablet by mouth Daily.   Yes Nabil Quiroz MD   rosuvastatin (Crestor) 40 MG tablet Take 1 tablet by mouth Daily. 20  Yes Camron Pollard MD   tamsulosin (FLOMAX) 0.4 MG capsule 24 hr capsule Take 1 capsule by mouth every night at bedtime.   Yes Nabil Quiroz MD   theophylline (THEODUR) 300 MG 12 hr tablet Take 300 mg by mouth Daily.   Yes Nabil Quiroz MD         Objective     tMax 24 hours:  Temp (24hrs), Av.9 °F (36.6 °C), Min:97.7 °F (36.5 °C), Max:98 °F (36.7 °C)    Vital Sign Ranges:  Temp:  [97.7 °F (36.5 °C)-98 °F (36.7 °C)] 97.8 °F (36.6 °C)  Heart Rate:  [] 68  Resp:  [18-20] 20  BP: ()/(44-57) 121/49  Intake and Output Last 3 Shifts:  I/O last 3 completed shifts:  In: 1260 [P.O.:460; Other:800]  Out: 2780 [Urine:1600; Chest Tube:1180]      Physical Exam:     General Appearance:    Alert, cooperative, in no acute distress   Head:    Normocephalic, without obvious abnormality, atraumatic   Eyes:            Lids and lashes normal, conjunctivae and sclerae normal, no   icterus, no pallor, corneas clear, PERRLA   Ears:    Ears appear intact with no abnormalities noted   Throat:   No oral lesions, no thrush, oral mucosa moist   Neck:   No adenopathy, supple, trachea midline, no thyromegaly, no   carotid bruit, no JVD   Back:     No kyphosis present, no scoliosis present, no skin lesions,      erythema or scars, no tenderness to percussion or                   palpation,   range of motion normal   Lungs:     Clear to auscultation,respirations  regular, even and                  unlabored    Heart:    Regular rhythm and normal rate, normal S1 and S2, no            murmur, no gallop, no rub, no click   Chest Wall:    No abnormalities observed   Abdomen:     Normal bowel sounds, no masses, no organomegaly, soft        non-tender, non-distended, no guarding, no rebound                tenderness   Rectal:     Deferred   Extremities:   Moves all extremities well, no edema, no cyanosis, no             redness   Pulses:   Pulses palpable and equal bilaterally   Skin:   No bleeding, bruising or rash   Lymph nodes:   No palpable adenopathy   Neurologic:   Cranial nerves 2 - 12 grossly intact, sensation intact, DTR       present and equal bilaterally       Results Review:     Lab Results (last 24 hours)     Procedure Component Value Units Date/Time    Body Fluid Culture - Body Fluid, Pleural Cavity [[id number]] Collected: 01/15/21 1509    Specimen: Body Fluid from Pleural Cavity Updated: 01/18/21 0649     Body Fluid Culture No growth at 3 days     Gram Stain No organisms seen    Basic Metabolic Panel [[id number]]  (Abnormal) Collected: 01/18/21 0500    Specimen: Blood Updated: 01/18/21 0543     Glucose 154 mg/dL      BUN 31 mg/dL      Creatinine 1.11 mg/dL      Sodium 135 mmol/L      Potassium 3.9 mmol/L      Chloride 100 mmol/L      CO2 27.0 mmol/L      Calcium 8.6 mg/dL      eGFR Non African Amer 64 mL/min/1.73      BUN/Creatinine Ratio 27.9     Anion Gap 8.0 mmol/L     Narrative:      GFR Normal >60  Chronic Kidney Disease <60  Kidney Failure <15      Magnesium [[id number]]  (Normal) Collected: 01/18/21 0500    Specimen: Blood Updated: 01/18/21 0543     Magnesium 2.2 mg/dL     CBC & Differential [[id number]]  (Abnormal) Collected: 01/18/21 0500    Specimen: Blood Updated: 01/18/21 0516    Narrative:      The following orders were created for panel order CBC & Differential.  Procedure                               Abnormality         Status                      ---------                               -----------         ------                     CBC Auto Differential[[id number]]        Abnormal            Final result                 Please view results for these tests on the individual orders.    CBC Auto Differential [[id number]]  (Abnormal) Collected: 01/18/21 0500    Specimen: Blood Updated: 01/18/21 0516     WBC 8.80 10*3/mm3      RBC 3.94 10*6/mm3      Hemoglobin 11.5 g/dL      Hematocrit 34.6 %      MCV 87.8 fL      MCH 29.1 pg      MCHC 33.2 g/dL      RDW 16.4 %      RDW-SD 50.8 fl      MPV 9.1 fL      Platelets 206 10*3/mm3      Neutrophil % 94.5 %      Lymphocyte % 1.0 %      Monocyte % 4.3 %      Eosinophil % 0.0 %      Basophil % 0.2 %      Neutrophils, Absolute 8.30 10*3/mm3      Lymphocytes, Absolute 0.10 10*3/mm3      Monocytes, Absolute 0.40 10*3/mm3      Eosinophils, Absolute 0.00 10*3/mm3      Basophils, Absolute 0.00 10*3/mm3      nRBC 0.2 /100 WBC     Urinalysis With Culture If Indicated - Urine, Catheter [[id number]] Updated: 01/17/21 1928    Specimen: Urine, Catheter     Virus Culture - Lavage, Lung, Right Lower Lobe [[id number]] Collected: 01/14/21 1347    Specimen: Lavage from Lung, Right Lower Lobe Updated: 01/17/21 1707     Viral Culture, General Comment     Comment: Preliminary Report:  No virus isolated at 24 hours. Next report to follow after 4 days.       Narrative:      Performed at:   - [address]  [id number]  : Rick Willis MD, Phone:  [phone]    Blood Culture - Blood, Arm, Right [[id number]] Collected: 01/13/21 1310    Specimen: Blood from Arm, Right Updated: 01/17/21 1317     Blood Culture No growth at 4 days    Blood Culture - Blood, Arm, Right [[id number]] Collected: 01/13/21 1302    Specimen: Blood from Arm, Right Updated: 01/17/21 1317     Blood Culture No growth at 4 days         No results found for: URINECX     Imaging Results (Last 7 Days)     Procedure Component Value Units  Date/Time    XR Chest 1 View [[id number]] Collected: 01/18/21 0750     Updated: 01/18/21 0754    Narrative:      DATE OF EXAM:  1/18/2021 5:48 AM     PROCEDURE:  XR CHEST 1 VW-     INDICATIONS:  Right chest tube, right lung cancer, previous right upper lobectomy,  chest pain, shortness of breath.      COMPARISON:  1/17/2021.     TECHNIQUE:   Single radiographic view of the chest was obtained.     FINDINGS:  There is a right basilar pigtail chest tube in place with no  pneumothorax or pleural effusion. There is increased density in the  right hilar region which is unchanged from the previous exam and felt to  be secondary to posttreatment changes. The heart size is felt to be  normal for technique. The pulmonary vascular markings are normal. The  left lung and pleural space are clear.  There is no interval change from  yesterday's study.       Impression:      No interval change from yesterday's study as described above.     Electronically Signed By-Kel Fair MD On:1/18/2021 7:52 AM  This report was finalized on [id number] by  Kel Fair MD.    XR Chest 1 View [[id number]] Collected: 01/17/21 0832     Updated: 01/17/21 0836    Narrative:      DATE OF EXAM:  1/17/2021 2:50 AM     PROCEDURE:  XR CHEST 1 VW-     INDICATIONS:  chest catheter; J44.1-Chronic obstructive pulmonary disease with (acute)  exacerbation; R06.03-Acute respiratory distress; I48.91-Unspecified  atrial fibrillation; H56-Tqptnmt effusion, not elsewhere classified;  C34.11-Malignant neoplasm of upper lobe, right bronchus or lung     COMPARISON:  01/16/2021     TECHNIQUE:   Single radiographic view of the chest was obtained.     FINDINGS:  There is a right basilar chest tube in similar position. No significant  pneumothorax or pleural effusion is seen.  There is elevation of the  right diaphragm with postsurgical changes of right lobectomy, as before.  There are suspected postsurgical and posttreatment changes at the right  hilum, similar to  recent prior exams.  No definite new left lung  infiltrate is seen. Heart size and mediastinal contour appear within  normal limits.       Impression:      1.Right basilar chest tube appears in similar position. No significant  pneumothorax or pleural effusion is seen.  2.Postsurgical/post therapy changes in the right lung, as before.     Electronically Signed By-Brant Dao MD On:1/17/2021 8:34 AM  This report was finalized on [id number] by  Brant Dao MD.    XR Chest 1 View [[id number]] Collected: 01/16/21 1054     Updated: 01/16/21 1101    Narrative:      XR CHEST 1 VW-     Date of Exam: 1/16/2021 4:14 AM     Indication: chest catheter; J44.1-Chronic obstructive pulmonary disease  with (acute) exacerbation; R06.03-Acute respiratory distress;  I48.91-Unspecified atrial fibrillation; R92-Kvwuphw effusion, not  elsewhere classified; C34.11-Malignant neoplasm of upper lobe, right  bronchus or lung     Comparison: 1/15/2021 x 2, 01/13/2021, 08/23/2018.     Technique: 1 view(s) of the chest were obtained.     FINDINGS: Small-caliber chest tube is present in the right lung  base. There is improved aeration. No pleural effusion or pneumothorax is  demonstrated. There is hilar retraction and ulna with postsurgical  changes similar to prior studies this patient with a history of treated  lung cancer. No acute infiltrates. Heart size and mediastinal  silhouettes are unremarkable. Pulmonary vascularity is normal. The  trachea is midline. Bony thorax is intact.       Impression:         1. Improved aeration with small caliber chest tube in the right lung  base. A pneumothorax or pleural effusion is not demonstrated.  2. Persistent postsurgical and treatment related changes in the right  superhilar region, this is likely treatment related changes, is similar  radiographically when compared with the study from 08/23/2018.     Electronically Signed By-Donny Abdi DO On:1/16/2021 10:59 AM  This report was finalized on  [id number] by  Donny Abdi DO.    XR Chest 1 View [[id number]] Collected: 01/15/21 1509     Updated: 01/15/21 1512    Narrative:      DATE OF EXAM:  1/15/2021 2:43 PM     PROCEDURE:  XR CHEST 1 VW-     INDICATIONS:  Thoracentesis; J44.1-Chronic obstructive pulmonary disease with (acute)  exacerbation; R06.03-Acute respiratory distress; I48.91-Unspecified  atrial fibrillation; F78-Dfifplh effusion, not elsewhere classified;  C34.11-Malignant neoplasm of upper lobe, right bronchus or lung       COMPARISON:  AP portable chest 01/15/2021.     TECHNIQUE:   Single radiographic view of the chest was obtained.     FINDINGS:  Right basilar pleural pigtail drainage catheter has been placed. Right  pleural effusion has resolved. There is mild residual opacity in the  right base favored to represent atelectasis, and there is significantly  improved aeration in the right lower lobe compared earlier today. Left  lung remains clear. Heart size is normal. No pneumothorax is seen.       Impression:      Significantly improved aeration in the right hemithorax, that is post  pleural drain placement. Mild residual atelectasis remains. No visible  pleural fluid or pneumothorax.     Electronically Signed By-Maura Esteves MD On:1/15/2021 3:10 PM  This report was finalized on [id number] by  Maura Esteves MD.    XR Chest 1 View [[id number]] Collected: 01/15/21 0752     Updated: 01/15/21 0755    Narrative:      DATE OF EXAM:  1/15/2021 6:06 AM     PROCEDURE:  XR CHEST 1 VW-     INDICATIONS:  Follow-up status post bronchoscopy; R06.03-Acute respiratory distress;  I48.91-Unspecified atrial fibrillation; J44.1-Chronic obstructive  pulmonary disease with (acute) exacerbation; N15-Aqqjpyv effusion, not  elsewhere classified; C34.11-Malignant neoplasm of upper lobe, right  bronchus or lung history smoking. Coronary artery stent. History of lung  cancer. Hypertension. COPD. Cough today. Follow-up bronchoscopy.     COMPARISON:  Single  frontal view chest performed on 01/13/2021     TECHNIQUE:   Single radiographic AP view of the chest was obtained.     FINDINGS:  Single frontal view chest reveals that there is a moderate-large  infiltrate and pleural effusion involving the right midlung zone and  right lower lobe along less prominent than on previous study. No acute  or focal arising left lung. Heart and mediastinum are normal. No  evidence of pneumothorax.        Impression:         1. Moderate-large sized infiltrate pleural effusion involving the right  midlung zone right lower lobe lung has improved slightly since previous  study performed on 01/13/2021     Electronically Signed By-Baron Diggs MD On:1/15/2021 7:53 AM  This report was finalized on [id number] by  Baron Diggs MD.     Aorta Limited [[id number]] Collected: 01/14/21 2214     Updated: 01/15/21 0018    Narrative:      EXAM: Abdominal aortic ultrasound    DATE: January 14, 2021    HISTORY: Follow-up abdominal aortic aneurysm    COMPARISON: May 18, 2019    TECHNIQUE: Grayscale and color flow ultrasonographic imaging was performed of the abdominal aorta    FINDINGS:    There is severe atherosclerotic disease within the abdominal aorta. There is redemonstration of an infrarenal abdominal aortic aneurysm measuring 5.3 x 4.9 cm. The iliac arteries are well characterized secondary to overlying bowel gas. The visualized   grafted iliac arteries are patent.      Impression:      1. Redemonstration of a distal abdominal aortic aneurysm measuring 5.3 x 4.9 cm. This is very similar in size to that described on May 18, 2019.  2. Severe atherosclerotic disease.  3. There are limitations to this study secondary to overlying bowel gas.        Slot 63    Electronically signed by:  Vladislav Rich M.D.    1/14/2021 10:17 PM    XR Chest 1 View [[id number]] Collected: 01/13/21 1416     Updated: 01/13/21 1421    Narrative:      DATE OF EXAM:  1/13/2021 1:30 PM     PROCEDURE:  XR CHEST  1 VW-     INDICATIONS:  Shortness of breath, cough, lung cancer, hypoxia.      COMPARISON:  Chest x-ray performed on 8/13/2018 and 8 CT the chest from 1/11/2019.     TECHNIQUE:   Single radiographic view of the chest was obtained.     FINDINGS:  There has been interval development of a moderate right pleural  effusion. There is worsening consolidation in the right lung,  particularly in the mid and lower lung zone. Some of this is due to  compressive atelectasis from the pleural effusion. There also may be  superimposed pneumonia. There is a masslike area of consolidation in the  right hilar region which has been seen on previous imaging studies  presumably related to treated lung cancer. The left lung and pleural  space are clear. The heart size is difficult to evaluate as the right  cardiac border is obscured.  There are chronic age-related changes  involving the bony thorax.       Impression:         1. Interval development of a moderate-sized right pleural effusion.  2. Worsening consolidation in the right lung particularly in the mid and  lower lung zone. This is likely due to a combination of compressive  atelectasis with superimposed pneumonia.  3. Masslike area of consolidation has been present the right hilar  region on previous imaging studies consistent with treated lung cancer.     Electronically Signed By-Kel Fair MD On:1/13/2021 2:19 PM  This report was finalized on [id number] by  Kel Fair MD.          Inpatient Meds:   Scheduled Meds:budesonide, 0.5 mg, Nebulization, BID - RT  cefepime, 2 g, Intravenous, Q12H  digoxin, 125 mcg, Oral, Daily  dilTIAZem, 30 mg, Oral, Q6H  enoxaparin, 1 mg/kg, Subcutaneous, Q12H  finasteride, 5 mg, Oral, Daily  guaiFENesin, 1,200 mg, Oral, Q12H  ipratropium-albuterol, 3 mL, Nebulization, Q4H - RT  methylPREDNISolone sodium succinate, 40 mg, Intravenous, Q8H  metoprolol tartrate, 25 mg, Oral, Q8H  multivitamin with minerals, 1 tablet, Oral, Daily  rosuvastatin,  40 mg, Oral, Nightly  sodium chloride, 10 mL, Intravenous, Q12H  tamsulosin, 0.4 mg, Oral, Daily With Dinner  theophylline, 300 mg, Oral, Daily       Continuous Infusions:amiodarone, 1 mg/min, Last Rate: 1 mg/min (01/18/21 0301)       PRN Meds:.•  acetaminophen **OR** acetaminophen **OR** acetaminophen  •  benzonatate  •  HYDROcodone-acetaminophen  •  HYDROcodone-acetaminophen  •  ipratropium-albuterol  •  magnesium hydroxide  •  magnesium sulfate **OR** magnesium sulfate in D5W 1g/100mL (PREMIX)  •  nitroglycerin  •  ondansetron **OR** ondansetron  •  potassium chloride  •  [COMPLETED] Insert peripheral IV **AND** sodium chloride  •  sodium chloride      Assessment/Plan     Hematuria    BPH    Urinary retention    Patient on Flomax and has Beach in place.  He does not want a CT scan but he has agreed to doing a bedside cystoscopy.  He understands all risk benefits and options and is willing to proceed    I discussed the patients findings and my recommendations with patient.    Dami Parikh MD  01/18/21  08:09 EST

## 2021-01-19 ENCOUNTER — APPOINTMENT (OUTPATIENT)
Dept: GENERAL RADIOLOGY | Facility: HOSPITAL | Age: 81
End: 2021-01-19

## 2021-01-19 LAB
ANION GAP SERPL CALCULATED.3IONS-SCNC: 7 MMOL/L (ref 5–15)
BASOPHILS # BLD AUTO: 0 10*3/MM3 (ref 0–0.2)
BASOPHILS NFR BLD AUTO: 0.3 % (ref 0–1.5)
BUN SERPL-MCNC: 40 MG/DL (ref 8–23)
BUN/CREAT SERPL: 28.8 (ref 7–25)
CALCIUM SPEC-SCNC: 8.9 MG/DL (ref 8.6–10.5)
CHLORIDE SERPL-SCNC: 101 MMOL/L (ref 98–107)
CO2 SERPL-SCNC: 29 MMOL/L (ref 22–29)
CREAT SERPL-MCNC: 1.39 MG/DL (ref 0.76–1.27)
DEPRECATED RDW RBC AUTO: 48.6 FL (ref 37–54)
EOSINOPHIL # BLD AUTO: 0 10*3/MM3 (ref 0–0.4)
EOSINOPHIL NFR BLD AUTO: 0 % (ref 0.3–6.2)
ERYTHROCYTE [DISTWIDTH] IN BLOOD BY AUTOMATED COUNT: 16 % (ref 12.3–15.4)
GFR SERPL CREATININE-BSD FRML MDRD: 49 ML/MIN/1.73
GLUCOSE SERPL-MCNC: 159 MG/DL (ref 65–99)
HCT VFR BLD AUTO: 35.7 % (ref 37.5–51)
HGB BLD-MCNC: 11.8 G/DL (ref 13–17.7)
LAB AP CASE REPORT: NORMAL
LYMPHOCYTES # BLD AUTO: 0.1 10*3/MM3 (ref 0.7–3.1)
LYMPHOCYTES NFR BLD AUTO: 0.6 % (ref 19.6–45.3)
MAGNESIUM SERPL-MCNC: 2.2 MG/DL (ref 1.6–2.4)
MCH RBC QN AUTO: 29.2 PG (ref 26.6–33)
MCHC RBC AUTO-ENTMCNC: 33.1 G/DL (ref 31.5–35.7)
MCV RBC AUTO: 88 FL (ref 79–97)
MONOCYTES # BLD AUTO: 0.4 10*3/MM3 (ref 0.1–0.9)
MONOCYTES NFR BLD AUTO: 4.4 % (ref 5–12)
NEUTROPHILS NFR BLD AUTO: 9.4 10*3/MM3 (ref 1.7–7)
NEUTROPHILS NFR BLD AUTO: 94.7 % (ref 42.7–76)
NRBC BLD AUTO-RTO: 0.1 /100 WBC (ref 0–0.2)
P JIROVECII DNA # SPEC NAA+PROBE: NEGATIVE {COPIES}/ML
PATH REPORT.FINAL DX SPEC: NORMAL
PATH REPORT.GROSS SPEC: NORMAL
PLATELET # BLD AUTO: 212 10*3/MM3 (ref 140–450)
PMV BLD AUTO: 9.8 FL (ref 6–12)
POTASSIUM SERPL-SCNC: 4.5 MMOL/L (ref 3.5–5.2)
RBC # BLD AUTO: 4.05 10*6/MM3 (ref 4.14–5.8)
SODIUM SERPL-SCNC: 137 MMOL/L (ref 136–145)
SPECIMEN SOURCE: NORMAL
TRIGL FLD-MCNC: 12 MG/DL
WBC # BLD AUTO: 9.9 10*3/MM3 (ref 3.4–10.8)

## 2021-01-19 PROCEDURE — 85025 COMPLETE CBC W/AUTO DIFF WBC: CPT | Performed by: INTERNAL MEDICINE

## 2021-01-19 PROCEDURE — 94799 UNLISTED PULMONARY SVC/PX: CPT

## 2021-01-19 PROCEDURE — 83735 ASSAY OF MAGNESIUM: CPT | Performed by: INTERNAL MEDICINE

## 2021-01-19 PROCEDURE — 99232 SBSQ HOSP IP/OBS MODERATE 35: CPT | Performed by: INTERNAL MEDICINE

## 2021-01-19 PROCEDURE — 99232 SBSQ HOSP IP/OBS MODERATE 35: CPT | Performed by: HOSPITALIST

## 2021-01-19 PROCEDURE — 25010000002 ENOXAPARIN PER 10 MG: Performed by: INTERNAL MEDICINE

## 2021-01-19 PROCEDURE — 25010000002 METHYLPREDNISOLONE PER 40 MG: Performed by: INTERNAL MEDICINE

## 2021-01-19 PROCEDURE — 71045 X-RAY EXAM CHEST 1 VIEW: CPT

## 2021-01-19 PROCEDURE — 80048 BASIC METABOLIC PNL TOTAL CA: CPT | Performed by: INTERNAL MEDICINE

## 2021-01-19 PROCEDURE — 25010000002 CEFTRIAXONE PER 250 MG: Performed by: INTERNAL MEDICINE

## 2021-01-19 RX ORDER — IPRATROPIUM BROMIDE AND ALBUTEROL SULFATE 2.5; .5 MG/3ML; MG/3ML
3 SOLUTION RESPIRATORY (INHALATION)
Status: DISCONTINUED | OUTPATIENT
Start: 2021-01-20 | End: 2021-01-22 | Stop reason: HOSPADM

## 2021-01-19 RX ADMIN — DILTIAZEM HYDROCHLORIDE 30 MG: 30 TABLET, FILM COATED ORAL at 12:17

## 2021-01-19 RX ADMIN — THEOPHYLLINE 300 MG: 300 TABLET, EXTENDED RELEASE ORAL at 08:47

## 2021-01-19 RX ADMIN — BUDESONIDE 0.5 MG: 0.5 INHALANT RESPIRATORY (INHALATION) at 20:35

## 2021-01-19 RX ADMIN — Medication 10 ML: at 05:30

## 2021-01-19 RX ADMIN — GUAIFENESIN 1200 MG: 600 TABLET, EXTENDED RELEASE ORAL at 05:30

## 2021-01-19 RX ADMIN — ENOXAPARIN SODIUM 80 MG: 80 INJECTION SUBCUTANEOUS at 14:11

## 2021-01-19 RX ADMIN — METHYLPREDNISOLONE SODIUM SUCCINATE 40 MG: 40 INJECTION, POWDER, FOR SOLUTION INTRAMUSCULAR; INTRAVENOUS at 05:29

## 2021-01-19 RX ADMIN — Medication 10 ML: at 21:27

## 2021-01-19 RX ADMIN — Medication 10 ML: at 08:47

## 2021-01-19 RX ADMIN — DIGOXIN 125 MCG: 125 TABLET ORAL at 08:47

## 2021-01-19 RX ADMIN — TAMSULOSIN HYDROCHLORIDE 0.4 MG: 0.4 CAPSULE ORAL at 18:17

## 2021-01-19 RX ADMIN — IPRATROPIUM BROMIDE AND ALBUTEROL SULFATE 3 ML: 2.5; .5 SOLUTION RESPIRATORY (INHALATION) at 06:34

## 2021-01-19 RX ADMIN — IPRATROPIUM BROMIDE AND ALBUTEROL SULFATE 3 ML: 2.5; .5 SOLUTION RESPIRATORY (INHALATION) at 15:00

## 2021-01-19 RX ADMIN — IPRATROPIUM BROMIDE AND ALBUTEROL SULFATE 3 ML: 2.5; .5 SOLUTION RESPIRATORY (INHALATION) at 10:46

## 2021-01-19 RX ADMIN — METOPROLOL TARTRATE 25 MG: 25 TABLET, FILM COATED ORAL at 14:11

## 2021-01-19 RX ADMIN — IPRATROPIUM BROMIDE AND ALBUTEROL SULFATE 3 ML: 2.5; .5 SOLUTION RESPIRATORY (INHALATION) at 03:15

## 2021-01-19 RX ADMIN — AMIODARONE HYDROCHLORIDE 200 MG: 200 TABLET ORAL at 18:17

## 2021-01-19 RX ADMIN — BISACODYL 10 MG: 10 SUPPOSITORY RECTAL at 08:47

## 2021-01-19 RX ADMIN — DILTIAZEM HYDROCHLORIDE 30 MG: 30 TABLET, FILM COATED ORAL at 23:41

## 2021-01-19 RX ADMIN — METHYLPREDNISOLONE SODIUM SUCCINATE 40 MG: 40 INJECTION, POWDER, FOR SOLUTION INTRAMUSCULAR; INTRAVENOUS at 21:26

## 2021-01-19 RX ADMIN — METOPROLOL TARTRATE 25 MG: 25 TABLET, FILM COATED ORAL at 21:26

## 2021-01-19 RX ADMIN — ENOXAPARIN SODIUM 80 MG: 80 INJECTION SUBCUTANEOUS at 03:47

## 2021-01-19 RX ADMIN — DILTIAZEM HYDROCHLORIDE 30 MG: 30 TABLET, FILM COATED ORAL at 01:02

## 2021-01-19 RX ADMIN — AMIODARONE HYDROCHLORIDE 200 MG: 200 TABLET ORAL at 08:47

## 2021-01-19 RX ADMIN — CEFTRIAXONE SODIUM 2 G: 2 INJECTION, POWDER, FOR SOLUTION INTRAMUSCULAR; INTRAVENOUS at 14:11

## 2021-01-19 RX ADMIN — DILTIAZEM HYDROCHLORIDE 30 MG: 30 TABLET, FILM COATED ORAL at 18:17

## 2021-01-19 RX ADMIN — POLYETHYLENE GLYCOL 3350 17 G: 17 POWDER, FOR SOLUTION ORAL at 08:47

## 2021-01-19 RX ADMIN — FINASTERIDE 5 MG: 5 TABLET, FILM COATED ORAL at 08:47

## 2021-01-19 RX ADMIN — POLYETHYLENE GLYCOL 3350 17 G: 17 POWDER, FOR SOLUTION ORAL at 21:27

## 2021-01-19 RX ADMIN — ROSUVASTATIN CALCIUM 40 MG: 10 TABLET, FILM COATED ORAL at 21:26

## 2021-01-19 RX ADMIN — METOPROLOL TARTRATE 25 MG: 25 TABLET, FILM COATED ORAL at 05:29

## 2021-01-19 RX ADMIN — METHYLPREDNISOLONE SODIUM SUCCINATE 40 MG: 40 INJECTION, POWDER, FOR SOLUTION INTRAMUSCULAR; INTRAVENOUS at 14:11

## 2021-01-19 RX ADMIN — IPRATROPIUM BROMIDE AND ALBUTEROL SULFATE 3 ML: 2.5; .5 SOLUTION RESPIRATORY (INHALATION) at 20:31

## 2021-01-19 RX ADMIN — MULTIPLE VITAMINS W/ MINERALS TAB 1 TABLET: TAB at 08:47

## 2021-01-19 RX ADMIN — DILTIAZEM HYDROCHLORIDE 30 MG: 30 TABLET, FILM COATED ORAL at 05:29

## 2021-01-19 RX ADMIN — GUAIFENESIN 1200 MG: 600 TABLET, EXTENDED RELEASE ORAL at 18:17

## 2021-01-19 RX ADMIN — BUDESONIDE 0.5 MG: 0.5 INHALANT RESPIRATORY (INHALATION) at 06:34

## 2021-01-19 NOTE — PLAN OF CARE
Goal Outcome Evaluation:  Plan of Care Reviewed With: patient, spouse  Progress: improving     Problem: Adult Inpatient Plan of Care  Goal: Absence of Hospital-Acquired Illness or Injury  Intervention: Identify and Manage Fall Risk  Description: Perform standard risk assessment with a validated tool or comprehensive approach appropriate to the patient on admission; reassess fall risk frequently, with change in status or transfer to another level of care.  Communicate fall injury risk to interprofessional healthcare team.  Determine need for increased observation, equipment and environmental modification, such as low bed and signage, as well as supportive, nonskid footwear.  Adjust safety measures to individual developmental age, stage and identified risk factors.  Reinforce the importance of safety and physical activity with patient and family.  Perform regular intentional rounding to assess need for position change, pain assessment, personal needs, including assistance with toileting.  Recent Flowsheet Documentation  Taken 1/19/2021 1500 by Henrietta Fay RN  Safety Promotion/Fall Prevention: assistive device/personal items within reach  Goal: Optimal Comfort and Wellbeing  Intervention: Provide Person-Centered Care  Description: Use a family-focused approach to care.  Develop trust and rapport by proactively providing information, encouraging questions, addressing concerns and offering reassurance.  Acknowledge emotional response to hospitalization.  Recognize and utilize personal coping strategies.  Honor spiritual and cultural preferences.  Recent Flowsheet Documentation  Taken 1/19/2021 1500 by Henrietta Fay RN  Trust Relationship/Rapport:   care explained   choices provided   emotional support provided   questions answered   empathic listening provided   questions encouraged   reassurance provided   thoughts/feelings acknowledged  Taken 1/19/2021 1100 by Henrietta Fay, RN  Trust  Relationship/Rapport:   care explained   choices provided   emotional support provided   empathic listening provided   questions answered   questions encouraged   reassurance provided   thoughts/feelings acknowledged  Taken 1/19/2021 0700 by Henrietta Fay RN  Trust Relationship/Rapport:   care explained   choices provided   emotional support provided   empathic listening provided   questions answered   questions encouraged   reassurance provided   thoughts/feelings acknowledged     Problem: Fall Injury Risk  Goal: Absence of Fall and Fall-Related Injury  Intervention: Identify and Manage Contributors to Fall Injury Risk  Description: Reassess fall risk frequently and with change in status or transfer to another level of care.  Communicate fall injury risk to all healthcare team members (e.g., rounds, change of shift/provider, patient transport).  Anticipate needs; perform regular intentional rounding to assess need for position change, pain assessment, personal needs (e.g., toileting) and placement of necessary items.  Provide reorientation, appropriate sensory stimulation and routines with changes in mental status to decrease risk of fall.  Promote use of personal vision and auditory aids (e.g., glasses, hearing aids).  Assess assistance level required for safe and effective care; provide support as needed (e.g., toileting, bathing, mobilization).  Define behavior and activity limits to patient and family.  If fall occurs, assess for and treat injury; determine cause; revise fall injury prevention plan.  Regularly review medication contribution to fall risk; adjust medication administration times to minimize risk of falling.  Consider risk related to polypharmacy and age.  Balance adequate pain management with potential for oversedation.  Recent Flowsheet Documentation  Taken 1/19/2021 1500 by Henrietta Fay, RN  Self-Care Promotion: independence encouraged  Taken 1/19/2021 1100 by Henrietta Fay,  RN  Self-Care Promotion: independence encouraged  Taken 1/19/2021 0700 by Henrietta Fay RN  Self-Care Promotion: independence encouraged  Intervention: Promote Injury-Free Environment  Description: Provide a safe, barrier-free environment that encourages independent activity.  Keep care area uncluttered and well-lighted.  Determine need for increased observation or auditory alerts (e.g., bed or chair alarm).  Assess equipment and environmental modification needs (e.g., low bed, signage, nonskid footwear, grab bars).  Avoid use of restraints.  Recent Flowsheet Documentation  Taken 1/19/2021 1500 by Henrietta Fay RN  Safety Promotion/Fall Prevention: assistive device/personal items within reach     Problem: Fall Injury Risk  Goal: Absence of Fall and Fall-Related Injury  Intervention: Identify and Manage Contributors to Fall Injury Risk  Description: Reassess fall risk frequently and with change in status or transfer to another level of care.  Communicate fall injury risk to all healthcare team members (e.g., rounds, change of shift/provider, patient transport).  Anticipate needs; perform regular intentional rounding to assess need for position change, pain assessment, personal needs (e.g., toileting) and placement of necessary items.  Provide reorientation, appropriate sensory stimulation and routines with changes in mental status to decrease risk of fall.  Promote use of personal vision and auditory aids (e.g., glasses, hearing aids).  Assess assistance level required for safe and effective care; provide support as needed (e.g., toileting, bathing, mobilization).  Define behavior and activity limits to patient and family.  If fall occurs, assess for and treat injury; determine cause; revise fall injury prevention plan.  Regularly review medication contribution to fall risk; adjust medication administration times to minimize risk of falling.  Consider risk related to polypharmacy and age.  Balance adequate  pain management with potential for oversedation.  Recent Flowsheet Documentation  Taken 1/19/2021 1500 by Henrietta Fay RN  Self-Care Promotion: independence encouraged  Taken 1/19/2021 1100 by Henrietta Fay RN  Self-Care Promotion: independence encouraged  Taken 1/19/2021 0700 by Henrietta Fay RN  Self-Care Promotion: independence encouraged  Intervention: Promote Injury-Free Environment  Description: Provide a safe, barrier-free environment that encourages independent activity.  Keep care area uncluttered and well-lighted.  Determine need for increased observation or auditory alerts (e.g., bed or chair alarm).  Assess equipment and environmental modification needs (e.g., low bed, signage, nonskid footwear, grab bars).  Avoid use of restraints.  Recent Flowsheet Documentation  Taken 1/19/2021 1500 by Henrietta Fay RN  Safety Promotion/Fall Prevention: assistive device/personal items within reach     Problem: Skin Injury Risk Increased  Goal: Skin Health and Integrity  Intervention: Optimize Skin Protection  Description: Reassess skin injury risk and inspect skin frequently (e.g., scheduled interval, with turning, with change in condition) to provide optimal prevention.  Maintain adequate tissue perfusion (e.g., encourage fluid balance, avoid crossing legs, constrictive clothing or devices) to promote tissue oxygenation.  Maintain head of bed at lowest degree of elevation tolerated, considering medical condition and other restrictions. Limit amount of time head of bed is elevated greater than 30 degrees to prevent friction/shear injury.  Avoid positioning onto an area that remains reddened.  Minimize incontinence and moisture (e.g., toileting schedule; moisture-wicking pad, diaper or incontinence collection device, skin moisture barrier).  Cleanse skin promptly and gently when soiled utilizing a pH-balanced cleanser.  Relieve and redistribute pressure (e.g., schedule position changes; utilize higher  specification foam mattress, chair cushion, constant low-pressure or alternating-pressure support surface; medical device repositioning; protective dressing applicatio  Support increased progressive functional activity (e.g., therapeutic exercise) to decrease risk associated with immobility. Balance rest with activity.  Recent Flowsheet Documentation  Taken 1/19/2021 1500 by Henrietta Fay RN  Pressure Reduction Techniques:   weight shift assistance provided   sit time limited to 2 hours  Pressure Reduction Devices:   specialty bed utilized   pressure-redistributing mattress utilized   positioning supports utilized  Skin Protection:   skin-to-skin areas padded   skin-to-device areas padded  Taken 1/19/2021 1100 by Henrietta Fay RN  Pressure Reduction Techniques:   weight shift assistance provided   sit time limited to 2 hours  Pressure Reduction Devices:   specialty bed utilized   pressure-redistributing mattress utilized   positioning supports utilized  Skin Protection:   skin-to-skin areas padded   skin-to-device areas padded  Taken 1/19/2021 0700 by Henrietta Fay RN  Pressure Reduction Techniques:   weight shift assistance provided   sit time limited to 2 hours  Head of Bed (HOB): HOB at 30-45 degrees  Pressure Reduction Devices:   specialty bed utilized   pressure-redistributing mattress utilized   positioning supports utilized  Skin Protection:   skin-to-skin areas padded   skin-to-device areas padded

## 2021-01-19 NOTE — PROGRESS NOTES
Referring Provider: Hospitalist    Reason for follow-up: Shortness of breath     Patient Care Team:  Camron Pollard MD as PCP - General (Family Medicine)  Alvarado Cai MD as Consulting Physician (Cardiology)    Subjective .  Shortness of breath is slowly improving    Objective Sitting up in chair     Review of Systems   Constitution: Negative for chills and fever.   HENT: Negative for ear discharge and nosebleeds.    Eyes: Negative for discharge and redness.   Cardiovascular: Negative for chest pain, orthopnea, palpitations, paroxysmal nocturnal dyspnea and syncope.   Respiratory: Positive for shortness of breath. Negative for cough and wheezing.    Endocrine: Negative for heat intolerance.   Skin: Negative for rash.   Musculoskeletal: Negative for arthritis and myalgias.   Gastrointestinal: Negative for abdominal pain, melena, nausea and vomiting.   Genitourinary: Negative for dysuria and hematuria.   Neurological: Negative for dizziness, light-headedness, numbness and tremors.   Psychiatric/Behavioral: Negative for depression. The patient is not nervous/anxious.        Ativan [lorazepam]    Scheduled Meds:amiodarone, 200 mg, Oral, BID With Meals  bisacodyl, 10 mg, Rectal, Daily  budesonide, 0.5 mg, Nebulization, BID - RT  cefTRIAXone, 2 g, Intravenous, Q24H  digoxin, 125 mcg, Oral, Daily  dilTIAZem, 30 mg, Oral, Q6H  enoxaparin, 1 mg/kg, Subcutaneous, Q12H  finasteride, 5 mg, Oral, Daily  guaiFENesin, 1,200 mg, Oral, Q12H  ipratropium-albuterol, 3 mL, Nebulization, Q4H - RT  methylPREDNISolone sodium succinate, 40 mg, Intravenous, Q8H  metoprolol tartrate, 25 mg, Oral, Q8H  multivitamin with minerals, 1 tablet, Oral, Daily  polyethylene glycol, 17 g, Oral, BID  rosuvastatin, 40 mg, Oral, Nightly  sodium chloride, 10 mL, Intravenous, Q12H  tamsulosin, 0.4 mg, Oral, Daily With Dinner  theophylline, 300 mg, Oral, Daily      Continuous Infusions:   PRN Meds:.•  acetaminophen **OR** acetaminophen **OR**  "acetaminophen  •  benzonatate  •  HYDROcodone-acetaminophen  •  HYDROcodone-acetaminophen  •  ipratropium-albuterol  •  magnesium hydroxide  •  magnesium sulfate **OR** magnesium sulfate in D5W 1g/100mL (PREMIX)  •  nitroglycerin  •  ondansetron **OR** ondansetron  •  potassium chloride  •  [COMPLETED] Insert peripheral IV **AND** sodium chloride  •  sodium chloride        VITAL SIGNS  Vitals:    01/19/21 0634 01/19/21 0640 01/19/21 1023 01/19/21 1046   BP:   123/57    BP Location:       Patient Position:       Pulse: 98 95 104 111   Resp: 18 18 22 22   Temp:       TempSrc:   Oral    SpO2: 94% 99% 96% 96%   Weight:       Height:           Flowsheet Rows      First Filed Value   Admission Height  177.8 cm (70\") Documented at 01/13/2021 1222   Admission Weight  80.6 kg (177 lb 11.1 oz) Documented at 01/13/2021 1222           TELEMETRY: Atrial fibrillation with rapid response    Physical Exam:  Constitutional:       Appearance: Well-developed.   Eyes:      General: No scleral icterus.        Right eye: No discharge.   HENT:      Head: Normocephalic and atraumatic.   Neck:      Thyroid: No thyromegaly.      Lymphadenopathy: No cervical adenopathy.   Pulmonary:      Effort: Pulmonary effort is normal. No respiratory distress.      Breath sounds: No wheezing. Rhonchi present. No rales.   Cardiovascular:      Normal rate. Irregularly irregular rhythm.      No gallop.   Abdominal:      Tenderness: There is no abdominal tenderness.   Skin:     Findings: No erythema or rash.   Neurological:      Mental Status: Alert and oriented to person, place, and time.          Results Review:   I reviewed the patient's new clinical results.  Lab Results (last 24 hours)     Procedure Component Value Units Date/Time    Body Fluid Culture - Body Fluid, Pleural Cavity [[id number]] Collected: 01/15/21 1509    Specimen: Body Fluid from Pleural Cavity Updated: 01/19/21 0706     Body Fluid Culture No growth at 4 days     Gram Stain No organisms " seen    Magnesium [[id number]]  (Normal) Collected: 01/19/21 0227    Specimen: Blood Updated: 01/19/21 0329     Magnesium 2.2 mg/dL     Basic Metabolic Panel [[id number]]  (Abnormal) Collected: 01/19/21 0227    Specimen: Blood Updated: 01/19/21 0329     Glucose 159 mg/dL      BUN 40 mg/dL      Creatinine 1.39 mg/dL      Sodium 137 mmol/L      Potassium 4.5 mmol/L      Comment: Slight hemolysis detected by analyzer. Results may be affected.        Chloride 101 mmol/L      CO2 29.0 mmol/L      Calcium 8.9 mg/dL      eGFR Non African Amer 49 mL/min/1.73      BUN/Creatinine Ratio 28.8     Anion Gap 7.0 mmol/L     Narrative:      GFR Normal >60  Chronic Kidney Disease <60  Kidney Failure <15      CBC & Differential [[id number]]  (Abnormal) Collected: 01/19/21 0227    Specimen: Blood Updated: 01/19/21 0315    Narrative:      The following orders were created for panel order CBC & Differential.  Procedure                               Abnormality         Status                     ---------                               -----------         ------                     CBC Auto Differential[[id number]]        Abnormal            Final result                 Please view results for these tests on the individual orders.    CBC Auto Differential [[id number]]  (Abnormal) Collected: 01/19/21 0227    Specimen: Blood Updated: 01/19/21 0315     WBC 9.90 10*3/mm3      RBC 4.05 10*6/mm3      Hemoglobin 11.8 g/dL      Hematocrit 35.7 %      MCV 88.0 fL      MCH 29.2 pg      MCHC 33.1 g/dL      RDW 16.0 %      RDW-SD 48.6 fl      MPV 9.8 fL      Platelets 212 10*3/mm3      Neutrophil % 94.7 %      Lymphocyte % 0.6 %      Monocyte % 4.4 %      Eosinophil % 0.0 %      Basophil % 0.3 %      Neutrophils, Absolute 9.40 10*3/mm3      Lymphocytes, Absolute 0.10 10*3/mm3      Monocytes, Absolute 0.40 10*3/mm3      Eosinophils, Absolute 0.00 10*3/mm3      Basophils, Absolute 0.00 10*3/mm3      nRBC 0.1 /100 WBC     Cytomegalovirus (CMV) By PCR -  Lavage, Lung, Right Lower Lobe [[id number]] Collected: 01/14/21 1347    Specimen: Lavage from Lung, Right Lower Lobe Updated: 01/18/21 2206     Specimen Source BAL     Cytomegalovirus PCR Negative     Comment: -------------------ADDITIONAL INFORMATION-------------------  This test was developed and its performance characteristics  determined by [facility] in a manner consistent with CLIA  requirements. This test has not been cleared or approved by  the [place]. Food and Drug Administration.       Narrative:      Performed at:  01 - [facility] [address]  [id number]  : Jonas Sorto , Phone:  [phone]    Blood Culture - Blood, Arm, Right [[id number]] Collected: 01/13/21 1310    Specimen: Blood from Arm, Right Updated: 01/18/21 1315     Blood Culture No growth at 5 days    Blood Culture - Blood, Arm, Right [[id number]] Collected: 01/13/21 1302    Specimen: Blood from Arm, Right Updated: 01/18/21 1315     Blood Culture No growth at 5 days    Non-gynecologic Cytology [[id number]] Collected: 01/14/21 1347    Specimen: Lavage from Lung, Right Lower Lobe Updated: 01/18/21 1222     Case Report --     Medical Cytology Report                           Case: [id number]                                  Authorizing Provider:  Sanjay Gonsales MD             Collected:           01/14/2021 01:47 PM          Ordering Location:     [facility]  Received:            01/15/2021 09:26 AM                                 SUITES                                                                       Pathologist:           Lenny Price MD                                                            Specimen:    Lung, Right Lower Lobe, RLL BAL split                                                       Final Diagnosis --     Right lower lobe, bronchoalveolar lavage with smears and cytospin:    Acute inflammation, mature squamous cells, pulmonary macrophages and bronchial epithelial  "cells    No malignancy identified    See flow cytometry summary    JULITO/tkd        Clinical Information --     pneumonia       Gross Description --     Received in [employer] and designated \"Bronchoalveolar lavage\" are 75 mL of cloudy, green colored fluid. Particulate matter is present. This specimen is processed as per protocol.         Flow Cytometry Summary --     Pleural fluid:    No significant lymphoid immunophenotypic abnormalities detected    See attached report from [facility] for further details            Imaging Results (Last 24 Hours)     Procedure Component Value Units Date/Time    XR Chest 1 View [[id number]] Collected: 01/19/21 0819     Updated: 01/19/21 0823    Narrative:      DATE OF EXAM:  1/19/2021 4:32 AM     PROCEDURE:  XR CHEST 1 VW-     INDICATIONS:  COPD exacerbation. Respiratory distress. Chest tube.       COMPARISON:  AP portable chest 01/18/2021, CT chest 01/11/2019     TECHNIQUE:   Single radiographic view of the chest was obtained.     FINDINGS:  Basilar pleural pigtail drain appears similarly positioned. No  pneumothorax is visible. There is mild right apical pleural thickening  or trace apical pleural fluid. Bilateral linear perihilar opacities are  favored to represent subsegmental atelectasis. Asymmetric stable  increased density in the right perihilar lung, favored to reflect  posttreatment changes/fibrosis which can be seen to better advantage on  previous CT chest. No acute osseous abnormalities. Heart size is within  normal limits.       Impression:         1. Stable bilateral perihilar linear atelectatic changes. 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  2. Right basilar pleural drain unchanged in position. No visible  pneumothorax.     Electronically Signed By-Maura Esteves MD On:1/19/2021 8:21 AM  This report was finalized on [id number] by  Maura Esteves MD.          EKG      I personally viewed and interpreted the patient's " EKG/Telemetry data:    ECHOCARDIOGRAM:    STRESS MYOVIEW:    CARDIAC CATHETERIZATION:    OTHER:         Assessment/Plan     Principal Problem:    Acute respiratory distress  Active Problems:    Chronic obstructive pulmonary disease (CMS/HCC)    Coronary artery disease    Hyperlipidemia    Hypertension    Chest pain    Atrial fibrillation with RVR (CMS/HCC)    BPH without obstruction/lower urinary tract symptoms    History of lung cancer    COPD exacerbation (CMS/HCC)    Malignant neoplasm of upper lobe of right lung (CMS/HCC)       Assessment:    Afib RVR  HFrEF  Pericardial Effusion  Hx Lung CA    Plan:    Patient presented with shortness of breath and had elevated lactate level and BNP level  Patient had an echocardiogram which showed LV systolic dysfunction with an EF of 20 to 25%  Patient also has pericardial effusion  Patient is currently on digoxin and diuretics.  He is not on beta-blocker because of low blood pressure.     Patient also has history of lung cancer with pleural effusion and is followed by the pulmonologist  Patient is currently on amiodarone digoxin Cardizem and metoprolol on heart rates are well controlled  Patient had a large pleural effusion and hence he had thoracentesis done and his chest tubes are still draining  Patient will need evaluation for his congestive heart rate with a cardia catheterization once he is stable.  Discussed with him about the procedure risks and benefits.  Will perform once okay with pulmonologist    Alvarado Cai MD  01/19/21  10:50 EST

## 2021-01-19 NOTE — PROGRESS NOTES
"PULMONARY CRITICAL CARE Progress  NOTE      PATIENT IDENTIFICATION:  Name: Lenny Larson  MRN: [id number]  :  1940     Age: 80 y.o.  Sex: male    DATE OF Note:  2021   Referring Physician: Dottie Casillas MD                  Subjective:   More alert, CT in place and patent, no SOB no, chest pain, no nausea or vomiting, no change in bowel habit, no dysuria,  no new  skin rash or itching.      Objective:  tMax 24 hrs: Temp (24hrs), Av.5 °F (36.9 °C), Min:98 °F (36.7 °C), Max:98.9 °F (37.2 °C)      Vitals Ranges:   Temp:  [98 °F (36.7 °C)-98.9 °F (37.2 °C)] 98.9 °F (37.2 °C)  Heart Rate:  [] 105  Resp:  [18-22] 22  BP: (101-131)/(36-61) 123/57    Intake and Output Last 3 Shifts:   I/O last 3 completed shifts:  In: 837 [P.O.:837]  Out: 2575 [Urine:1725; Chest Tube:850]    Exam:  /57   Pulse 105   Temp 98.9 °F (37.2 °C) (Oral)   Resp 22   Ht 177.8 cm (70\")   Wt 79.5 kg (175 lb 4.3 oz)   SpO2 100%   BMI 25.15 kg/m²     General Appearance:     HEENT:  Normocephalic, without obvious abnormality, Conjunctiva/corneas clear,.  Normal external ear canals, Nares normal, no drainage     Neck:  Supple, symmetrical, trachea midline. No JVD.  Lungs /Chest wall:   Bilateral basal rhonchi, respirations unlabored, CT in place draining,  symmetrical wall movement.     Heart:  Regular rate and rhythm, systolic murmur PMI left sternal border  Abdomen: Soft, non-tender, no masses, no organomegaly.    Extremities: Trace edema no clubbing or Cyanosis        Medications:    Current Facility-Administered Medications:   •  acetaminophen (TYLENOL) tablet 650 mg, 650 mg, Oral, Q4H PRN, 650 mg at 01/15/21 1507 **OR** acetaminophen (TYLENOL) 160 MG/5ML solution 650 mg, 650 mg, Oral, Q4H PRN **OR** acetaminophen (TYLENOL) suppository 650 mg, 650 mg, Rectal, Q4H PRN, aSnjay Gonsales MD  •  amiodarone (PACERONE) tablet 200 mg, 200 mg, Oral, BID With Meals, Alvarado Cai MD, 200 mg at 21 0847  •  benzonatate " (TESSALON) capsule 200 mg, 200 mg, Oral, TID PRN, Sanjay Gonsales MD  •  bisacodyl (DULCOLAX) suppository 10 mg, 10 mg, Rectal, Daily, Dottie Casillas MD, 10 mg at 01/19/21 0847  •  budesonide (PULMICORT) nebulizer solution 0.5 mg, 0.5 mg, Nebulization, BID - RT, Sanjay Gonsales MD, 0.5 mg at 01/19/21 0634  •  cefTRIAXone (ROCEPHIN) 2 g in sodium chloride 0.9 % 100 mL IVPB, 2 g, Intravenous, Q24H, Louis Ronquillo MD, Last Rate: 200 mL/hr at 01/18/21 1423, 2 g at 01/18/21 1423  •  digoxin (LANOXIN) tablet 125 mcg, 125 mcg, Oral, Daily, Alvarado Cai MD, 125 mcg at 01/19/21 0847  •  dilTIAZem (CARDIZEM) tablet 30 mg, 30 mg, Oral, Q6H, Daniel Rahman MD, 30 mg at 01/19/21 1217  •  enoxaparin (LOVENOX) syringe 80 mg, 1 mg/kg, Subcutaneous, Q12H, Diane Tamez MD, 80 mg at 01/19/21 0347  •  finasteride (PROSCAR) tablet 5 mg, 5 mg, Oral, Daily, Sanjay Gonsales MD, 5 mg at 01/19/21 0847  •  guaiFENesin (MUCINEX) 12 hr tablet 1,200 mg, 1,200 mg, Oral, Q12H, Sanjay Gonsales MD, 1,200 mg at 01/19/21 0530  •  HYDROcodone-acetaminophen (NORCO)  MG per tablet 1 tablet, 1 tablet, Oral, Q4H PRN, Diane Tamez MD, 1 tablet at 01/17/21 0942  •  HYDROcodone-acetaminophen (NORCO) 5-325 MG per tablet 1 tablet, 1 tablet, Oral, Q4H PRN, Diane Tamez MD, 1 tablet at 01/17/21 0025  •  ipratropium-albuterol (DUO-NEB) nebulizer solution 3 mL, 3 mL, Nebulization, Q4H - RT, Sanjay Gonsales MD, 3 mL at 01/19/21 1046  •  ipratropium-albuterol (DUO-NEB) nebulizer solution 3 mL, 3 mL, Nebulization, Q2H PRN, Sanjay Gonsales MD  •  magnesium hydroxide (MILK OF MAGNESIA) suspension 2400 mg/10mL 10 mL, 10 mL, Oral, Daily PRN, Diane Tamez MD, 10 mL at 01/15/21 1729  •  Magnesium Sulfate 2 gram infusion - Mg less than or equal to 1.5 mg/dL, 2 g, Intravenous, PRN **OR** Magnesium Sulfate 1 gram infusion - Mg 1.6-1.9 mg/dL, 1 g, Intravenous, PRN, Sanjay Gonsales MD  •  methylPREDNISolone sodium succinate (SOLU-Medrol) injection 40 mg, 40 mg, Intravenous, Q8H, Draw, Azmi,  MD, 40 mg at 01/19/21 0529  •  metoprolol tartrate (LOPRESSOR) tablet 25 mg, 25 mg, Oral, Q8H, Daniel Rahman MD, 25 mg at 01/19/21 0529  •  multivitamin with minerals 1 tablet, 1 tablet, Oral, Daily, Sanjay Gonsales MD, 1 tablet at 01/19/21 0847  •  nitroglycerin (NITROSTAT) SL tablet 0.4 mg, 0.4 mg, Sublingual, Q5 Min PRN, Sanjay Gonsales MD  •  ondansetron (ZOFRAN) tablet 4 mg, 4 mg, Oral, Q6H PRN **OR** ondansetron (ZOFRAN) injection 4 mg, 4 mg, Intravenous, Q6H PRN, Sanjay Gonsales MD  •  polyethylene glycol (MIRALAX) packet 17 g, 17 g, Oral, BID, Dottie Casillas MD, 17 g at 01/19/21 0847  •  potassium chloride (K-DUR,KLOR-CON) CR tablet 40 mEq, 40 mEq, Oral, PRN, Sanjay Gonsales MD  •  rosuvastatin (CRESTOR) tablet 40 mg, 40 mg, Oral, Nightly, Sanjay Gonsales MD, 40 mg at 01/18/21 2244  •  [COMPLETED] Insert peripheral IV, , , Once **AND** sodium chloride 0.9 % flush 10 mL, 10 mL, Intravenous, PRN, Sanjay Gonsales MD, 10 mL at 01/16/21 2056  •  sodium chloride 0.9 % flush 10 mL, 10 mL, Intravenous, Q12H, Sanjay Gonsales MD, 10 mL at 01/19/21 0847  •  sodium chloride 0.9 % flush 10 mL, 10 mL, Intravenous, PRN, Sanjay Gonsales MD, 10 mL at 01/19/21 0530  •  tamsulosin (FLOMAX) 24 hr capsule 0.4 mg, 0.4 mg, Oral, Daily With Dinner, Sanjay Gonsales MD, 0.4 mg at 01/18/21 1731  •  theophylline (THEODUR) 12 hr tablet 300 mg, 300 mg, Oral, Daily, Sanjay Gonsales MD, 300 mg at 01/19/21 0847    Data Review:  All labs (24hrs):   Recent Results (from the past 24 hour(s))   Magnesium    Collection Time: 01/19/21  2:27 AM    Specimen: Blood   Result Value Ref Range    Magnesium 2.2 1.6 - 2.4 mg/dL   Basic Metabolic Panel    Collection Time: 01/19/21  2:27 AM    Specimen: Blood   Result Value Ref Range    Glucose 159 (H) 65 - 99 mg/dL    BUN 40 (H) 8 - 23 mg/dL    Creatinine 1.39 (H) 0.76 - 1.27 mg/dL    Sodium 137 136 - 145 mmol/L    Potassium 4.5 3.5 - 5.2 mmol/L    Chloride 101 98 - 107 mmol/L    CO2 29.0 22.0 - 29.0 mmol/L    Calcium 8.9 8.6 - 10.5 mg/dL     eGFR Non African Amer 49 (L) >60 mL/min/1.73    BUN/Creatinine Ratio 28.8 (H) 7.0 - 25.0    Anion Gap 7.0 5.0 - 15.0 mmol/L   CBC Auto Differential    Collection Time: 01/19/21  2:27 AM    Specimen: Blood   Result Value Ref Range    WBC 9.90 3.40 - 10.80 10*3/mm3    RBC 4.05 (L) 4.14 - 5.80 10*6/mm3    Hemoglobin 11.8 (L) 13.0 - 17.7 g/dL    Hematocrit 35.7 (L) 37.5 - 51.0 %    MCV 88.0 79.0 - 97.0 fL    MCH 29.2 26.6 - 33.0 pg    MCHC 33.1 31.5 - 35.7 g/dL    RDW 16.0 (H) 12.3 - 15.4 %    RDW-SD 48.6 37.0 - 54.0 fl    MPV 9.8 6.0 - 12.0 fL    Platelets 212 140 - 450 10*3/mm3    Neutrophil % 94.7 (H) 42.7 - 76.0 %    Lymphocyte % 0.6 (L) 19.6 - 45.3 %    Monocyte % 4.4 (L) 5.0 - 12.0 %    Eosinophil % 0.0 (L) 0.3 - 6.2 %    Basophil % 0.3 0.0 - 1.5 %    Neutrophils, Absolute 9.40 (H) 1.70 - 7.00 10*3/mm3    Lymphocytes, Absolute 0.10 (L) 0.70 - 3.10 10*3/mm3    Monocytes, Absolute 0.40 0.10 - 0.90 10*3/mm3    Eosinophils, Absolute 0.00 0.00 - 0.40 10*3/mm3    Basophils, Absolute 0.00 0.00 - 0.20 10*3/mm3    nRBC 0.1 0.0 - 0.2 /100 WBC        Imaging:  XR Chest 1 View  Narrative: DATE OF EXAM:  1/19/2021 4:32 AM     PROCEDURE:  XR CHEST 1 VW-     INDICATIONS:  COPD exacerbation. Respiratory distress. Chest tube.       COMPARISON:  AP portable chest 01/18/2021, CT chest 01/11/2019     TECHNIQUE:   Single radiographic view of the chest was obtained.     FINDINGS:  Basilar pleural pigtail drain appears similarly positioned. No  pneumothorax is visible. There is mild right apical pleural thickening  or trace apical pleural fluid. Bilateral linear perihilar opacities are  favored to represent subsegmental atelectasis. Asymmetric stable  increased density in the right perihilar lung, favored to reflect  posttreatment changes/fibrosis which can be seen to better advantage on  previous CT chest. No acute osseous abnormalities. Heart size is within  normal limits.     Impression:    1. Stable bilateral perihilar linear  atelectatic changes. 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  2. Right basilar pleural drain unchanged in position. No visible  pneumothorax.     Electronically Signed By-Maura Esteves MD On:1/19/2021 8:21 AM  This report was finalized on [id number] by  Maura Esteves MD.       ASSESSMENT:    Acute respiratory distress    Chronic obstructive pulmonary disease (CMS/HCC)    Coronary artery disease    Hyperlipidemia    Hypertension    Chest pain    Atrial fibrillation with RVR (CMS/HCC)    BPH without obstruction/lower urinary tract symptoms    History of lung cancer    COPD exacerbation (CMS/HCC)    Malignant neoplasm of upper lobe of right lung (CMS/HCC)         PLAN:  Change chest tube to water seal and monitor   Change diet to mechanical soft   Change antibiotics   Add Boost   CT care daily   Monitor CT output  Check cytology from pleural fluid  Antibiotics empirically on cefepime but will discontinue and start ceftriaxone due to increased confusion  Check BAL culture and cytology pending  Steroids IV Solu-Medrol 40 mg every 8  Bronchodilator  Inhaled corticosteroids  Amiodarone to po   Electrolytes/ glycemic control  DVT and GI prophylaxis      Discussed with Dr Shima Garcia, APRN  1/19/2021  13:53 EST     I personally have examined  and interviewed the patient. I have reviewed the history, data, problems, assessment and plan with our NP.  Critical care time in direct medical management (   ) minutes   Louis Ronquillo MD, D,ABSM  1/19/2021

## 2021-01-19 NOTE — PLAN OF CARE
Goal Outcome Evaluation:  Plan of Care Reviewed With: patient, spouse  Progress: improving  No complaints or distress noted. Patient continues to be on RA. Bladder scan done. Straight Cath. patient. Patient seemed to of rested well during my shift. Will continue to monitor.

## 2021-01-19 NOTE — PROGRESS NOTES
Continued Stay Note  [facility]     Patient Name: Lenny Larson  MRN: [id number]  Today's Date: 1/19/2021    Admit Date: 1/13/2021    Discharge Plan     Row Name 01/19/21 1203       Plan    Plan  Home with [facility] (accepted and selected). Rolling walker with [employer].    Plan Comments  Barriers to discharge: Cysto scheduled for tomorrow, Started on PO amiodarone. Patient has not had a bowel movement for six days. Patient continues with a chest tube.          Expected Discharge Date and Time     Expected Discharge Date Expected Discharge Time    Jan 20, 2021               Anna Naegele [place] [address]   [phone]  office  [phone]  fax  [email or web address]

## 2021-01-19 NOTE — PROGRESS NOTES
[facility] Medicine Services Daily Progress Note      Hospitalist Team  LOS 6 days      Patient Care Team:  Camron Pollard MD as PCP - General (Family Medicine)  Alvarado Cai MD as Consulting Physician (Cardiology)    Patient Location: 2102/1      Subjective   Subjective     Chief Complaint / Subjective  Chief Complaint   Patient presents with   • Shortness of Breath     Brief Synopsis of Hospital Course/HPI  The patient is a 80-year-old male who has underlying coronary artery disease with atrial fibrillation, stent placement and an abdominal aortic aneurysm.  The patient also is status post right upper lobectomy for previous lung carcinoma which was also treated with radiation and chemotherapy.  The patient presents with atrial fibrillation with rapid ventricular response as well as a new right lower lobe effusion.      Date:  1/15/2021  The patient had a chest tube placed today for his right pleural effusion.  It continues to drain.  He is having pain with deep breathing related to the chest tube.  The patient does report that his breathing is better and that his heart is not racing as much as it was on admission.    1/16/2021  The patient is in tears as he has been in pain.  I did check with nursing and the patient has not taken anything for pain nor is he asked for anything.  They are going to start offering it to the patient.  Other than that the patient has no additional issues.  I did explain to him that he needs to ask them and should not be shy about it as it is painful to have a chest tube in place.    1/17/2021  The patient's pain is markedly improved over yesterday.  The patient's tachycardia is improved as well.  The patient does have a lot of lower extremity edema and was encouraged to place his legs up on an elevation so that hopefully this can resolve/improve.    1/18/2021  Patient reports ongoing pain from his right chest tube site.  He does report feeling better  "after having had a couple bowel movements after receiving to collect suppository MiraLAX.  The nursing staff reports that he had been more confused today and requested antibiotic be changed from cefepime as a possible etiology for the confusion.  This was deferred to pulmonary and cefepime was changed to Rocephin.  Patient reports voiding several times after Beach was removed but it was just a small amount.  Post void residual showed 275 cc in and out cath performed with 200 cc removed.    1/19/2021  Patient continues to be confused per nursing staff.  He denies specific complaints at this time.  He had to have Beach catheter replaced because of urinary retention.    ROS   12 point review of systems was reviewed and was negative except as above.      Objective   Objective      Vital Signs  Temp:  [98 °F (36.7 °C)-98.9 °F (37.2 °C)] 98.9 °F (37.2 °C)  Heart Rate:  [] 105  Resp:  [18-22] 22  BP: (101-131)/(36-61) 123/57  Oxygen Therapy  SpO2: 100 %  Pulse Oximetry Type: Continuous  Device (Oxygen Therapy): room air  Flow (L/min): 2  ETCO2 (mmHg): (!) 0 mmHg  Flowsheet Rows      First Filed Value   Admission Height  177.8 cm (70\") Documented at 01/13/2021 1222   Admission Weight  80.6 kg (177 lb 11.1 oz) Documented at 01/13/2021 1222        Intake & Output (last 3 days)       01/16 0701 - 01/17 0700 01/17 0701 - 01/18 0700 01/18 0701 - 01/19 0700 01/19 0701 - 01/20 0700    P.O. 580 360 717     Other  800      Total Intake(mL/kg) 580 (7.6) 1160 (14.6) 717 (9)     Urine (mL/kg/hr) 1300 (0.7) 350 (0.2) 1375 (0.7)     Stool   0     Chest Tube 870 310 540     Total Output 2170 660 1915     Net -1590 +500 -1198             Stool Unmeasured Occurrence   2 x         Lines, Drains & Airways    Active LDAs     Name:   Placement date:   Placement time:   Site:   Days:    Peripheral IV 01/13/21 1250 Right Antecubital   01/13/21    1250    Antecubital   1    Peripheral IV 01/14/21 1312 Anterior;Right Forearm   01/14/21    " "1312    Forearm   less than 1              Physical Exam:  Physical Exam   Well-developed well-nourished gentleman sitting up in the chair comfortable on room air in no acute distress awake and alert; mucous membranes moist; sclerae anicteric; lungs with crackles in the right posterior base; CV regular rate and rhythm; abdomen soft nontender nondistended with active bowel sounds; extremities with improving trace bilateral ankle and pedal edema, no cyanosis or calf tenderness; palpable pedal pulses bilaterally; Beach catheter present to bedside drain.  Chest tube right posterior thorax to atrium with serous drainage.      Procedures:  Procedure(s):  BRONCHOSCOPY with bronchio alveolar lavage of right lower lung  Thoracostomy tube placed    Results Review:     I reviewed the patient's new clinical results.    Lab Results (last 24 hours)     Procedure Component Value Units Date/Time    Non-gynecologic Cytology [[id number]] Collected: 01/15/21 1510    Specimen: Body Fluid from Pleural Cavity Updated: 01/19/21 1358     Case Report --     Medical Cytology Report                           Case: [id number]                                  Authorizing Provider:  Sanjay Gonsales MD             Collected:           01/15/2021 03:10 PM          Ordering Location:     [facility]       Received:            01/18/2021 09:30 AM                                 PROGRESS CARE                                                                Pathologist:           Brody Mcghee MD                                                             Specimen:    Pleural Cavity                                                                              Final Diagnosis --     Pleural fluid, smears and cytospin preparation:    Benign mesothelial cells, histiocytes and moderate acute and chronic inflammation    Negative for malignant cells     JPR/bradly        Gross Description --     Received in [employer] and designated \"Pleural fluid\" are 45 mL of " cloudy green colored fluid. Particulate matter is present. This specimen is processed as per protocol.        Triglycerides, Body Fluid - Pleural Fluid, Pleural Cavity [[id number]] Collected: 01/15/21 1511    Specimen: Pleural Fluid from Pleural Cavity Updated: 01/19/21 1110     Triglycerides, Fluid 12 mg/dL      Comment: The reference intervals and other method performance specifications  have not been established for this test. The test result should be  integrated into the clinical context for interpretation.  The reference interval(s) and other method performance specifications  have not been established for this body fluid. The test result must be  integrated into the clinical context for interpretation.       Narrative:      Performed at:  [address] [place] [address]  [id number]  : Humberto Herr PhD, Phone:  [phone]    Body Fluid Culture - Body Fluid, Pleural Cavity [[id number]] Collected: 01/15/21 1509    Specimen: Body Fluid from Pleural Cavity Updated: 01/19/21 0706     Body Fluid Culture No growth at 4 days     Gram Stain No organisms seen    Magnesium [[id number]]  (Normal) Collected: 01/19/21 0227    Specimen: Blood Updated: 01/19/21 0329     Magnesium 2.2 mg/dL     Basic Metabolic Panel [[id number]]  (Abnormal) Collected: 01/19/21 0227    Specimen: Blood Updated: 01/19/21 0329     Glucose 159 mg/dL      BUN 40 mg/dL      Creatinine 1.39 mg/dL      Sodium 137 mmol/L      Potassium 4.5 mmol/L      Comment: Slight hemolysis detected by analyzer. Results may be affected.        Chloride 101 mmol/L      CO2 29.0 mmol/L      Calcium 8.9 mg/dL      eGFR Non African Amer 49 mL/min/1.73      BUN/Creatinine Ratio 28.8     Anion Gap 7.0 mmol/L     Narrative:      GFR Normal >60  Chronic Kidney Disease <60  Kidney Failure <15      CBC & Differential [[id number]]  (Abnormal) Collected: 01/19/21 0227    Specimen: Blood Updated: 01/19/21 0315    Narrative:      The following orders  were created for panel order CBC & Differential.  Procedure                               Abnormality         Status                     ---------                               -----------         ------                     CBC Auto Differential[[id number]]        Abnormal            Final result                 Please view results for these tests on the individual orders.    CBC Auto Differential [[id number]]  (Abnormal) Collected: 01/19/21 0227    Specimen: Blood Updated: 01/19/21 0315     WBC 9.90 10*3/mm3      RBC 4.05 10*6/mm3      Hemoglobin 11.8 g/dL      Hematocrit 35.7 %      MCV 88.0 fL      MCH 29.2 pg      MCHC 33.1 g/dL      RDW 16.0 %      RDW-SD 48.6 fl      MPV 9.8 fL      Platelets 212 10*3/mm3      Neutrophil % 94.7 %      Lymphocyte % 0.6 %      Monocyte % 4.4 %      Eosinophil % 0.0 %      Basophil % 0.3 %      Neutrophils, Absolute 9.40 10*3/mm3      Lymphocytes, Absolute 0.10 10*3/mm3      Monocytes, Absolute 0.40 10*3/mm3      Eosinophils, Absolute 0.00 10*3/mm3      Basophils, Absolute 0.00 10*3/mm3      nRBC 0.1 /100 WBC     Cytomegalovirus (CMV) By PCR - Lavage, Lung, Right Lower Lobe [[id number]] Collected: 01/14/21 1347    Specimen: Lavage from Lung, Right Lower Lobe Updated: 01/18/21 2206     Specimen Source BAL     Cytomegalovirus PCR Negative     Comment: -------------------ADDITIONAL INFORMATION-------------------  This test was developed and its performance characteristics  determined by [facility] in a manner consistent with CLIA  requirements. This test has not been cleared or approved by  the U.S. Food and Drug Administration.       Narrative:      Performed at:  01 - [facility]  [address], [place]  [id number]  : Jonas Sorto , Phone:  [phone]        No results found for: HGBA1C  Results from last 7 days   Lab Units 01/13/21  1310   INR  1.04           Lab Results   Component Value Date    LIPASE 19 (L) 05/18/2019     Lab Results    Component Value Date    CHOL 121 07/09/2020    TRIG 75 07/09/2020    HDL 36 (L) 07/09/2020    LDL 70 07/09/2020       Lab Results   Lab Value Date/Time    FINALDX  01/15/2021 1510     Pleural fluid, smears and cytospin preparation:    Benign mesothelial cells, histiocytes and moderate acute and chronic inflammation    Negative for malignant cells     JPR/tkd       FINALDX  01/14/2021 1347     Right lower lobe, bronchoalveolar lavage with smears and cytospin:    Acute inflammation, mature squamous cells, pulmonary macrophages and bronchial epithelial cells    No malignancy identified    See flow cytometry summary    JULITO/tkd          Microbiology Results (last 10 days)     Procedure Component Value - Date/Time    Body Fluid Culture - Body Fluid, Pleural Cavity [[id number]] Collected: 01/15/21 1509    Lab Status: Preliminary result Specimen: Body Fluid from Pleural Cavity Updated: 01/19/21 0706     Body Fluid Culture No growth at 4 days     Gram Stain No organisms seen    AFB Culture - Body Fluid, Pleural Cavity [[id number]] Collected: 01/15/21 1509    Lab Status: Preliminary result Specimen: Body Fluid from Pleural Cavity Updated: 01/16/21 0954     AFB Stain No acid fast bacilli seen    Virus Culture - Lavage, Lung, Right Lower Lobe [[id number]] Collected: 01/14/21 1347    Lab Status: Preliminary result Specimen: Lavage from Lung, Right Lower Lobe Updated: 01/17/21 1707     Viral Culture, General Comment     Comment: Preliminary Report:  No virus isolated at 24 hours. Next report to follow after 4 days.       Narrative:      Performed at:  [address] [place] [address]  [id number]  : Rick Willis MD, Phone:  [phone]    AFB Culture - Lavage, Lung, Right Lower Lobe [[id number]] Collected: 01/14/21 1347    Lab Status: Preliminary result Specimen: Lavage from Lung, Right Lower Lobe Updated: 01/15/21 1324     AFB Stain No acid fast bacilli seen    BAL Culture, Quantitative -  Lavage, Lung, Right Lower Lobe [[id number]] Collected: 01/14/21 1347    Lab Status: Final result Specimen: Lavage from Lung, Right Lower Lobe Updated: 01/16/21 1322     BAL Culture Scant growth (1+) Normal Respiratory Terri: NO S.aureus/MRSA or Pseudomonas aeruginosa     Gram Stain Rare (1+) WBCs per low power field      No organisms seen    Narrative:      Not streaked for quantitation    Respiratory Panel, PCR (WITHOUT COVID) - Lavage, Lung, Right Lower Lobe [[id number]]  (Normal) Collected: 01/14/21 1347    Lab Status: Final result Specimen: Lavage from Lung, Right Lower Lobe Updated: 01/14/21 1542     ADENOVIRUS, PCR Not Detected     Coronavirus 229E Not Detected     Coronavirus HKU1 Not Detected     Coronavirus NL63 Not Detected     Coronavirus OC43 Not Detected     Human Metapneumovirus Not Detected     Human Rhinovirus/Enterovirus Not Detected     Influenza B PCR Not Detected     Parainfluenza Virus 1 Not Detected     Parainfluenza Virus 2 Not Detected     Parainfluenza Virus 3 Not Detected     Parainfluenza Virus 4 Not Detected     Bordetella pertussis pcr Not Detected     Chlamydophila pneumoniae PCR Not Detected     Mycoplasma pneumo by PCR Not Detected     Influenza A PCR Not Detected     RSV, PCR Not Detected     Bordetella parapertussis PCR Not Detected    Narrative:      The coronavirus on the RVP is NOT COVID-19 and is NOT indicative of infection with COVID-19.     Cytomegalovirus (CMV) By PCR - Lavage, Lung, Right Lower Lobe [[id number]] Collected: 01/14/21 1347    Lab Status: Final result Specimen: Lavage from Lung, Right Lower Lobe Updated: 01/18/21 2206     Specimen Source BAL     Cytomegalovirus PCR Negative     Comment: -------------------ADDITIONAL INFORMATION-------------------  This test was developed and its performance characteristics  determined by [facility] in a manner consistent with CLIA  requirements. This test has not been cleared or approved by  the U.S. Food and Drug Administration.        Narrative:      Performed at:  01 - [facility]  [address] [facility], [place], [place]  [id number]  : Jonas Sorto , Phone:  [phone]    Respiratory Panel, PCR (WITHOUT COVID) - Swab, Nasopharynx [[id number]]  (Normal) Collected: 01/13/21 1824    Lab Status: Final result Specimen: Swab from Nasopharynx Updated: 01/13/21 1940     ADENOVIRUS, PCR Not Detected     Coronavirus 229E Not Detected     Coronavirus HKU1 Not Detected     Coronavirus NL63 Not Detected     Coronavirus OC43 Not Detected     Human Metapneumovirus Not Detected     Human Rhinovirus/Enterovirus Not Detected     Influenza B PCR Not Detected     Parainfluenza Virus 1 Not Detected     Parainfluenza Virus 2 Not Detected     Parainfluenza Virus 3 Not Detected     Parainfluenza Virus 4 Not Detected     Bordetella pertussis pcr Not Detected     Chlamydophila pneumoniae PCR Not Detected     Mycoplasma pneumo by PCR Not Detected     Influenza A PCR Not Detected     RSV, PCR Not Detected     Bordetella parapertussis PCR Not Detected    Narrative:      The coronavirus on the RVP is NOT COVID-19 and is NOT indicative of infection with COVID-19.     Blood Culture - Blood, Arm, Right [[id number]] Collected: 01/13/21 1310    Lab Status: Final result Specimen: Blood from Arm, Right Updated: 01/18/21 1315     Blood Culture No growth at 5 days    COVID-19,[employer] IN-HOUSE,Nasal Swab No Transport Media 3-4 HR TAT - Swab, Nasal Cavity [[id number]]  (Normal) Collected: 01/13/21 1303    Lab Status: Final result Specimen: Swab from Nasal Cavity Updated: 01/13/21 1343     COVID19 Not Detected    Narrative:      Fact sheet for providers: https://www.fda.gov/media/654261/download     Fact sheet for patients: https://www.fda.gov/media/880967/download    Test performed by PCR.    Blood Culture - Blood, Arm, Right [[id number]] Collected: 01/13/21 1302    Lab Status: Final result Specimen: Blood from Arm, Right Updated: 01/18/21 1315     Blood  Culture No growth at 5 days          ECG/EMG Results (most recent)     Procedure Component Value Units Date/Time    ECG 12 Lead [[id number]] Collected: 01/14/21 0154     Updated: 01/14/21 0157     QT Interval 354 ms     Narrative:      HEART RATE= 140  bpm  RR Interval= 432  ms  NH Interval= 146  ms  P Horizontal Axis= -51  deg  P Front Axis= 41  deg  QRSD Interval= 102  ms  QT Interval= 354  ms  QRS Axis= 49  deg  T Wave Axis= 256  deg  - ABNORMAL ECG -  Sinus tachycardia  Multiform ventricular premature complexes  Repolarization abnormality, prob rate related  Prolonged QT interval  Electronically Signed By:   Date and Time of Study: 2021-01-14 01:54:00    ECG 12 Lead [[id number]] Collected: 01/13/21 1235     Updated: 01/14/21 0828     QT Interval 350 ms     Narrative:      HEART RATE= 108  bpm  RR Interval= 556  ms  NH Interval= 159  ms  P Horizontal Axis= 22  deg  P Front Axis= 44  deg  QRSD Interval= 95  ms  QT Interval= 350  ms  QRS Axis= 58  deg  T Wave Axis= 103  deg  - ABNORMAL ECG -  Sinus tachycardia  Multiple ventricular premature complexes  Probable LVH with secondary repol abnrm  Anterior Q waves, possibly due to LVH  When compared with ECG of 18-May-2019 9:03:54,  No significant change  Electronically Signed By: Dakota Rodrigues (IGNACIO) 14-Jan-2021 08:26:08  Date and Time of Study: 2021-01-13 12:35:42    Adult Transthoracic Echo Complete W/ Cont if Necessary Per Protocol [[id number]] Collected: 01/13/21 1727     Updated: 01/14/21 1705     BSA 2.0 m^2      RVIDd 2.1 cm      IVSd 0.9 cm      LVIDd 5.5 cm      LVIDs 5.2 cm      LVPWd 0.91 cm      IVS/LVPW 1.0     FS 5.1 %      EDV(Teich) 146.6 ml      ESV(Teich) 129.8 ml      EF(Teich) 11.5 %      EDV(cubed) 165.2 ml      ESV(cubed) 141.1 ml      EF(cubed) 14.6 %      LV mass(C)d 186.0 grams      LV mass(C)dI 93.9 grams/m^2      SV(Teich) 16.8 ml      SI(Teich) 8.5 ml/m^2      SV(cubed) 24.1 ml      SI(cubed) 12.2 ml/m^2      Ao root diam 3.8 cm      Ao root area  11.5 cm^2      LVOT diam 2.0 cm      LVOT area 3.1 cm^2      EDV(MOD-sp4) 125.6 ml      ESV(MOD-sp4) 87.5 ml      EF(MOD-sp4) 30.3 %      SV(MOD-sp4) 38.0 ml      SI(MOD-sp4) 19.2 ml/m^2      Ao root area (BSA corrected) 1.9     LV Knight Vol (BSA corrected) 63.4 ml/m^2      LV Sys Vol (BSA corrected) 44.2 ml/m^2      Aortic R-R 0.51 sec      Aortic .5 BPM      MV V2 max 90.8 cm/sec      MV max PG 3.3 mmHg      MV V2 mean 56.8 cm/sec      MV mean PG 1.5 mmHg      MV V2 VTI 17.5 cm      MVA(VTI) 2.9 cm^2      Ao pk lamont 267.9 cm/sec      Ao max PG 28.7 mmHg      Ao max PG (full) 25.4 mmHg      Ao V2 mean 186.7 cm/sec      Ao mean PG 15.8 mmHg      Ao mean PG (full) 14.0 mmHg      Ao V2 VTI 51.9 cm      IQRA(I,A) 0.97 cm^2      IQRA(I,D) 0.97 cm^2      IQRA(V,A) 1.0 cm^2      IQRA(V,D) 1.0 cm^2      AI max lamont 389.1 cm/sec      AI max PG 60.5 mmHg      AI dec slope 287.4 cm/sec^2      AI dec time 1.4 sec      AI P1/2t 396.5 msec      LV V1 max PG 3.3 mmHg      LV V1 mean PG 1.8 mmHg      LV V1 max 90.4 cm/sec      LV V1 mean 63.5 cm/sec      LV V1 VTI 16.4 cm      MR max lamont 514.5 cm/sec      MR max .9 mmHg      CO(Ao) 70.8 l/min      CI(Ao) 35.7 l/min/m^2      SV(Ao) 597.3 ml      SI(Ao) 301.4 ml/m^2      CO(LVOT) 6.0 l/min      CI(LVOT) 3.0 l/min/m^2      SV(LVOT) 50.4 ml      SI(LVOT) 25.4 ml/m^2      PA V2 max 68.8 cm/sec      PA max PG 1.9 mmHg      PA max PG (full) -0.6 mmHg      PA V2 mean 47.5 cm/sec      PA mean PG 1.0 mmHg      PA mean PG (full) -0.25 mmHg      PA V2 VTI 14.3 cm      RV V1 max PG 2.5 mmHg      RV V1 mean PG 1.3 mmHg      RV V1 max 78.9 cm/sec      RV V1 mean 52.8 cm/sec      RV V1 VTI 15.3 cm      TR max lamont 291.0 cm/sec      RVSP(TR) 36.9 mmHg      RAP systole 3.0 mmHg       CV ECHO KATT - BZI_BMI 25.4 kilograms/m^2       CV ECHO KATT - BSA([facility]) 2.0 m^2       CV ECHO KATT - BZI_METRIC_WEIGHT 80.3 kg       CV ECHO KATT - BZI_METRIC_HEIGHT 177.8 cm      LA dimension(2D) 3.3  cm     Narrative:      · Left ventricular ejection fraction appears to be 21 - 25%.  · Severe mitral valve regurgitation is present.  · Mild dilation of the aortic root is present.  · There is a large (>2cm) pericardial effusion adjacent to the right   atrium.  · The following left ventricular wall segments are hypokinetic: mid   anterior, apical anterior, basal anterolateral, mid anterolateral, apical   lateral, basal inferolateral, mid inferolateral, apical inferior, mid   inferior, apical septal, basal inferoseptal, mid inferoseptal, apex   hypokinetic, mid anteroseptal, basal anterior, basal inferior and basal   inferoseptal.  · No pericardial tamponade noted       ECG 12 Lead [[id number]] Collected: 01/15/21 0549     Updated: 01/15/21 0550     QT Interval 380 ms     Narrative:      HEART RATE= 92  bpm  RR Interval= 651  ms  ME Interval=   ms  P Horizontal Axis=   deg  P Front Axis=   deg  QRSD Interval= 102  ms  QT Interval= 380  ms  QRS Axis= 64  deg  T Wave Axis= 249  deg  - ABNORMAL ECG -  Atrial fibrillation  Probable left ventricular hypertrophy  Nonspecific T abnormalities, inferior leads  Electronically Signed By:   Date and Time of Study: 2021-01-15 05:49:00    ECG 12 Lead [[id number]] Collected: 01/16/21 0533     Updated: 01/16/21 0535     QT Interval 356 ms     Narrative:      HEART RATE= 123  bpm  RR Interval= 487  ms  ME Interval=   ms  P Horizontal Axis=   deg  P Front Axis=   deg  QRSD Interval= 91  ms  QT Interval= 356  ms  QRS Axis= 86  deg  T Wave Axis= -86  deg  - ABNORMAL ECG -  Atrial fibrillation  Anterior infarct, old  Repol abnrm suggests ischemia, inferior leads  Borderline ST elevation, lateral leads  Prolonged QT interval  Electronically Signed By:   Date and Time of Study: 2021-01-16 05:33:41    ECG 12 Lead [[id number]] Collected: 01/17/21 0612     Updated: 01/17/21 0614     QT Interval 418 ms     Narrative:      HEART RATE= 106  bpm  RR Interval= 567  ms  ME Interval=   ms  P Horizontal  Axis=   deg  P Front Axis=   deg  QRSD Interval= 105  ms  QT Interval= 418  ms  QRS Axis= 67  deg  T Wave Axis= 260  deg  - ABNORMAL ECG -  Atrial fibrillation  Probable LVH with secondary repol abnrm  Prolonged QT interval  When compared with ECG of 16-Jan-2021 5:33:41,  Significant repolarization change  Significant axis, voltage or hypertrophy change  Electronically Signed By:   Date and Time of Study: 2021-01-17 06:12:07           Results for orders placed during the hospital encounter of 01/13/21   Adult Transthoracic Echo Complete W/ Cont if Necessary Per Protocol    Narrative · Left ventricular ejection fraction appears to be 21 - 25%.  · Severe mitral valve regurgitation is present.  · Mild dilation of the aortic root is present.  · There is a large (>2cm) pericardial effusion adjacent to the right   atrium.  · The following left ventricular wall segments are hypokinetic: mid   anterior, apical anterior, basal anterolateral, mid anterolateral, apical   lateral, basal inferolateral, mid inferolateral, apical inferior, mid   inferior, apical septal, basal inferoseptal, mid inferoseptal, apex   hypokinetic, mid anteroseptal, basal anterior, basal inferior and basal   inferoseptal.  · No pericardial tamponade noted        Xr Chest 1 View    Result Date: 1/19/2021   1. Stable bilateral perihilar linear atelectatic changes. 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 2. Right basilar pleural drain unchanged in position. No visible pneumothorax.  Electronically Signed By-Maura Esteves MD On:1/19/2021 8:21 AM This report was finalized on [id number] by  Maura Esteves MD.    Xr Chest 1 View    Result Date: 1/18/2021  No interval change from yesterday's study as described above.  Electronically Signed By-Kel Fair MD On:1/18/2021 7:52 AM This report was finalized on [id number] by  Kel Fair MD.    Xr Chest 1 View    Result Date: 1/17/2021  1.Right basilar chest tube appears in  similar position. No significant pneumothorax or pleural effusion is seen. 2.Postsurgical/post therapy changes in the right lung, as before.  Electronically Signed By-Brant Dao MD On:1/17/2021 8:34 AM This report was finalized on [id number] by  Brant Dao MD.    Xr Chest 1 View    Result Date: 1/16/2021   1. Improved aeration with small caliber chest tube in the right lung base. A pneumothorax or pleural effusion is not demonstrated. 2. Persistent postsurgical and treatment related changes in the right superhilar region, this is likely treatment related changes, is similar radiographically when compared with the study from 08/23/2018.  Electronically Signed By-Donny Abdi DO On:1/16/2021 10:59 AM This report was finalized on [id number] by  Donny Abdi DO.    Xr Chest 1 View    Result Date: 1/15/2021  Significantly improved aeration in the right hemithorax, that is post pleural drain placement. Mild residual atelectasis remains. No visible pleural fluid or pneumothorax.  Electronically Signed By-Maura Esteves MD On:1/15/2021 3:10 PM This report was finalized on [id number] by  Maura Esteves MD.    Xr Chest 1 View    Result Date: 1/15/2021   1. Moderate-large sized infiltrate pleural effusion involving the right midlung zone right lower lobe lung has improved slightly since previous study performed on 01/13/2021  Electronically Signed By-Baron Diggs MD On:1/15/2021 7:53 AM This report was finalized on [id number] by  Baron Diggs MD.    Xr Chest 1 View    Result Date: 1/13/2021   1. Interval development of a moderate-sized right pleural effusion. 2. Worsening consolidation in the right lung particularly in the mid and lower lung zone. This is likely due to a combination of compressive atelectasis with superimposed pneumonia. 3. Masslike area of consolidation has been present the right hilar region on previous imaging studies consistent with treated lung cancer.   Electronically Signed By-Kel Fair MD On:1/13/2021 2:19 PM This report was finalized on [id number] by  Kel Fair MD.    Us Aorta Limited    Result Date: 1/14/2021  1. Redemonstration of a distal abdominal aortic aneurysm measuring 5.3 x 4.9 cm. This is very similar in size to that described on May 18, 2019. 2. Severe atherosclerotic disease. 3. There are limitations to this study secondary to overlying bowel gas. Slot [id number] Electronically signed by:  Vladislav Rich M.D.  1/14/2021 10:17 PM  Xrays, labs reviewed personally by physician.    Medication Review:   I have reviewed the patient's current medication list    Scheduled Meds  amiodarone, 200 mg, Oral, BID With Meals  bisacodyl, 10 mg, Rectal, Daily  budesonide, 0.5 mg, Nebulization, BID - RT  cefTRIAXone, 2 g, Intravenous, Q24H  digoxin, 125 mcg, Oral, Daily  dilTIAZem, 30 mg, Oral, Q6H  enoxaparin, 1 mg/kg, Subcutaneous, Q12H  finasteride, 5 mg, Oral, Daily  guaiFENesin, 1,200 mg, Oral, Q12H  ipratropium-albuterol, 3 mL, Nebulization, Q4H - RT  methylPREDNISolone sodium succinate, 40 mg, Intravenous, Q8H  metoprolol tartrate, 25 mg, Oral, Q8H  multivitamin with minerals, 1 tablet, Oral, Daily  polyethylene glycol, 17 g, Oral, BID  rosuvastatin, 40 mg, Oral, Nightly  sodium chloride, 10 mL, Intravenous, Q12H  tamsulosin, 0.4 mg, Oral, Daily With Dinner  theophylline, 300 mg, Oral, Daily        Meds Infusions       Meds PRN  •  acetaminophen **OR** acetaminophen **OR** acetaminophen  •  benzonatate  •  HYDROcodone-acetaminophen  •  HYDROcodone-acetaminophen  •  ipratropium-albuterol  •  magnesium hydroxide  •  magnesium sulfate **OR** magnesium sulfate in D5W 1g/100mL (PREMIX)  •  nitroglycerin  •  ondansetron **OR** ondansetron  •  potassium chloride  •  [COMPLETED] Insert peripheral IV **AND** sodium chloride  •  sodium chloride        Assessment/Plan   Assessment/Plan     Active Hospital Problems    Diagnosis  POA   • **Acute respiratory distress  [R06.03]  Yes   • Chest pain [R07.9]  Yes   • Atrial fibrillation with RVR (CMS/HCC) [I48.91]  Yes   • BPH without obstruction/lower urinary tract symptoms [N40.0]  Yes   • History of lung cancer [Z85.118]  Not Applicable   • COPD exacerbation (CMS/HCC) [J44.1]  Yes   • Malignant neoplasm of upper lobe of right lung (CMS/HCC) [C34.11]  Yes   • Hypertension [I10]  Yes   • Hyperlipidemia [E78.5]  Yes   • Coronary artery disease [I25.10]  Yes   • Chronic obstructive pulmonary disease (CMS/HCC) [J44.9]  Yes      Resolved Hospital Problems   No resolved problems to display.       MEDICAL DECISION MAKING COMPLEXITY BY PROBLEM:     Acute toxic and metabolic encephalopathy likely multifactorial secondary to pneumonia, acute respiratory failure, A. fib with RVR and/or medication  -Cefepime was changed to Rocephin 1/18/2021 because of the patient's confusion    Acute hypoxic respiratory failure multifactorial secondary to pneumonia and large right pleural effusion  -Pulmonary consulting and managing  -Status post chest tube placement for large right pleural effusion; body fluid culture and cytology pending  -Status post bronchoscopy 1/14/2021 with BAL showing normal respiratory keturah  -Patient on Rocephin; pulmonary managing antibiotics  -Await cytology from BAL and results still pending for possible atypical infections   -Continue Solu-Medrol, guaifenesin, duo nebs and theophylline per pulmonary    Atrial fibrillation with RVR  -Cardiology consulting and managing  -Currently on amiodarone (IV has been changed to oral), digoxin, metoprolol and verapamil  -Patient on therapeutic dosage of Lovenox per cardiology    Acute chest pain, resolved; etiology not determined  -Serial troponins negative  -Current chest pain seems to be associated with a right thoracostomy tube.    Coronary artery disease status post PCI and stents  -Cardiology consulted  -Continue metoprolol, statin and Lovenox    Acute on chronic systolic congestive  heart failure due to ischemic cardiomyopathy and valvular heart disease  -proBNP 7372 on 1/13/2021 and was 10,748 on 1/14/2021  -Echo 1/13/2021 showed LVEF of 21 to 25% with severe MVR, mild dilatation of the aortic root, large pericardial effusion greater than 2 cm adjacent to the right atrium, with no cardiac tamponade; RVSP 36.9 mmHg    History of lung cancer  -Status post right upper lobe resection followed by radiation as well as chemotherapy  -Status post bronchoscopy which showed evidence of pneumonia with no intrabronchial lesions  -Status post right thoracentesis with current thoracostomy tube in place that is draining serosanguineous fluid  -Await cytology on the pleural fluid and bronchoscopy    Abdominal aortic aneurysm  -Patient was having this followed but has missed his appointment last year   -The patient had an abdominal ultrasound and his aneurysm is 5.3 x 4.9 cm which is very similar in size to see what was seen in May 2019  -patient should continue follow-up as an outpatient with his vascular surgeon    Urinary retention secondary to bilateral prostatic hypertrophy with gross hematuria  -Urology consulting  -Status post cystoscopy 1/18/2021 which showed 45 mm prostate no evidence of bladder cancer or stones  -Patient failed voiding trial 1/18/2021 and had to have Beach replaced  -Continue Flomax and Proscar    Hyponatremia, mild  -Monitor    Acute constipation, new problem 1/18/2021, resolved  -Patient had 2 bowel movements after receiving MiraLAX and Dulcolax suppository    VTE Prophylaxis -   Mechanical Order History:      Ordered        01/13/21 1708  Place Sequential Compression Device  Once         01/13/21 1708  Maintain Sequential Compression Device  Continuous                 Pharmalogical Order History:      Ordered     Dose Route Frequency Stop    01/13/21 1502  enoxaparin (LOVENOX) syringe 80 mg      1 mg/kg SC Once 01/13/21 1520              Code Status -   Code Status and Medical  Interventions:   Ordered at: 01/13/21 1610     Limited Support to NOT Include:    Intubation     Level Of Support Discussed With:    Patient     Code Status:    CPR     Medical Interventions (Level of Support Prior to Arrest):    Limited     This patient has been examined wearing appropriate Personal Protective Equipment and discussed with hospital infection control department. 01/19/21    Discharge Planning  To be determined    Electronically signed by Dottie Caslilas MD, 01/19/21, 14:01 EST.  [facility] Hospitalist Team

## 2021-01-20 ENCOUNTER — APPOINTMENT (OUTPATIENT)
Dept: GENERAL RADIOLOGY | Facility: HOSPITAL | Age: 81
End: 2021-01-20

## 2021-01-20 PROBLEM — I50.21 ACUTE SYSTOLIC CONGESTIVE HEART FAILURE (HCC): Status: ACTIVE | Noted: 2021-01-13

## 2021-01-20 PROBLEM — I25.118 CORONARY ARTERY DISEASE OF NATIVE ARTERY OF NATIVE HEART WITH STABLE ANGINA PECTORIS (HCC): Status: ACTIVE | Noted: 2021-01-13

## 2021-01-20 LAB
ANION GAP SERPL CALCULATED.3IONS-SCNC: 7 MMOL/L (ref 5–15)
BACTERIA FLD CULT: NORMAL
BASOPHILS # BLD AUTO: 0 10*3/MM3 (ref 0–0.2)
BASOPHILS NFR BLD AUTO: 0.1 % (ref 0–1.5)
BUN SERPL-MCNC: 33 MG/DL (ref 8–23)
BUN/CREAT SERPL: 37.1 (ref 7–25)
CALCIUM SPEC-SCNC: 8.6 MG/DL (ref 8.6–10.5)
CHLORIDE SERPL-SCNC: 101 MMOL/L (ref 98–107)
CO2 SERPL-SCNC: 27 MMOL/L (ref 22–29)
CREAT SERPL-MCNC: 0.89 MG/DL (ref 0.76–1.27)
DACRYOCYTES BLD QL SMEAR: ABNORMAL
DEPRECATED RDW RBC AUTO: 48.1 FL (ref 37–54)
DEPRECATED RDW RBC AUTO: 51.2 FL (ref 37–54)
EOSINOPHIL # BLD AUTO: 0 10*3/MM3 (ref 0–0.4)
EOSINOPHIL NFR BLD AUTO: 0 % (ref 0.3–6.2)
ERYTHROCYTE [DISTWIDTH] IN BLOOD BY AUTOMATED COUNT: 16 % (ref 12.3–15.4)
ERYTHROCYTE [DISTWIDTH] IN BLOOD BY AUTOMATED COUNT: 16.3 % (ref 12.3–15.4)
GFR SERPL CREATININE-BSD FRML MDRD: 83 ML/MIN/1.73
GIANT PLATELETS: ABNORMAL
GLUCOSE SERPL-MCNC: 132 MG/DL (ref 65–99)
GRAM STN SPEC: NORMAL
HCT VFR BLD AUTO: 39.3 % (ref 37.5–51)
HCT VFR BLD AUTO: 40.9 % (ref 37.5–51)
HGB BLD-MCNC: 12.8 G/DL (ref 13–17.7)
HGB BLD-MCNC: 13.2 G/DL (ref 13–17.7)
LARGE PLATELETS: ABNORMAL
LYMPHOCYTES # BLD AUTO: 0.1 10*3/MM3 (ref 0.7–3.1)
LYMPHOCYTES # BLD MANUAL: 0.13 10*3/MM3 (ref 0.7–3.1)
LYMPHOCYTES NFR BLD AUTO: 0.9 % (ref 19.6–45.3)
LYMPHOCYTES NFR BLD MANUAL: 1 % (ref 19.6–45.3)
LYMPHOCYTES NFR BLD MANUAL: 3 % (ref 5–12)
MAGNESIUM SERPL-MCNC: 2.2 MG/DL (ref 1.6–2.4)
MCH RBC QN AUTO: 28.4 PG (ref 26.6–33)
MCH RBC QN AUTO: 28.6 PG (ref 26.6–33)
MCHC RBC AUTO-ENTMCNC: 32.2 G/DL (ref 31.5–35.7)
MCHC RBC AUTO-ENTMCNC: 32.5 G/DL (ref 31.5–35.7)
MCV RBC AUTO: 87.3 FL (ref 79–97)
MCV RBC AUTO: 88.7 FL (ref 79–97)
MONOCYTES # BLD AUTO: 0.38 10*3/MM3 (ref 0.1–0.9)
MONOCYTES # BLD AUTO: 0.5 10*3/MM3 (ref 0.1–0.9)
MONOCYTES NFR BLD AUTO: 3.1 % (ref 5–12)
NEUTROPHILS # BLD AUTO: 12.16 10*3/MM3 (ref 1.7–7)
NEUTROPHILS NFR BLD AUTO: 14.2 10*3/MM3 (ref 1.7–7)
NEUTROPHILS NFR BLD AUTO: 95.9 % (ref 42.7–76)
NEUTROPHILS NFR BLD MANUAL: 89 % (ref 42.7–76)
NEUTS BAND NFR BLD MANUAL: 6 % (ref 0–5)
NRBC BLD AUTO-RTO: 0.1 /100 WBC (ref 0–0.2)
PLATELET # BLD AUTO: 226 10*3/MM3 (ref 140–450)
PLATELET # BLD AUTO: 271 10*3/MM3 (ref 140–450)
PMV BLD AUTO: 10 FL (ref 6–12)
PMV BLD AUTO: 8.8 FL (ref 6–12)
POIKILOCYTOSIS BLD QL SMEAR: ABNORMAL
POTASSIUM SERPL-SCNC: 4.9 MMOL/L (ref 3.5–5.2)
RBC # BLD AUTO: 4.5 10*6/MM3 (ref 4.14–5.8)
RBC # BLD AUTO: 4.61 10*6/MM3 (ref 4.14–5.8)
SCAN SLIDE: NORMAL
SMALL PLATELETS BLD QL SMEAR: ADEQUATE
SODIUM SERPL-SCNC: 135 MMOL/L (ref 136–145)
VARIANT LYMPHS NFR BLD MANUAL: 1 % (ref 0–5)
WBC # BLD AUTO: 12.8 10*3/MM3 (ref 3.4–10.8)
WBC # BLD AUTO: 14.8 10*3/MM3 (ref 3.4–10.8)
WBC MORPH BLD: NORMAL

## 2021-01-20 PROCEDURE — 71045 X-RAY EXAM CHEST 1 VIEW: CPT

## 2021-01-20 PROCEDURE — 93458 L HRT ARTERY/VENTRICLE ANGIO: CPT | Performed by: INTERNAL MEDICINE

## 2021-01-20 PROCEDURE — 99152 MOD SED SAME PHYS/QHP 5/>YRS: CPT | Performed by: INTERNAL MEDICINE

## 2021-01-20 PROCEDURE — 25010000002 FENTANYL CITRATE (PF) 100 MCG/2ML SOLUTION: Performed by: INTERNAL MEDICINE

## 2021-01-20 PROCEDURE — 83735 ASSAY OF MAGNESIUM: CPT | Performed by: INTERNAL MEDICINE

## 2021-01-20 PROCEDURE — 80048 BASIC METABOLIC PNL TOTAL CA: CPT | Performed by: INTERNAL MEDICINE

## 2021-01-20 PROCEDURE — C1769 GUIDE WIRE: HCPCS | Performed by: INTERNAL MEDICINE

## 2021-01-20 PROCEDURE — 94760 N-INVAS EAR/PLS OXIMETRY 1: CPT

## 2021-01-20 PROCEDURE — 85007 BL SMEAR W/DIFF WBC COUNT: CPT | Performed by: HOSPITALIST

## 2021-01-20 PROCEDURE — 94799 UNLISTED PULMONARY SVC/PX: CPT

## 2021-01-20 PROCEDURE — 0 IOPAMIDOL PER 1 ML: Performed by: INTERNAL MEDICINE

## 2021-01-20 PROCEDURE — C1894 INTRO/SHEATH, NON-LASER: HCPCS | Performed by: INTERNAL MEDICINE

## 2021-01-20 PROCEDURE — 99232 SBSQ HOSP IP/OBS MODERATE 35: CPT | Performed by: INTERNAL MEDICINE

## 2021-01-20 PROCEDURE — 25010000002 ENOXAPARIN PER 10 MG: Performed by: INTERNAL MEDICINE

## 2021-01-20 PROCEDURE — 85025 COMPLETE CBC W/AUTO DIFF WBC: CPT | Performed by: HOSPITALIST

## 2021-01-20 PROCEDURE — 25010000002 METHYLPREDNISOLONE PER 40 MG: Performed by: INTERNAL MEDICINE

## 2021-01-20 PROCEDURE — 85025 COMPLETE CBC W/AUTO DIFF WBC: CPT | Performed by: INTERNAL MEDICINE

## 2021-01-20 PROCEDURE — 25010000002 CEFTRIAXONE PER 250 MG: Performed by: INTERNAL MEDICINE

## 2021-01-20 PROCEDURE — 99232 SBSQ HOSP IP/OBS MODERATE 35: CPT | Performed by: HOSPITALIST

## 2021-01-20 RX ORDER — SODIUM CHLORIDE 9 MG/ML
1-3 INJECTION, SOLUTION INTRAVENOUS CONTINUOUS
Status: DISCONTINUED | OUTPATIENT
Start: 2021-01-20 | End: 2021-01-21

## 2021-01-20 RX ORDER — SODIUM CHLORIDE 9 MG/ML
250 INJECTION, SOLUTION INTRAVENOUS ONCE AS NEEDED
Status: DISCONTINUED | OUTPATIENT
Start: 2021-01-20 | End: 2021-01-22 | Stop reason: HOSPADM

## 2021-01-20 RX ORDER — SODIUM CHLORIDE 9 MG/ML
INJECTION, SOLUTION INTRAVENOUS CONTINUOUS PRN
Status: COMPLETED | OUTPATIENT
Start: 2021-01-20 | End: 2021-01-20

## 2021-01-20 RX ORDER — LIDOCAINE HYDROCHLORIDE 20 MG/ML
INJECTION, SOLUTION INFILTRATION; PERINEURAL AS NEEDED
Status: DISCONTINUED | OUTPATIENT
Start: 2021-01-20 | End: 2021-01-20 | Stop reason: HOSPADM

## 2021-01-20 RX ORDER — MIDAZOLAM HYDROCHLORIDE 1 MG/ML
1 INJECTION INTRAMUSCULAR; INTRAVENOUS ONCE
Status: DISCONTINUED | OUTPATIENT
Start: 2021-01-20 | End: 2021-01-22

## 2021-01-20 RX ORDER — SODIUM CHLORIDE 9 MG/ML
75 INJECTION, SOLUTION INTRAVENOUS CONTINUOUS
Status: DISCONTINUED | OUTPATIENT
Start: 2021-01-20 | End: 2021-01-21

## 2021-01-20 RX ORDER — FENTANYL CITRATE 50 UG/ML
INJECTION, SOLUTION INTRAMUSCULAR; INTRAVENOUS AS NEEDED
Status: DISCONTINUED | OUTPATIENT
Start: 2021-01-20 | End: 2021-01-20 | Stop reason: HOSPADM

## 2021-01-20 RX ORDER — FENTANYL CITRATE 50 UG/ML
25 INJECTION, SOLUTION INTRAMUSCULAR; INTRAVENOUS ONCE
Status: DISCONTINUED | OUTPATIENT
Start: 2021-01-20 | End: 2021-01-22

## 2021-01-20 RX ORDER — ACETAMINOPHEN 325 MG/1
650 TABLET ORAL EVERY 4 HOURS PRN
Status: DISCONTINUED | OUTPATIENT
Start: 2021-01-20 | End: 2021-01-20 | Stop reason: SDUPTHER

## 2021-01-20 RX ADMIN — IPRATROPIUM BROMIDE AND ALBUTEROL SULFATE 3 ML: 2.5; .5 SOLUTION RESPIRATORY (INHALATION) at 19:39

## 2021-01-20 RX ADMIN — FINASTERIDE 5 MG: 5 TABLET, FILM COATED ORAL at 08:08

## 2021-01-20 RX ADMIN — BUDESONIDE 0.5 MG: 0.5 INHALANT RESPIRATORY (INHALATION) at 19:39

## 2021-01-20 RX ADMIN — TAMSULOSIN HYDROCHLORIDE 0.4 MG: 0.4 CAPSULE ORAL at 17:24

## 2021-01-20 RX ADMIN — METOPROLOL TARTRATE 25 MG: 25 TABLET, FILM COATED ORAL at 21:49

## 2021-01-20 RX ADMIN — BISACODYL 10 MG: 10 SUPPOSITORY RECTAL at 08:08

## 2021-01-20 RX ADMIN — GUAIFENESIN 1200 MG: 600 TABLET, EXTENDED RELEASE ORAL at 17:24

## 2021-01-20 RX ADMIN — THEOPHYLLINE 300 MG: 300 TABLET, EXTENDED RELEASE ORAL at 08:08

## 2021-01-20 RX ADMIN — IPRATROPIUM BROMIDE AND ALBUTEROL SULFATE 3 ML: 2.5; .5 SOLUTION RESPIRATORY (INHALATION) at 06:53

## 2021-01-20 RX ADMIN — Medication 10 ML: at 21:48

## 2021-01-20 RX ADMIN — AMIODARONE HYDROCHLORIDE 200 MG: 200 TABLET ORAL at 17:24

## 2021-01-20 RX ADMIN — METHYLPREDNISOLONE SODIUM SUCCINATE 40 MG: 40 INJECTION, POWDER, FOR SOLUTION INTRAMUSCULAR; INTRAVENOUS at 05:12

## 2021-01-20 RX ADMIN — Medication 10 ML: at 08:08

## 2021-01-20 RX ADMIN — MULTIPLE VITAMINS W/ MINERALS TAB 1 TABLET: TAB at 08:08

## 2021-01-20 RX ADMIN — METOPROLOL TARTRATE 25 MG: 25 TABLET, FILM COATED ORAL at 05:14

## 2021-01-20 RX ADMIN — DILTIAZEM HYDROCHLORIDE 30 MG: 30 TABLET, FILM COATED ORAL at 05:14

## 2021-01-20 RX ADMIN — METHYLPREDNISOLONE SODIUM SUCCINATE 40 MG: 40 INJECTION, POWDER, FOR SOLUTION INTRAMUSCULAR; INTRAVENOUS at 21:48

## 2021-01-20 RX ADMIN — ENOXAPARIN SODIUM 80 MG: 80 INJECTION SUBCUTANEOUS at 04:17

## 2021-01-20 RX ADMIN — Medication 10 ML: at 05:12

## 2021-01-20 RX ADMIN — AMIODARONE HYDROCHLORIDE 200 MG: 200 TABLET ORAL at 08:08

## 2021-01-20 RX ADMIN — POLYETHYLENE GLYCOL 3350 17 G: 17 POWDER, FOR SOLUTION ORAL at 21:48

## 2021-01-20 RX ADMIN — DIGOXIN 125 MCG: 125 TABLET ORAL at 08:08

## 2021-01-20 RX ADMIN — POLYETHYLENE GLYCOL 3350 17 G: 17 POWDER, FOR SOLUTION ORAL at 08:08

## 2021-01-20 RX ADMIN — SODIUM CHLORIDE 75 ML/HR: 0.9 INJECTION, SOLUTION INTRAVENOUS at 16:09

## 2021-01-20 RX ADMIN — DILTIAZEM HYDROCHLORIDE 30 MG: 30 TABLET, FILM COATED ORAL at 12:13

## 2021-01-20 RX ADMIN — BUDESONIDE 0.5 MG: 0.5 INHALANT RESPIRATORY (INHALATION) at 06:53

## 2021-01-20 RX ADMIN — GUAIFENESIN 1200 MG: 600 TABLET, EXTENDED RELEASE ORAL at 05:12

## 2021-01-20 RX ADMIN — ROSUVASTATIN CALCIUM 40 MG: 10 TABLET, FILM COATED ORAL at 21:48

## 2021-01-20 RX ADMIN — CEFTRIAXONE SODIUM 2 G: 2 INJECTION, POWDER, FOR SOLUTION INTRAMUSCULAR; INTRAVENOUS at 14:03

## 2021-01-20 RX ADMIN — METOPROLOL TARTRATE 25 MG: 25 TABLET, FILM COATED ORAL at 14:03

## 2021-01-20 RX ADMIN — DILTIAZEM HYDROCHLORIDE 30 MG: 30 TABLET, FILM COATED ORAL at 17:24

## 2021-01-20 RX ADMIN — METHYLPREDNISOLONE SODIUM SUCCINATE 40 MG: 40 INJECTION, POWDER, FOR SOLUTION INTRAMUSCULAR; INTRAVENOUS at 14:03

## 2021-01-20 NOTE — PROGRESS NOTES
"PULMONARY CRITICAL CARE Progress  NOTE      PATIENT IDENTIFICATION:  Name: Lenny Larson  MRN: [id number]  :  1940     Age: 80 y.o.  Sex: male    DATE OF Note:  2021   Referring Physician: Dottie Casillas MD                  Subjective:   More alert, CT removed this am,  no SOB no, chest pain, no nausea or vomiting, no change in bowel habit, no dysuria,  no new  skin rash or itching.      Objective:  tMax 24 hrs: Temp (24hrs), Av.6 °F (36.4 °C), Min:97.4 °F (36.3 °C), Max:97.8 °F (36.6 °C)      Vitals Ranges:   Temp:  [97.4 °F (36.3 °C)-97.8 °F (36.6 °C)] 97.7 °F (36.5 °C)  Heart Rate:  [] 107  Resp:  [12-20] 18  BP: (104-127)/(45-64) 113/48    Intake and Output Last 3 Shifts:   I/O last 3 completed shifts:  In: -   Out: 2350 [Urine:1850; Chest Tube:500]    Exam:  /48   Pulse 107   Temp 97.7 °F (36.5 °C)   Resp 18   Ht 177.8 cm (70\")   Wt 80.2 kg (176 lb 12.9 oz)   SpO2 98%   BMI 25.37 kg/m²     General Appearance:     HEENT:  Normocephalic, without obvious abnormality, Conjunctiva/corneas clear,.  Normal external ear canals, Nares normal, no drainage     Neck:  Supple, symmetrical, trachea midline. No JVD.  Lungs /Chest wall:   Bilateral basal rhonchi, respirations unlabored,  symmetrical wall movement.     Heart:  Regular rate and rhythm, systolic murmur PMI left sternal border  Abdomen: Soft, non-tender, no masses, no organomegaly.    Extremities: Trace edema no clubbing or Cyanosis        Medications:    Current Facility-Administered Medications:   •  acetaminophen (TYLENOL) tablet 650 mg, 650 mg, Oral, Q4H PRN, 650 mg at 01/15/21 1507 **OR** acetaminophen (TYLENOL) 160 MG/5ML solution 650 mg, 650 mg, Oral, Q4H PRN **OR** acetaminophen (TYLENOL) suppository 650 mg, 650 mg, Rectal, Q4H PRN, Draw, MD Sanjay  •  amiodarone (PACERONE) tablet 200 mg, 200 mg, Oral, BID With Meals, Alvarado Cai MD, 200 mg at 21 0808  •  benzonatate (TESSALON) capsule 200 mg, 200 mg, Oral, TID " PRN, Sanjay Gonsales MD  •  bisacodyl (DULCOLAX) suppository 10 mg, 10 mg, Rectal, Daily, Dottie Casillas MD, 10 mg at 01/20/21 0808  •  budesonide (PULMICORT) nebulizer solution 0.5 mg, 0.5 mg, Nebulization, BID - RT, Sanjay Gonsales MD, 0.5 mg at 01/20/21 0653  •  cefTRIAXone (ROCEPHIN) 2 g in sodium chloride 0.9 % 100 mL IVPB, 2 g, Intravenous, Q24H, Louis Ronquillo MD, Last Rate: 200 mL/hr at 01/19/21 1411, 2 g at 01/19/21 1411  •  digoxin (LANOXIN) tablet 125 mcg, 125 mcg, Oral, Daily, Alvarado Cai MD, 125 mcg at 01/20/21 0808  •  dilTIAZem (CARDIZEM) tablet 30 mg, 30 mg, Oral, Q6H, Daniel Rahman MD, 30 mg at 01/20/21 1213  •  enoxaparin (LOVENOX) syringe 80 mg, 1 mg/kg, Subcutaneous, Q12H, Diane Tamez MD, 80 mg at 01/20/21 0417  •  finasteride (PROSCAR) tablet 5 mg, 5 mg, Oral, Daily, Sanjay Gonsales MD, 5 mg at 01/20/21 0808  •  guaiFENesin (MUCINEX) 12 hr tablet 1,200 mg, 1,200 mg, Oral, Q12H, Sanjay Gonsales MD, 1,200 mg at 01/20/21 0512  •  HYDROcodone-acetaminophen (NORCO)  MG per tablet 1 tablet, 1 tablet, Oral, Q4H PRN, Diane Tamez MD, 1 tablet at 01/17/21 0942  •  HYDROcodone-acetaminophen (NORCO) 5-325 MG per tablet 1 tablet, 1 tablet, Oral, Q4H PRN, Diane Tamez MD, 1 tablet at 01/17/21 0025  •  ipratropium-albuterol (DUO-NEB) nebulizer solution 3 mL, 3 mL, Nebulization, Q2H PRN, Sanjay Gonsales MD  •  ipratropium-albuterol (DUO-NEB) nebulizer solution 3 mL, 3 mL, Nebulization, 4x Daily - RT, Dottie Casillas MD, 3 mL at 01/20/21 0653  •  magnesium hydroxide (MILK OF MAGNESIA) suspension 2400 mg/10mL 10 mL, 10 mL, Oral, Daily PRN, Diane Tamez MD, 10 mL at 01/15/21 1729  •  Magnesium Sulfate 2 gram infusion - Mg less than or equal to 1.5 mg/dL, 2 g, Intravenous, PRN **OR** Magnesium Sulfate 1 gram infusion - Mg 1.6-1.9 mg/dL, 1 g, Intravenous, PRN, Sanjay Gonsales MD  •  methylPREDNISolone sodium succinate (SOLU-Medrol) injection 40 mg, 40 mg, Intravenous, Q8H, Sanjay Gonsales MD, 40 mg at 01/20/21 0512  •   metoprolol tartrate (LOPRESSOR) tablet 25 mg, 25 mg, Oral, Q8H, Daniel Rahman MD, 25 mg at 01/20/21 0514  •  multivitamin with minerals 1 tablet, 1 tablet, Oral, Daily, Sanjay Gonsales MD, 1 tablet at 01/20/21 0808  •  nitroglycerin (NITROSTAT) SL tablet 0.4 mg, 0.4 mg, Sublingual, Q5 Min PRN, Sanjay Gonsales MD  •  ondansetron (ZOFRAN) tablet 4 mg, 4 mg, Oral, Q6H PRN **OR** ondansetron (ZOFRAN) injection 4 mg, 4 mg, Intravenous, Q6H PRN, Sanjay Gonsales MD  •  polyethylene glycol (MIRALAX) packet 17 g, 17 g, Oral, BID, Dottie Casillas MD, 17 g at 01/20/21 0808  •  potassium chloride (K-DUR,KLOR-CON) CR tablet 40 mEq, 40 mEq, Oral, PRN, Sanjay Gonsales MD  •  rosuvastatin (CRESTOR) tablet 40 mg, 40 mg, Oral, Nightly, Sanjay Gonsales MD, 40 mg at 01/19/21 2126  •  [COMPLETED] Insert peripheral IV, , , Once **AND** sodium chloride 0.9 % flush 10 mL, 10 mL, Intravenous, PRN, Sanjay Gonsales MD, 10 mL at 01/20/21 0512  •  sodium chloride 0.9 % flush 10 mL, 10 mL, Intravenous, Q12H, Sanjay Gonsales MD, 10 mL at 01/20/21 0808  •  sodium chloride 0.9 % flush 10 mL, 10 mL, Intravenous, PRN, Sanjay Gonsales MD, 10 mL at 01/19/21 0530  •  tamsulosin (FLOMAX) 24 hr capsule 0.4 mg, 0.4 mg, Oral, Daily With Dinner, Sanjay Gonsales MD, 0.4 mg at 01/19/21 1817  •  theophylline (THEODUR) 12 hr tablet 300 mg, 300 mg, Oral, Daily, Sanjay Gonsales MD, 300 mg at 01/20/21 0808    Data Review:  All labs (24hrs):   Recent Results (from the past 24 hour(s))   Magnesium    Collection Time: 01/20/21  2:56 AM    Specimen: Blood   Result Value Ref Range    Magnesium 2.2 1.6 - 2.4 mg/dL   Basic Metabolic Panel    Collection Time: 01/20/21  2:56 AM    Specimen: Blood   Result Value Ref Range    Glucose 132 (H) 65 - 99 mg/dL    BUN 33 (H) 8 - 23 mg/dL    Creatinine 0.89 0.76 - 1.27 mg/dL    Sodium 135 (L) 136 - 145 mmol/L    Potassium 4.9 3.5 - 5.2 mmol/L    Chloride 101 98 - 107 mmol/L    CO2 27.0 22.0 - 29.0 mmol/L    Calcium 8.6 8.6 - 10.5 mg/dL    eGFR Non African Amer 83 >60  mL/min/1.73    BUN/Creatinine Ratio 37.1 (H) 7.0 - 25.0    Anion Gap 7.0 5.0 - 15.0 mmol/L   CBC Auto Differential    Collection Time: 01/20/21  2:56 AM    Specimen: Blood   Result Value Ref Range    WBC 12.80 (H) 3.40 - 10.80 10*3/mm3    RBC 4.50 4.14 - 5.80 10*6/mm3    Hemoglobin 12.8 (L) 13.0 - 17.7 g/dL    Hematocrit 39.3 37.5 - 51.0 %    MCV 87.3 79.0 - 97.0 fL    MCH 28.4 26.6 - 33.0 pg    MCHC 32.5 31.5 - 35.7 g/dL    RDW 16.0 (H) 12.3 - 15.4 %    RDW-SD 48.1 37.0 - 54.0 fl    MPV 10.0 6.0 - 12.0 fL    Platelets 226 140 - 450 10*3/mm3   Scan Slide    Collection Time: 01/20/21  2:56 AM    Specimen: Blood   Result Value Ref Range    Scan Slide     Manual Differential    Collection Time: 01/20/21  2:56 AM    Specimen: Blood   Result Value Ref Range    Neutrophil % 89.0 (H) 42.7 - 76.0 %    Lymphocyte % 1.0 (L) 19.6 - 45.3 %    Monocyte % 3.0 (L) 5.0 - 12.0 %    Bands %  6.0 (H) 0.0 - 5.0 %    Atypical Lymphocyte % 1.0 0.0 - 5.0 %    Neutrophils Absolute 12.16 (H) 1.70 - 7.00 10*3/mm3    Lymphocytes Absolute 0.13 (L) 0.70 - 3.10 10*3/mm3    Monocytes Absolute 0.38 0.10 - 0.90 10*3/mm3    Dacrocytes Slight/1+ None Seen    Poikilocytes Slight/1+ None Seen    WBC Morphology Normal Normal    Platelet Estimate Adequate Normal    Large Platelets Slight/1+ None Seen    Giant Platelets Slight/1+ None Seen   CBC Auto Differential    Collection Time: 01/20/21 10:01 AM    Specimen: Blood   Result Value Ref Range    WBC 14.80 (H) 3.40 - 10.80 10*3/mm3    RBC 4.61 4.14 - 5.80 10*6/mm3    Hemoglobin 13.2 13.0 - 17.7 g/dL    Hematocrit 40.9 37.5 - 51.0 %    MCV 88.7 79.0 - 97.0 fL    MCH 28.6 26.6 - 33.0 pg    MCHC 32.2 31.5 - 35.7 g/dL    RDW 16.3 (H) 12.3 - 15.4 %    RDW-SD 51.2 37.0 - 54.0 fl    MPV 8.8 6.0 - 12.0 fL    Platelets 271 140 - 450 10*3/mm3    Neutrophil % 95.9 (H) 42.7 - 76.0 %    Lymphocyte % 0.9 (L) 19.6 - 45.3 %    Monocyte % 3.1 (L) 5.0 - 12.0 %    Eosinophil % 0.0 (L) 0.3 - 6.2 %    Basophil % 0.1 0.0 - 1.5  %    Neutrophils, Absolute 14.20 (H) 1.70 - 7.00 10*3/mm3    Lymphocytes, Absolute 0.10 (L) 0.70 - 3.10 10*3/mm3    Monocytes, Absolute 0.50 0.10 - 0.90 10*3/mm3    Eosinophils, Absolute 0.00 0.00 - 0.40 10*3/mm3    Basophils, Absolute 0.00 0.00 - 0.20 10*3/mm3    nRBC 0.1 0.0 - 0.2 /100 WBC        Imaging:  XR Chest 1 View  Narrative: FRONTAL VIEW OF THE CHEST    CLINICAL INDICATION: Catheter.    COMPARISON: 1/19/2021.    FINDINGS: Stable right pleural drain is stable. A small basilar pneumothorax is suspected without tension. Stable heart size. Stable right suprahilar opacity. No new consolidation.  Impression: 1. Small right basilar pneumothorax without tension.  2. Right suprahilar opacity is again present which may reflect scarring or other chronic change. A parenchymal mass cannot be excluded.    Electronically signed by:  Brant England M.D.    1/20/2021 12:46 AM       ASSESSMENT:  Acute respiratory distress  COPD  CAD  Hyperlipidemia  HTN  Chest pain  A-fib    BPH     History of lung cancer  Malignant neoplasm of upper lobe of right lung         PLAN:  Discontinue CT and monitor    Change diet to mechanical soft   Change antibiotics   Add Boost   CT care daily   Monitor CT output  Check cytology from pleural fluid  Antibiotics empirically on cefepime but will discontinue and start ceftriaxone due to increased confusion  Check BAL culture and cytology pending  Steroids IV Solu-Medrol 40 mg every 8  Bronchodilator  Inhaled corticosteroids  Amiodarone to po   Electrolytes/ glycemic control  DVT and GI prophylaxis      Discussed with Dr Shima Garcia, APRN  1/20/2021  13:22 EST     I personally have examined  and interviewed the patient. I have reviewed the history, data, problems, assessment and plan with our NP.  Critical care time in direct medical management (   ) minutes   Louis Ronquillo MD, D,ABSM  1/20/2021

## 2021-01-20 NOTE — PLAN OF CARE
Goal Outcome Evaluation:  Plan of Care Reviewed With: patient, spouse  Progress: improving  Chest tube removed this morning. Patient tolerated well and continues to be on room air. Patient scheduled to have a heart cath today. Will continue to monitor.     Problem: Adult Inpatient Plan of Care  Goal: Absence of Hospital-Acquired Illness or Injury  Intervention: Identify and Manage Fall Risk  Recent Flowsheet Documentation  Taken 1/20/2021 1205 by Priya Chavez RN  Safety Promotion/Fall Prevention:   activity supervised   assistive device/personal items within reach   clutter free environment maintained   nonskid shoes/slippers when out of bed   safety round/check completed  Taken 1/20/2021 1000 by Priya Chavez RN  Safety Promotion/Fall Prevention:   activity supervised   assistive device/personal items within reach   clutter free environment maintained   nonskid shoes/slippers when out of bed   safety round/check completed  Taken 1/20/2021 0800 by Priya Chavez RN  Safety Promotion/Fall Prevention:   activity supervised   assistive device/personal items within reach   clutter free environment maintained   nonskid shoes/slippers when out of bed   safety round/check completed

## 2021-01-20 NOTE — PROGRESS NOTES
[facility] Medicine Services Daily Progress Note      Hospitalist Team  LOS 7 days      Patient Care Team:  Camron Pollard MD as PCP - General (Family Medicine)  Alvarado Cai MD as Consulting Physician (Cardiology)    Patient Location: Pool/Cath      Subjective   Subjective     Chief Complaint / Subjective  Chief Complaint   Patient presents with   • Shortness of Breath     Brief Synopsis of Hospital Course/HPI  The patient is a 80-year-old male who has underlying coronary artery disease with atrial fibrillation, stent placement and an abdominal aortic aneurysm.  The patient also is status post right upper lobectomy for previous lung carcinoma which was also treated with radiation and chemotherapy.  The patient presents with atrial fibrillation with rapid ventricular response as well as a new right lower lobe effusion.      Date:  1/15/2021  The patient had a chest tube placed today for his right pleural effusion.  It continues to drain.  He is having pain with deep breathing related to the chest tube.  The patient does report that his breathing is better and that his heart is not racing as much as it was on admission.    1/16/2021  The patient is in tears as he has been in pain.  I did check with nursing and the patient has not taken anything for pain nor is he asked for anything.  They are going to start offering it to the patient.  Other than that the patient has no additional issues.  I did explain to him that he needs to ask them and should not be shy about it as it is painful to have a chest tube in place.    1/17/2021  The patient's pain is markedly improved over yesterday.  The patient's tachycardia is improved as well.  The patient does have a lot of lower extremity edema and was encouraged to place his legs up on an elevation so that hopefully this can resolve/improve.    1/18/2021  Patient reports ongoing pain from his right chest tube site.  He does report feeling  "better after having had a couple bowel movements after receiving to collect suppository MiraLAX.  The nursing staff reports that he had been more confused today and requested antibiotic be changed from cefepime as a possible etiology for the confusion.  This was deferred to pulmonary and cefepime was changed to Rocephin.  Patient reports voiding several times after Beach was removed but it was just a small amount.  Post void residual showed 275 cc in and out cath performed with 200 cc removed.    1/19/2021  Patient continues to be confused per nursing staff.  He denies specific complaints at this time.  He had to have Beach catheter replaced because of urinary retention.    1/20/2021  Patient reports no specific complaints at this time.  His chest tube has been removed and Beach catheter remains.  He is scheduled for heart cath later today.    ROS   12 point review of systems was reviewed and was negative except as above.      Objective   Objective      Vital Signs  Temp:  [97.4 °F (36.3 °C)-97.9 °F (36.6 °C)] 97.9 °F (36.6 °C)  Heart Rate:  [] 98  Resp:  [12-20] 18  BP: (104-127)/(45-78) 118/74  Oxygen Therapy  SpO2: 98 %  Pulse Oximetry Type: Continuous  Device (Oxygen Therapy): room air  Flow (L/min): 2  ETCO2 (mmHg): (!) 0 mmHg  Flowsheet Rows      First Filed Value   Admission Height  177.8 cm (70\") Documented at 01/13/2021 1222   Admission Weight  80.6 kg (177 lb 11.1 oz) Documented at 01/13/2021 1222        Intake & Output (last 3 days)       01/17 0701 - 01/18 0700 01/18 0701 - 01/19 0700 01/19 0701 - 01/20 0700 01/20 0701 - 01/21 0700    P.O. 360 717  360    Other 800       Total Intake(mL/kg) 1160 (14.6) 717 (9)  360 (4.5)    Urine (mL/kg/hr) 350 (0.2) 1375 (0.7) 1050 (0.5)     Stool  0      Chest Tube 310 540 250     Total Output 660 1915 1300     Net +500 -1198 -1300 +360            Stool Unmeasured Occurrence  2 x          Lines, Drains & Airways    Active LDAs     Name:   Placement date:   " Placement time:   Site:   Days:    Peripheral IV 01/13/21 1250 Right Antecubital   01/13/21    1250    Antecubital   1    Peripheral IV 01/14/21 1312 Anterior;Right Forearm   01/14/21    1312    Forearm   less than 1              Physical Exam:  Physical Exam   Well-developed well-nourished gentleman sitting up in the chair comfortable on room air in no acute distress awake and alert; mucous membranes moist; sclerae anicteric; lungs with crackles in the right posterior base; CV regular rate and rhythm; abdomen soft nontender nondistended with active bowel sounds; extremities with improving trace bilateral ankle and pedal edema, no cyanosis or calf tenderness; palpable pedal pulses bilaterally; Beach catheter present to bedside drain.  Chest tube has been removed.    Procedures:  Procedure(s):  BRONCHOSCOPY with bronchio alveolar lavage of right lower lung  Thoracostomy tube placed    Results Review:     I reviewed the patient's new clinical results.    Lab Results (last 24 hours)     Procedure Component Value Units Date/Time    Body Fluid Culture - Body Fluid, Pleural Cavity [[id number]] Collected: 01/15/21 1509    Specimen: Body Fluid from Pleural Cavity Updated: 01/20/21 1043     Body Fluid Culture No growth at 5 days     Gram Stain No organisms seen    CBC & Differential [[id number]]  (Abnormal) Collected: 01/20/21 1001    Specimen: Blood Updated: 01/20/21 1007    Narrative:      The following orders were created for panel order CBC & Differential.  Procedure                               Abnormality         Status                     ---------                               -----------         ------                     CBC Auto Differential[[id number]]        Abnormal            Final result                 Please view results for these tests on the individual orders.    CBC Auto Differential [[id number]]  (Abnormal) Collected: 01/20/21 1001    Specimen: Blood Updated: 01/20/21 1007     WBC 14.80 10*3/mm3      RBC  4.61 10*6/mm3      Hemoglobin 13.2 g/dL      Hematocrit 40.9 %      MCV 88.7 fL      MCH 28.6 pg      MCHC 32.2 g/dL      RDW 16.3 %      RDW-SD 51.2 fl      MPV 8.8 fL      Platelets 271 10*3/mm3      Neutrophil % 95.9 %      Lymphocyte % 0.9 %      Monocyte % 3.1 %      Eosinophil % 0.0 %      Basophil % 0.1 %      Neutrophils, Absolute 14.20 10*3/mm3      Lymphocytes, Absolute 0.10 10*3/mm3      Monocytes, Absolute 0.50 10*3/mm3      Eosinophils, Absolute 0.00 10*3/mm3      Basophils, Absolute 0.00 10*3/mm3      nRBC 0.1 /100 WBC     Virus Culture - Lavage, Lung, Right Lower Lobe [[id number]] Collected: 01/14/21 1347    Specimen: Lavage from Lung, Right Lower Lobe Updated: 01/20/21 0509     Viral Culture, General Comment     Comment: Preliminary Report:  No virus isolated at 4 days.  Next report to follow after 7 days.       Narrative:      Performed at:  [address] [place] [address]  [id number]  : Rick Willis MD, Phone:  [phone]    CBC & Differential [[id number]]  (Abnormal) Collected: 01/20/21 0256    Specimen: Blood Updated: 01/20/21 0429    Narrative:      The following orders were created for panel order CBC & Differential.  Procedure                               Abnormality         Status                     ---------                               -----------         ------                     Scan Slide[[id number]]                                       Final result               CBC Auto Differential[[id number]]        Abnormal            Final result                 Please view results for these tests on the individual orders.    CBC Auto Differential [[id number]]  (Abnormal) Collected: 01/20/21 0256    Specimen: Blood Updated: 01/20/21 0429     WBC 12.80 10*3/mm3      RBC 4.50 10*6/mm3      Hemoglobin 12.8 g/dL      Hematocrit 39.3 %      MCV 87.3 fL      MCH 28.4 pg      MCHC 32.5 g/dL      RDW 16.0 %      RDW-SD 48.1 fl      MPV 10.0 fL      Platelets 226  10*3/mm3     Narrative:      The previously reported component NRBC is no longer being reported. Previous result was 0.1 /100 WBC (Reference Range: 0.0-0.2 /100 WBC) on 1/20/2021 at 0344 EST.    Scan Slide [[id number]] Collected: 01/20/21 0256    Specimen: Blood Updated: 01/20/21 0429     Scan Slide --     Comment: See Manual Differential Results       Manual Differential [[id number]]  (Abnormal) Collected: 01/20/21 0256    Specimen: Blood Updated: 01/20/21 0429     Neutrophil % 89.0 %      Lymphocyte % 1.0 %      Monocyte % 3.0 %      Bands %  6.0 %      Atypical Lymphocyte % 1.0 %      Neutrophils Absolute 12.16 10*3/mm3      Lymphocytes Absolute 0.13 10*3/mm3      Monocytes Absolute 0.38 10*3/mm3      Dacrocytes Slight/1+     Poikilocytes Slight/1+     WBC Morphology Normal     Platelet Estimate Adequate     Large Platelets Slight/1+     Giant Platelets Slight/1+    Magnesium [[id number]]  (Normal) Collected: 01/20/21 0256    Specimen: Blood Updated: 01/20/21 0339     Magnesium 2.2 mg/dL     Basic Metabolic Panel [[id number]]  (Abnormal) Collected: 01/20/21 0256    Specimen: Blood Updated: 01/20/21 0339     Glucose 132 mg/dL      BUN 33 mg/dL      Creatinine 0.89 mg/dL      Sodium 135 mmol/L      Potassium 4.9 mmol/L      Comment: Slight hemolysis detected by analyzer. Results may be affected.        Chloride 101 mmol/L      CO2 27.0 mmol/L      Calcium 8.6 mg/dL      eGFR Non African Amer 83 mL/min/1.73      BUN/Creatinine Ratio 37.1     Anion Gap 7.0 mmol/L     Narrative:      GFR Normal >60  Chronic Kidney Disease <60  Kidney Failure <15      Pneumocystis PCR - Lavage, Lung, Right Lower Lobe [[id number]] Collected: 01/14/21 1347    Specimen: Lavage from Lung, Right Lower Lobe Updated: 01/19/21 1808     Pneumocystis jiroveci DNA Negative     Comment: -------------------ADDITIONAL INFORMATION-------------------  This test was developed and its performance characteristics  determined by [facility] in a manner  consistent with CLIA  requirements. This test has not been cleared or approved by  the U.S. Food and Drug Administration.  REFERENCE RANGE: Not Applicable        Specimen Source BAL    Narrative:      Performed at:  01 - [facility]  [address] [place], [place]  [id number]  : Jonas Sorto , Phone:  [phone]        No results found for: HGBA1C            Lab Results   Component Value Date    LIPASE 19 (L) 05/18/2019     Lab Results   Component Value Date    CHOL 121 07/09/2020    TRIG 75 07/09/2020    HDL 36 (L) 07/09/2020    LDL 70 07/09/2020       Lab Results   Lab Value Date/Time    FINALDX  01/15/2021 1510     Pleural fluid, smears and cytospin preparation:    Benign mesothelial cells, histiocytes and moderate acute and chronic inflammation    Negative for malignant cells     JPR/tkd       FINALDX  01/14/2021 1347     Right lower lobe, bronchoalveolar lavage with smears and cytospin:    Acute inflammation, mature squamous cells, pulmonary macrophages and bronchial epithelial cells    No malignancy identified    See flow cytometry summary    JULITO/tkd          Microbiology Results (last 10 days)     Procedure Component Value - Date/Time    Body Fluid Culture - Body Fluid, Pleural Cavity [[id number]] Collected: 01/15/21 1509    Lab Status: Final result Specimen: Body Fluid from Pleural Cavity Updated: 01/20/21 1043     Body Fluid Culture No growth at 5 days     Gram Stain No organisms seen    AFB Culture - Body Fluid, Pleural Cavity [[id number]] Collected: 01/15/21 1509    Lab Status: Preliminary result Specimen: Body Fluid from Pleural Cavity Updated: 01/16/21 0954     AFB Stain No acid fast bacilli seen    Fungus Culture - Lavage, Lung, Right Lower Lobe [[id number]] Collected: 01/14/21 1347    Lab Status: Preliminary result Specimen: Lavage from Lung, Right Lower Lobe Updated: 01/19/21 1445     Fungus Culture No fungus isolated at less than 1 week    Virus Culture - Lavage,  Lung, Right Lower Lobe [[id number]] Collected: 01/14/21 1347    Lab Status: Preliminary result Specimen: Lavage from Lung, Right Lower Lobe Updated: 01/20/21 0509     Viral Culture, General Comment     Comment: Preliminary Report:  No virus isolated at 4 days.  Next report to follow after 7 days.       Narrative:      Performed at:  [facility]  [id number]  : Rick Willis MD, Phone:  [phone]    AFB Culture - Lavage, Lung, Right Lower Lobe [[id number]] Collected: 01/14/21 1347    Lab Status: Preliminary result Specimen: Lavage from Lung, Right Lower Lobe Updated: 01/19/21 1445     AFB Culture No AFB isolated at less than 1 week     AFB Stain No acid fast bacilli seen    BAL Culture, Quantitative - Lavage, Lung, Right Lower Lobe [[id number]] Collected: 01/14/21 1347    Lab Status: Final result Specimen: Lavage from Lung, Right Lower Lobe Updated: 01/16/21 1322     BAL Culture Scant growth (1+) Normal Respiratory Terri: NO S.aureus/MRSA or Pseudomonas aeruginosa     Gram Stain Rare (1+) WBCs per low power field      No organisms seen    Narrative:      Not streaked for quantitation    Respiratory Panel, PCR (WITHOUT COVID) - Lavage, Lung, Right Lower Lobe [[id number]]  (Normal) Collected: 01/14/21 1347    Lab Status: Final result Specimen: Lavage from Lung, Right Lower Lobe Updated: 01/14/21 1542     ADENOVIRUS, PCR Not Detected     Coronavirus 229E Not Detected     Coronavirus HKU1 Not Detected     Coronavirus NL63 Not Detected     Coronavirus OC43 Not Detected     Human Metapneumovirus Not Detected     Human Rhinovirus/Enterovirus Not Detected     Influenza B PCR Not Detected     Parainfluenza Virus 1 Not Detected     Parainfluenza Virus 2 Not Detected     Parainfluenza Virus 3 Not Detected     Parainfluenza Virus 4 Not Detected     Bordetella pertussis pcr Not Detected     Chlamydophila pneumoniae PCR Not Detected     Mycoplasma pneumo by PCR Not Detected      Influenza A PCR Not Detected     RSV, PCR Not Detected     Bordetella parapertussis PCR Not Detected    Narrative:      The coronavirus on the RVP is NOT COVID-19 and is NOT indicative of infection with COVID-19.     Cytomegalovirus (CMV) By PCR - Lavage, Lung, Right Lower Lobe [[id number]] Collected: 01/14/21 1347    Lab Status: Final result Specimen: Lavage from Lung, Right Lower Lobe Updated: 01/18/21 2206     Specimen Source BAL     Cytomegalovirus PCR Negative     Comment: -------------------ADDITIONAL INFORMATION-------------------  This test was developed and its performance characteristics  determined by [facility] in a manner consistent with CLIA  requirements. This test has not been cleared or approved by  the U.S. Food and Drug Administration.       Narrative:      Performed at:  01 - [facility] [address]  [id number]  : Jonas Sorto , Phone:  [phone]    Pneumocystis PCR - Lavage, Lung, Right Lower Lobe [[id number]] Collected: 01/14/21 1347    Lab Status: Final result Specimen: Lavage from Lung, Right Lower Lobe Updated: 01/19/21 1808     Pneumocystis jiroveci DNA Negative     Comment: -------------------ADDITIONAL INFORMATION-------------------  This test was developed and its performance characteristics  determined by [facility] in a manner consistent with CLIA  requirements. This test has not been cleared or approved by  the U.S. Food and Drug Administration.  REFERENCE RANGE: Not Applicable        Specimen Source BAL    Narrative:      Performed at:  01 - [address]  [id number]  : Jonas Sorto , Phone:  [phone]    Respiratory Panel, PCR (WITHOUT COVID) - Swab, Nasopharynx [[id number]]  (Normal) Collected: 01/13/21 1824    Lab Status: Final result Specimen: Swab from Nasopharynx Updated: 01/13/21 1940     ADENOVIRUS, PCR Not Detected     Coronavirus 229E Not Detected      Coronavirus HKU1 Not Detected     Coronavirus NL63 Not Detected     Coronavirus OC43 Not Detected     Human Metapneumovirus Not Detected     Human Rhinovirus/Enterovirus Not Detected     Influenza B PCR Not Detected     Parainfluenza Virus 1 Not Detected     Parainfluenza Virus 2 Not Detected     Parainfluenza Virus 3 Not Detected     Parainfluenza Virus 4 Not Detected     Bordetella pertussis pcr Not Detected     Chlamydophila pneumoniae PCR Not Detected     Mycoplasma pneumo by PCR Not Detected     Influenza A PCR Not Detected     RSV, PCR Not Detected     Bordetella parapertussis PCR Not Detected    Narrative:      The coronavirus on the RVP is NOT COVID-19 and is NOT indicative of infection with COVID-19.     Blood Culture - Blood, Arm, Right [[id number]] Collected: 01/13/21 1310    Lab Status: Final result Specimen: Blood from Arm, Right Updated: 01/18/21 1315     Blood Culture No growth at 5 days    COVID-19,[employer] IN-HOUSE,Nasal Swab No Transport Media 3-4 HR TAT - Swab, Nasal Cavity [[id number]]  (Normal) Collected: 01/13/21 1303    Lab Status: Final result Specimen: Swab from Nasal Cavity Updated: 01/13/21 1343     COVID19 Not Detected    Narrative:      Fact sheet for providers: https://www.fda.gov/media/735514/download     Fact sheet for patients: https://www.fda.gov/media/673908/download    Test performed by PCR.    Blood Culture - Blood, Arm, Right [[id number]] Collected: 01/13/21 1302    Lab Status: Final result Specimen: Blood from Arm, Right Updated: 01/18/21 1315     Blood Culture No growth at 5 days          ECG/EMG Results (most recent)     Procedure Component Value Units Date/Time    ECG 12 Lead [[id number]] Collected: 01/14/21 0154     Updated: 01/14/21 0157     QT Interval 354 ms     Narrative:      HEART RATE= 140  bpm  RR Interval= 432  ms  KS Interval= 146  ms  P Horizontal Axis= -51  deg  P Front Axis= 41  deg  QRSD Interval= 102  ms  QT Interval= 354  ms  QRS Axis= 49  deg  T Wave Axis= 256   deg  - ABNORMAL ECG -  Sinus tachycardia  Multiform ventricular premature complexes  Repolarization abnormality, prob rate related  Prolonged QT interval  Electronically Signed By:   Date and Time of Study: 2021-01-14 01:54:00    ECG 12 Lead [[id number]] Collected: 01/13/21 1235     Updated: 01/14/21 0828     QT Interval 350 ms     Narrative:      HEART RATE= 108  bpm  RR Interval= 556  ms  IN Interval= 159  ms  P Horizontal Axis= 22  deg  P Front Axis= 44  deg  QRSD Interval= 95  ms  QT Interval= 350  ms  QRS Axis= 58  deg  T Wave Axis= 103  deg  - ABNORMAL ECG -  Sinus tachycardia  Multiple ventricular premature complexes  Probable LVH with secondary repol abnrm  Anterior Q waves, possibly due to LVH  When compared with ECG of 18-May-2019 9:03:54,  No significant change  Electronically Signed By: Dakota Rodrigues (IGNACIO) 14-Jan-2021 08:26:08  Date and Time of Study: 2021-01-13 12:35:42    Adult Transthoracic Echo Complete W/ Cont if Necessary Per Protocol [[id number]] Collected: 01/13/21 1727     Updated: 01/14/21 1705     BSA 2.0 m^2      RVIDd 2.1 cm      IVSd 0.9 cm      LVIDd 5.5 cm      LVIDs 5.2 cm      LVPWd 0.91 cm      IVS/LVPW 1.0     FS 5.1 %      EDV(Teich) 146.6 ml      ESV(Teich) 129.8 ml      EF(Teich) 11.5 %      EDV(cubed) 165.2 ml      ESV(cubed) 141.1 ml      EF(cubed) 14.6 %      LV mass(C)d 186.0 grams      LV mass(C)dI 93.9 grams/m^2      SV(Teich) 16.8 ml      SI(Teich) 8.5 ml/m^2      SV(cubed) 24.1 ml      SI(cubed) 12.2 ml/m^2      Ao root diam 3.8 cm      Ao root area 11.5 cm^2      LVOT diam 2.0 cm      LVOT area 3.1 cm^2      EDV(MOD-sp4) 125.6 ml      ESV(MOD-sp4) 87.5 ml      EF(MOD-sp4) 30.3 %      SV(MOD-sp4) 38.0 ml      SI(MOD-sp4) 19.2 ml/m^2      Ao root area (BSA corrected) 1.9     LV Knight Vol (BSA corrected) 63.4 ml/m^2      LV Sys Vol (BSA corrected) 44.2 ml/m^2      Aortic R-R 0.51 sec      Aortic .5 BPM      MV V2 max 90.8 cm/sec      MV max PG 3.3 mmHg      MV V2 mean 56.8  cm/sec      MV mean PG 1.5 mmHg      MV V2 VTI 17.5 cm      MVA(VTI) 2.9 cm^2      Ao pk lamont 267.9 cm/sec      Ao max PG 28.7 mmHg      Ao max PG (full) 25.4 mmHg      Ao V2 mean 186.7 cm/sec      Ao mean PG 15.8 mmHg      Ao mean PG (full) 14.0 mmHg      Ao V2 VTI 51.9 cm      IQRA(I,A) 0.97 cm^2      IQRA(I,D) 0.97 cm^2      IQRA(V,A) 1.0 cm^2      IQRA(V,D) 1.0 cm^2      AI max lamont 389.1 cm/sec      AI max PG 60.5 mmHg      AI dec slope 287.4 cm/sec^2      AI dec time 1.4 sec      AI P1/2t 396.5 msec      LV V1 max PG 3.3 mmHg      LV V1 mean PG 1.8 mmHg      LV V1 max 90.4 cm/sec      LV V1 mean 63.5 cm/sec      LV V1 VTI 16.4 cm      MR max lamont 514.5 cm/sec      MR max .9 mmHg      CO(Ao) 70.8 l/min      CI(Ao) 35.7 l/min/m^2      SV(Ao) 597.3 ml      SI(Ao) 301.4 ml/m^2      CO(LVOT) 6.0 l/min      CI(LVOT) 3.0 l/min/m^2      SV(LVOT) 50.4 ml      SI(LVOT) 25.4 ml/m^2      PA V2 max 68.8 cm/sec      PA max PG 1.9 mmHg      PA max PG (full) -0.6 mmHg      PA V2 mean 47.5 cm/sec      PA mean PG 1.0 mmHg      PA mean PG (full) -0.25 mmHg      PA V2 VTI 14.3 cm      RV V1 max PG 2.5 mmHg      RV V1 mean PG 1.3 mmHg      RV V1 max 78.9 cm/sec      RV V1 mean 52.8 cm/sec      RV V1 VTI 15.3 cm      TR max lamont 291.0 cm/sec      RVSP(TR) 36.9 mmHg      RAP systole 3.0 mmHg       CV ECHO KATT - BZI_BMI 25.4 kilograms/m^2       CV ECHO KATT - BSA(HAYCOCK) 2.0 m^2       CV ECHO KATT - BZI_METRIC_WEIGHT 80.3 kg       CV ECHO KATT - BZI_METRIC_HEIGHT 177.8 cm      LA dimension(2D) 3.3 cm     Narrative:      · Left ventricular ejection fraction appears to be 21 - 25%.  · Severe mitral valve regurgitation is present.  · Mild dilation of the aortic root is present.  · There is a large (>2cm) pericardial effusion adjacent to the right   atrium.  · The following left ventricular wall segments are hypokinetic: mid   anterior, apical anterior, basal anterolateral, mid anterolateral, apical   lateral, basal  inferolateral, mid inferolateral, apical inferior, mid   inferior, apical septal, basal inferoseptal, mid inferoseptal, apex   hypokinetic, mid anteroseptal, basal anterior, basal inferior and basal   inferoseptal.  · No pericardial tamponade noted       ECG 12 Lead [[id number]] Collected: 01/15/21 0549     Updated: 01/15/21 0550     QT Interval 380 ms     Narrative:      HEART RATE= 92  bpm  RR Interval= 651  ms  FL Interval=   ms  P Horizontal Axis=   deg  P Front Axis=   deg  QRSD Interval= 102  ms  QT Interval= 380  ms  QRS Axis= 64  deg  T Wave Axis= 249  deg  - ABNORMAL ECG -  Atrial fibrillation  Probable left ventricular hypertrophy  Nonspecific T abnormalities, inferior leads  Electronically Signed By:   Date and Time of Study: 2021-01-15 05:49:00    ECG 12 Lead [[id number]] Collected: 01/16/21 0533     Updated: 01/16/21 0535     QT Interval 356 ms     Narrative:      HEART RATE= 123  bpm  RR Interval= 487  ms  FL Interval=   ms  P Horizontal Axis=   deg  P Front Axis=   deg  QRSD Interval= 91  ms  QT Interval= 356  ms  QRS Axis= 86  deg  T Wave Axis= -86  deg  - ABNORMAL ECG -  Atrial fibrillation  Anterior infarct, old  Repol abnrm suggests ischemia, inferior leads  Borderline ST elevation, lateral leads  Prolonged QT interval  Electronically Signed By:   Date and Time of Study: 2021-01-16 05:33:41    ECG 12 Lead [[id number]] Collected: 01/17/21 0612     Updated: 01/17/21 0614     QT Interval 418 ms     Narrative:      HEART RATE= 106  bpm  RR Interval= 567  ms  FL Interval=   ms  P Horizontal Axis=   deg  P Front Axis=   deg  QRSD Interval= 105  ms  QT Interval= 418  ms  QRS Axis= 67  deg  T Wave Axis= 260  deg  - ABNORMAL ECG -  Atrial fibrillation  Probable LVH with secondary repol abnrm  Prolonged QT interval  When compared with ECG of 16-Jan-2021 5:33:41,  Significant repolarization change  Significant axis, voltage or hypertrophy change  Electronically Signed By:   Date and Time of Study: 2021-01-17  06:12:07           Results for orders placed during the hospital encounter of 01/13/21   Adult Transthoracic Echo Complete W/ Cont if Necessary Per Protocol    Narrative · Left ventricular ejection fraction appears to be 21 - 25%.  · Severe mitral valve regurgitation is present.  · Mild dilation of the aortic root is present.  · There is a large (>2cm) pericardial effusion adjacent to the right   atrium.  · The following left ventricular wall segments are hypokinetic: mid   anterior, apical anterior, basal anterolateral, mid anterolateral, apical   lateral, basal inferolateral, mid inferolateral, apical inferior, mid   inferior, apical septal, basal inferoseptal, mid inferoseptal, apex   hypokinetic, mid anteroseptal, basal anterior, basal inferior and basal   inferoseptal.  · No pericardial tamponade noted        Xr Chest 1 View    Result Date: 1/20/2021  1. Small right basilar pneumothorax without tension. 2. Right suprahilar opacity is again present which may reflect scarring or other chronic change. A parenchymal mass cannot be excluded. Electronically signed by:  Brant England M.D.  1/20/2021 12:46 AM    Xr Chest 1 View    Result Date: 1/19/2021   1. Stable bilateral perihilar linear atelectatic changes. 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 2. Right basilar pleural drain unchanged in position. No visible pneumothorax.  Electronically Signed By-Maura Esteves MD On:1/19/2021 8:21 AM This report was finalized on [id number] by  Maura Esteves MD.    Xr Chest 1 View    Result Date: 1/18/2021  No interval change from yesterday's study as described above.  Electronically Signed By-Kel Fair MD On:1/18/2021 7:52 AM This report was finalized on [id number] by  Kel Fair MD.    Xr Chest 1 View    Result Date: 1/17/2021  1.Right basilar chest tube appears in similar position. No significant pneumothorax or pleural effusion is seen. 2.Postsurgical/post therapy changes in the  right lung, as before.  Electronically Signed By-Brant Dao MD On:1/17/2021 8:34 AM This report was finalized on [id number] by  Brant Dao MD.    Xr Chest 1 View    Result Date: 1/16/2021   1. Improved aeration with small caliber chest tube in the right lung base. A pneumothorax or pleural effusion is not demonstrated. 2. Persistent postsurgical and treatment related changes in the right superhilar region, this is likely treatment related changes, is similar radiographically when compared with the study from 08/23/2018.  Electronically Signed By-Donny Abdi DO On:1/16/2021 10:59 AM This report was finalized on [id number] by  Donny Abdi DO.    Xr Chest 1 View    Result Date: 1/15/2021  Significantly improved aeration in the right hemithorax, that is post pleural drain placement. Mild residual atelectasis remains. No visible pleural fluid or pneumothorax.  Electronically Signed By-Maura Esteves MD On:1/15/2021 3:10 PM This report was finalized on [id number] by  Maura Esteves MD.    Xr Chest 1 View    Result Date: 1/15/2021   1. Moderate-large sized infiltrate pleural effusion involving the right midlung zone right lower lobe lung has improved slightly since previous study performed on 01/13/2021  Electronically Signed By-Baron Diggs MD On:1/15/2021 7:53 AM This report was finalized on [id number] by  Baron Diggs MD.    Xr Chest 1 View    Result Date: 1/13/2021   1. Interval development of a moderate-sized right pleural effusion. 2. Worsening consolidation in the right lung particularly in the mid and lower lung zone. This is likely due to a combination of compressive atelectasis with superimposed pneumonia. 3. Masslike area of consolidation has been present the right hilar region on previous imaging studies consistent with treated lung cancer.  Electronically Signed By-Kel Fair MD On:1/13/2021 2:19 PM This report was finalized on [id number] by  Kel Fair  MD.    Us Aorta Limited    Result Date: 1/14/2021  1. Redemonstration of a distal abdominal aortic aneurysm measuring 5.3 x 4.9 cm. This is very similar in size to that described on May 18, 2019. 2. Severe atherosclerotic disease. 3. There are limitations to this study secondary to overlying bowel gas. Slot [id number] Electronically signed by:  Vladislav Rich M.D.  1/14/2021 10:17 PM  Xrays, labs reviewed personally by physician.    Medication Review:   I have reviewed the patient's current medication list    Scheduled Meds  amiodarone, 200 mg, Oral, BID With Meals  [MAR Hold] bisacodyl, 10 mg, Rectal, Daily  [MAR Hold] budesonide, 0.5 mg, Nebulization, BID - RT  [MAR Hold] digoxin, 125 mcg, Oral, Daily  dilTIAZem, 30 mg, Oral, Q6H  [MAR Hold] enoxaparin, 1 mg/kg, Subcutaneous, Q12H  [MAR Hold] finasteride, 5 mg, Oral, Daily  [MAR Hold] guaiFENesin, 1,200 mg, Oral, Q12H  [MAR Hold] ipratropium-albuterol, 3 mL, Nebulization, 4x Daily - RT  [MAR Hold] methylPREDNISolone sodium succinate, 40 mg, Intravenous, Q8H  metoprolol tartrate, 25 mg, Oral, Q8H  [MAR Hold] multivitamin with minerals, 1 tablet, Oral, Daily  [MAR Hold] polyethylene glycol, 17 g, Oral, BID  [MAR Hold] rosuvastatin, 40 mg, Oral, Nightly  [MAR Hold] sodium chloride, 10 mL, Intravenous, Q12H  [MAR Hold] tamsulosin, 0.4 mg, Oral, Daily With Dinner  [MAR Hold] theophylline, 300 mg, Oral, Daily        Meds Infusions  sodium chloride, , Last Rate: 50 mL/hr (01/20/21 1448)        Meds PRN  •  [MAR Hold] acetaminophen **OR** [MAR Hold] acetaminophen **OR** [MAR Hold] acetaminophen  •  [MAR Hold] benzonatate  •  fentaNYL citrate (PF)  •  [MAR Hold] HYDROcodone-acetaminophen  •  [MAR Hold] HYDROcodone-acetaminophen  •  [MAR Hold] ipratropium-albuterol  •  [MAR Hold] magnesium hydroxide  •  [MAR Hold] magnesium sulfate **OR** [MAR Hold] magnesium sulfate in D5W 1g/100mL (PREMIX)  •  [MAR Hold] nitroglycerin  •  [MAR Hold] ondansetron **OR** [MAR Hold] ondansetron  •   [MAR Hold] potassium chloride  •  [COMPLETED] Insert peripheral IV **AND** [MAR Hold] sodium chloride  •  [MAR Hold] sodium chloride  •  sodium chloride        Assessment/Plan   Assessment/Plan     Active Hospital Problems    Diagnosis  POA   • **Acute respiratory distress [R06.03]  Yes   • Chest pain [R07.9]  Yes   • Atrial fibrillation with RVR (CMS/HCC) [I48.91]  Yes   • BPH without obstruction/lower urinary tract symptoms [N40.0]  Yes   • History of lung cancer [Z85.118]  Not Applicable   • COPD exacerbation (CMS/HCC) [J44.1]  Yes   • Malignant neoplasm of upper lobe of right lung (CMS/HCC) [C34.11]  Yes   • Acute systolic congestive heart failure (CMS/HCC) [I50.21]  Unknown   • 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) [I25.118]  Unknown   • Hypertension [I10]  Yes   • Hyperlipidemia [E78.5]  Yes   • Coronary artery disease [I25.10]  Yes   • Chronic obstructive pulmonary disease (CMS/HCC) [J44.9]  Yes      Resolved Hospital Problems   No resolved problems to display.       MEDICAL DECISION MAKING COMPLEXITY BY PROBLEM:     Acute toxic and metabolic encephalopathy likely multifactorial secondary to pneumonia, acute respiratory failure, A. fib with RVR and/or medication  -Cefepime was changed to Rocephin 1/18/2021 because of the patient's confusion    Acute hypoxic respiratory failure multifactorial secondary to pneumonia and large right pleural effusion  -Pulmonary consulting and managing  -Status post chest tube placement for large right pleural effusion; body fluid culture and cytology pending  -Status post bronchoscopy 1/14/2021 with BAL showing normal respiratory keturah  -Patient on Rocephin; pulmonary managing antibiotics  -Await cytology from BAL and results still pending for possible atypical infections   -Continue Solu-Medrol, guaifenesin, duo nebs and theophylline per pulmonary    Atrial fibrillation with RVR  -Cardiology consulting and  managing  -Currently on amiodarone (IV has been changed to oral), digoxin, metoprolol and verapamil  -Patient on therapeutic dosage of Lovenox per cardiology    Acute chest pain, resolved; etiology not determined  -Serial troponins negative    Coronary artery disease status post PCI and stents  -Cardiology consulted  -Continue metoprolol, statin and Lovenox  -Cardiac cath to be performed 1/20/2021    Acute on chronic systolic congestive heart failure due to ischemic cardiomyopathy and valvular heart disease  -proBNP 7372 on 1/13/2021 and was 10,748 on 1/14/2021  -Echo 1/13/2021 showed LVEF of 21 to 25% with severe MVR, mild dilatation of the aortic root, large pericardial effusion greater than 2 cm adjacent to the right atrium, with no cardiac tamponade; RVSP 36.9 mmHg    History of lung cancer  -Status post right upper lobe resection followed by radiation as well as chemotherapy  -Status post bronchoscopy which showed evidence of pneumonia with no intrabronchial lesions  -Status post right thoracentesis with current thoracostomy tube in place that is draining serosanguineous fluid  -cytology on the pleural fluid negative    Abdominal aortic aneurysm  -Patient was having this followed but has missed his appointment last year   -The patient had an abdominal ultrasound and his aneurysm is 5.3 x 4.9 cm which is very similar in size to see what was seen in May 2019  -patient should continue follow-up as an outpatient with his vascular surgeon    Urinary retention secondary to bilateral prostatic hypertrophy with gross hematuria  -Urology consulting  -Status post cystoscopy 1/18/2021 which showed 45 mm prostate no evidence of bladder cancer or stones  -Patient failed voiding trial 1/18/2021 and had to have Beach replaced  -Continue Flomax and Proscar    Hyponatremia, mild  -Monitor    Acute constipation, new problem 1/18/2021, resolved  -Patient had 2 bowel movements after receiving MiraLAX and Dulcolax  suppository    VTE Prophylaxis -   Mechanical Order History:      Ordered        01/13/21 1708  Place Sequential Compression Device  Once         01/13/21 1708  Maintain Sequential Compression Device  Continuous                 Pharmalogical Order History:      Ordered     Dose Route Frequency Stop    01/13/21 1502  enoxaparin (LOVENOX) syringe 80 mg      1 mg/kg SC Once 01/13/21 1520              Code Status -   Code Status and Medical Interventions:   Ordered at: 01/13/21 1610     Limited Support to NOT Include:    Intubation     Level Of Support Discussed With:    Patient     Code Status:    CPR     Medical Interventions (Level of Support Prior to Arrest):    Limited     This patient has been examined wearing appropriate Personal Protective Equipment and discussed with hospital infection control department. 01/20/21    Discharge Planning  To be determined    Electronically signed by Dottie Casillas MD, 01/20/21, 15:06 EST.  [facility]ist Team

## 2021-01-20 NOTE — PROGRESS NOTES
Referring Provider: Hospitalist    Reason for follow-up: Shortness of breath     Patient Care Team:  Camron Pollard MD as PCP - General (Family Medicine)  Alvarado Cai MD as Consulting Physician (Cardiology)    Subjective .  Shortness of breath is slowly improving    Objective Sitting up in chair     Review of Systems   Constitution: Negative for chills and fever.   HENT: Negative for ear discharge and nosebleeds.    Eyes: Negative for discharge and redness.   Cardiovascular: Negative for chest pain, orthopnea, palpitations, paroxysmal nocturnal dyspnea and syncope.   Respiratory: Positive for shortness of breath. Negative for cough and wheezing.    Endocrine: Negative for heat intolerance.   Skin: Negative for rash.   Musculoskeletal: Negative for arthritis and myalgias.   Gastrointestinal: Negative for abdominal pain, melena, nausea and vomiting.   Genitourinary: Negative for dysuria and hematuria.   Neurological: Negative for dizziness, light-headedness, numbness and tremors.   Psychiatric/Behavioral: Negative for depression. The patient is not nervous/anxious.        Ativan [lorazepam]    Scheduled Meds:amiodarone, 200 mg, Oral, BID With Meals  [MAR Hold] bisacodyl, 10 mg, Rectal, Daily  [MAR Hold] budesonide, 0.5 mg, Nebulization, BID - RT  [MAR Hold] digoxin, 125 mcg, Oral, Daily  dilTIAZem, 30 mg, Oral, Q6H  [MAR Hold] enoxaparin, 1 mg/kg, Subcutaneous, Q12H  [MAR Hold] finasteride, 5 mg, Oral, Daily  [MAR Hold] guaiFENesin, 1,200 mg, Oral, Q12H  [MAR Hold] ipratropium-albuterol, 3 mL, Nebulization, 4x Daily - RT  [MAR Hold] methylPREDNISolone sodium succinate, 40 mg, Intravenous, Q8H  metoprolol tartrate, 25 mg, Oral, Q8H  [MAR Hold] multivitamin with minerals, 1 tablet, Oral, Daily  [MAR Hold] polyethylene glycol, 17 g, Oral, BID  [MAR Hold] rosuvastatin, 40 mg, Oral, Nightly  [MAR Hold] sodium chloride, 10 mL, Intravenous, Q12H  [MAR Hold] tamsulosin, 0.4 mg, Oral, Daily With Dinner  [MAR Hold]  "theophylline, 300 mg, Oral, Daily      Continuous Infusions:   PRN Meds:.•  [MAR Hold] acetaminophen **OR** [MAR Hold] acetaminophen **OR** [MAR Hold] acetaminophen  •  [MAR Hold] benzonatate  •  [MAR Hold] HYDROcodone-acetaminophen  •  [MAR Hold] HYDROcodone-acetaminophen  •  [MAR Hold] ipratropium-albuterol  •  [MAR Hold] magnesium hydroxide  •  [MAR Hold] magnesium sulfate **OR** [MAR Hold] magnesium sulfate in D5W 1g/100mL (PREMIX)  •  [MAR Hold] nitroglycerin  •  [MAR Hold] ondansetron **OR** [MAR Hold] ondansetron  •  [MAR Hold] potassium chloride  •  [COMPLETED] Insert peripheral IV **AND** [MAR Hold] sodium chloride  •  [MAR Hold] sodium chloride        VITAL SIGNS  Vitals:    01/20/21 1031 01/20/21 1412 01/20/21 1438 01/20/21 1447   BP: 113/48  124/78 118/74   BP Location:       Patient Position:       Pulse: 107 104 98    Resp: 18  18    Temp: 97.7 °F (36.5 °C)  97.9 °F (36.6 °C)    TempSrc:       SpO2: 98%  98%    Weight:       Height:           Flowsheet Rows      First Filed Value   Admission Height  177.8 cm (70\") Documented at 01/13/2021 1222   Admission Weight  80.6 kg (177 lb 11.1 oz) Documented at 01/13/2021 1222           TELEMETRY: Atrial fibrillation with rapid response    Physical Exam:  Constitutional:       Appearance: Well-developed.   Eyes:      General: No scleral icterus.        Right eye: No discharge.      Conjunctiva/sclera: Conjunctivae normal.      Pupils: Pupils are equal, round, and reactive to light.   HENT:      Head: Normocephalic and atraumatic.   Neck:      Musculoskeletal: Normal range of motion and neck supple.      Thyroid: No thyromegaly.      Vascular: No carotid bruit or JVD.      Lymphadenopathy: No cervical adenopathy.   Pulmonary:      Effort: Pulmonary effort is normal. No respiratory distress.      Breath sounds: No wheezing. Rhonchi present. No rales.   Cardiovascular:      Normal rate. Irregularly irregular rhythm.      No gallop.   Pulses:     Intact distal " pulses.   Abdominal:      General: Bowel sounds are normal.      Palpations: Abdomen is soft.      Tenderness: There is no abdominal tenderness.   Musculoskeletal: Normal range of motion.   Skin:     General: Skin is warm and dry.      Findings: No erythema or rash.   Neurological:      Mental Status: Alert and oriented to person, place, and time.      Comments: No focal deficits          Results Review:   I reviewed the patient's new clinical results.  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       Narrative:      Performed at:  [address] [place] [address]  [id number]  : Rick Willis MD, Phone:  [phone]    Body Fluid Culture - Body Fluid, Pleural Cavity [[id number]] Collected: 01/15/21 1509    Specimen: Body Fluid from Pleural Cavity Updated: 01/20/21 1043     Body Fluid Culture No growth at 5 days     Gram Stain No organisms seen    CBC & Differential [[id number]]  (Abnormal) Collected: 01/20/21 1001    Specimen: Blood Updated: 01/20/21 1007    Narrative:      The following orders were created for panel order CBC & Differential.  Procedure                               Abnormality         Status                     ---------                               -----------         ------                     CBC Auto Differential[[id number]]        Abnormal            Final result                  Please view results for these tests on the individual orders.    CBC Auto Differential [[id number]]  (Abnormal) Collected: 01/20/21 1001    Specimen: Blood Updated: 01/20/21 1007     WBC 14.80 10*3/mm3      RBC 4.61 10*6/mm3      Hemoglobin 13.2 g/dL      Hematocrit 40.9 %      MCV 88.7 fL      MCH 28.6 pg      MCHC 32.2 g/dL      RDW 16.3 %      RDW-SD 51.2 fl      MPV 8.8 fL      Platelets 271 10*3/mm3      Neutrophil % 95.9 %      Lymphocyte % 0.9 %      Monocyte % 3.1 %      Eosinophil % 0.0 %      Basophil % 0.1 %      Neutrophils, Absolute 14.20 10*3/mm3      Lymphocytes, Absolute 0.10 10*3/mm3      Monocytes, Absolute 0.50 10*3/mm3      Eosinophils, Absolute 0.00 10*3/mm3      Basophils, Absolute 0.00 10*3/mm3      nRBC 0.1 /100 WBC     CBC & Differential [[id number]]  (Abnormal) Collected: 01/20/21 0256    Specimen: Blood Updated: 01/20/21 0429    Narrative:      The following orders were created for panel order CBC & Differential.  Procedure                               Abnormality         Status                     ---------                               -----------         ------                     Scan Slide[[id number]]                                       Final result               CBC Auto Differential[[id number]]        Abnormal            Final result                 Please view results for these tests on the individual orders.    CBC Auto Differential [[id number]]  (Abnormal) Collected: 01/20/21 0256    Specimen: Blood Updated: 01/20/21 0429     WBC 12.80 10*3/mm3      RBC 4.50 10*6/mm3      Hemoglobin 12.8 g/dL      Hematocrit 39.3 %      MCV 87.3 fL      MCH 28.4 pg      MCHC 32.5 g/dL      RDW 16.0 %      RDW-SD 48.1 fl      MPV 10.0 fL      Platelets 226 10*3/mm3     Narrative:      The previously reported component NRBC is no longer being reported. Previous result was 0.1 /100 WBC (Reference Range: 0.0-0.2 /100 WBC) on 1/20/2021 at 0344 EST.    Scan Slide [[id number]] Collected: 01/20/21 0256     Specimen: Blood Updated: 01/20/21 0429     Scan Slide --     Comment: See Manual Differential Results       Manual Differential [[id number]]  (Abnormal) Collected: 01/20/21 0256    Specimen: Blood Updated: 01/20/21 0429     Neutrophil % 89.0 %      Lymphocyte % 1.0 %      Monocyte % 3.0 %      Bands %  6.0 %      Atypical Lymphocyte % 1.0 %      Neutrophils Absolute 12.16 10*3/mm3      Lymphocytes Absolute 0.13 10*3/mm3      Monocytes Absolute 0.38 10*3/mm3      Dacrocytes Slight/1+     Poikilocytes Slight/1+     WBC Morphology Normal     Platelet Estimate Adequate     Large Platelets Slight/1+     Giant Platelets Slight/1+    Magnesium [[id number]]  (Normal) Collected: 01/20/21 0256    Specimen: Blood Updated: 01/20/21 0339     Magnesium 2.2 mg/dL     Basic Metabolic Panel [[id number]]  (Abnormal) Collected: 01/20/21 0256    Specimen: Blood Updated: 01/20/21 0339     Glucose 132 mg/dL      BUN 33 mg/dL      Creatinine 0.89 mg/dL      Sodium 135 mmol/L      Potassium 4.9 mmol/L      Comment: Slight hemolysis detected by analyzer. Results may be affected.        Chloride 101 mmol/L      CO2 27.0 mmol/L      Calcium 8.6 mg/dL      eGFR Non African Amer 83 mL/min/1.73      BUN/Creatinine Ratio 37.1     Anion Gap 7.0 mmol/L     Narrative:      GFR Normal >60  Chronic Kidney Disease <60  Kidney Failure <15      Pneumocystis PCR - Lavage, Lung, Right Lower Lobe [[id number]] Collected: 01/14/21 1347    Specimen: Lavage from Lung, Right Lower Lobe Updated: 01/19/21 1808     Pneumocystis jiroveci DNA Negative     Comment: -------------------ADDITIONAL INFORMATION-------------------  This test was developed and its performance characteristics  determined by [facility] in a manner consistent with CLIA  requirements. This test has not been cleared or approved by  the U.S. Food and Drug Administration.  REFERENCE RANGE: Not Applicable        Specimen Source BAL    Narrative:      Performed at:  01 - [facility] [address]  [address]  [address] [place], [place]  [id number]  : Jonas Sorto , Phone:  [phone]          Imaging Results (Last 24 Hours)     Procedure Component Value Units Date/Time    XR Chest 1 View [[id number]] Collected: 01/20/21 0243     Updated: 01/20/21 0503    Narrative:      FRONTAL VIEW OF THE CHEST    CLINICAL INDICATION: Catheter.    COMPARISON: 1/19/2021.    FINDINGS: Stable right pleural drain is stable. A small basilar pneumothorax is suspected without tension. Stable heart size. Stable right suprahilar opacity. No new consolidation.      Impression:      1. Small right basilar pneumothorax without tension.  2. Right suprahilar opacity is again present which may reflect scarring or other chronic change. A parenchymal mass cannot be excluded.    Electronically signed by:  Brant England M.D.    1/20/2021 12:46 AM          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 obstruction/lower urinary tract symptoms    History of lung cancer    COPD exacerbation (CMS/HCC)    Malignant neoplasm of upper lobe of right lung (CMS/HCC)       Assessment:    Afib RVR  HFrEF  Pericardial Effusion  Hx Lung CA  Congestive heart    Plan:    Patient presented with shortness of breath and had elevated lactate level and BNP level  Patient had an echocardiogram which showed LV systolic dysfunction with an EF of 20 to 25%  Patient also has pericardial effusion  Patient is currently on digoxin      He is not on beta-blocker because of low blood pressure.   We will start him on low-dose beta-blockers  Patient also has history of lung cancer with pleural effusion and is followed by the pulmonologist  Patient is currently on amiodarone digoxin   Heart is well controlled patient had a large pleural effusion and hence he had thoracentesis done and his chest tubes are still draining  Patient had a cardiac catheterization which showed nonobstructive disease but with severe LV dysfunction  Patient will be started on low-dose beta-blockers  Alvarado Cai MD  01/20/21  15:35 EST

## 2021-01-20 NOTE — PLAN OF CARE
Goal Outcome Evaluation:  Plan of Care Reviewed With: patient, spouse  Progress: improving  Patient stable. No complaints or distress noted. Patient continues to be on RA. Patient continues to have a FC and Chest tube. Patient did not rested well during my shift. He sat in the recliner for most of the night. Will continue to monitor.

## 2021-01-20 NOTE — PROGRESS NOTES
Continued Stay Note  [facility]     Patient Name: Lenny Larson  MRN: [id number]  Today's Date: 1/20/2021    Admit Date: 1/13/2021    Discharge Plan     Row Name 01/20/21 1516       Plan    Plan  Home with [facility] (accepted and selected). Rolling walker with [employer].    Patient/Family in Agreement with Plan  yes    Plan Comments  Barriers to discharge: Patient had cardiac cath 1/20. Chest tube has been removed.          Expected Discharge Date and Time     Expected Discharge Date Expected Discharge Time    Jan 21, 2021             Anna Naegele RN Case Manager  [address]  [address]   [phone]  office  [phone]  fax  [email or web address]

## 2021-01-20 NOTE — PROGRESS NOTES
"Heart Failure Program  Nurse Navigator  Discharge Planning    Patient Name:Lenny Larson  :1940  Cardiologist:Trell  Current Admission Date: 2021   Previous Admission:    Admission frequency: 1 admissions in 6 months    Heart Failure history per record:    Symptoms on admission:c/o SOA and swelling lower extremities on arrival. Pt advise worse with lying flat and walking. Hx CAD, COPD, AFIB, HTN, lung CA. No HF hx per patient. Pt advises of medication adherence.        Admission Weight:  Flowsheet Rows      First Filed Value   Admission Height  177.8 cm (70\") Documented at 2021 1222   Admission Weight  80.6 kg (177 lb 11.1 oz) Documented at 2021 1222            Current Home Medications:  Prior to Admission medications    Medication Sig Start Date End Date Taking? Authorizing Provider   calcium carbonate (OS-GILMA) 600 MG tablet Take 600 mg by mouth Daily.   Yes Nabil Quiroz MD   dilTIAZem CD (CARDIZEM CD) 240 MG 24 hr capsule TAKE 1 CAPSULE DAILY 8/3/20  Yes Alvarado Cai MD   finasteride (PROSCAR) 5 MG tablet Take 5 mg by mouth Daily.   Yes Nabil Quiroz MD   multivitamin with minerals tablet tablet Take 1 tablet by mouth Daily.   Yes Nabil Quiroz MD   rosuvastatin (Crestor) 40 MG tablet Take 1 tablet by mouth Daily. 20  Yes Camron Pollard MD   tamsulosin (FLOMAX) 0.4 MG capsule 24 hr capsule Take 1 capsule by mouth every night at bedtime.   Yes Nabil Quiroz MD   theophylline (THEODUR) 300 MG 12 hr tablet Take 300 mg by mouth Daily.   Yes Nabil Quiroz MD       Social history:   Pt lives with wife, drives self per pt. No issues with obtaining medications or taking them. No specific diet prior to arrival.     Smoking status:     Diagnostics Testing:  proBNP level: [id number]>[id number]    Echocardiogram:  Results for orders placed during the hospital encounter of 21   Adult Transthoracic Echo Complete W/ Cont if Necessary Per Protocol    " Narrative · Left ventricular ejection fraction appears to be 21 - 25%.  · Severe mitral valve regurgitation is present.  · Mild dilation of the aortic root is present.  · There is a large (>2cm) pericardial effusion adjacent to the right   atrium.  · The following left ventricular wall segments are hypokinetic: mid   anterior, apical anterior, basal anterolateral, mid anterolateral, apical   lateral, basal inferolateral, mid inferolateral, apical inferior, mid   inferior, apical septal, basal inferoseptal, mid inferoseptal, apex   hypokinetic, mid anteroseptal, basal anterior, basal inferior and basal   inferoseptal.  · No pericardial tamponade noted            Patient Assessment:   Pt sitting up in chair, resp even and unlabored, no SOA with conversation, no edema in lower extremities, F/C to bedside       Current O2: none  Home O2: none    Education provided to patient:  yes- Heart Failure disease education  yes -Symptom identification/management  yes -Daily Weights  yes- Diet education  n/a- Fluid restriction (if ordered)  yes- Activity education  yes- Medication education  n/a- Smoking cessation  yes- Follow-up Appointments  yes-Provided information on how to access [facility] My HF Guide/Heart Failure Interactive workbook    Acceptance of learning: acceptance, cooperative, teachback    Heart Failure education interactive teaching session time: 30 minutes    Identified needs/barriers:   Volume status - proBNP increase on admission, mobility, new dx, I&o, daily weights.    Intervention:   Discuss with RN, message to cardiology clarify diuretic order (last dose 1/14/21)

## 2021-01-21 ENCOUNTER — TELEPHONE (OUTPATIENT)
Dept: CARDIOLOGY | Facility: CLINIC | Age: 81
End: 2021-01-21

## 2021-01-21 ENCOUNTER — APPOINTMENT (OUTPATIENT)
Dept: GENERAL RADIOLOGY | Facility: HOSPITAL | Age: 81
End: 2021-01-21

## 2021-01-21 LAB
AMORPH URATE CRY URNS QL MICRO: ABNORMAL /HPF
ANION GAP SERPL CALCULATED.3IONS-SCNC: 6 MMOL/L (ref 5–15)
BACTERIA UR QL AUTO: ABNORMAL /HPF
BASOPHILS # BLD AUTO: 0 10*3/MM3 (ref 0–0.2)
BASOPHILS NFR BLD AUTO: 0.1 % (ref 0–1.5)
BILIRUB UR QL STRIP: NEGATIVE
BUN SERPL-MCNC: 36 MG/DL (ref 8–23)
BUN/CREAT SERPL: 37.1 (ref 7–25)
CALCIUM SPEC-SCNC: 8.3 MG/DL (ref 8.6–10.5)
CHLORIDE SERPL-SCNC: 101 MMOL/L (ref 98–107)
CLARITY UR: ABNORMAL
CO2 SERPL-SCNC: 29 MMOL/L (ref 22–29)
COLOR UR: YELLOW
CREAT SERPL-MCNC: 0.97 MG/DL (ref 0.76–1.27)
DEPRECATED RDW RBC AUTO: 50.3 FL (ref 37–54)
EOSINOPHIL # BLD AUTO: 0 10*3/MM3 (ref 0–0.4)
EOSINOPHIL NFR BLD AUTO: 0 % (ref 0.3–6.2)
ERYTHROCYTE [DISTWIDTH] IN BLOOD BY AUTOMATED COUNT: 16.3 % (ref 12.3–15.4)
GFR SERPL CREATININE-BSD FRML MDRD: 75 ML/MIN/1.73
GLUCOSE SERPL-MCNC: 126 MG/DL (ref 65–99)
GLUCOSE UR STRIP-MCNC: NEGATIVE MG/DL
HCT VFR BLD AUTO: 38.3 % (ref 37.5–51)
HGB BLD-MCNC: 12.4 G/DL (ref 13–17.7)
HGB UR QL STRIP.AUTO: ABNORMAL
HYALINE CASTS UR QL AUTO: ABNORMAL /LPF
KETONES UR QL STRIP: NEGATIVE
LEUKOCYTE ESTERASE UR QL STRIP.AUTO: ABNORMAL
LYMPHOCYTES # BLD AUTO: 0.1 10*3/MM3 (ref 0.7–3.1)
LYMPHOCYTES NFR BLD AUTO: 1.3 % (ref 19.6–45.3)
MAGNESIUM SERPL-MCNC: 2.1 MG/DL (ref 1.6–2.4)
MCH RBC QN AUTO: 28.5 PG (ref 26.6–33)
MCHC RBC AUTO-ENTMCNC: 32.4 G/DL (ref 31.5–35.7)
MCV RBC AUTO: 88 FL (ref 79–97)
MONOCYTES # BLD AUTO: 0.5 10*3/MM3 (ref 0.1–0.9)
MONOCYTES NFR BLD AUTO: 4.6 % (ref 5–12)
NEUTROPHILS NFR BLD AUTO: 9.6 10*3/MM3 (ref 1.7–7)
NEUTROPHILS NFR BLD AUTO: 94 % (ref 42.7–76)
NITRITE UR QL STRIP: NEGATIVE
NRBC BLD AUTO-RTO: 0 /100 WBC (ref 0–0.2)
PH UR STRIP.AUTO: 5.5 [PH] (ref 5–8)
PLATELET # BLD AUTO: 226 10*3/MM3 (ref 140–450)
PMV BLD AUTO: 8.8 FL (ref 6–12)
POTASSIUM SERPL-SCNC: 4.6 MMOL/L (ref 3.5–5.2)
PROT UR QL STRIP: ABNORMAL
RBC # BLD AUTO: 4.35 10*6/MM3 (ref 4.14–5.8)
RBC # UR: ABNORMAL /HPF
REF LAB TEST METHOD: ABNORMAL
SODIUM SERPL-SCNC: 136 MMOL/L (ref 136–145)
SP GR UR STRIP: 1.02 (ref 1–1.03)
SQUAMOUS #/AREA URNS HPF: ABNORMAL /HPF
UROBILINOGEN UR QL STRIP: ABNORMAL
WBC # BLD AUTO: 10.2 10*3/MM3 (ref 3.4–10.8)
WBC UR QL AUTO: ABNORMAL /HPF

## 2021-01-21 PROCEDURE — 94799 UNLISTED PULMONARY SVC/PX: CPT

## 2021-01-21 PROCEDURE — 87086 URINE CULTURE/COLONY COUNT: CPT | Performed by: HOSPITALIST

## 2021-01-21 PROCEDURE — 25010000002 ENOXAPARIN PER 10 MG: Performed by: INTERNAL MEDICINE

## 2021-01-21 PROCEDURE — 97530 THERAPEUTIC ACTIVITIES: CPT

## 2021-01-21 PROCEDURE — 25010000002 METHYLPREDNISOLONE PER 40 MG: Performed by: INTERNAL MEDICINE

## 2021-01-21 PROCEDURE — 99232 SBSQ HOSP IP/OBS MODERATE 35: CPT | Performed by: HOSPITALIST

## 2021-01-21 PROCEDURE — 81001 URINALYSIS AUTO W/SCOPE: CPT | Performed by: HOSPITALIST

## 2021-01-21 PROCEDURE — 99232 SBSQ HOSP IP/OBS MODERATE 35: CPT | Performed by: INTERNAL MEDICINE

## 2021-01-21 PROCEDURE — 80048 BASIC METABOLIC PNL TOTAL CA: CPT | Performed by: INTERNAL MEDICINE

## 2021-01-21 PROCEDURE — 85025 COMPLETE CBC W/AUTO DIFF WBC: CPT | Performed by: INTERNAL MEDICINE

## 2021-01-21 PROCEDURE — 71045 X-RAY EXAM CHEST 1 VIEW: CPT

## 2021-01-21 PROCEDURE — 25010000002 FUROSEMIDE PER 20 MG: Performed by: NURSE PRACTITIONER

## 2021-01-21 PROCEDURE — 97116 GAIT TRAINING THERAPY: CPT

## 2021-01-21 PROCEDURE — 83735 ASSAY OF MAGNESIUM: CPT | Performed by: INTERNAL MEDICINE

## 2021-01-21 RX ORDER — FUROSEMIDE 10 MG/ML
20 INJECTION INTRAMUSCULAR; INTRAVENOUS DAILY
Status: DISCONTINUED | OUTPATIENT
Start: 2021-01-21 | End: 2021-01-22 | Stop reason: HOSPADM

## 2021-01-21 RX ADMIN — IPRATROPIUM BROMIDE AND ALBUTEROL SULFATE 3 ML: 2.5; .5 SOLUTION RESPIRATORY (INHALATION) at 18:19

## 2021-01-21 RX ADMIN — DILTIAZEM HYDROCHLORIDE 30 MG: 30 TABLET, FILM COATED ORAL at 00:30

## 2021-01-21 RX ADMIN — BUDESONIDE 0.5 MG: 0.5 INHALANT RESPIRATORY (INHALATION) at 18:19

## 2021-01-21 RX ADMIN — METOPROLOL TARTRATE 25 MG: 25 TABLET, FILM COATED ORAL at 21:25

## 2021-01-21 RX ADMIN — AMIODARONE HYDROCHLORIDE 200 MG: 200 TABLET ORAL at 07:43

## 2021-01-21 RX ADMIN — IPRATROPIUM BROMIDE AND ALBUTEROL SULFATE 3 ML: 2.5; .5 SOLUTION RESPIRATORY (INHALATION) at 10:38

## 2021-01-21 RX ADMIN — BUDESONIDE 0.5 MG: 0.5 INHALANT RESPIRATORY (INHALATION) at 06:34

## 2021-01-21 RX ADMIN — ROSUVASTATIN CALCIUM 40 MG: 10 TABLET, FILM COATED ORAL at 21:25

## 2021-01-21 RX ADMIN — GUAIFENESIN 1200 MG: 600 TABLET, EXTENDED RELEASE ORAL at 05:07

## 2021-01-21 RX ADMIN — FINASTERIDE 5 MG: 5 TABLET, FILM COATED ORAL at 07:44

## 2021-01-21 RX ADMIN — BISACODYL 10 MG: 10 SUPPOSITORY RECTAL at 07:43

## 2021-01-21 RX ADMIN — DILTIAZEM HYDROCHLORIDE 30 MG: 30 TABLET, FILM COATED ORAL at 17:03

## 2021-01-21 RX ADMIN — GUAIFENESIN 1200 MG: 600 TABLET, EXTENDED RELEASE ORAL at 17:03

## 2021-01-21 RX ADMIN — Medication 10 ML: at 07:43

## 2021-01-21 RX ADMIN — ENOXAPARIN SODIUM 80 MG: 80 INJECTION SUBCUTANEOUS at 05:07

## 2021-01-21 RX ADMIN — METHYLPREDNISOLONE SODIUM SUCCINATE 40 MG: 40 INJECTION, POWDER, FOR SOLUTION INTRAMUSCULAR; INTRAVENOUS at 21:25

## 2021-01-21 RX ADMIN — POLYETHYLENE GLYCOL 3350 17 G: 17 POWDER, FOR SOLUTION ORAL at 07:43

## 2021-01-21 RX ADMIN — METHYLPREDNISOLONE SODIUM SUCCINATE 40 MG: 40 INJECTION, POWDER, FOR SOLUTION INTRAMUSCULAR; INTRAVENOUS at 05:08

## 2021-01-21 RX ADMIN — POLYETHYLENE GLYCOL 3350 17 G: 17 POWDER, FOR SOLUTION ORAL at 21:25

## 2021-01-21 RX ADMIN — IPRATROPIUM BROMIDE AND ALBUTEROL SULFATE 3 ML: 2.5; .5 SOLUTION RESPIRATORY (INHALATION) at 06:30

## 2021-01-21 RX ADMIN — METOPROLOL TARTRATE 25 MG: 25 TABLET, FILM COATED ORAL at 05:07

## 2021-01-21 RX ADMIN — MULTIPLE VITAMINS W/ MINERALS TAB 1 TABLET: TAB at 07:43

## 2021-01-21 RX ADMIN — DIGOXIN 125 MCG: 125 TABLET ORAL at 07:43

## 2021-01-21 RX ADMIN — Medication 10 ML: at 21:25

## 2021-01-21 RX ADMIN — TAMSULOSIN HYDROCHLORIDE 0.4 MG: 0.4 CAPSULE ORAL at 17:03

## 2021-01-21 RX ADMIN — THEOPHYLLINE 300 MG: 300 TABLET, EXTENDED RELEASE ORAL at 07:43

## 2021-01-21 RX ADMIN — AMIODARONE HYDROCHLORIDE 200 MG: 200 TABLET ORAL at 17:03

## 2021-01-21 RX ADMIN — DILTIAZEM HYDROCHLORIDE 30 MG: 30 TABLET, FILM COATED ORAL at 12:33

## 2021-01-21 RX ADMIN — IPRATROPIUM BROMIDE AND ALBUTEROL SULFATE 3 ML: 2.5; .5 SOLUTION RESPIRATORY (INHALATION) at 16:09

## 2021-01-21 RX ADMIN — METHYLPREDNISOLONE SODIUM SUCCINATE 40 MG: 40 INJECTION, POWDER, FOR SOLUTION INTRAMUSCULAR; INTRAVENOUS at 12:34

## 2021-01-21 RX ADMIN — FUROSEMIDE 20 MG: 10 INJECTION, SOLUTION INTRAMUSCULAR; INTRAVENOUS at 12:34

## 2021-01-21 RX ADMIN — ENOXAPARIN SODIUM 80 MG: 80 INJECTION SUBCUTANEOUS at 12:34

## 2021-01-21 RX ADMIN — METOPROLOL TARTRATE 25 MG: 25 TABLET, FILM COATED ORAL at 12:33

## 2021-01-21 RX ADMIN — DILTIAZEM HYDROCHLORIDE 30 MG: 30 TABLET, FILM COATED ORAL at 05:08

## 2021-01-21 NOTE — PROGRESS NOTES
Referring Provider: Hospitalist    Reason for follow-up: Shortness of breath     Patient Care Team:  Camron Pollard MD as PCP - General (Family Medicine)  Alvarado Cai MD as Consulting Physician (Cardiology)    Subjective .  Shortness of breath is slowly improving    Objective Sitting up in chair     Review of Systems   Constitution: Negative for chills and fever.   HENT: Negative for ear discharge and nosebleeds.    Eyes: Negative for discharge and redness.   Cardiovascular: Negative for chest pain, orthopnea, palpitations, paroxysmal nocturnal dyspnea and syncope.   Respiratory: Positive for shortness of breath. Negative for cough and wheezing.    Endocrine: Negative for heat intolerance.   Skin: Negative for rash.   Musculoskeletal: Negative for arthritis and myalgias.   Gastrointestinal: Negative for abdominal pain, melena, nausea and vomiting.   Genitourinary: Negative for dysuria and hematuria.   Neurological: Negative for dizziness, light-headedness, numbness and tremors.   Psychiatric/Behavioral: Negative for depression. The patient is not nervous/anxious.        Ativan [lorazepam]    Scheduled Meds:amiodarone, 200 mg, Oral, BID With Meals  bisacodyl, 10 mg, Rectal, Daily  budesonide, 0.5 mg, Nebulization, BID - RT  digoxin, 125 mcg, Oral, Daily  dilTIAZem, 30 mg, Oral, Q6H  enoxaparin, 1 mg/kg, Subcutaneous, Q12H  fentaNYL citrate (PF), 25 mcg, Intravenous, Once  finasteride, 5 mg, Oral, Daily  furosemide, 20 mg, Intravenous, Daily  guaiFENesin, 1,200 mg, Oral, Q12H  ipratropium-albuterol, 3 mL, Nebulization, 4x Daily - RT  methylPREDNISolone sodium succinate, 40 mg, Intravenous, Q8H  metoprolol tartrate, 25 mg, Oral, Q8H  midazolam, 1 mg, Intravenous, Once  multivitamin with minerals, 1 tablet, Oral, Daily  polyethylene glycol, 17 g, Oral, BID  rosuvastatin, 40 mg, Oral, Nightly  sodium chloride, 10 mL, Intravenous, Q12H  tamsulosin, 0.4 mg, Oral, Daily With Dinner  theophylline, 300 mg, Oral,  "Daily      Continuous Infusions:sodium chloride, 1-3 mL/kg/hr  sodium chloride, 75 mL/hr, Last Rate: 75 mL/hr (01/20/21 1609)      PRN Meds:.•  acetaminophen **OR** acetaminophen **OR** acetaminophen  •  atropine  •  benzonatate  •  HYDROcodone-acetaminophen  •  HYDROcodone-acetaminophen  •  ipratropium-albuterol  •  magnesium hydroxide  •  magnesium sulfate **OR** magnesium sulfate in D5W 1g/100mL (PREMIX)  •  nitroglycerin  •  ondansetron **OR** ondansetron  •  potassium chloride  •  [COMPLETED] Insert peripheral IV **AND** sodium chloride  •  sodium chloride  •  sodium chloride        VITAL SIGNS  Vitals:    01/21/21 0639 01/21/21 1024 01/21/21 1038 01/21/21 1041   BP:  107/43     BP Location:       Patient Position:       Pulse: 76 (!) 125 94 99   Resp: 18 18 18 18   Temp:  98.9 °F (37.2 °C)     TempSrc:       SpO2:   95%    Weight:       Height:           Flowsheet Rows      First Filed Value   Admission Height  177.8 cm (70\") Documented at 01/13/2021 1222   Admission Weight  80.6 kg (177 lb 11.1 oz) Documented at 01/13/2021 1222           TELEMETRY: Atrial fibrillation with controlled ventricular response  Physical Exam:  Constitutional:       Appearance: Well-developed.   Eyes:      General: No scleral icterus.        Right eye: No discharge.      Conjunctiva/sclera: Conjunctivae normal.      Pupils: Pupils are equal, round, and reactive to light.   HENT:      Head: Normocephalic and atraumatic.   Neck:      Musculoskeletal: Normal range of motion and neck supple.      Thyroid: No thyromegaly.      Vascular: No carotid bruit or JVD.      Lymphadenopathy: No cervical adenopathy.   Pulmonary:      Effort: Pulmonary effort is normal. No respiratory distress.      Breath sounds: No wheezing. Rhonchi present. No rales.   Cardiovascular:      Normal rate. Irregularly irregular rhythm.      No gallop.   Pulses:     Intact distal pulses.   Abdominal:      General: Bowel sounds are normal.      Palpations: Abdomen is " soft.      Tenderness: There is no abdominal tenderness.   Musculoskeletal: Normal range of motion.   Skin:     General: Skin is warm and dry.      Findings: No erythema or rash.   Neurological:      Mental Status: Alert and oriented to person, place, and time.      Comments: No focal deficits          Results Review:   I reviewed the patient's new clinical results.  Lab Results (last 24 hours)     Procedure Component Value Units Date/Time    Fungus Culture - Lavage, Lung, Right Lower Lobe [[id number]]  (Abnormal) Collected: 01/14/21 1347    Specimen: Lavage from Lung, Right Lower Lobe Updated: 01/21/21 0804     Fungus Culture Candida species    Magnesium [[id number]]  (Normal) Collected: 01/21/21 0147    Specimen: Blood Updated: 01/21/21 0223     Magnesium 2.1 mg/dL     Basic Metabolic Panel [[id number]]  (Abnormal) Collected: 01/21/21 0147    Specimen: Blood Updated: 01/21/21 0223     Glucose 126 mg/dL      BUN 36 mg/dL      Creatinine 0.97 mg/dL      Sodium 136 mmol/L      Potassium 4.6 mmol/L      Chloride 101 mmol/L      CO2 29.0 mmol/L      Calcium 8.3 mg/dL      eGFR Non African Amer 75 mL/min/1.73      BUN/Creatinine Ratio 37.1     Anion Gap 6.0 mmol/L     Narrative:      GFR Normal >60  Chronic Kidney Disease <60  Kidney Failure <15      CBC & Differential [[id number]]  (Abnormal) Collected: 01/21/21 0147    Specimen: Blood Updated: 01/21/21 0203    Narrative:      The following orders were created for panel order CBC & Differential.  Procedure                               Abnormality         Status                     ---------                               -----------         ------                     CBC Auto Differential[[id number]]        Abnormal            Final result                 Please view results for these tests on the individual orders.    CBC Auto Differential [[id number]]  (Abnormal) Collected: 01/21/21 0147    Specimen: Blood Updated: 01/21/21 0203     WBC 10.20 10*3/mm3      RBC 4.35  10*6/mm3      Hemoglobin 12.4 g/dL      Hematocrit 38.3 %      MCV 88.0 fL      MCH 28.5 pg      MCHC 32.4 g/dL      RDW 16.3 %      RDW-SD 50.3 fl      MPV 8.8 fL      Platelets 226 10*3/mm3      Neutrophil % 94.0 %      Lymphocyte % 1.3 %      Monocyte % 4.6 %      Eosinophil % 0.0 %      Basophil % 0.1 %      Neutrophils, Absolute 9.60 10*3/mm3      Lymphocytes, Absolute 0.10 10*3/mm3      Monocytes, Absolute 0.50 10*3/mm3      Eosinophils, Absolute 0.00 10*3/mm3      Basophils, Absolute 0.00 10*3/mm3      nRBC 0.0 /100 WBC     AFB Culture - Body Fluid, Pleural Cavity [[id number]] Collected: 01/15/21 1509    Specimen: Body Fluid from Pleural Cavity Updated: 01/20/21 1531     AFB Culture No AFB isolated at less than 1 week     AFB Stain No acid fast bacilli seen    Virus Culture - Lavage, Lung, Right Lower Lobe [[id number]]  (Abnormal) Collected: 01/14/21 1347    Specimen: Lavage from Lung, Right Lower Lobe Updated: 01/20/21 1509     Viral Culture, General Comment     Comment: 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       Narrative:      Performed at:  [address] [place] [address]  [id number]  : Rick Willis MD, Phone:  [phone]          Imaging Results (Last 24 Hours)     Procedure Component Value Units Date/Time    XR Chest 1 View [[id number]] Collected: 01/21/21 0754     Updated: 01/21/21 0759    Narrative:      DATE OF EXAM:  1/21/2021 4:08 AM     PROCEDURE:  XR CHEST 1 VW-     INDICATIONS:  chest catheter; J44.1-Chronic obstructive pulmonary disease with (acute)  exacerbation; R06.03-Acute respiratory distress; I48.91-Unspecified  atrial fibrillation; C47-Sozwjkz effusion, not elsewhere classified;  C34.11-Malignant neoplasm of upper lobe, right bronchus or lung;  I50.21-Acute systolic (congestive) heart failure;  I25.118-Atherosclerotic heart disease of native coronary artery with  other  right  pneumothorax.     COMPARISON:  1/20/2021     TECHNIQUE:   Single radiographic view of the chest was obtained.     FINDINGS:  Right basilar chest tube has been removed. No pneumothorax is evident.  There is chronic elevation of the right hemidiaphragm. There is  worsening consolidation in the right base suggesting atelectasis or  infiltrate. Right hilar/suprahilar mass or masslike consolidation is  again noted. This could be more definitively evaluated by CT. Heart size  and mediastinal contour appear stable.       Impression:         1. Removal of right basilar pigtail thoracostomy tube. No pneumothorax  evident.  2. Increasing consolidation in the right lung base suggesting  atelectasis or infiltrate.  3. Right hilar/suprahilar mass or masslike consolidation. This could be  more definitively evaluated by CT..     Electronically Signed By-Nikolas Lloyd MD On:1/21/2021 7:57 AM  This report was finalized on [id number] by  Nikolas Lloyd MD.          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 obstruction/lower urinary tract symptoms    History of lung cancer    COPD exacerbation (CMS/HCC)    Malignant neoplasm of upper lobe of right lung (CMS/HCC)       Assessment:    Afib RVR  HFrEF  Pericardial Effusion  Hx Lung CA  Congestive heart    Plan:    Patient presented with shortness of breath and had elevated lactate level and BNP level  Patient had an echocardiogram which showed LV systolic  dysfunction with an EF of 20 to 25%  Patient also has pericardial effusion  Patient is currently on digoxin     He is not on beta-blocker because of low blood pressure.   We will start him on low-dose beta-blockers  Patient also has history of lung cancer with pleural effusion and is followed by the pulmonologist  Patient is currently on amiodarone digoxin   Heart is well controlled patient had a large pleural effusion and hence he had thoracentesis done and his chest tubes are still draining  Patient had a cardiac catheterization which showed nonobstructive disease but with severe LV dysfunction  Patient is currently on metoprolol.  Patient is not on any ACE inhibitor's but if his blood pressure permits we will start him on.  Patient needs anticoagulation whenever he is cleared by lung doctors.    Alvarado Cai MD  01/21/21  10:55 EST

## 2021-01-21 NOTE — CONSULTS
Cardiac rehab evaluation CHF. Explained program and benefits. Brochure given. Would qualify with EF 20-25%. Patient says to talk with his wife. She makes the decisions. Verified phone number. Will call following discharge.

## 2021-01-21 NOTE — PROGRESS NOTES
Continued Stay Note  [facility]     Patient Name: Lenny Larson  MRN: [id number]  Today's Date: 1/21/2021    Admit Date: 1/13/2021    Discharge Plan     Row Name 01/21/21 1301       Plan    Plan  Home with [facility] (accepted and selected). Rolling walker with [employer] (patient already has in his pocession).    Patient/Family in Agreement with Plan  yes    Plan Comments  Barriers to discharge: patient has a pericardial effusion, low dose beta blocker started.          Expected Discharge Date and Time     Expected Discharge Date Expected Discharge Time    Jan 21, 2021           Met with patient in room wearing PPE: mask, face shield/goggles, gloves, gown.      Maintained distance greater than six feet and spent less than 15 minutes in the room.          Anna Naegele RN Case Manager  [address]  [address] [phone]  office  [phone]  fax  [email or web address]  [facility]

## 2021-01-21 NOTE — PLAN OF CARE
Goal Outcome Evaluation:  Plan of Care Reviewed With: patient  Progress: improving   Patient continues to have a [place] in place. Will d/c once cleared by all consults. Will continue to monitor.     Problem: Adult Inpatient Plan of Care  Goal: Absence of Hospital-Acquired Illness or Injury  Intervention: Identify and Manage Fall Risk  Recent Flowsheet Documentation  Taken 1/21/2021 1202 by Priya Chavez RN  Safety Promotion/Fall Prevention:   activity supervised   assistive device/personal items within reach   clutter free environment maintained   nonskid shoes/slippers when out of bed   safety round/check completed  Taken 1/21/2021 0805 by Priya Chavez, RN  Safety Promotion/Fall Prevention:   activity supervised   assistive device/personal items within reach   clutter free environment maintained   nonskid shoes/slippers when out of bed   safety round/check completed

## 2021-01-21 NOTE — PROGRESS NOTES
"PULMONARY CRITICAL CARE Progress  NOTE      PATIENT IDENTIFICATION:  Name: Lenny Larson  MRN: [id number]  :  1940     Age: 80 y.o.  Sex: male    DATE OF Note:  2021   Referring Physician: Dottie Casillas MD                  Subjective:   More alert, off O2,  no SOB no, chest pain, no nausea or vomiting, no change in bowel habit, no dysuria,  no new  skin rash or itching.      Objective:  tMax 24 hrs: Temp (24hrs), Av.8 °F (36.6 °C), Min:97.6 °F (36.4 °C), Max:98.1 °F (36.7 °C)      Vitals Ranges:   Temp:  [97.6 °F (36.4 °C)-98.1 °F (36.7 °C)] 98.1 °F (36.7 °C)  Heart Rate:  [] 76  Resp:  [14-20] 18  BP: (101-124)/(48-80) 108/62    Intake and Output Last 3 Shifts:   I/O last 3 completed shifts:  In: 900 [P.O.:900]  Out: 2600 [Urine:2350; Chest Tube:250]    Exam:  /62 (BP Location: Left arm, Patient Position: Lying)   Pulse 76   Temp 98.1 °F (36.7 °C) (Oral)   Resp 18   Ht 177.8 cm (70\")   Wt 75.7 kg (166 lb 14.2 oz)   SpO2 94%   BMI 23.95 kg/m²     General Appearance:  AAO   HEENT:  Normocephalic, without obvious abnormality, Conjunctiva/corneas clear,.  Normal external ear canals, Nares normal, no drainage     Neck:  Supple, symmetrical, trachea midline. No JVD.  Lungs /Chest wall:   Bilateral basal rhonchi, respirations unlabored,  symmetrical wall movement.     Heart:  Regular rate and rhythm, systolic murmur PMI left sternal border  Abdomen: Soft, non-tender, no masses, no organomegaly.    Extremities: Trace edema no clubbing or Cyanosis        Medications:    Current Facility-Administered Medications:   •  acetaminophen (TYLENOL) tablet 650 mg, 650 mg, Oral, Q4H PRN, 650 mg at 01/15/21 1507 **OR** acetaminophen (TYLENOL) 160 MG/5ML solution 650 mg, 650 mg, Oral, Q4H PRN **OR** acetaminophen (TYLENOL) suppository 650 mg, 650 mg, Rectal, Q4H PRN, Alvarado Cai MD  •  amiodarone (PACERONE) tablet 200 mg, 200 mg, Oral, BID With Meals, Alvarado Cai MD, 200 mg at 21 " 0743  •  atropine sulfate injection 0.5 mg, 0.5 mg, Intravenous, Q5 Min PRN, Alvarado Cai MD  •  benzonatate (TESSALON) capsule 200 mg, 200 mg, Oral, TID PRN, Alvarado Cai MD  •  bisacodyl (DULCOLAX) suppository 10 mg, 10 mg, Rectal, Daily, Alvarado Cai MD, 10 mg at 01/21/21 0743  •  budesonide (PULMICORT) nebulizer solution 0.5 mg, 0.5 mg, Nebulization, BID - RT, Alvarado Cai MD, 0.5 mg at 01/21/21 0634  •  digoxin (LANOXIN) tablet 125 mcg, 125 mcg, Oral, Daily, Alvarado Cai MD, 125 mcg at 01/21/21 0743  •  dilTIAZem (CARDIZEM) tablet 30 mg, 30 mg, Oral, Q6H, Alvarado Cai MD, 30 mg at 01/21/21 0508  •  enoxaparin (LOVENOX) syringe 80 mg, 1 mg/kg, Subcutaneous, Q12H, Alvarado Cai MD, 80 mg at 01/21/21 0507  •  fentaNYL citrate (PF) (SUBLIMAZE) injection 25 mcg, 25 mcg, Intravenous, Once, Alvarado Cai MD  •  finasteride (PROSCAR) tablet 5 mg, 5 mg, Oral, Daily, Alvarado Cai MD, 5 mg at 01/21/21 0744  •  guaiFENesin (MUCINEX) 12 hr tablet 1,200 mg, 1,200 mg, Oral, Q12H, Alvarado Cai MD, 1,200 mg at 01/21/21 0507  •  HYDROcodone-acetaminophen (NORCO)  MG per tablet 1 tablet, 1 tablet, Oral, Q4H PRN, Alvarado Cai MD, 1 tablet at 01/17/21 0942  •  HYDROcodone-acetaminophen (NORCO) 5-325 MG per tablet 1 tablet, 1 tablet, Oral, Q4H PRN, Alvarado Cai MD, 1 tablet at 01/17/21 0025  •  ipratropium-albuterol (DUO-NEB) nebulizer solution 3 mL, 3 mL, Nebulization, Q2H PRN, Alvarado Cai MD  •  ipratropium-albuterol (DUO-NEB) nebulizer solution 3 mL, 3 mL, Nebulization, 4x Daily - RT, Alvarado Cai MD, 3 mL at 01/21/21 0630  •  magnesium hydroxide (MILK OF MAGNESIA) suspension 2400 mg/10mL 10 mL, 10 mL, Oral, Daily PRN, Alvarado Cai MD, 10 mL at 01/15/21 1729  •  Magnesium Sulfate 2 gram infusion - Mg less than or equal to 1.5 mg/dL, 2 g, Intravenous, PRN **OR** Magnesium Sulfate 1 gram infusion - Mg 1.6-1.9 mg/dL, 1 g, Intravenous, PRN, Alvarado Cai MD  •  methylPREDNISolone sodium succinate  (SOLU-Medrol) injection 40 mg, 40 mg, Intravenous, Q8H, Alvarado Cai MD, 40 mg at 01/21/21 0508  •  metoprolol tartrate (LOPRESSOR) tablet 25 mg, 25 mg, Oral, Q8H, Alvarado Cai MD, 25 mg at 01/21/21 0507  •  midazolam (VERSED) injection 1 mg, 1 mg, Intravenous, Once, Alvarado Cai MD  •  multivitamin with minerals 1 tablet, 1 tablet, Oral, Daily, Alvarado Cai MD, 1 tablet at 01/21/21 0743  •  nitroglycerin (NITROSTAT) SL tablet 0.4 mg, 0.4 mg, Sublingual, Q5 Min PRN, Alvarado Cai MD  •  ondansetron (ZOFRAN) tablet 4 mg, 4 mg, Oral, Q6H PRN **OR** ondansetron (ZOFRAN) injection 4 mg, 4 mg, Intravenous, Q6H PRN, Alvarado Cai MD  •  polyethylene glycol (MIRALAX) packet 17 g, 17 g, Oral, BID, Alvarado Cai MD, 17 g at 01/21/21 0743  •  potassium chloride (K-DUR,KLOR-CON) CR tablet 40 mEq, 40 mEq, Oral, PRN, Alvarado Cai MD  •  rosuvastatin (CRESTOR) tablet 40 mg, 40 mg, Oral, Nightly, Alvarado Cai MD, 40 mg at 01/20/21 3538  •  [COMPLETED] Insert peripheral IV, , , Once **AND** sodium chloride 0.9 % flush 10 mL, 10 mL, Intravenous, PRN, Alvarado Cai MD, 10 mL at 01/20/21 0512  •  sodium chloride 0.9 % flush 10 mL, 10 mL, Intravenous, Q12H, Alvarado Cai MD, 10 mL at 01/21/21 0743  •  sodium chloride 0.9 % flush 10 mL, 10 mL, Intravenous, PRN, Alvarado Cai MD, 10 mL at 01/19/21 0530  •  sodium chloride 0.9 % infusion 250 mL, 250 mL, Intravenous, Once PRN, Alvarado Cai MD  •  sodium chloride 0.9 % infusion, 1-3 mL/kg/hr, Intravenous, Continuous, Alvarado Cai MD  •  sodium chloride 0.9 % infusion, 75 mL/hr, Intravenous, Continuous, Alvarado Cai MD, Last Rate: 75 mL/hr at 01/20/21 1609, 75 mL/hr at 01/20/21 1609  •  tamsulosin (FLOMAX) 24 hr capsule 0.4 mg, 0.4 mg, Oral, Daily With Dinner, Alvarado Cai MD, 0.4 mg at 01/20/21 1724  •  theophylline (THEODUR) 12 hr tablet 300 mg, 300 mg, Oral, Daily, Alvarado Cai MD, 300 mg at 01/21/21 0790    Data Review:  All labs (24hrs):   Recent Results  (from the past 24 hour(s))   CBC Auto Differential    Collection Time: 01/20/21 10:01 AM    Specimen: Blood   Result Value Ref Range    WBC 14.80 (H) 3.40 - 10.80 10*3/mm3    RBC 4.61 4.14 - 5.80 10*6/mm3    Hemoglobin 13.2 13.0 - 17.7 g/dL    Hematocrit 40.9 37.5 - 51.0 %    MCV 88.7 79.0 - 97.0 fL    MCH 28.6 26.6 - 33.0 pg    MCHC 32.2 31.5 - 35.7 g/dL    RDW 16.3 (H) 12.3 - 15.4 %    RDW-SD 51.2 37.0 - 54.0 fl    MPV 8.8 6.0 - 12.0 fL    Platelets 271 140 - 450 10*3/mm3    Neutrophil % 95.9 (H) 42.7 - 76.0 %    Lymphocyte % 0.9 (L) 19.6 - 45.3 %    Monocyte % 3.1 (L) 5.0 - 12.0 %    Eosinophil % 0.0 (L) 0.3 - 6.2 %    Basophil % 0.1 0.0 - 1.5 %    Neutrophils, Absolute 14.20 (H) 1.70 - 7.00 10*3/mm3    Lymphocytes, Absolute 0.10 (L) 0.70 - 3.10 10*3/mm3    Monocytes, Absolute 0.50 0.10 - 0.90 10*3/mm3    Eosinophils, Absolute 0.00 0.00 - 0.40 10*3/mm3    Basophils, Absolute 0.00 0.00 - 0.20 10*3/mm3    nRBC 0.1 0.0 - 0.2 /100 WBC   Magnesium    Collection Time: 01/21/21  1:47 AM    Specimen: Blood   Result Value Ref Range    Magnesium 2.1 1.6 - 2.4 mg/dL   Basic Metabolic Panel    Collection Time: 01/21/21  1:47 AM    Specimen: Blood   Result Value Ref Range    Glucose 126 (H) 65 - 99 mg/dL    BUN 36 (H) 8 - 23 mg/dL    Creatinine 0.97 0.76 - 1.27 mg/dL    Sodium 136 136 - 145 mmol/L    Potassium 4.6 3.5 - 5.2 mmol/L    Chloride 101 98 - 107 mmol/L    CO2 29.0 22.0 - 29.0 mmol/L    Calcium 8.3 (L) 8.6 - 10.5 mg/dL    eGFR Non African Amer 75 >60 mL/min/1.73    BUN/Creatinine Ratio 37.1 (H) 7.0 - 25.0    Anion Gap 6.0 5.0 - 15.0 mmol/L   CBC Auto Differential    Collection Time: 01/21/21  1:47 AM    Specimen: Blood   Result Value Ref Range    WBC 10.20 3.40 - 10.80 10*3/mm3    RBC 4.35 4.14 - 5.80 10*6/mm3    Hemoglobin 12.4 (L) 13.0 - 17.7 g/dL    Hematocrit 38.3 37.5 - 51.0 %    MCV 88.0 79.0 - 97.0 fL    MCH 28.5 26.6 - 33.0 pg    MCHC 32.4 31.5 - 35.7 g/dL    RDW 16.3 (H) 12.3 - 15.4 %    RDW-SD 50.3 37.0 -  54.0 fl    MPV 8.8 6.0 - 12.0 fL    Platelets 226 140 - 450 10*3/mm3    Neutrophil % 94.0 (H) 42.7 - 76.0 %    Lymphocyte % 1.3 (L) 19.6 - 45.3 %    Monocyte % 4.6 (L) 5.0 - 12.0 %    Eosinophil % 0.0 (L) 0.3 - 6.2 %    Basophil % 0.1 0.0 - 1.5 %    Neutrophils, Absolute 9.60 (H) 1.70 - 7.00 10*3/mm3    Lymphocytes, Absolute 0.10 (L) 0.70 - 3.10 10*3/mm3    Monocytes, Absolute 0.50 0.10 - 0.90 10*3/mm3    Eosinophils, Absolute 0.00 0.00 - 0.40 10*3/mm3    Basophils, Absolute 0.00 0.00 - 0.20 10*3/mm3    nRBC 0.0 0.0 - 0.2 /100 WBC        Imaging:  XR Chest 1 View  Narrative: DATE OF EXAM:  1/21/2021 4:08 AM     PROCEDURE:  XR CHEST 1 VW-     INDICATIONS:  chest catheter; J44.1-Chronic obstructive pulmonary disease with (acute)  exacerbation; R06.03-Acute respiratory distress; I48.91-Unspecified  atrial fibrillation; B28-Jsvrnxl effusion, not elsewhere classified;  C34.11-Malignant neoplasm of upper lobe, right bronchus or lung;  I50.21-Acute systolic (congestive) heart failure;  I25.118-Atherosclerotic heart disease of native coronary artery with  other  right pneumothorax.     COMPARISON:  1/20/2021     TECHNIQUE:   Single radiographic view of the chest was obtained.     FINDINGS:  Right basilar chest tube has been removed. No pneumothorax is evident.  There is chronic elevation of the right hemidiaphragm. There is  worsening consolidation in the right base suggesting atelectasis or  infiltrate. Right hilar/suprahilar mass or masslike consolidation is  again noted. This could be more definitively evaluated by CT. Heart size  and mediastinal contour appear stable.     Impression:    1. Removal of right basilar pigtail thoracostomy tube. No pneumothorax  evident.  2. Increasing consolidation in the right lung base suggesting  atelectasis or infiltrate.  3. Right hilar/suprahilar mass or masslike consolidation. This could be  more definitively evaluated by CT..     Electronically Signed By-Nikolas Lloyd MD On:1/21/2021  7:57 AM  This report was finalized on [id number] by  Nikolas Lloyd MD.       ASSESSMENT:  Acute respiratory distress  COPD  CAD  Hyperlipidemia  HTN  Chest pain  A-fib    BPH     History of lung cancer  Malignant neoplasm of upper lobe of right lung         PLAN:  OOB daily   PT/OT  Low dose diuretic  Antibiotics   Steroids IV Solu-Medrol 40 mg every 8  Bronchodilator  Inhaled corticosteroids  Amiodarone to po   Electrolytes/ glycemic control  DVT and GI prophylaxis      Discussed with Dr Shima Garcia, APRN  1/21/2021  09:50 EST       I personally have examined  and interviewed the patient. I have reviewed the history, data, problems, assessment and plan with our NP.  Critical care time in direct medical management (   ) minutes   Louis Ronquillo MD, D,ABSM  1/21/2021

## 2021-01-21 NOTE — PLAN OF CARE
Goal Outcome Evaluation:  Plan of Care Reviewed With: patient  Progress: improving  Outcome Summary: Patient has ambulated around room this shift after cardio catheter procedure, site is soft, intact, clean and dry. After resting patient woke confused and had to be reoriented and remains restless, will continue to monitor.

## 2021-01-21 NOTE — PROGRESS NOTES
"Heart Failure Program  Nurse Navigator  Discharge Planning: Follow-up Note    Patient Name:Lenny Larson  :1940  Current Admission Date: 2021       Last 3 Weights:  Wt Readings from Last 3 Encounters:   21 75.7 kg (166 lb 14.2 oz)   20 84.4 kg (186 lb)   20 84.8 kg (187 lb)       Intake and Output totals: I/O last 3 completed shifts:  In: 900 [P.O.:900]  Out: 2600 [Urine:2350; Chest Tube:250]  No intake/output data recorded.          Patient Assessment:   Pt sitting in chair, resp even and unlabored, no SOA with conversation, no edema in lower legs.  \"feeling better today\",      Patient Education:   Review HF s/s, medication adherence and keeping follow-up appointments    Review HF Education provided to patient:  yes-Symptoms worsening  yes-Prescribed medications  yes-HF self-care  yes-Follow-up Appointments       Acceptance of learning: acceptance, cooperative, teachback    Heart Failure education interactive teaching session time: 30 minutes      Identified needs/barriers:        Intervention follow-up:       "

## 2021-01-21 NOTE — TELEPHONE ENCOUNTER
PLEASE CALL ARLEN BACK @ [phone]. DR. RUIZ DID CATH ON PATIENT YESTERDAY AND SHE WAS TOLD TO CALL THIS MORNING TO SEE HOW CATH WENT. SHE HAS APT @ 1 TODAY SO WILL NOT BE AVAILABLE AROUND THAT TIME.

## 2021-01-22 ENCOUNTER — READMISSION MANAGEMENT (OUTPATIENT)
Dept: CALL CENTER | Facility: HOSPITAL | Age: 81
End: 2021-01-22

## 2021-01-22 ENCOUNTER — APPOINTMENT (OUTPATIENT)
Dept: GENERAL RADIOLOGY | Facility: HOSPITAL | Age: 81
End: 2021-01-22

## 2021-01-22 VITALS
RESPIRATION RATE: 18 BRPM | HEIGHT: 70 IN | DIASTOLIC BLOOD PRESSURE: 49 MMHG | WEIGHT: 167.11 LBS | SYSTOLIC BLOOD PRESSURE: 117 MMHG | OXYGEN SATURATION: 93 % | BODY MASS INDEX: 23.92 KG/M2 | TEMPERATURE: 97.5 F | HEART RATE: 100 BPM

## 2021-01-22 PROBLEM — R07.9 CHEST PAIN: Status: RESOLVED | Noted: 2021-01-13 | Resolved: 2021-01-22

## 2021-01-22 PROBLEM — J90 PLEURAL EFFUSION: Status: RESOLVED | Noted: 2021-01-22 | Resolved: 2021-01-22

## 2021-01-22 PROBLEM — E44.0 MODERATE MALNUTRITION (HCC): Status: ACTIVE | Noted: 2021-01-22

## 2021-01-22 PROBLEM — J90 PLEURAL EFFUSION: Status: ACTIVE | Noted: 2021-01-22

## 2021-01-22 PROBLEM — I48.91 ATRIAL FIBRILLATION WITH RVR (HCC): Status: RESOLVED | Noted: 2021-01-13 | Resolved: 2021-01-22

## 2021-01-22 PROBLEM — I48.20 CHRONIC ATRIAL FIBRILLATION (HCC): Status: ACTIVE | Noted: 2021-01-22

## 2021-01-22 LAB
ANION GAP SERPL CALCULATED.3IONS-SCNC: 7 MMOL/L (ref 5–15)
BACTERIA SPEC AEROBE CULT: NO GROWTH
BASOPHILS # BLD AUTO: 0 10*3/MM3 (ref 0–0.2)
BASOPHILS NFR BLD AUTO: 0.2 % (ref 0–1.5)
BUN SERPL-MCNC: 36 MG/DL (ref 8–23)
BUN/CREAT SERPL: 34.3 (ref 7–25)
CALCIUM SPEC-SCNC: 8.5 MG/DL (ref 8.6–10.5)
CHLORIDE SERPL-SCNC: 100 MMOL/L (ref 98–107)
CO2 SERPL-SCNC: 31 MMOL/L (ref 22–29)
CREAT SERPL-MCNC: 1.05 MG/DL (ref 0.76–1.27)
DEPRECATED RDW RBC AUTO: 49.4 FL (ref 37–54)
EOSINOPHIL # BLD AUTO: 0 10*3/MM3 (ref 0–0.4)
EOSINOPHIL NFR BLD AUTO: 0 % (ref 0.3–6.2)
ERYTHROCYTE [DISTWIDTH] IN BLOOD BY AUTOMATED COUNT: 16.2 % (ref 12.3–15.4)
GFR SERPL CREATININE-BSD FRML MDRD: 68 ML/MIN/1.73
GLUCOSE SERPL-MCNC: 127 MG/DL (ref 65–99)
HCT VFR BLD AUTO: 39 % (ref 37.5–51)
HGB BLD-MCNC: 12.8 G/DL (ref 13–17.7)
LYMPHOCYTES # BLD AUTO: 0.1 10*3/MM3 (ref 0.7–3.1)
LYMPHOCYTES NFR BLD AUTO: 0.8 % (ref 19.6–45.3)
MAGNESIUM SERPL-MCNC: 2.2 MG/DL (ref 1.6–2.4)
MCH RBC QN AUTO: 28.5 PG (ref 26.6–33)
MCHC RBC AUTO-ENTMCNC: 32.8 G/DL (ref 31.5–35.7)
MCV RBC AUTO: 86.9 FL (ref 79–97)
MONOCYTES # BLD AUTO: 0.4 10*3/MM3 (ref 0.1–0.9)
MONOCYTES NFR BLD AUTO: 3.4 % (ref 5–12)
NEUTROPHILS NFR BLD AUTO: 11.3 10*3/MM3 (ref 1.7–7)
NEUTROPHILS NFR BLD AUTO: 95.6 % (ref 42.7–76)
NRBC BLD AUTO-RTO: 0 /100 WBC (ref 0–0.2)
PLATELET # BLD AUTO: 235 10*3/MM3 (ref 140–450)
PMV BLD AUTO: 8.8 FL (ref 6–12)
POTASSIUM SERPL-SCNC: 4.1 MMOL/L (ref 3.5–5.2)
RBC # BLD AUTO: 4.49 10*6/MM3 (ref 4.14–5.8)
SODIUM SERPL-SCNC: 138 MMOL/L (ref 136–145)
WBC # BLD AUTO: 11.8 10*3/MM3 (ref 3.4–10.8)

## 2021-01-22 PROCEDURE — 94760 N-INVAS EAR/PLS OXIMETRY 1: CPT

## 2021-01-22 PROCEDURE — 99239 HOSP IP/OBS DSCHRG MGMT >30: CPT | Performed by: HOSPITALIST

## 2021-01-22 PROCEDURE — 71045 X-RAY EXAM CHEST 1 VIEW: CPT

## 2021-01-22 PROCEDURE — 85025 COMPLETE CBC W/AUTO DIFF WBC: CPT | Performed by: INTERNAL MEDICINE

## 2021-01-22 PROCEDURE — 25010000002 METHYLPREDNISOLONE PER 40 MG: Performed by: INTERNAL MEDICINE

## 2021-01-22 PROCEDURE — 97116 GAIT TRAINING THERAPY: CPT

## 2021-01-22 PROCEDURE — 25010000002 ENOXAPARIN PER 10 MG: Performed by: INTERNAL MEDICINE

## 2021-01-22 PROCEDURE — 94799 UNLISTED PULMONARY SVC/PX: CPT

## 2021-01-22 PROCEDURE — 83735 ASSAY OF MAGNESIUM: CPT | Performed by: INTERNAL MEDICINE

## 2021-01-22 PROCEDURE — 97110 THERAPEUTIC EXERCISES: CPT

## 2021-01-22 PROCEDURE — 99232 SBSQ HOSP IP/OBS MODERATE 35: CPT | Performed by: INTERNAL MEDICINE

## 2021-01-22 PROCEDURE — 25010000002 FUROSEMIDE PER 20 MG: Performed by: NURSE PRACTITIONER

## 2021-01-22 PROCEDURE — 80048 BASIC METABOLIC PNL TOTAL CA: CPT | Performed by: INTERNAL MEDICINE

## 2021-01-22 RX ORDER — ACETAMINOPHEN 325 MG/1
650 TABLET ORAL EVERY 4 HOURS PRN
Start: 2021-01-22 | End: 2021-01-01

## 2021-01-22 RX ORDER — BUDESONIDE 0.5 MG/2ML
0.5 INHALANT ORAL
Qty: 60 EACH | Refills: 0 | Status: SHIPPED | OUTPATIENT
Start: 2021-01-22 | End: 2021-01-01

## 2021-01-22 RX ORDER — DIGOXIN 125 MCG
125 TABLET ORAL DAILY
Qty: 30 TABLET | Refills: 0 | Status: CANCELLED | OUTPATIENT
Start: 2021-01-23

## 2021-01-22 RX ORDER — BUDESONIDE 0.5 MG/2ML
0.5 INHALANT ORAL
Qty: 60 EACH | Refills: 0 | Status: CANCELLED | OUTPATIENT
Start: 2021-01-22

## 2021-01-22 RX ORDER — GUAIFENESIN 600 MG/1
1200 TABLET, EXTENDED RELEASE ORAL EVERY 12 HOURS
Qty: 60 TABLET | Refills: 0 | Status: SHIPPED | OUTPATIENT
Start: 2021-01-22 | End: 2021-02-16 | Stop reason: SDUPTHER

## 2021-01-22 RX ORDER — ALBUTEROL SULFATE 90 UG/1
2 AEROSOL, METERED RESPIRATORY (INHALATION) EVERY 4 HOURS PRN
Qty: 18 G | Refills: 0 | Status: SHIPPED | OUTPATIENT
Start: 2021-01-22

## 2021-01-22 RX ORDER — DIGOXIN 125 MCG
125 TABLET ORAL DAILY
Qty: 30 TABLET | Refills: 0 | Status: SHIPPED | OUTPATIENT
Start: 2021-01-23 | End: 2021-02-08

## 2021-01-22 RX ORDER — FUROSEMIDE 20 MG/1
20 TABLET ORAL DAILY
Qty: 30 TABLET | Refills: 0 | Status: SHIPPED | OUTPATIENT
Start: 2021-01-22 | End: 2021-01-01

## 2021-01-22 RX ORDER — DILTIAZEM HYDROCHLORIDE 240 MG/1
240 CAPSULE, COATED, EXTENDED RELEASE ORAL
Status: DISCONTINUED | OUTPATIENT
Start: 2021-01-22 | End: 2021-01-22 | Stop reason: HOSPADM

## 2021-01-22 RX ORDER — BUDESONIDE 0.5 MG/2ML
0.5 INHALANT ORAL
Status: DISCONTINUED | OUTPATIENT
Start: 2021-01-22 | End: 2021-01-22

## 2021-01-22 RX ORDER — POLYETHYLENE GLYCOL 3350 17 G/17G
17 POWDER, FOR SOLUTION ORAL 2 TIMES DAILY
Start: 2021-01-22 | End: 2021-01-01

## 2021-01-22 RX ORDER — CEPHALEXIN 500 MG/1
500 CAPSULE ORAL 2 TIMES DAILY
Qty: 14 CAPSULE | Refills: 0 | Status: SHIPPED | OUTPATIENT
Start: 2021-01-22 | End: 2021-01-29

## 2021-01-22 RX ORDER — PREDNISONE 10 MG/1
TABLET ORAL
Qty: 30 TABLET | Refills: 0 | Status: SHIPPED | OUTPATIENT
Start: 2021-01-22 | End: 2021-02-08

## 2021-01-22 RX ORDER — IPRATROPIUM BROMIDE AND ALBUTEROL SULFATE 2.5; .5 MG/3ML; MG/3ML
3 SOLUTION RESPIRATORY (INHALATION)
Qty: 360 ML | Refills: 0 | Status: SHIPPED | OUTPATIENT
Start: 2021-01-22 | End: 2021-01-01

## 2021-01-22 RX ORDER — ACETAMINOPHEN 325 MG/1
650 TABLET ORAL EVERY 4 HOURS PRN
Status: CANCELLED
Start: 2021-01-22

## 2021-01-22 RX ORDER — METOPROLOL SUCCINATE 50 MG/1
75 TABLET, EXTENDED RELEASE ORAL DAILY
Qty: 45 TABLET | Refills: 0 | Status: SHIPPED | OUTPATIENT
Start: 2021-01-22 | End: 2021-02-08 | Stop reason: SDUPTHER

## 2021-01-22 RX ORDER — IPRATROPIUM BROMIDE AND ALBUTEROL SULFATE 2.5; .5 MG/3ML; MG/3ML
3 SOLUTION RESPIRATORY (INHALATION) 4 TIMES DAILY PRN
Qty: 120 ML | Refills: 11 | OUTPATIENT
Start: 2021-01-22 | End: 2021-01-22 | Stop reason: HOSPADM

## 2021-01-22 RX ORDER — AMIODARONE HYDROCHLORIDE 200 MG/1
200 TABLET ORAL 2 TIMES DAILY WITH MEALS
Qty: 60 TABLET | Refills: 0 | Status: CANCELLED | OUTPATIENT
Start: 2021-01-22

## 2021-01-22 RX ORDER — BUDESONIDE 0.5 MG/2ML
0.5 INHALANT ORAL
Qty: 60 EACH | Refills: 11 | OUTPATIENT
Start: 2021-01-22 | End: 2021-01-22 | Stop reason: HOSPADM

## 2021-01-22 RX ORDER — IPRATROPIUM BROMIDE AND ALBUTEROL SULFATE 2.5; .5 MG/3ML; MG/3ML
3 SOLUTION RESPIRATORY (INHALATION) 4 TIMES DAILY PRN
Status: DISCONTINUED | OUTPATIENT
Start: 2021-01-22 | End: 2021-01-22

## 2021-01-22 RX ORDER — AMIODARONE HYDROCHLORIDE 200 MG/1
200 TABLET ORAL 2 TIMES DAILY WITH MEALS
Qty: 60 TABLET | Refills: 0 | Status: SHIPPED | OUTPATIENT
Start: 2021-01-22 | End: 2021-02-08

## 2021-01-22 RX ADMIN — FINASTERIDE 5 MG: 5 TABLET, FILM COATED ORAL at 08:46

## 2021-01-22 RX ADMIN — BISACODYL 10 MG: 10 SUPPOSITORY RECTAL at 08:46

## 2021-01-22 RX ADMIN — FUROSEMIDE 20 MG: 10 INJECTION, SOLUTION INTRAMUSCULAR; INTRAVENOUS at 08:46

## 2021-01-22 RX ADMIN — METOPROLOL TARTRATE 25 MG: 25 TABLET, FILM COATED ORAL at 05:11

## 2021-01-22 RX ADMIN — ACETAMINOPHEN 650 MG: 325 TABLET, FILM COATED ORAL at 15:10

## 2021-01-22 RX ADMIN — IPRATROPIUM BROMIDE AND ALBUTEROL SULFATE 3 ML: 2.5; .5 SOLUTION RESPIRATORY (INHALATION) at 06:40

## 2021-01-22 RX ADMIN — DILTIAZEM HYDROCHLORIDE 240 MG: 240 CAPSULE, COATED, EXTENDED RELEASE ORAL at 15:09

## 2021-01-22 RX ADMIN — IPRATROPIUM BROMIDE AND ALBUTEROL SULFATE 3 ML: 2.5; .5 SOLUTION RESPIRATORY (INHALATION) at 15:33

## 2021-01-22 RX ADMIN — IPRATROPIUM BROMIDE AND ALBUTEROL SULFATE 3 ML: 2.5; .5 SOLUTION RESPIRATORY (INHALATION) at 10:52

## 2021-01-22 RX ADMIN — APIXABAN 5 MG: 5 TABLET, FILM COATED ORAL at 15:09

## 2021-01-22 RX ADMIN — DILTIAZEM HYDROCHLORIDE 30 MG: 30 TABLET, FILM COATED ORAL at 00:07

## 2021-01-22 RX ADMIN — Medication 10 ML: at 08:46

## 2021-01-22 RX ADMIN — METHYLPREDNISOLONE SODIUM SUCCINATE 40 MG: 40 INJECTION, POWDER, FOR SOLUTION INTRAMUSCULAR; INTRAVENOUS at 12:32

## 2021-01-22 RX ADMIN — ENOXAPARIN SODIUM 80 MG: 80 INJECTION SUBCUTANEOUS at 05:12

## 2021-01-22 RX ADMIN — POLYETHYLENE GLYCOL 3350 17 G: 17 POWDER, FOR SOLUTION ORAL at 08:46

## 2021-01-22 RX ADMIN — DILTIAZEM HYDROCHLORIDE 30 MG: 30 TABLET, FILM COATED ORAL at 12:27

## 2021-01-22 RX ADMIN — DIGOXIN 125 MCG: 125 TABLET ORAL at 08:46

## 2021-01-22 RX ADMIN — METHYLPREDNISOLONE SODIUM SUCCINATE 40 MG: 40 INJECTION, POWDER, FOR SOLUTION INTRAMUSCULAR; INTRAVENOUS at 05:11

## 2021-01-22 RX ADMIN — MULTIPLE VITAMINS W/ MINERALS TAB 1 TABLET: TAB at 08:46

## 2021-01-22 RX ADMIN — NYSTATIN 500000 UNITS: 100000 SUSPENSION ORAL at 12:27

## 2021-01-22 RX ADMIN — DILTIAZEM HYDROCHLORIDE 30 MG: 30 TABLET, FILM COATED ORAL at 05:11

## 2021-01-22 RX ADMIN — BUDESONIDE 0.5 MG: 0.5 INHALANT RESPIRATORY (INHALATION) at 06:40

## 2021-01-22 RX ADMIN — THEOPHYLLINE 300 MG: 300 TABLET, EXTENDED RELEASE ORAL at 08:46

## 2021-01-22 RX ADMIN — METOPROLOL TARTRATE 25 MG: 25 TABLET, FILM COATED ORAL at 12:31

## 2021-01-22 RX ADMIN — AMIODARONE HYDROCHLORIDE 200 MG: 200 TABLET ORAL at 08:46

## 2021-01-22 RX ADMIN — GUAIFENESIN 1200 MG: 600 TABLET, EXTENDED RELEASE ORAL at 05:11

## 2021-01-22 NOTE — PLAN OF CARE
Goal Outcome Evaluation:  Plan of Care Reviewed With: patient  Progress: improving  Outcome Summary: Pt has had no acute events this shift

## 2021-01-22 NOTE — PROGRESS NOTES
[facility] Medicine Services Daily Progress Note      Hospitalist Team  LOS 9 days      Patient Care Team:  Camron Pollard MD as PCP - General (Family Medicine)  Alvarado Cai MD as Consulting Physician (Cardiology)    Patient Location: 2102/1      Subjective   Subjective     Chief Complaint / Subjective  Chief Complaint   Patient presents with   • Shortness of Breath     Brief Synopsis of Hospital Course/HPI  The patient is a 80-year-old male who has underlying coronary artery disease with atrial fibrillation, stent placement and an abdominal aortic aneurysm.  The patient also is status post right upper lobectomy for previous lung carcinoma which was also treated with radiation and chemotherapy.  The patient presents with atrial fibrillation with rapid ventricular response as well as a new right lower lobe effusion.      Date:  1/15/2021  The patient had a chest tube placed today for his right pleural effusion.  It continues to drain.  He is having pain with deep breathing related to the chest tube.  The patient does report that his breathing is better and that his heart is not racing as much as it was on admission.    1/16/2021  The patient is in tears as he has been in pain.  I did check with nursing and the patient has not taken anything for pain nor is he asked for anything.  They are going to start offering it to the patient.  Other than that the patient has no additional issues.  I did explain to him that he needs to ask them and should not be shy about it as it is painful to have a chest tube in place.    1/17/2021  The patient's pain is markedly improved over yesterday.  The patient's tachycardia is improved as well.  The patient does have a lot of lower extremity edema and was encouraged to place his legs up on an elevation so that hopefully this can resolve/improve.    1/18/2021  Patient reports ongoing pain from his right chest tube site.  He does report feeling better  after having had a couple bowel movements after receiving to collect suppository MiraLAX.  The nursing staff reports that he had been more confused today and requested antibiotic be changed from cefepime as a possible etiology for the confusion.  This was deferred to pulmonary and cefepime was changed to Rocephin.  Patient reports voiding several times after Beach was removed but it was just a small amount.  Post void residual showed 275 cc in and out cath performed with 200 cc removed.    1/19/2021  Patient continues to be confused per nursing staff.  He denies specific complaints at this time.  He had to have Beach catheter replaced because of urinary retention.    1/20/2021  Patient reports no specific complaints at this time.  His chest tube has been removed and Beach catheter remains.  Patient underwent heart cath which showed nonobstructive coronary disease, and severe LV dysfunction.    1/21/2021  Patient denies specific complaints at this time and is anxious to go home.  I spoke with his wife on the phone and all questions were answered.  The patient's wife reports that the patient refuses to eat because of the dietary restrictions.  She asked that his diet be changed to a regular diet.  She also reports that he tends to develop urinary tract infections when he is catheterized and she has requested a urinalysis be performed.  I spoke with the bedside nurse who reports that the patient has been drinking boost several times a day.    ROS   12 point review of systems was reviewed and was negative except as above.      Objective   Objective      Vital Signs    Temperature 98.5, pulse 95, respirations 19, blood pressure 124/63    Lines, Drains & Airways    Active LDAs     Name:   Placement date:   Placement time:   Site:   Days:    Peripheral IV 01/13/21 1250 Right Antecubital   01/13/21    1250    Antecubital   1    Peripheral IV 01/14/21 1312 Anterior;Right Forearm   01/14/21    1312    Forearm   less than 1               Physical Exam:  Physical Exam   Vital signs and nurses notes reviewed.  Well-developed well-nourished in no acute distress sitting up in bed awake and alert; mucous membranes moist; lungs with decreased air entry and faint scattered crackles and soft expiratory wheezes bilaterally; CV regular rate and rhythm; abdomen soft nontender nondistended; extremities with no edema, cyanosis or calf tenderness; palpable pedal pulses bilaterally; Beach catheter in place to bedside drain with ina urine.    Procedures:  Procedure(s):  BRONCHOSCOPY with bronchio alveolar lavage of right lower lung  Thoracostomy tube placed    Results Review:     I reviewed the patient's new clinical results.    01/21/2021 0147 01/21/2021 0223 Magnesium [[id number]]   Blood    Final result Component Value Units   Magnesium 2.1 mg/dL           01/21/2021 0147 01/21/2021 0223 Basic Metabolic Panel [[id number]]    (Abnormal)   Blood    Final result Component Value Units   Glucose 126High  mg/dL   BUN 36High  mg/dL   Creatinine 0.97 mg/dL   Sodium 136 mmol/L   Potassium 4.6 mmol/L   Chloride 101 mmol/L   CO2 29.0 mmol/L   Calcium 8.3Low  mg/dL   eGFR Non African Am 75 mL/min/1.73   BUN/Creatinine Ratio 37.1High     Anion Gap 6.0 mmol/L           01/21/2021 0147 01/21/2021 0203 CBC Auto Differential [[id number]]   (Abnormal)   Blood    Final result Component Value Units   WBC 10.20 10*3/mm3   RBC 4.35 10*6/mm3   Hemoglobin 12.4Low  g/dL   Hematocrit 38.3 %   MCV 88.0 fL   MCH 28.5 pg   MCHC 32.4 g/dL   RDW 16.3High  %   RDW-SD 50.3 fl   MPV 8.8 fL   Platelets 226 10*3/mm3   Neutrophil Rel % 94.0High  %   Lymphocyte Rel % 1.3Low  %   Monocyte Rel % 4.6Low  %   Eosinophil Rel % 0.0Low  %   Basophil Rel % 0.1 %   Neutrophils Absolute 9.60High  10*3/mm3   Lymphocytes Absolute 0.10Low  10*3/mm3   Monocytes Absolute 0.50 10*3/mm3   Eosinophils Absolute 0.00 10*3/mm3   Basophils Absolute 0.00 10*3/mm3   nRBC 0.0 /100 WBC            Lab Results   Lab  Value Date/Time    FINALDX  01/15/2021 1510     Pleural fluid, smears and cytospin preparation:    Benign mesothelial cells, histiocytes and moderate acute and chronic inflammation    Negative for malignant cells     JPR/tkd       FINALDX  01/14/2021 1347     Right lower lobe, bronchoalveolar lavage with smears and cytospin:    Acute inflammation, mature squamous cells, pulmonary macrophages and bronchial epithelial cells    No malignancy identified    See flow cytometry summary    JULITO/tkd          Microbiology Results (last 10 days)     Procedure Component Value - Date/Time    Body Fluid Culture - Body Fluid, Pleural Cavity [[id number]] Collected: 01/15/21 1509    Lab Status: Final result Specimen: Body Fluid from Pleural Cavity Updated: 01/20/21 1043     Body Fluid Culture No growth at 5 days     Gram Stain No organisms seen    AFB Culture - Body Fluid, Pleural Cavity [[id number]] Collected: 01/15/21 1509    Lab Status: Preliminary result Specimen: Body Fluid from Pleural Cavity Updated: 01/20/21 1531     AFB Culture No AFB isolated at less than 1 week     AFB Stain No acid fast bacilli seen    Fungus Culture - Lavage, Lung, Right Lower Lobe [[id number]]  (Abnormal) Collected: 01/14/21 1347    Lab Status: Preliminary result Specimen: Lavage from Lung, Right Lower Lobe Updated: 01/21/21 0804     Fungus Culture Candida species    Virus Culture - Lavage, Lung, Right Lower Lobe [[id number]]  (Abnormal) Collected: 01/14/21 1347    Lab Status: Preliminary result Specimen: Lavage from Lung, Right Lower Lobe Updated: 01/20/21 1509     Viral Culture, General Comment     Comment: 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       Narrative:      Performed at:  [address] [place] [address]  [id number]  : Rick Willis MD, Phone:  [phone]    AFB Culture - Lavage, Lung, Right Lower Lobe [[id number]] Collected: 01/14/21 1347     Lab Status: Preliminary result Specimen: Lavage from Lung, Right Lower Lobe Updated: 01/21/21 1445     AFB Culture No AFB isolated at 1 week     AFB Stain No acid fast bacilli seen    BAL Culture, Quantitative - Lavage, Lung, Right Lower Lobe [[id number]] Collected: 01/14/21 1347    Lab Status: Final result Specimen: Lavage from Lung, Right Lower Lobe Updated: 01/16/21 1322     BAL Culture Scant growth (1+) Normal Respiratory Terri: NO S.aureus/MRSA or Pseudomonas aeruginosa     Gram Stain Rare (1+) WBCs per low power field      No organisms seen    Narrative:      Not streaked for quantitation    Respiratory Panel, PCR (WITHOUT COVID) - Lavage, Lung, Right Lower Lobe [[id number]]  (Normal) Collected: 01/14/21 1347    Lab Status: Final result Specimen: Lavage from Lung, Right Lower Lobe Updated: 01/14/21 1542     ADENOVIRUS, PCR Not Detected     Coronavirus 229E Not Detected     Coronavirus HKU1 Not Detected     Coronavirus NL63 Not Detected     Coronavirus OC43 Not Detected     Human Metapneumovirus Not Detected     Human Rhinovirus/Enterovirus Not Detected     Influenza B PCR Not Detected     Parainfluenza Virus 1 Not Detected     Parainfluenza Virus 2 Not Detected     Parainfluenza Virus 3 Not Detected     Parainfluenza Virus 4 Not Detected     Bordetella pertussis pcr Not Detected     Chlamydophila pneumoniae PCR Not Detected     Mycoplasma pneumo by PCR Not Detected     Influenza A PCR Not Detected     RSV, PCR Not Detected     Bordetella parapertussis PCR Not Detected    Narrative:      The coronavirus on the RVP is NOT COVID-19 and is NOT indicative of infection with COVID-19.     Cytomegalovirus (CMV) By PCR - Lavage, Lung, Right Lower Lobe [[id number]] Collected: 01/14/21 1347    Lab Status: Final result Specimen: Lavage from Lung, Right Lower Lobe Updated: 01/18/21 2206     Specimen Source BAL     Cytomegalovirus PCR Negative     Comment: -------------------ADDITIONAL INFORMATION-------------------  This  test was developed and its performance characteristics  determined by [facility] in a manner consistent with CLIA  requirements. This test has not been cleared or approved by  the U.S. Food and Drug Administration.       Narrative:      Performed at:  [address]  [id number]  : Jonas Sorto , Phone:  [phone]    Pneumocystis PCR - Lavage, Lung, Right Lower Lobe [[id number]] Collected: 01/14/21 1347    Lab Status: Final result Specimen: Lavage from Lung, Right Lower Lobe Updated: 01/19/21 1808     Pneumocystis jiroveci DNA Negative     Comment: -------------------ADDITIONAL INFORMATION-------------------  This test was developed and its performance characteristics  determined by [facility] in a manner consistent with CLIA  requirements. This test has not been cleared or approved by  the U.S. Food and Drug Administration.  REFERENCE RANGE: Not Applicable        Specimen Source BAL    Narrative:      Performed at:  [address]  [id number]  : Jonas Sorto , Phone:  [phone]    Respiratory Panel, PCR (WITHOUT COVID) - Swab, Nasopharynx [[id number]]  (Normal) Collected: 01/13/21 1824    Lab Status: Final result Specimen: Swab from Nasopharynx Updated: 01/13/21 1940     ADENOVIRUS, PCR Not Detected     Coronavirus 229E Not Detected     Coronavirus HKU1 Not Detected     Coronavirus NL63 Not Detected     Coronavirus OC43 Not Detected     Human Metapneumovirus Not Detected     Human Rhinovirus/Enterovirus Not Detected     Influenza B PCR Not Detected     Parainfluenza Virus 1 Not Detected     Parainfluenza Virus 2 Not Detected     Parainfluenza Virus 3 Not Detected     Parainfluenza Virus 4 Not Detected     Bordetella pertussis pcr Not Detected     Chlamydophila pneumoniae PCR Not Detected     Mycoplasma pneumo by PCR Not Detected     Influenza A PCR Not Detected     RSV, PCR  Not Detected     Bordetella parapertussis PCR Not Detected    Narrative:      The coronavirus on the RVP is NOT COVID-19 and is NOT indicative of infection with COVID-19.     Blood Culture - Blood, Arm, Right [[id number]] Collected: 01/13/21 1310    Lab Status: Final result Specimen: Blood from Arm, Right Updated: 01/18/21 1315     Blood Culture No growth at 5 days    COVID-19,[employer] IN-HOUSE,Nasal Swab No Transport Media 3-4 HR TAT - Swab, Nasal Cavity [[id number]]  (Normal) Collected: 01/13/21 1303    Lab Status: Final result Specimen: Swab from Nasal Cavity Updated: 01/13/21 1343     COVID19 Not Detected    Narrative:      Fact sheet for providers: https://www.fda.gov/media/918751/download     Fact sheet for patients: https://www.fda.gov/media/026712/download    Test performed by PCR.    Blood Culture - Blood, Arm, Right [[id number]] Collected: 01/13/21 1302    Lab Status: Final result Specimen: Blood from Arm, Right Updated: 01/18/21 1315     Blood Culture No growth at 5 days          ECG/EMG Results (most recent)     Procedure Component Value Units Date/Time    ECG 12 Lead [[id number]] Collected: 01/14/21 0154     Updated: 01/14/21 0157     QT Interval 354 ms     Narrative:      HEART RATE= 140  bpm  RR Interval= 432  ms  NM Interval= 146  ms  P Horizontal Axis= -51  deg  P Front Axis= 41  deg  QRSD Interval= 102  ms  QT Interval= 354  ms  QRS Axis= 49  deg  T Wave Axis= 256  deg  - ABNORMAL ECG -  Sinus tachycardia  Multiform ventricular premature complexes  Repolarization abnormality, prob rate related  Prolonged QT interval  Electronically Signed By:   Date and Time of Study: 2021-01-14 01:54:00    ECG 12 Lead [[id number]] Collected: 01/13/21 1235     Updated: 01/14/21 0828     QT Interval 350 ms     Narrative:      HEART RATE= 108  bpm  RR Interval= 556  ms  NM Interval= 159  ms  P Horizontal Axis= 22  deg  P Front Axis= 44  deg  QRSD Interval= 95  ms  QT Interval= 350  ms  QRS Axis= 58  deg  T Wave Axis= 103   deg  - ABNORMAL ECG -  Sinus tachycardia  Multiple ventricular premature complexes  Probable LVH with secondary repol abnrm  Anterior Q waves, possibly due to LVH  When compared with ECG of 18-May-2019 9:03:54,  No significant change  Electronically Signed By: Dakota Rodrigues (IGNACIO) 14-Jan-2021 08:26:08  Date and Time of Study: 2021-01-13 12:35:42    Adult Transthoracic Echo Complete W/ Cont if Necessary Per Protocol [[id number]] Collected: 01/13/21 1727     Updated: 01/14/21 1705     BSA 2.0 m^2      RVIDd 2.1 cm      IVSd 0.9 cm      LVIDd 5.5 cm      LVIDs 5.2 cm      LVPWd 0.91 cm      IVS/LVPW 1.0     FS 5.1 %      EDV(Teich) 146.6 ml      ESV(Teich) 129.8 ml      EF(Teich) 11.5 %      EDV(cubed) 165.2 ml      ESV(cubed) 141.1 ml      EF(cubed) 14.6 %      LV mass(C)d 186.0 grams      LV mass(C)dI 93.9 grams/m^2      SV(Teich) 16.8 ml      SI(Teich) 8.5 ml/m^2      SV(cubed) 24.1 ml      SI(cubed) 12.2 ml/m^2      Ao root diam 3.8 cm      Ao root area 11.5 cm^2      LVOT diam 2.0 cm      LVOT area 3.1 cm^2      EDV(MOD-sp4) 125.6 ml      ESV(MOD-sp4) 87.5 ml      EF(MOD-sp4) 30.3 %      SV(MOD-sp4) 38.0 ml      SI(MOD-sp4) 19.2 ml/m^2      Ao root area (BSA corrected) 1.9     LV Knight Vol (BSA corrected) 63.4 ml/m^2      LV Sys Vol (BSA corrected) 44.2 ml/m^2      Aortic R-R 0.51 sec      Aortic .5 BPM      MV V2 max 90.8 cm/sec      MV max PG 3.3 mmHg      MV V2 mean 56.8 cm/sec      MV mean PG 1.5 mmHg      MV V2 VTI 17.5 cm      MVA(VTI) 2.9 cm^2      Ao pk lamont 267.9 cm/sec      Ao max PG 28.7 mmHg      Ao max PG (full) 25.4 mmHg      Ao V2 mean 186.7 cm/sec      Ao mean PG 15.8 mmHg      Ao mean PG (full) 14.0 mmHg      Ao V2 VTI 51.9 cm      IQRA(I,A) 0.97 cm^2      IQRA(I,D) 0.97 cm^2      IQRA(V,A) 1.0 cm^2      IQRA(V,D) 1.0 cm^2      AI max lamont 389.1 cm/sec      AI max PG 60.5 mmHg      AI dec slope 287.4 cm/sec^2      AI dec time 1.4 sec      AI P1/2t 396.5 msec      LV V1 max PG 3.3 mmHg      LV V1 mean  PG 1.8 mmHg      LV V1 max 90.4 cm/sec      LV V1 mean 63.5 cm/sec      LV V1 VTI 16.4 cm      MR max lamont 514.5 cm/sec      MR max .9 mmHg      CO(Ao) 70.8 l/min      CI(Ao) 35.7 l/min/m^2      SV(Ao) 597.3 ml      SI(Ao) 301.4 ml/m^2      CO(LVOT) 6.0 l/min      CI(LVOT) 3.0 l/min/m^2      SV(LVOT) 50.4 ml      SI(LVOT) 25.4 ml/m^2      PA V2 max 68.8 cm/sec      PA max PG 1.9 mmHg      PA max PG (full) -0.6 mmHg      PA V2 mean 47.5 cm/sec      PA mean PG 1.0 mmHg      PA mean PG (full) -0.25 mmHg      PA V2 VTI 14.3 cm      RV V1 max PG 2.5 mmHg      RV V1 mean PG 1.3 mmHg      RV V1 max 78.9 cm/sec      RV V1 mean 52.8 cm/sec      RV V1 VTI 15.3 cm      TR max lamont 291.0 cm/sec      RVSP(TR) 36.9 mmHg      RAP systole 3.0 mmHg       CV ECHO KATT - BZI_BMI 25.4 kilograms/m^2       CV ECHO KATT - BSA(HAYCOCK) 2.0 m^2       CV ECHO KATT - BZI_METRIC_WEIGHT 80.3 kg       CV ECHO KATT - BZI_METRIC_HEIGHT 177.8 cm      LA dimension(2D) 3.3 cm     Narrative:      · Left ventricular ejection fraction appears to be 21 - 25%.  · Severe mitral valve regurgitation is present.  · Mild dilation of the aortic root is present.  · There is a large (>2cm) pericardial effusion adjacent to the right   atrium.  · The following left ventricular wall segments are hypokinetic: mid   anterior, apical anterior, basal anterolateral, mid anterolateral, apical   lateral, basal inferolateral, mid inferolateral, apical inferior, mid   inferior, apical septal, basal inferoseptal, mid inferoseptal, apex   hypokinetic, mid anteroseptal, basal anterior, basal inferior and basal   inferoseptal.  · No pericardial tamponade noted       ECG 12 Lead [[id number]] Collected: 01/15/21 0549     Updated: 01/15/21 0550     QT Interval 380 ms     Narrative:      HEART RATE= 92  bpm  RR Interval= 651  ms  ID Interval=   ms  P Horizontal Axis=   deg  P Front Axis=   deg  QRSD Interval= 102  ms  QT Interval= 380  ms  QRS Axis= 64  deg  T Wave Axis=  249  deg  - ABNORMAL ECG -  Atrial fibrillation  Probable left ventricular hypertrophy  Nonspecific T abnormalities, inferior leads  Electronically Signed By:   Date and Time of Study: 2021-01-15 05:49:00    ECG 12 Lead [[id number]] Collected: 01/16/21 0533     Updated: 01/16/21 0535     QT Interval 356 ms     Narrative:      HEART RATE= 123  bpm  RR Interval= 487  ms  VA Interval=   ms  P Horizontal Axis=   deg  P Front Axis=   deg  QRSD Interval= 91  ms  QT Interval= 356  ms  QRS Axis= 86  deg  T Wave Axis= -86  deg  - ABNORMAL ECG -  Atrial fibrillation  Anterior infarct, old  Repol abnrm suggests ischemia, inferior leads  Borderline ST elevation, lateral leads  Prolonged QT interval  Electronically Signed By:   Date and Time of Study: 2021-01-16 05:33:41    ECG 12 Lead [[id number]] Collected: 01/17/21 0612     Updated: 01/17/21 0614     QT Interval 418 ms     Narrative:      HEART RATE= 106  bpm  RR Interval= 567  ms  VA Interval=   ms  P Horizontal Axis=   deg  P Front Axis=   deg  QRSD Interval= 105  ms  QT Interval= 418  ms  QRS Axis= 67  deg  T Wave Axis= 260  deg  - ABNORMAL ECG -  Atrial fibrillation  Probable LVH with secondary repol abnrm  Prolonged QT interval  When compared with ECG of 16-Jan-2021 5:33:41,  Significant repolarization change  Significant axis, voltage or hypertrophy change  Electronically Signed By:   Date and Time of Study: 2021-01-17 06:12:07           Results for orders placed during the hospital encounter of 01/13/21   Adult Transthoracic Echo Complete W/ Cont if Necessary Per Protocol    Narrative · Left ventricular ejection fraction appears to be 21 - 25%.  · Severe mitral valve regurgitation is present.  · Mild dilation of the aortic root is present.  · There is a large (>2cm) pericardial effusion adjacent to the right   atrium.  · The following left ventricular wall segments are hypokinetic: mid   anterior, apical anterior, basal anterolateral, mid anterolateral, apical   lateral,  basal inferolateral, mid inferolateral, apical inferior, mid   inferior, apical septal, basal inferoseptal, mid inferoseptal, apex   hypokinetic, mid anteroseptal, basal anterior, basal inferior and basal   inferoseptal.  · No pericardial tamponade noted        Xr Chest 1 View    Result Date: 1/22/2021  1 no evidence of pneumothorax. 2. Stable volume loss in the right lung with right hilar/suprahilar mass or masslike consolidation, similar to prior. 3. Resolving right basilar atelectasis.  Electronically Signed By-Nikolas Lloyd MD On:1/22/2021 7:42 AM This report was finalized on [id number] by  Nikolas Lloyd MD.    Xr Chest 1 View    Result Date: 1/21/2021   1. Removal of right basilar pigtail thoracostomy tube. No pneumothorax evident. 2. Increasing consolidation in the right lung base suggesting atelectasis or infiltrate. 3. Right hilar/suprahilar mass or masslike consolidation. This could be more definitively evaluated by CT..  Electronically Signed By-Nikolas Lloyd MD On:1/21/2021 7:57 AM This report was finalized on [id number] by  Nikolas Lloyd MD.    Xr Chest 1 View    Result Date: 1/20/2021  1. Small right basilar pneumothorax without tension. 2. Right suprahilar opacity is again present which may reflect scarring or other chronic change. A parenchymal mass cannot be excluded. Electronically signed by:  Brant England M.D.  1/20/2021 12:46 AM    Xr Chest 1 View    Result Date: 1/19/2021   1. Stable bilateral perihilar linear atelectatic changes. 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 2. Right basilar pleural drain unchanged in position. No visible pneumothorax.  Electronically Signed By-Maura Esteves MD On:1/19/2021 8:21 AM This report was finalized on [id number] by  Maura Esteves MD.    Xr Chest 1 View    Result Date: 1/18/2021  No interval change from yesterday's study as described above.  Electronically Signed By-Kel Fair MD On:1/18/2021 7:52 AM This report was  finalized on [id number] by  Kel Fair MD.    Xr Chest 1 View    Result Date: 1/17/2021  1.Right basilar chest tube appears in similar position. No significant pneumothorax or pleural effusion is seen. 2.Postsurgical/post therapy changes in the right lung, as before.  Electronically Signed By-Brant Dao MD On:1/17/2021 8:34 AM This report was finalized on [id number] by  Brant Dao MD.    Xr Chest 1 View    Result Date: 1/16/2021   1. Improved aeration with small caliber chest tube in the right lung base. A pneumothorax or pleural effusion is not demonstrated. 2. Persistent postsurgical and treatment related changes in the right superhilar region, this is likely treatment related changes, is similar radiographically when compared with the study from 08/23/2018.  Electronically Signed By-Donny Abdi DO On:1/16/2021 10:59 AM This report was finalized on [id number] by  Donny Abdi DO.    Xr Chest 1 View    Result Date: 1/15/2021  Significantly improved aeration in the right hemithorax, that is post pleural drain placement. Mild residual atelectasis remains. No visible pleural fluid or pneumothorax.  Electronically Signed By-Maura Esteves MD On:1/15/2021 3:10 PM This report was finalized on [id number] by  Maura Esteves MD.    Xr Chest 1 View    Result Date: 1/15/2021   1. Moderate-large sized infiltrate pleural effusion involving the right midlung zone right lower lobe lung has improved slightly since previous study performed on 01/13/2021  Electronically Signed By-Baron Diggs MD On:1/15/2021 7:53 AM This report was finalized on [id number] by  Baron Diggs MD.    Us Aorta Limited    Result Date: 1/14/2021  1. Redemonstration of a distal abdominal aortic aneurysm measuring 5.3 x 4.9 cm. This is very similar in size to that described on May 18, 2019. 2. Severe atherosclerotic disease. 3. There are limitations to this study secondary to overlying bowel gas. Slot 63  Electronically signed by:  Vladislav Rich M.D.  1/14/2021 10:17 PM  Xrays, labs reviewed personally by physician.    Medication Review:   I have reviewed the patient's current medication list    Scheduled Meds  amiodarone, 200 mg, Oral, BID With Meals  bisacodyl, 10 mg, Rectal, Daily  budesonide, 0.5 mg, Nebulization, BID - RT  digoxin, 125 mcg, Oral, Daily  dilTIAZem, 30 mg, Oral, Q6H  enoxaparin, 1 mg/kg, Subcutaneous, Q12H  fentaNYL citrate (PF), 25 mcg, Intravenous, Once  finasteride, 5 mg, Oral, Daily  furosemide, 20 mg, Intravenous, Daily  guaiFENesin, 1,200 mg, Oral, Q12H  ipratropium-albuterol, 3 mL, Nebulization, 4x Daily - RT  methylPREDNISolone sodium succinate, 40 mg, Intravenous, Q8H  metoprolol tartrate, 25 mg, Oral, Q8H  midazolam, 1 mg, Intravenous, Once  multivitamin with minerals, 1 tablet, Oral, Daily  polyethylene glycol, 17 g, Oral, BID  rosuvastatin, 40 mg, Oral, Nightly  sodium chloride, 10 mL, Intravenous, Q12H  tamsulosin, 0.4 mg, Oral, Daily With Dinner  theophylline, 300 mg, Oral, Daily        Meds Infusions       Meds PRN  •  acetaminophen **OR** acetaminophen **OR** acetaminophen  •  atropine  •  benzonatate  •  HYDROcodone-acetaminophen  •  HYDROcodone-acetaminophen  •  ipratropium-albuterol  •  magnesium hydroxide  •  magnesium sulfate **OR** magnesium sulfate in D5W 1g/100mL (PREMIX)  •  nitroglycerin  •  ondansetron **OR** ondansetron  •  potassium chloride  •  [COMPLETED] Insert peripheral IV **AND** sodium chloride  •  sodium chloride  •  sodium chloride        Assessment/Plan   Assessment/Plan     Active Hospital Problems    Diagnosis  POA   • **Acute respiratory distress [R06.03]  Yes   • Chest pain [R07.9]  Yes   • Atrial fibrillation with RVR (CMS/HCC) [I48.91]  Yes   • BPH without obstruction/lower urinary tract symptoms [N40.0]  Yes   • History of lung cancer [Z85.118]  Not Applicable   • COPD exacerbation (CMS/HCC) [J44.1]  Yes   • Malignant neoplasm of upper lobe of right  lung (CMS/[facility]) [C34.11]  Yes   • Acute systolic congestive heart failure (CMS/[facility]) [I50.21]  Unknown   • Coronary artery disease of native artery of native heart with stable angina pectoris (CMS/[facility]) [I25.118]  Unknown   • Hypertension [I10]  Yes   • Hyperlipidemia [E78.5]  Yes   • Coronary artery disease [I25.10]  Yes   • Chronic obstructive pulmonary disease (CMS/[facility]) [J44.9]  Yes      Resolved Hospital Problems   No resolved problems to display.       MEDICAL DECISION MAKING COMPLEXITY BY PROBLEM:     Acute toxic and metabolic encephalopathy, improving; likely multifactorial secondary to pneumonia, acute respiratory failure, A. fib with RVR and/or medication  -Cefepime was changed to Rocephin 1/18/2021 because of the patient's confusion    Acute hypoxic respiratory failure multifactorial secondary to pneumonia and large right pleural effusion  -Pulmonary consulting and managing  -Status post chest tube placement for large right pleural effusion; body fluid culture and cytology pending  -Status post bronchoscopy 1/14/2021 with BAL showing normal respiratory keturah  -Patient on Rocephin; pulmonary managing antibiotics  -Await cytology from BAL and results still pending for possible atypical infections   -Continue Solu-Medrol, guaifenesin, duo nebs and theophylline per pulmonary    Atrial fibrillation with RVR  -Cardiology consulting and managing  -Currently on amiodarone (IV has been changed to oral), digoxin, metoprolol and verapamil  -Patient on therapeutic dosage of Lovenox per cardiology    Acute chest pain, resolved; etiology not determined  -Serial troponins negative    Coronary artery disease status post PCI and stents  -Cardiology consulted  -Continue metoprolol, statin and Lovenox  -Cardiac cath performed 1/20/2021 showed nonocclusive coronary artery disease    Acute on chronic systolic congestive heart failure due to ischemic cardiomyopathy and valvular heart disease  -proBNP 7372 on 1/13/2021 and was  10,378 on 1/14/2021  -Echo 1/13/2021 showed LVEF of 21 to 25% with severe MVR, mild dilatation of the aortic root, large pericardial effusion greater than 2 cm adjacent to the right atrium, with no cardiac tamponade; RVSP 36.9 mmHg  -Patient was started on low-dose beta-blockers cardiology  -Blood pressure will not tolerate ACE inhibitor at this point but it may be able to be added later    History of lung cancer  -Status post right upper lobe resection followed by radiation as well as chemotherapy  -Status post bronchoscopy which showed evidence of pneumonia with no intrabronchial lesions  -Status post right thoracentesis with current thoracostomy tube in place that is draining serosanguineous fluid  -cytology on the pleural fluid negative    Abdominal aortic aneurysm  -Patient was having this followed but has missed his appointment last year   -The patient had an abdominal ultrasound and his aneurysm is 5.3 x 4.9 cm which is very similar in size to see what was seen in May 2019  -patient should continue follow-up as an outpatient with his vascular surgeon    Urinary retention secondary to bilateral prostatic hypertrophy with gross hematuria  -Urology consulting  -Status post cystoscopy 1/18/2021 which showed 45 mm prostate no evidence of bladder cancer or stones  -Patient failed voiding trial 1/18/2021 and had to have [place] replaced  -Continue Flomax and Proscar    History of recurrent urinary tract infections related to catheterizations  -Check urinalysis    Hyponatremia, mild  -Monitor    Acute constipation, new problem 1/18/2021, resolved  -Patient had 2 bowel movements after receiving MiraLAX and Dulcolax suppository    VTE Prophylaxis -   Mechanical Order History:      Ordered        01/13/21 1708  Place Sequential Compression Device  Once         01/13/21 1708  Maintain Sequential Compression Device  Continuous                 Pharmalogical Order History:      Ordered     Dose Route Frequency Stop     01/13/21 1502  enoxaparin (LOVENOX) syringe 80 mg      1 mg/kg SC Once 01/13/21 1520              Code Status -   Code Status and Medical Interventions:   Ordered at: 01/13/21 1610     Limited Support to NOT Include:    Intubation     Level Of Support Discussed With:    Patient     Code Status:    CPR     Medical Interventions (Level of Support Prior to Arrest):    Limited     This patient has been examined wearing appropriate Personal Protective Equipment and discussed with hospital infection control department. 01/22/21    Discharge Planning  Patient will be going home with home health care once he is weaned off of the IV steroids.    Electronically signed by Dottie Casillas MD, 01/22/21, 09:37 EST.  [facility] Hospitalist Team

## 2021-01-22 NOTE — PLAN OF CARE
Goal Outcome Evaluation:  Plan of Care Reviewed With: patient  Progress: improving  Outcome Summary: pt iain tx session much better today.  pt is transfering with roll walker with sba and amb 200' with roll walker and supervision assist.  PT recommend home with family at d/c.  PPE used:  mask, safety glasses and gloves

## 2021-01-22 NOTE — THERAPY TREATMENT NOTE
Patient Name: Lenny Larson  : 1940    MRN: [id number]                              Today's Date: 2021       Admit Date: 2021    Visit Dx:     ICD-10-CM ICD-9-CM   1. Chronic obstructive pulmonary disease with acute exacerbation (CMS/HCC)  J44.1 491.21   2. Acute respiratory distress  R06.03 518.82   3. Atrial fibrillation with RVR (CMS/HCC)  I48.91 427.31   4. Pleural effusion  J90 511.9   5. Malignant neoplasm of upper lobe of right lung (CMS/HCC)  C34.11 162.3   6. Acute systolic congestive heart failure (CMS/HCC)  I50.21 428.21     428.0   7. 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)  I25.118 414.01     413.9     Patient Active Problem List   Diagnosis   • Chronic obstructive pulmonary disease (CMS/HCC)   • Coronary artery disease   • Hyperlipidemia   • Hypertension   • ST elevation (STEMI) myocardial infarction (CMS/HCC)   • Status post coronary artery stent placement   • Acute respiratory distress   • Abdominal aortic aneurysm (AAA) (CMS/HCC)   • Lung cancer (CMS/HCC)   • Chest pain   • Atrial fibrillation with RVR (CMS/HCC)   • BPH without obstruction/lower urinary tract symptoms   • History of lung cancer   • COPD exacerbation (CMS/HCC)   • Malignant neoplasm of upper lobe of right lung (CMS/HCC)   • Acute systolic congestive heart failure (CMS/HCC)   • 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)     Past Medical History:   Diagnosis Date   • COPD (chronic obstructive pulmonary disease) (CMS/HCC)    • Coronary artery disease    • Hyperlipidemia    • Hypertension      Past Surgical History:   Procedure Laterality Date   • BRONCHOSCOPY N/A 2021    Procedure: BRONCHOSCOPY with bronchio alveolar lavage of right lower lung;  Surgeon: Sanjay Gonsales MD;  Location: [facility] ENDOSCOPY;  Service: Pulmonary;  Laterality: N/A;  post op: pneumonia   • CARDIAC CATHETERIZATION      PCI   • CARDIAC  CATHETERIZATION N/A 1/20/2021    Procedure: Left Heart Cath and coronary angiogram;  Surgeon: Alvarado Cai MD;  Location: [facility] CATH INVASIVE LOCATION;  Service: Cardiovascular;  Laterality: N/A;   • CARDIAC CATHETERIZATION N/A 1/20/2021    Procedure: Left ventriculography;  Surgeon: Alvarado Cai MD;  Location: [facility] CATH INVASIVE LOCATION;  Service: Cardiovascular;  Laterality: N/A;     General Information     Row Name 01/21/21 1906          Physical Therapy Time and Intention    Document Type  therapy note (daily note)  -SC     Mode of Treatment  individual therapy;physical therapy  -SC     Row Name 01/21/21 1906          Cognition    Orientation Status (Cognition)  oriented x 3 pt reported he ordered food and he is really hungry.  pt agreeable to work with PT till meal arrives  -SC     Row Name 01/21/21 1906          Safety Issues, Functional Mobility    Impairments Affecting Function (Mobility)  balance  -SC       User Key  (r) = Recorded By, (t) = Taken By, (c) = Cosigned By    Initials Name Provider Type    SC Rosanne Caldera PTA Physical Therapy Assistant        Mobility     Row Name 01/21/21 1910          Bed Mobility    Comment (Bed Mobility)  pt already up in the chair  -SC     Row Name 01/21/21 1910          Sit-Stand Transfer    Sit-Stand [place] (Transfers)  standby assist  -SC     Assistive Device (Sit-Stand Transfers)  walker, front-wheeled  -SC     Row Name 01/21/21 1910          Gait/Stairs (Locomotion)    [place] Level (Gait)  standby assist  -SC     Assistive Device (Gait)  walker, front-wheeled  -SC     Distance in Feet (Gait)  200'  -SC     Comment (Gait/Stairs)  no loss of balance while amb with roll walker.  pt iain increased gait distance  -SC       User Key  (r) = Recorded By, (t) = Taken By, (c) = Cosigned By    Initials Name Provider Type    Rosanne Ricks PTA Physical Therapy Assistant        Obj/Interventions     Row Name 01/21/21 1911          Motor Skills     Therapeutic Exercise  -- standing marches, heel raises, mini squats  1/10  -[facility] Name 01/21/21 1911          Balance    Balance Assessment  sitting dynamic balance  -SC     Static Sitting Balance  sitting in chair;WNL  -SC     Dynamic Sitting Balance  sitting in chair;WNL  -SC     Static Standing Balance  WFL;supported  -SC     Dynamic Standing Balance  WFL;supported  -SC       User Key  (r) = Recorded By, (t) = Taken By, (c) = Cosigned By    Initials Name Provider Type    Rosanne Ricks, SHERWIN Physical Therapy Assistant        Goals/Plan    No documentation.       Clinical Impression     [facility] Name 01/21/21 1913          Pain Scale: FACES Pre/Post-Treatment    Pain: FACES Scale, Pretreatment  0-->no hurt  -SC     Posttreatment Pain Rating  0-->no hurt  -SC     Pre/Posttreatment Pain Comment  pt did not exhibit any pain today.  -[facility] Name 01/21/21 1913          Therapy Assessment/Plan (PT)    Rehab Potential (PT)  good, to achieve stated therapy goals  -SC     Criteria for Skilled Interventions Met (PT)  skilled treatment is necessary  -[facility] Name 01/21/21 1913          Vital Signs    O2 Delivery Pre Treatment  room air  -SC     O2 Delivery Intra Treatment  room air  -SC     O2 Delivery Post Treatment  room air  -[facility] Name 01/21/21 1913          Positioning and Restraints    Pre-Treatment Position  sitting in chair/recliner  -SC     Post Treatment Position  chair  -SC     In Chair  notified nsg;reclined;call light within reach;encouraged to call for assist;exit alarm on  -SC       User Key  (r) = Recorded By, (t) = Taken By, (c) = Cosigned By    Initials Name Provider Type    Rosanne Ricks, SHERWIN Physical Therapy Assistant        Outcome Measures     Row Name 01/21/21 1914          How much help from another person do you currently need...    Turning from your back to your side while in flat bed without using bedrails?  4  -SC     Moving from lying on back to sitting on the side of a  flat bed without bedrails?  4  -SC     Moving to and from a bed to a chair (including a wheelchair)?  4  -SC     Standing up from a chair using your arms (e.g., wheelchair, bedside chair)?  4  -SC     Climbing 3-5 steps with a railing?  3  -SC     To walk in hospital room?  4  -SC     AM-PAC 6 Clicks Score (PT)  23  -SC     Row Name 01/21/21 1914          Functional Assessment    Outcome Measure Options  AM-PAC 6 Clicks Basic Mobility (PT)  -SC       User Key  (r) = Recorded By, (t) = Taken By, (c) = Cosigned By    Initials Name Provider Type    SC Rosanne Caldera PTA Physical Therapy Assistant        Physical Therapy Education                 Title: PT OT SLP Therapies (Done)     Topic: Physical Therapy (Done)     Point: Mobility training (Done)     Learning Progress Summary           Patient Acceptance, E, VU by SC at 1/21/2021 1914    Acceptance, E, VU,NR by  at 1/20/2021 2305    Acceptance, E,TB, VU by  at 1/18/2021 1142    Acceptance, E,TB, VU by  at 1/15/2021 1254                   Point: Precautions (Done)     Learning Progress Summary           Patient Acceptance, E, VU by SC at 1/21/2021 1914    Acceptance, E, VU,NR by  at 1/20/2021 2305    Acceptance, E,TB, VU by  at 1/18/2021 1142    Acceptance, E,TB, VU by  at 1/15/2021 1254                               User Key     Initials Effective Dates Name Provider Type Discipline    SC 03/01/19 -  Rosanne Caldera PTA Physical Therapy Assistant PT    EL 06/23/20 -  Larry Queen, PT Physical Therapist PT     03/01/19 -  Lorenza Nicole, RN Registered Nurse Nurse              PT Recommendation and Plan     Plan of Care Reviewed With: patient  Progress: improving  Outcome Summary: pt iain tx session much better today.  pt is transfering with roll walker with sba and amb 200' with roll walker and supervision assist.  PT recommend home with family at d/c.  PPE used:  mask, safety glasses and gloves     Time Calculation:   PT Charges     Row Name  01/21/21 1907             Time Calculation    Start Time  1445  -SC      Stop Time  1509  -SC      Time Calculation (min)  24 min  -SC      PT Received On  01/21/21  -SC      PT - Next Appointment  01/22/21  -SC         Time Calculation- PT    Total Timed Code Minutes- PT  24 minute(s)  -SC         Timed Charges    02289 - Gait Training Minutes   14  -SC      89693 - PT Therapeutic Activity Minutes  10  -SC        User Key  (r) = Recorded By, (t) = Taken By, (c) = Cosigned By    Initials Name Provider Type    SC Rosanne Caldera, SHERWIN Physical Therapy Assistant        Therapy Charges for Today     Code Description Service Date Service Provider Modifiers Qty    74894432588 HC GAIT TRAINING EA 15 MIN 1/21/2021 Rosanne Caldera, SHERWIN GP 1    67813041275 HC PT THERAPEUTIC ACT EA 15 MIN 1/21/2021 Rosanne Caldera, SHERWIN GP 1          PT G-Codes  Outcome Measure Options: AM-PAC 6 Clicks Basic Mobility (PT)  AM-PAC 6 Clicks Score (PT): 23    Rosanne Caldera PTA  1/21/2021

## 2021-01-22 NOTE — PROGRESS NOTES
Referring Provider: Hospitalist    Reason for follow-up: Shortness of breath     Patient Care Team:  Camron Pollard MD as PCP - General (Family Medicine)  Alvarado Cai MD as Consulting Physician (Cardiology)    Subjective .  Shortness of breath is slowly improving    Objective Sitting up in chair     Review of Systems   Constitution: Negative for chills and fever.   HENT: Negative for ear discharge and nosebleeds.    Eyes: Negative for discharge and redness.   Cardiovascular: Negative for chest pain, orthopnea, palpitations, paroxysmal nocturnal dyspnea and syncope.   Respiratory: Positive for shortness of breath. Negative for cough and wheezing.    Endocrine: Negative for heat intolerance.   Skin: Negative for rash.   Musculoskeletal: Negative for arthritis and myalgias.   Gastrointestinal: Negative for abdominal pain, melena, nausea and vomiting.   Genitourinary: Negative for dysuria and hematuria.   Neurological: Negative for dizziness, light-headedness, numbness and tremors.   Psychiatric/Behavioral: Negative for depression. The patient is not nervous/anxious.        Ativan [lorazepam]    Scheduled Meds:amiodarone, 200 mg, Oral, BID With Meals  bisacodyl, 10 mg, Rectal, Daily  budesonide, 0.5 mg, Nebulization, BID - RT  digoxin, 125 mcg, Oral, Daily  dilTIAZem, 30 mg, Oral, Q6H  enoxaparin, 1 mg/kg, Subcutaneous, Q12H  fentaNYL citrate (PF), 25 mcg, Intravenous, Once  finasteride, 5 mg, Oral, Daily  furosemide, 20 mg, Intravenous, Daily  guaiFENesin, 1,200 mg, Oral, Q12H  ipratropium-albuterol, 3 mL, Nebulization, 4x Daily - RT  methylPREDNISolone sodium succinate, 40 mg, Intravenous, Q8H  metoprolol tartrate, 25 mg, Oral, Q8H  midazolam, 1 mg, Intravenous, Once  multivitamin with minerals, 1 tablet, Oral, Daily  nystatin, 5 mL, Oral, 4x Daily  polyethylene glycol, 17 g, Oral, BID  rosuvastatin, 40 mg, Oral, Nightly  sodium chloride, 10 mL, Intravenous, Q12H  tamsulosin, 0.4 mg, Oral, Daily With  "Dinner  theophylline, 300 mg, Oral, Daily      Continuous Infusions:   PRN Meds:.•  acetaminophen **OR** acetaminophen **OR** acetaminophen  •  atropine  •  benzonatate  •  HYDROcodone-acetaminophen  •  HYDROcodone-acetaminophen  •  ipratropium-albuterol  •  magnesium hydroxide  •  magnesium sulfate **OR** magnesium sulfate in D5W 1g/100mL (PREMIX)  •  nitroglycerin  •  ondansetron **OR** ondansetron  •  potassium chloride  •  [COMPLETED] Insert peripheral IV **AND** sodium chloride  •  sodium chloride  •  sodium chloride        VITAL SIGNS  Vitals:    01/22/21 0644 01/22/21 0846 01/22/21 1052 01/22/21 1055   BP:  (!) 101/35     BP Location:       Patient Position:       Pulse:  114 92 92   Resp: 20 20 20   Temp:       TempSrc:       SpO2:       Weight:       Height:           Flowsheet Rows      First Filed Value   Admission Height  177.8 cm (70\") Documented at 01/13/2021 1222   Admission Weight  80.6 kg (177 lb 11.1 oz) Documented at 01/13/2021 1222           TELEMETRY: Atrial fibrillation with controlled ventricular response  Physical Exam:  Constitutional:       Appearance: Well-developed.   Eyes:      General: No scleral icterus.        Right eye: No discharge.      Conjunctiva/sclera: Conjunctivae normal.      Pupils: Pupils are equal, round, and reactive to light.   HENT:      Head: Normocephalic and atraumatic.   Neck:      Musculoskeletal: Normal range of motion and neck supple.      Thyroid: No thyromegaly.      Vascular: No carotid bruit or JVD.      Lymphadenopathy: No cervical adenopathy.   Pulmonary:      Effort: Pulmonary effort is normal. No respiratory distress.      Breath sounds: No wheezing. Rhonchi present. No rales.   Cardiovascular:      Normal rate. Irregularly irregular rhythm.      No gallop.   Pulses:     Intact distal pulses.   Abdominal:      General: Bowel sounds are normal.      Palpations: Abdomen is soft.      Tenderness: There is no abdominal tenderness.   Musculoskeletal: Normal " range of motion.   Skin:     General: Skin is warm and dry.      Findings: No erythema or rash.   Neurological:      Mental Status: Alert and oriented to person, place, and time.      Comments: No focal deficits          Results Review:   I reviewed the patient's new clinical results.  Lab Results (last 24 hours)     Procedure Component Value Units Date/Time    Urine Culture - Urine, Urine, Random Void [[id number]]  (Normal) Collected: 01/21/21 1752    Specimen: Urine, Random Void Updated: 01/22/21 0957     Urine Culture No growth    Magnesium [[id number]]  (Normal) Collected: 01/22/21 0449    Specimen: Blood Updated: 01/22/21 0549     Magnesium 2.2 mg/dL     Basic Metabolic Panel [[id number]]  (Abnormal) Collected: 01/22/21 0449    Specimen: Blood Updated: 01/22/21 0549     Glucose 127 mg/dL      BUN 36 mg/dL      Creatinine 1.05 mg/dL      Sodium 138 mmol/L      Potassium 4.1 mmol/L      Chloride 100 mmol/L      CO2 31.0 mmol/L      Calcium 8.5 mg/dL      eGFR Non African Amer 68 mL/min/1.73      BUN/Creatinine Ratio 34.3     Anion Gap 7.0 mmol/L     Narrative:      GFR Normal >60  Chronic Kidney Disease <60  Kidney Failure <15      CBC & Differential [[id number]]  (Abnormal) Collected: 01/22/21 0449    Specimen: Blood Updated: 01/22/21 0534    Narrative:      The following orders were created for panel order CBC & Differential.  Procedure                               Abnormality         Status                     ---------                               -----------         ------                     CBC Auto Differential[[id number]]        Abnormal            Final result                 Please view results for these tests on the individual orders.    CBC Auto Differential [[id number]]  (Abnormal) Collected: 01/22/21 0449    Specimen: Blood Updated: 01/22/21 0534     WBC 11.80 10*3/mm3      RBC 4.49 10*6/mm3      Hemoglobin 12.8 g/dL      Hematocrit 39.0 %      MCV 86.9 fL      MCH 28.5 pg      MCHC 32.8 g/dL      RDW  16.2 %      RDW-SD 49.4 fl      MPV 8.8 fL      Platelets 235 10*3/mm3      Neutrophil % 95.6 %      Lymphocyte % 0.8 %      Monocyte % 3.4 %      Eosinophil % 0.0 %      Basophil % 0.2 %      Neutrophils, Absolute 11.30 10*3/mm3      Lymphocytes, Absolute 0.10 10*3/mm3      Monocytes, Absolute 0.40 10*3/mm3      Eosinophils, Absolute 0.00 10*3/mm3      Basophils, Absolute 0.00 10*3/mm3      nRBC 0.0 /100 WBC     Urinalysis, Microscopic Only - Urine, Random Void [[id number]]  (Abnormal) Collected: 01/21/21 1752    Specimen: Urine, Random Void Updated: 01/21/21 1815     RBC, UA 13-20 /HPF      WBC, UA 13-20 /HPF      Bacteria, UA Trace /HPF      Squamous Epithelial Cells, UA None Seen /HPF      Hyaline Casts, UA None Seen /LPF      Amorphous Crystals, UA Small/1+ /HPF      Methodology Manual Light Microscopy    Urinalysis With Culture If Indicated - Urine, Random Void [[id number]]  (Abnormal) Collected: 01/21/21 1752    Specimen: Urine, Random Void Updated: 01/21/21 1803     Color, UA Yellow     Appearance, UA Cloudy     Comment: Result checked         pH, UA 5.5     Specific Gravity, UA 1.016     Glucose, UA Negative     Ketones, UA Negative     Bilirubin, UA Negative     Blood, UA Large (3+)     Protein, UA 30 mg/dL (1+)     Leuk Esterase, UA Trace     Nitrite, UA Negative     Urobilinogen, UA 0.2 E.U./dL    AFB Culture - Lavage, Lung, Right Lower Lobe [[id number]] Collected: 01/14/21 1347    Specimen: Lavage from Lung, Right Lower Lobe Updated: 01/21/21 1445     AFB Culture No AFB isolated at 1 week     AFB Stain No acid fast bacilli seen          Imaging Results (Last 24 Hours)     Procedure Component Value Units Date/Time    XR Chest 1 View [[id number]] Collected: 01/22/21 0740     Updated: 01/22/21 0744    Narrative:      DATE OF EXAM:  1/22/2021 5:16 AM     PROCEDURE:  XR CHEST 1 VW-     INDICATIONS:  chest catheter; J44.1-Chronic obstructive pulmonary disease with (acute)  exacerbation; R06.03-Acute respiratory  distress; I48.91-Unspecified  atrial fibrillation; C61-Oamyppl effusion, not elsewhere classified;  C34.11-Malignant neoplasm of upper lobe, right bronchus or lung;  I50.21-Acute systolic (congestive) heart failure;  I25.118-Atherosclerotic heart disease of native coronary artery with  other     COMPARISON:  1/21/2021     TECHNIQUE:   Single radiographic view of the chest was obtained.     FINDINGS:  There is elevation of the right hemidiaphragm again noted. No  pneumothorax is seen. There is volume loss in the right lung again  noted. Right hilar and suprahilar mass or masslike consolidation is  again noted. Heart size and mediastinal contour appear unchanged. No  other pulmonary consolidations are evident.       Impression:      1 no evidence of pneumothorax.  2. Stable volume loss in the right lung with right hilar/suprahilar mass  or masslike consolidation, similar to prior.  3. Resolving right basilar atelectasis.     Electronically Signed By-Nikolas Lloyd MD On:1/22/2021 7:42 AM  This report was finalized on [id number] by  Nikolas Lloyd MD.          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 obstruction/lower urinary tract symptoms    History of lung cancer    COPD exacerbation (CMS/HCC)    Malignant neoplasm of upper lobe of right lung (CMS/HCC)       Assessment:    Afib RVR  HFrEF  Pericardial Effusion  Hx Lung CA  Congestive heart    Plan:    Patient presented with shortness  of breath and had elevated lactate level and BNP level  Patient had an echocardiogram which showed LV systolic dysfunction with an EF of 20 to 25%  Patient also has pericardial effusion  Patient is currently on digoxin   Patient is started on beta-blockers also  Patient also has history of lung cancer with pleural effusion and is followed by the pulmonologist  Patient is currently on amiodarone digoxin   Heart is well controlled patient had a large pleural effusion and hence he had thoracentesis done and his chest tubes are still draining  Patient had a cardiac catheterization which showed nonobstructive disease but with severe LV dysfunction  Patient is currently on metoprolol.  Patient is not on any ACE inhibitor's but if his blood pressure permits we will start him on.  Patient needs anticoagulation whenever he is cleared by lung doctors.  Patient will do better with Eliquis.    Alvarado Cai MD  01/22/21  11:15 EST

## 2021-01-22 NOTE — PROGRESS NOTES
Continued Stay Note   [place]     Patient Name: Lenny Larson  MRN: [id number]  Today's Date: 1/22/2021    Admit Date: 1/13/2021    Discharge Plan     Row Name 01/22/21 1632       Plan    Plan  D/C Plan : Home with spouse and F H/H . Rolling walker with [employer] . called in eliquis to [facility] on [address].Cost topt 43.50 , pt is ok with the cost and pt given a 30 day free supply coupon    Plan Comments  Pt's daughter to pick pt up and take him home at D/C .        Discharge Codes    No documentation.       Expected Discharge Date and Time     Expected Discharge Date Expected Discharge Time    Jan 22, 2021             Amparo Crandall RN

## 2021-01-22 NOTE — DISCHARGE SUMMARY
[facility] Medicine Services  DISCHARGE SUMMARY        Prepared For PCP:  Camron Pollard MD    Patient Name: Lenny Larson  : 1940  MRN: [id number]      Date of Admission:   2021    Date of Discharge:  2021    Length of stay:  LOS: 9 days     Hospital Course     Presenting Problem:   Acute respiratory distress [R06.03]  Pleural effusion [J90]  Atrial fibrillation with RVR (CMS/HCC) [I48.91]  Chronic obstructive pulmonary disease with acute exacerbation (CMS/HCC) [J44.1]      Active Hospital Problems    Diagnosis  POA   • **Acute respiratory distress [R06.03]  Yes   • Chronic atrial fibrillation (CMS/HCC) [I48.20]  No   • Moderate malnutrition (CMS/HCC) [E44.0]  Yes   • BPH without obstruction/lower urinary tract symptoms [N40.0]  Yes   • History of lung cancer [Z85.118]  Not Applicable   • COPD exacerbation (CMS/HCC) [J44.1]  Yes   • Malignant neoplasm of upper lobe of right lung (CMS/HCC) [C34.11]  Yes   • Acute systolic congestive heart failure (CMS/HCC) [I50.21]  Yes   • 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) [I25.118]  Yes   • Hypertension [I10]  Yes   • Hyperlipidemia [E78.5]  Yes   • Coronary artery disease [I25.10]  Yes   • Chronic obstructive pulmonary disease (CMS/HCC) [J44.9]  Yes      Resolved Hospital Problems    Diagnosis Date Resolved POA   • Pleural effusion [J90] 2021 Yes   • Chest pain [R07.9] 2021 Yes   • Atrial fibrillation with RVR (CMS/HCC) [I48.91] 2021 Yes     Assessment and plan:    Acute catheter related urinary tract infection, new diagnosis 2021  -Initiate Keflex and await culture    Moderate acute malnutrition r/t acute medical complexity (including acute hypoxic respiratory failure multifactorial secondary to pneumonia and large right pleural effusion) AEB sig weight loss of 5.7% since admission, < 75% of estimated energy requirements for > 7 days, 1-2+  edema and muscle/fat wasting as noted on physical exam.     Hospital Course:  Lenny Larson is a 80 y.o. male who has underlying coronary artery disease with atrial fibrillation, stent placement and an abdominal aortic aneurysm.  The patient also is status post right upper lobectomy for previous lung carcinoma which was also treated with radiation and chemotherapy.  The patient presents with atrial fibrillation with rapid ventricular response as well as a new right pleural effusion.        Date:  1/15/2021  The patient had a chest tube placed today for his right pleural effusion.  It continues to drain.  He is having pain with deep breathing related to the chest tube.  The patient does report that his breathing is better and that his heart is not racing as much as it was on admission.     1/16/2021  The patient is in tears as he has been in pain.  I did check with nursing and the patient has not taken anything for pain nor is he asked for anything.  They are going to start offering it to the patient.  Other than that the patient has no additional issues.  I did explain to him that he needs to ask them and should not be shy about it as it is painful to have a chest tube in place.     1/17/2021  The patient's pain is markedly improved over yesterday.  The patient's tachycardia is improved as well.  The patient does have a lot of lower extremity edema and was encouraged to place his legs up on an elevation so that hopefully this can resolve/improve.     1/18/2021  Patient reports ongoing pain from his right chest tube site.  He does report feeling better after having had a couple bowel movements after receiving to collect suppository MiraLAX.  The nursing staff reports that he had been more confused today and requested antibiotic be changed from cefepime as a possible etiology for the confusion.  This was deferred to pulmonary and cefepime was changed to Rocephin.  Patient reports voiding several times after Beach was  removed but it was just a small amount.  Post void residual showed 275 cc in and out cath performed with 200 cc removed.     1/19/2021  Patient continues to be confused per nursing staff.  He denies specific complaints at this time.  He had to have Beach catheter replaced because of urinary retention.     1/20/2021  Patient reports no specific complaints at this time.  His chest tube has been removed and Beach catheter remains.  Patient underwent heart cath which showed nonobstructive coronary disease, and severe LV dysfunction.     1/21/2021  Patient denies specific complaints at this time and is anxious to go home.  I spoke with his wife on the phone and all questions were answered.  The patient's wife reports that the patient refuses to eat because of the dietary restrictions.  She asked that his diet be changed to a regular diet.  She also reports that he tends to develop urinary tract infections when he is catheterized and she has requested a urinalysis be performed.  I spoke with the bedside nurse who reports that the patient has been drinking boost several times a day.    1/22/2021  The patient has had improved oral intake.  He is less confused today.  He is comfortable on room air.  He was started on Keflex for an abnormal urinalysis with urine culture pending.  He is now felt to be stable for discharge.        Recommendation for Outpatient Providers:       Follow-up on urine culture from 1/21/2021 to assure that Keflex is the appropriate antibiotic to treat for the acute urinary tract infection.        Day of Discharge     HPI: Patient denies current complaints.      Vital Signs:   Temp:  [97.3 °F (36.3 °C)-98.6 °F (37 °C)] 97.5 °F (36.4 °C)  Heart Rate:  [] 100  Resp:  [18-20] 18  BP: (101-125)/(35-79) 117/49     Physical Exam:  Physical Exam   Vital signs and nurses notes reviewed.  Well-developed well-nourished gentleman in no acute distress sitting up in the chair awake and alert; mucous membranes  moist; sclerae anicteric; lungs clear to auscultation bilaterally except for some faint crackles on the right base; CV regular rate and rhythm; abdomen soft nontender nondistended with active bowel sounds; extremities with no edema, cyanosis or calf tenderness; palpable pedal pulses bilaterally; neurologic exam grossly nonfocal; Beach catheter present to bedside drain with ina urine.    Pertinent  and/or Most Recent Results     Results from last 7 days   Lab Units 01/22/21  0449 01/21/21  0147 01/20/21  1001 01/20/21  0256 01/19/21  0227 01/18/21  0500 01/17/21  0309 01/16/21  0307   WBC 10*3/mm3 11.80* 10.20 14.80* 12.80* 9.90 8.80 8.50 12.50*   HEMOGLOBIN g/dL 12.8* 12.4* 13.2 12.8* 11.8* 11.5* 11.6* 11.8*   HEMATOCRIT % 39.0 38.3 40.9 39.3 35.7* 34.6* 34.8* 36.3*   PLATELETS 10*3/mm3 235 226 271 226 212 206 201 210   SODIUM mmol/L 138 136  --  135* 137 135* 134* 138   POTASSIUM mmol/L 4.1 4.6  --  4.9 4.5 3.9 3.9 4.1   CHLORIDE mmol/L 100 101  --  101 101 100 98 101   CO2 mmol/L 31.0* 29.0  --  27.0 29.0 27.0 27.0 28.0   BUN mg/dL 36* 36*  --  33* 40* 31* 32* 38*   CREATININE mg/dL 1.05 0.97  --  0.89 1.39* 1.11 1.17 1.30*   GLUCOSE mg/dL 127* 126*  --  132* 159* 154* 154* 187*   CALCIUM mg/dL 8.5* 8.3*  --  8.6 8.9 8.6 8.9 8.9           Invalid input(s): PROT, LABALBU        Invalid input(s): TG, LDLCALC, LDLREALC        Brief Urine Lab Results  (Last result in the past 365 days)      Color   Clarity   Blood   Leuk Est   Nitrite   Protein   CREAT   Urine HCG        01/21/21 1752 Yellow Cloudy  Comment:  Result checked  Large (3+) Trace Negative 30 mg/dL (1+)               Microbiology Results Abnormal     Procedure Component Value - Date/Time    AFB Culture - Body Fluid, Pleural Cavity [[id number]] Collected: 01/15/21 1509    Lab Status: Preliminary result Specimen: Body Fluid from Pleural Cavity Updated: 01/22/21 1530     AFB Culture No AFB isolated at 1 week     AFB Stain No acid fast bacilli seen    Urine  Culture - Urine, Urine, Random Void [[id number]]  (Normal) Collected: 01/21/21 1752    Lab Status: Preliminary result Specimen: Urine, Random Void Updated: 01/22/21 0957     Urine Culture No growth    AFB Culture - Lavage, Lung, Right Lower Lobe [[id number]] Collected: 01/14/21 1347    Lab Status: Preliminary result Specimen: Lavage from Lung, Right Lower Lobe Updated: 01/21/21 1445     AFB Culture No AFB isolated at 1 week     AFB Stain No acid fast bacilli seen    Fungus Culture - Lavage, Lung, Right Lower Lobe [[id number]]  (Abnormal) Collected: 01/14/21 1347    Lab Status: Preliminary result Specimen: Lavage from Lung, Right Lower Lobe Updated: 01/21/21 0804     Fungus Culture Candida species    Virus Culture - Lavage, Lung, Right Lower Lobe [[id number]]  (Abnormal) Collected: 01/14/21 1347    Lab Status: Preliminary result Specimen: Lavage from Lung, Right Lower Lobe Updated: 01/20/21 1509     Viral Culture, General Comment     Comment: 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       Narrative:      Performed at:  [address] [place] [address]  [id number]  : Rick Willis MD, Phone:  [phone]    Body Fluid Culture - Body Fluid, Pleural Cavity [[id number]] Collected: 01/15/21 1509    Lab Status: Final result Specimen: Body Fluid from Pleural Cavity Updated: 01/20/21 1043     Body Fluid Culture No growth at 5 days     Gram Stain No organisms seen    Pneumocystis PCR - Lavage, Lung, Right Lower Lobe [[id number]] Collected: 01/14/21 1347    Lab Status: Final result Specimen: Lavage from Lung, Right Lower Lobe Updated: 01/19/21 1808     Pneumocystis jiroveci DNA Negative     Comment: -------------------ADDITIONAL INFORMATION-------------------  This test was developed and its performance characteristics  determined by [facility] in a manner consistent with CLIA  requirements. This test has not been cleared or  approved by  the U.S. Food and Drug Administration.  REFERENCE RANGE: Not Applicable        Specimen Source BAL    Narrative:      Performed at:  [address]  [id number]  : Jonas Sorto , Phone:  [phone]    Cytomegalovirus (CMV) By PCR - Lavage, Lung, Right Lower Lobe [[id number]] Collected: 01/14/21 1347    Lab Status: Final result Specimen: Lavage from Lung, Right Lower Lobe Updated: 01/18/21 2206     Specimen Source BAL     Cytomegalovirus PCR Negative     Comment: -------------------ADDITIONAL INFORMATION-------------------  This test was developed and its performance characteristics  determined by [facility] in a manner consistent with CLIA  requirements. This test has not been cleared or approved by  the U.S. Food and Drug Administration.       Narrative:      Performed at:  [address]  [id number]  : Jonas Sorto , Phone:  [phone]    Blood Culture - Blood, Arm, Right [[id number]] Collected: 01/13/21 1310    Lab Status: Final result Specimen: Blood from Arm, Right Updated: 01/18/21 1315     Blood Culture No growth at 5 days    Blood Culture - Blood, Arm, Right [[id number]] Collected: 01/13/21 1302    Lab Status: Final result Specimen: Blood from Arm, Right Updated: 01/18/21 1315     Blood Culture No growth at 5 days    BAL Culture, Quantitative - Lavage, Lung, Right Lower Lobe [[id number]] Collected: 01/14/21 1347    Lab Status: Final result Specimen: Lavage from Lung, Right Lower Lobe Updated: 01/16/21 1322     BAL Culture Scant growth (1+) Normal Respiratory Terri: NO S.aureus/MRSA or Pseudomonas aeruginosa     Gram Stain Rare (1+) WBCs per low power field      No organisms seen    Narrative:      Not streaked for quantitation    Respiratory Panel, PCR (WITHOUT COVID) - Lavage, Lung, Right Lower Lobe [[id number]]  (Normal) Collected: 01/14/21 1347    Lab Status:  Final result Specimen: Lavage from Lung, Right Lower Lobe Updated: 01/14/21 1542     ADENOVIRUS, PCR Not Detected     Coronavirus 229E Not Detected     Coronavirus HKU1 Not Detected     Coronavirus NL63 Not Detected     Coronavirus OC43 Not Detected     Human Metapneumovirus Not Detected     Human Rhinovirus/Enterovirus Not Detected     Influenza B PCR Not Detected     Parainfluenza Virus 1 Not Detected     Parainfluenza Virus 2 Not Detected     Parainfluenza Virus 3 Not Detected     Parainfluenza Virus 4 Not Detected     Bordetella pertussis pcr Not Detected     Chlamydophila pneumoniae PCR Not Detected     Mycoplasma pneumo by PCR Not Detected     Influenza A PCR Not Detected     RSV, PCR Not Detected     Bordetella parapertussis PCR Not Detected    Narrative:      The coronavirus on the RVP is NOT COVID-19 and is NOT indicative of infection with COVID-19.     Respiratory Panel, PCR (WITHOUT COVID) - Swab, Nasopharynx [[id number]]  (Normal) Collected: 01/13/21 1824    Lab Status: Final result Specimen: Swab from Nasopharynx Updated: 01/13/21 1940     ADENOVIRUS, PCR Not Detected     Coronavirus 229E Not Detected     Coronavirus HKU1 Not Detected     Coronavirus NL63 Not Detected     Coronavirus OC43 Not Detected     Human Metapneumovirus Not Detected     Human Rhinovirus/Enterovirus Not Detected     Influenza B PCR Not Detected     Parainfluenza Virus 1 Not Detected     Parainfluenza Virus 2 Not Detected     Parainfluenza Virus 3 Not Detected     Parainfluenza Virus 4 Not Detected     Bordetella pertussis pcr Not Detected     Chlamydophila pneumoniae PCR Not Detected     Mycoplasma pneumo by PCR Not Detected     Influenza A PCR Not Detected     RSV, PCR Not Detected     Bordetella parapertussis PCR Not Detected    Narrative:      The coronavirus on the RVP is NOT COVID-19 and is NOT indicative of infection with COVID-19.     COVID-19,[employer] IN-HOUSE,Nasal Swab No Transport Media 3-4 HR TAT - Swab, Nasal Cavity  [[id number]]  (Normal) Collected: 01/13/21 1303    Lab Status: Final result Specimen: Swab from Nasal Cavity Updated: 01/13/21 1343     COVID19 Not Detected    Narrative:      Fact sheet for providers: https://www.fda.gov/media/342024/download     Fact sheet for patients: https://www.fda.gov/media/797245/download    Test performed by PCR.          Xr Chest 1 View    Result Date: 1/22/2021  Impression: 1 no evidence of pneumothorax. 2. Stable volume loss in the right lung with right hilar/suprahilar mass or masslike consolidation, similar to prior. 3. Resolving right basilar atelectasis.  Electronically Signed By-Nikolas Lloyd MD On:1/22/2021 7:42 AM This report was finalized on [id number] by  Nikolas Lloyd MD.    Xr Chest 1 View    Result Date: 1/21/2021  Impression:  1. Removal of right basilar pigtail thoracostomy tube. No pneumothorax evident. 2. Increasing consolidation in the right lung base suggesting atelectasis or infiltrate. 3. Right hilar/suprahilar mass or masslike consolidation. This could be more definitively evaluated by CT..  Electronically Signed By-Nikolas Lloyd MD On:1/21/2021 7:57 AM This report was finalized on [id number] by  Nikolas Lloyd MD.    Xr Chest 1 View    Result Date: 1/20/2021  Impression: 1. Small right basilar pneumothorax without tension. 2. Right suprahilar opacity is again present which may reflect scarring or other chronic change. A parenchymal mass cannot be excluded. Electronically signed by:  Brant England M.D.  1/20/2021 12:46 AM    Xr Chest 1 View    Result Date: 1/19/2021  Impression:  1. Stable bilateral perihilar linear atelectatic changes. 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 2. Right basilar pleural drain unchanged in position. No visible pneumothorax.  Electronically Signed By-Maura Esteves MD On:1/19/2021 8:21 AM This report was finalized on [id number] by  Maura Esteves MD.    Xr Chest 1 View    Result Date:  1/18/2021  Impression: No interval change from yesterday's study as described above.  Electronically Signed By-Kel Fair MD On:1/18/2021 7:52 AM This report was finalized on [id number] by  Kel Fair MD.    Xr Chest 1 View    Result Date: 1/17/2021  Impression: 1.Right basilar chest tube appears in similar position. No significant pneumothorax or pleural effusion is seen. 2.Postsurgical/post therapy changes in the right lung, as before.  Electronically Signed By-Brant Dao MD On:1/17/2021 8:34 AM This report was finalized on [id number] by  Brant Dao MD.    Xr Chest 1 View    Result Date: 1/16/2021  Impression:  1. Improved aeration with small caliber chest tube in the right lung base. A pneumothorax or pleural effusion is not demonstrated. 2. Persistent postsurgical and treatment related changes in the right superhilar region, this is likely treatment related changes, is similar radiographically when compared with the study from 08/23/2018.  Electronically Signed By-Donny Abdi DO On:1/16/2021 10:59 AM This report was finalized on [id number] by  Donny Abdi DO.    Xr Chest 1 View    Result Date: 1/15/2021  Impression: Significantly improved aeration in the right hemithorax, that is post pleural drain placement. Mild residual atelectasis remains. No visible pleural fluid or pneumothorax.  Electronically Signed By-Maura Esteves MD On:1/15/2021 3:10 PM This report was finalized on [id number] by  Maura Esteves MD.    Xr Chest 1 View    Result Date: 1/15/2021  Impression:  1. Moderate-large sized infiltrate pleural effusion involving the right midlung zone right lower lobe lung has improved slightly since previous study performed on 01/13/2021  Electronically Signed By-Baron Diggs MD On:1/15/2021 7:53 AM This report was finalized on [id number] by  Baron Diggs MD.    Xr Chest 1 View    Result Date: 1/13/2021  Impression:  1. Interval development of a moderate-sized  right pleural effusion. 2. Worsening consolidation in the right lung particularly in the mid and lower lung zone. This is likely due to a combination of compressive atelectasis with superimposed pneumonia. 3. Masslike area of consolidation has been present the right hilar region on previous imaging studies consistent with treated lung cancer.  Electronically Signed By-Kel Fair MD On:1/13/2021 2:19 PM This report was finalized on [id number] by  Kel Fair MD.    Us Aorta Limited    Result Date: 1/14/2021  Impression: 1. Redemonstration of a distal abdominal aortic aneurysm measuring 5.3 x 4.9 cm. This is very similar in size to that described on May 18, 2019. 2. Severe atherosclerotic disease. 3. There are limitations to this study secondary to overlying bowel gas. Slot 63 Electronically signed by:  Vladislav Rich M.D.  1/14/2021 10:17 PM                Results for orders placed during the hospital encounter of 01/13/21   Adult Transthoracic Echo Complete W/ Cont if Necessary Per Protocol    Narrative · Left ventricular ejection fraction appears to be 21 - 25%.  · Severe mitral valve regurgitation is present.  · Mild dilation of the aortic root is present.  · There is a large (>2cm) pericardial effusion adjacent to the right   atrium.  · The following left ventricular wall segments are hypokinetic: mid   anterior, apical anterior, basal anterolateral, mid anterolateral, apical   lateral, basal inferolateral, mid inferolateral, apical inferior, mid   inferior, apical septal, basal inferoseptal, mid inferoseptal, apex   hypokinetic, mid anteroseptal, basal anterior, basal inferior and basal   inferoseptal.  · No pericardial tamponade noted                  Test Results Pending at Discharge  Pending Labs     Order Current Status    AFB Culture - Body Fluid, Pleural Cavity Preliminary result    AFB Culture - Lavage, Lung, Right Lower Lobe Preliminary result    Fungus Culture - Lavage, Lung, Right Lower Lobe  Preliminary result    Urine Culture - Urine, Urine, Random Void Preliminary result    Virus Culture - Lavage, Lung, Right Lower Lobe Preliminary result            Procedures Performed  Procedure(s):  Left Heart Cath and coronary angiogram  Left ventriculography         Consults:   Consults     Date and Time Order Name Status Description    1/17/2021 1101 Inpatient Urology Consult Completed     1/13/2021 1707 Inpatient Pulmonology Consult Completed     1/13/2021 1707 Inpatient Cardiology Consult Completed     1/13/2021 1515 Cardiology (on-call MD unless specified)      1/13/2021 1503 Hospitalist (on-call MD unless specified) Completed             Discharge Details        Discharge Medications      New Medications      Instructions Start Date   acetaminophen 325 MG tablet  Commonly known as: TYLENOL   650 mg, Oral, Every 4 Hours PRN      albuterol sulfate  (90 Base) MCG/ACT inhaler  Commonly known as: Proventil HFA   2 puffs, Inhalation, Every 4 Hours PRN      amiodarone 200 MG tablet  Commonly known as: PACERONE   200 mg, Oral, 2 Times Daily With Meals      apixaban 5 MG tablet tablet  Commonly known as: ELIQUIS   5 mg, Oral, Every 12 Hours Scheduled      budesonide 0.5 MG/2ML nebulizer solution  Commonly known as: PULMICORT   0.5 mg, Nebulization, 2 Times Daily - RT      cephalexin 500 MG capsule  Commonly known as: Keflex   500 mg, Oral, 2 Times Daily      digoxin 125 MCG tablet  Commonly known as: LANOXIN   125 mcg, Oral, Daily   Start Date: January 23, 2021     furosemide 20 MG tablet  Commonly known as: Lasix   20 mg, Oral, Daily      guaiFENesin 600 MG 12 hr tablet  Commonly known as: MUCINEX   1,200 mg, Oral, Every 12 Hours      ipratropium-albuterol 0.5-2.5 mg/3 ml nebulizer  Commonly known as: DUO-NEB   3 mL, Nebulization, 4 Times Daily - RT      metoprolol succinate XL 50 MG 24 hr tablet  Commonly known as: Toprol XL   75 mg, Oral, Daily      nystatin 031638 UNIT/ML suspension  Commonly known as:  MYCOSTATIN   500,000 Units, Oral, 4 Times Daily      polyethylene glycol 17 g packet  Commonly known as: MIRALAX   17 g, Oral, 2 Times Daily, Available over the counter      predniSONE 10 MG tablet  Commonly known as: DELTASONE   40mg qd po x 3 d, then reduce by 10mg evcery 3 days, then d/c         Continue These Medications      Instructions Start Date   calcium carbonate 600 MG tablet  Commonly known as: OS-GILMA   600 mg, Oral, Daily      dilTIAZem  MG 24 hr capsule  Commonly known as: CARDIZEM CD   TAKE 1 CAPSULE DAILY      finasteride 5 MG tablet  Commonly known as: PROSCAR   5 mg, Oral, Daily      multivitamin with minerals tablet tablet   1 tablet, Oral, Daily      rosuvastatin 40 MG tablet  Commonly known as: Crestor   40 mg, Oral, Daily      tamsulosin 0.4 MG capsule 24 hr capsule  Commonly known as: FLOMAX   1 capsule, Oral, Every Night at Bedtime      theophylline 300 MG 12 hr tablet  Commonly known as: THEODUR   300 mg, Oral, Daily             Allergies   Allergen Reactions   • Ativan [Lorazepam] Mental Status Change         Discharge Disposition:      Diet:  Hospital:  Diet Order   Procedures   • Diet Regular         Discharge Activity:   Activity Instructions     Activity as Tolerated              CODE STATUS:    Code Status and Medical Interventions:   Ordered at: 01/13/21 1610     Limited Support to NOT Include:    Intubation     Level Of Support Discussed With:    Patient     Code Status:    CPR     Medical Interventions (Level of Support Prior to Arrest):    Limited         Follow-up Appointments  No future appointments.    Additional Instructions for the Follow-ups that You Need to Schedule     Ambulatory Referral to Home Health   As directed      [facility] h/h to eval and treat    Order Comments: [facility] h/h to eval and treat     Face to Face Visit Date: 1/15/2021    Follow-up provider for Plan of Care?: I treated the patient in an acute care facility and will not continue treatment after discharge.     Follow-up provider: UMU BEASLEY [[id number]]    Reason/Clinical Findings: copd    Describe mobility limitations that make leaving home difficult: weakness    Nursing/Therapeutic Services Requested: Other    Frequency: 1 Week 1         Call MD With Problems / Concerns   As directed      Instructions: Call [phone] or email [email or web address] for problems or concerns.    Order Comments: Instructions: Call [phone] or email [email or web address] for problems or concerns.          Discharge Follow-up with PCP   As directed       Currently Documented PCP:    Umu Beasley MD    PCP Phone Number:    [phone]     Follow Up Details: 2-3 business days                 Condition on Discharge:      Stable      This patient has been examined wearing appropriate Personal Protective Equipment 01/22/21      Electronically signed by Dottie Casillas MD, 01/22/21, 2:44 PM EST.      Time: I spent 40  minutes on this discharge activity which included face-to-face encounter with the patient/reviewing the data in the system/coordination of the care with the nursing staff as well as consultants/documentation/entering orders.

## 2021-01-22 NOTE — PLAN OF CARE
Goal Outcome Evaluation:  Plan of Care Reviewed With: patient  Progress: improving  Patient discharging home with [facility] and [place] in place.     Problem: Adult Inpatient Plan of Care  Goal: Plan of Care Review  Outcome: Ongoing, Progressing  Goal: Absence of Hospital-Acquired Illness or Injury  Intervention: Identify and Manage Fall Risk  Recent Flowsheet Documentation  Taken 1/22/2021 1215 by Priya Chavez RN  Safety Promotion/Fall Prevention:   activity supervised   assistive device/personal items within reach   clutter free environment maintained   nonskid shoes/slippers when out of bed   safety round/check completed  Taken 1/22/2021 1000 by Priya Chavez RN  Safety Promotion/Fall Prevention:   activity supervised   assistive device/personal items within reach   clutter free environment maintained   nonskid shoes/slippers when out of bed   safety round/check completed  Taken 1/22/2021 0815 by Priya Chavez RN  Safety Promotion/Fall Prevention:   activity supervised   assistive device/personal items within reach   clutter free environment maintained   nonskid shoes/slippers when out of bed   safety round/check completed

## 2021-01-22 NOTE — PLAN OF CARE
Goal Outcome Evaluation:  Plan of Care Reviewed With: patient  Progress: improving  Outcome Summary: pt cont to tolerate increased activity with each PT tx session.  pt amb 250' with rolling walker.  no loss balance note.  Recommend home with family at d/c and HHPT to follow.  PPE used:  mask, safety glasses and gloves

## 2021-01-22 NOTE — CONSULTS
"Nutrition Services    Patient Name: Lenny Larson  YOB: 1940  MRN: [id number]  Admission date: 1/13/2021    Comment:    Moderate acute malnutrition r/t acute medical complexity (including acute hypoxic respiratory failure multifactorial secondary to pneumonia and large right pleural effusion) AEB sig weight loss of 5.7% since admission, < 75% of estimated energy requirements for > 7 days, 1-2+ edema and muscle/fat wasting as noted on physical exam.     Continue with Boost Plus TID (provides 1080 kcals, 42 gm protein if consumed)     PPE Documentation        PPE Worn By Provider Mask, Eye Protection, and Gloves    PPE Worn By Patient  None      CLINICAL NUTRITION ASSESSMENT      Reason for Assessment 1/22: LOS review (9 day)      H&P:  Per H&P \"Mr. Larson is a 80 y.o. male with a past medical history of COPD, CAD, hyperlipidemia, hypertension, and atrial fibrillation who presented to [facility] on 1/13/2021 with complaints of shortness of breath.\"     Past Medical History:   Diagnosis Date   • COPD (chronic obstructive pulmonary disease) (CMS/HCC)    • Coronary artery disease    • Hyperlipidemia    • Hypertension        Past Surgical History:   Procedure Laterality Date   • BRONCHOSCOPY N/A 1/14/2021    Procedure: BRONCHOSCOPY with bronchio alveolar lavage of right lower lung;  Surgeon: Sanjay Gonsales MD;  Location: [facility] ENDOSCOPY;  Service: Pulmonary;  Laterality: N/A;  post op: pneumonia   • CARDIAC CATHETERIZATION      PCI   • CARDIAC CATHETERIZATION N/A 1/20/2021    Procedure: Left Heart Cath and coronary angiogram;  Surgeon: Alvarado Cai MD;  Location: [facility] CATH INVASIVE LOCATION;  Service: Cardiovascular;  Laterality: N/A;   • CARDIAC CATHETERIZATION N/A 1/20/2021    Procedure: Left ventriculography;  Surgeon: Alvarado Cai MD;  Location: [facility] CATH INVASIVE LOCATION;  Service: Cardiovascular;  Laterality: N/A;        Current Problems:   Acute toxic and metabolic encephalopathy   - " "Improving  - Likely multifactorial secondary to pneumonia, acute respiratory failure, A. fib with RVR and/or medication     Acute hypoxic respiratory failure multifactorial secondary to pneumonia and large right pleural effusion  - Pulmonary Following   - S/p chest tube placement for large right pleural effusion; body fluid culture and cytology pending  - S/p bronch 1/14 with BAL showing normal respiratory keturah    Atrial fibrillation with RVR  -  Cardiology Following      Acute chest pain  - Resolved      Coronary artery disease status post PCI and stents  - Cardiac cath performed 1/20 showed nonocclusive coronary artery disease  - Cardiology Following      Acute on chronic systolic congestive heart failure   - Due to ischemic cardiomyopathy and valvular heart disease     History of lung cancer  - Status post right upper lobe resection followed by radiation as well as chemotherapy  - Status post bronchoscopy which showed evidence of pneumonia with no intrabronchial lesions  - Status post right thoracentesis with current thoracostomy tube in place that is draining serosanguineous fluid  - Cytology on the pleural fluid negative     Abdominal aortic aneurysm  - Patient should continue follow-up as an outpatient with his vascular surgeon     Urinary retention secondary to bilateral prostatic hypertrophy with gross hematuria  - Status post cystoscopy 1/18 which showed 45 mm prostate no evidence of bladder cancer or stones     History of recurrent urinary tract infections related to catheterizations       Nutrition/Diet History         Narrative     1/22: Visited patient today who at some points seemed confused & other times really alert. Pt reports recently having a decreased appetite r/t \"Having all his food chopped up and living off of orange sherbert\". Appears patient was recently required to be on mechanical soft diet. At times meal selection very limited, for example only ordered orange sherbet/pudding for dinner. " "Patient states since have his diet consistency to regular, eating much better. Patient also describes himself as a picky eater. Patient reports PTA having a good appetite and not losing weight. Reports a UBW of 185 lbs. Patient has lost a significant amount of weight this admission (Patient is down 10 lbs/5.7% since admission. Also noted weight is down. Patient is also down gradually 36 lbs over the last 5 years). NFPE completed consistent with malnutrition. Given his chronic diseases suspect malnutrition may in fact be acute on chronic malnutrition, however currently meets criteria for acute malnutrition.       Functional Status PLOF= independent. Now ambulating with rolling walker. Expected to DC with home health.      Food Allergies NKFA      Factors Affecting   Nutritional Intake Acute on chronic medical complexity      Anthropometrics        Current Height, Weight Height: 177.8 cm (70\")  Weight: 75.8 kg (167 lb 1.7 oz) (01/22/21 0453)        Admit Height, Weight -    Flowsheet Rows      First Filed Value   Admission Height  177.8 cm (70\") Documented at 01/13/2021 1222   Admission Weight  80.6 kg (177 lb 11.1 oz) Documented at 01/13/2021 1222             Ideal Body Weight (IBW) 166    % Ideal Body Weight 101%        Usual Body Weight UTD   % Usual Body Weight UTD   Wt Change Observation 1/22: Patient is down 10 lbs/5.7% since admission. Also noted weight is down. Patient is also down gradually 36 lbs over the last 5 years.    Weight Hx    Wt Readings from Last 30 Encounters:   01/22/21 0453 75.8 kg (167 lb 1.7 oz)   01/21/21 0530 75.7 kg (166 lb 14.2 oz)   01/20/21 0516 80.2 kg (176 lb 12.9 oz)   01/19/21 0600 79.5 kg (175 lb 4.3 oz)   01/18/21 0500 79.2 kg (174 lb 9.7 oz)   01/17/21 0604 76 kg (167 lb 8.8 oz)   01/16/21 0506 75.7 kg (166 lb 14.2 oz)   01/15/21 0514 76.8 kg (169 lb 5 oz)   01/14/21 0515 76.9 kg (169 lb 8.5 oz)   01/13/21 1729 80.3 kg (177 lb)   01/13/21 1222 80.6 kg (177 lb 11.1 oz)   07/09/20 " 1240 84.4 kg (186 lb)   01/09/20 1057 84.8 kg (187 lb)   08/07/19 1217 81 kg (178 lb 8 oz)   05/29/19 1533 84.1 kg (185 lb 6.4 oz)   03/06/19 1504 86.3 kg (190 lb 3.2 oz)   12/18/18 1419 88.9 kg (196 lb)   06/27/18 1046 88.6 kg (195 lb 4 oz)   10/26/17 1107 89.8 kg (198 lb)   06/01/17 1343 91.3 kg (201 lb 4 oz)   10/13/16 1438 89.9 kg (198 lb 4 oz)   03/17/16 1359 92.1 kg (203 lb 0.8 oz)        BMI kg/m2 Body mass index is 23.98 kg/m².     Labs/Medications         Pertinent Labs -   Results from last 7 days   Lab Units 01/22/21  0449 01/21/21  0147 01/20/21  0256   SODIUM mmol/L 138 136 135*   POTASSIUM mmol/L 4.1 4.6 4.9   CHLORIDE mmol/L 100 101 101   CO2 mmol/L 31.0* 29.0 27.0   BUN mg/dL 36* 36* 33*   CREATININE mg/dL 1.05 0.97 0.89   CALCIUM mg/dL 8.5* 8.3* 8.6   GLUCOSE mg/dL 127* 126* 132*     Results from last 7 days   Lab Units 01/22/21  0449 01/21/21  0147  01/20/21  0256   MAGNESIUM mg/dL 2.2 2.1  --  2.2   HEMOGLOBIN g/dL 12.8* 12.4*   < > 12.8*   HEMATOCRIT % 39.0 38.3   < > 39.3    < > = values in this interval not displayed.     COVID19   Date Value Ref Range Status   01/13/2021 Not Detected Not Detected - Ref. Range Final     Lab Results   Component Value Date    HGBA1C 5.6 12/21/2018         Pertinent Medications Dulcolax, Lasix, Solu-Medrol, MVI with minerals, Nystatin, Miralax,      Physical Findings        Overall Physical   Appearance, MSA 1/22: NFPE completed, consistent with malnutrition, see MSA    --  Edema  1-2+ edema      Gastrointestinal + BM 1/18 x 4 days ago- RN alerted via secure chat. RN states patient had BM on 1/20 and is to chart it.      Tubes No feeding tubes      Oral/Mouth Cavity Per flowhseets swallows food/liquids without difficulty      Skin Intact      --  Current Nutrition Orders & Evaluation of Intake       Oral Nutrition     Current PO Diet Diet Regular   Supplement Boost Plus TID    PO Evaluation     % PO Intake 11/22: Avg meal intakes 72% of meals. Noted this is not an  accurate reflection of total caloric intakes. At times only ordering chocolate pudding/orange sherbet for meals    --  Clinical Course    Nutrition Course Details PO diet    1/13-1/17 HH  1/18-1/20 Mech Soft  1/21 to present (1/22) Regular     On 1/22 Spoke with RN about patient diet texture change as patient has not bee evaluated by ST this admission. RN reports Dr. Casillas changed diet to regular & patient tolerating this consistency well.      Nutritional Risk Screening        NRS-2002 Score   -       Nutrition Diagnosis         Nutrition Dx Problem 1 Moderate acute malnutrition r/t acute medical complexity (including acute hypoxic respiratory failure multifactorial secondary to pneumonia and large right pleural effusion) AEB sig weight loss of 5.7% since admission, < 75% of estimated energy requirements for > 7 days, 1-2+ edema and muscle/fat wasting as noted on physical exam.       Nutrition Dx Problem 2 -       Intervention Goal         Intervention Goal(s) Meal choices will improve     Patient will be accepting of oral nutritional supplements      Nutrition Intervention        RD/Tech Action Continue with Boost Plus as ordered      Nutrition Prescription          Diet Prescription Regular    Supplement Prescription Boost Plus TID (provides 1080 kcals, 42 gm protein if consumed)    --  Monitor/Evaluation        Monitor Weights, intakes, labs, BM, skin and medication changes      Electronically signed by:  Danielle David RD  01/22/21 13:26 EST

## 2021-01-22 NOTE — PROGRESS NOTES
"Malnutrition Severity Assessment    Patient Name:  Lenny Larson  YOB: 1940  MRN: [id number]  Admit Date:  1/13/2021    Patient meets criteria for : Moderate (non-severe) Malnutrition    Comments:      Visited patient today who at some points seemed confused & other times really alert. Pt reports recently having a decreased appetite r/t \"Having all his food chopped up and living off of orange sherbert\". Appears patient was recently required to be on mechanical soft diet. At times meal selection very limited, for example only ordered orange sherbet/pudding for dinner. Patient states since have his diet consistency to regular, eating much better. Patient also describes himself as a picky eater. Patient reports PTA having a good appetite and not losing weight. Reports a UBW of 185 lbs. Patient has lost a significant amount of weight this admission (Patient is down 10 lbs/5.7% since admission. Also noted weight is down. Patient is also down gradually 36 lbs over the last 5 years). NFPE completed consistent with malnutrition. Given his chronic diseases suspect malnutrition may in fact be acute on chronic malnutrition, however currently meets criteria for acute malnutrition.      Nutrition Dxs:    Moderate acute malnutrition r/t acute medical complexity (including acute hypoxic respiratory failure multifactorial secondary to pneumonia and large right pleural effusion) AEB sig weight loss of 5.7% since admission, < 75% of estimated energy requirements for > 7 days, 1-2+ edema and muscle/fat wasting as noted on physical exam.     Plan:    Continue with Boost Plus TID (provides 1080 kcals, 42 gm protein if consumed)       Malnutrition Severity Assessment  Malnutrition Type: Acute Disease or Injury - Related Malnutrition     Malnutrition Type (last 8 hours)      Malnutrition Severity Assessment     Row Name 01/22/21 0119       Malnutrition Severity Assessment    Malnutrition Type  Acute Disease or Injury - " Related Malnutrition    Row Name 01/22/21 1359       Insufficient Energy Intake     Insufficient Energy Intake Findings  Moderate    Insufficient Energy Intake   <75% of est. energy requirement for >7d)    Row Name 01/22/21 1359       Unintentional Weight Loss     Unintentional Weight Loss Findings  Severe    Unintentional Weight Loss   Weight loss greater than 2% in one week    Row Name 01/22/21 1359       Muscle Loss    Loss of Muscle Mass Findings  Moderate    Jainism Region  Moderate - slight depression    Clavicle Bone Region  Moderate - some protrusion in females, visible in males    Acromion Bone Region  Moderate - acromion may slightly protrude    Scapular Bone Region  Moderate - mild depression, bones may show slightly    Dorsal Hand Region  None    Patellar Region  None    Anterior Thigh Region  None    Row Name 01/22/21 1359       Fat Loss    Subcutaneous Fat Loss Findings  Mild    Orbital Region   Moderate -  somewhat hollowness, slightly dark circles    Upper Arm Region  None    Thoracic & Lumbar Region  None    Row Name 01/22/21 1359       Fluid Accumulation (Edema)    Fluid Accumulation   Moderate equals 2+ pitting edema    Row Name 01/22/21 1359       Criteria Met (Must meet criteria for severity in at least 2 of these categories: M Wasting, Fat Loss, Fluid, Secondary Signs, Wt. Status, Intake)    Patient meets criteria for   Moderate (non-severe) Malnutrition          Electronically signed by:  Danielle David RD  01/22/21 14:00 EST

## 2021-01-22 NOTE — THERAPY TREATMENT NOTE
Patient Name: Lenny Larson  : 1940    MRN: [id number]                              Today's Date: 2021       Admit Date: 2021    Visit Dx:     ICD-10-CM ICD-9-CM   1. Chronic obstructive pulmonary disease with acute exacerbation (CMS/HCC)  J44.1 491.21   2. Acute respiratory distress  R06.03 518.82   3. Atrial fibrillation with RVR (CMS/HCC)  I48.91 427.31   4. Pleural effusion  J90 511.9   5. Malignant neoplasm of upper lobe of right lung (CMS/HCC)  C34.11 162.3   6. Acute systolic congestive heart failure (CMS/HCC)  I50.21 428.21     428.0   7. 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)  I25.118 414.01     413.9     Patient Active Problem List   Diagnosis   • Chronic obstructive pulmonary disease (CMS/HCC)   • Coronary artery disease   • Hyperlipidemia   • Hypertension   • ST elevation (STEMI) myocardial infarction (CMS/HCC)   • Status post coronary artery stent placement   • Acute respiratory distress   • Abdominal aortic aneurysm (AAA) (CMS/HCC)   • Lung cancer (CMS/HCC)   • Chest pain   • Atrial fibrillation with RVR (CMS/HCC)   • BPH without obstruction/lower urinary tract symptoms   • History of lung cancer   • COPD exacerbation (CMS/HCC)   • Malignant neoplasm of upper lobe of right lung (CMS/HCC)   • Acute systolic congestive heart failure (CMS/HCC)   • 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)     Past Medical History:   Diagnosis Date   • COPD (chronic obstructive pulmonary disease) (CMS/HCC)    • Coronary artery disease    • Hyperlipidemia    • Hypertension      Past Surgical History:   Procedure Laterality Date   • BRONCHOSCOPY N/A 2021    Procedure: BRONCHOSCOPY with bronchio alveolar lavage of right lower lung;  Surgeon: Sanjay Gonsales MD;  Location: [facility] ENDOSCOPY;  Service: Pulmonary;  Laterality: N/A;  post op: pneumonia   • CARDIAC CATHETERIZATION      PCI   • CARDIAC  CATHETERIZATION N/A 1/20/2021    Procedure: Left Heart Cath and coronary angiogram;  Surgeon: Alvarado Cai MD;  Location: [facility] CATH INVASIVE LOCATION;  Service: Cardiovascular;  Laterality: N/A;   • CARDIAC CATHETERIZATION N/A 1/20/2021    Procedure: Left ventriculography;  Surgeon: Alvarado Cai MD;  Location: [facility] CATH INVASIVE LOCATION;  Service: Cardiovascular;  Laterality: N/A;     General Information     Row Name 01/22/21 1154          Physical Therapy Time and Intention    Document Type  therapy note (daily note)  -SC     Mode of Treatment  individual therapy;physical therapy  -SC     Row Name 01/22/21 1154          Cognition    Orientation Status (Cognition)  oriented x 4  -SC       User Key  (r) = Recorded By, (t) = Taken By, (c) = Cosigned By    Initials Name Provider Type    Rosanne Ricks PTA Physical Therapy Assistant        Mobility     Row Name 01/22/21 1154          Bed Mobility    Comment (Bed Mobility)  pt up in the recliner chair  -SC     Row Name 01/22/21 1154          Transfers    Comment (Transfers)  cues again to push up from seating surface with upper extrem and to reach back to sitting surface  -SC     Row Name 01/22/21 1154          Sit-Stand Transfer    Sit-Stand [place] (Transfers)  standby assist  -SC     Assistive Device (Sit-Stand Transfers)  walker, front-wheeled  -SC     Row Name 01/22/21 1154          Gait/Stairs (Locomotion)    [place] Level (Gait)  standby assist  -SC     Assistive Device (Gait)  walker, front-wheeled  -SC     Distance in Feet (Gait)  250'  -SC     Comment (Gait/Stairs)  cues to take longer step length for more effecient walk.  pt cont to iain increased gait distance.  -SC       User Key  (r) = Recorded By, (t) = Taken By, (c) = Cosigned By    Initials Name Provider Type    Rosanne Ricks PTA Physical Therapy Assistant        Obj/Interventions     Row Name 01/22/21 1158          Motor Skills    Therapeutic Exercise  -- standing with  walker:  march in place, heel raises, hip abduction, mini squats, laq's, sitting marches   1/15  -SC     Row Name 01/22/21 1158          Balance    Static Sitting Balance  sitting in chair;WNL  -SC     Dynamic Sitting Balance  sitting in chair;WNL  -SC     Static Standing Balance  WFL;supported  -SC     Dynamic Standing Balance  WFL;supported  -SC       User Key  (r) = Recorded By, (t) = Taken By, (c) = Cosigned By    Initials Name Provider Type    Rosanne Ricks PTA Physical Therapy Assistant        Goals/Plan     Row Name 01/22/21 1203          Bed Mobility Goal 1 (PT)    Progress/Outcomes (Bed Mobility Goal 1, PT)  goal met  -SC     Row Name 01/22/21 1203          Transfer Goal 1 (PT)    Progress/Outcome (Transfer Goal 1, PT)  continuing progress toward goal  -SC     Row Name 01/22/21 1203          Gait Training Goal 1 (PT)    Progress/Outcome (Gait Training Goal 1, PT)  continuing progress toward goal  -SC       User Key  (r) = Recorded By, (t) = Taken By, (c) = Cosigned By    Initials Name Provider Type    Rosanne Ricks PTA Physical Therapy Assistant        Clinical Impression     Row Name 01/22/21 1200          Pain Scale: FACES Pre/Post-Treatment    Pain: FACES Scale, Pretreatment  2-->hurts little bit  -SC     Posttreatment Pain Rating  2-->hurts little bit  -SC     Pain Location  perineum  -SC     Pre/Posttreatment Pain Comment  catheter incert site  -SC     Row Name 01/22/21 1200          Therapy Assessment/Plan (PT)    Rehab Potential (PT)  good, to achieve stated therapy goals  -SC     Criteria for Skilled Interventions Met (PT)  skilled treatment is necessary  -SC     Row Name 01/22/21 1200          Vital Signs    O2 Delivery Pre Treatment  room air  -SC     O2 Delivery Intra Treatment  room air  -SC     O2 Delivery Post Treatment  room air  -[facility] Name 01/22/21 1200          Positioning and Restraints    Pre-Treatment Position  sitting in chair/recliner  -SC     Post Treatment  Position  chair  -SC     In Chair  notified nsg;reclined;call light within reach;exit alarm on  -SC       User Key  (r) = Recorded By, (t) = Taken By, (c) = Cosigned By    Initials Name Provider Type    Rosanne Ricks PTA Physical Therapy Assistant        Outcome Measures     Row Name 01/22/21 1204          How much help from another person do you currently need...    Turning from your back to your side while in flat bed without using bedrails?  4  -SC     Moving from lying on back to sitting on the side of a flat bed without bedrails?  4  -SC     Moving to and from a bed to a chair (including a wheelchair)?  4  -SC     Standing up from a chair using your arms (e.g., wheelchair, bedside chair)?  4  -SC     Climbing 3-5 steps with a railing?  3  -SC     To walk in hospital room?  4  -SC     AM-PAC 6 Clicks Score (PT)  23  -SC     Row Name 01/22/21 1204          Functional Assessment    Outcome Measure Options  AM-PAC 6 Clicks Basic Mobility (PT)  -SC       User Key  (r) = Recorded By, (t) = Taken By, (c) = Cosigned By    Initials Name Provider Type    Rosanne Ricks PTA Physical Therapy Assistant        Physical Therapy Education                 Title: PT OT SLP Therapies (Done)     Topic: Physical Therapy (Done)     Point: Mobility training (Done)     Learning Progress Summary           Patient Acceptance, E,TB, VU by SC at 1/22/2021 1204    Acceptance, E, VU,NR by BOB at 1/21/2021 2248    Acceptance, E, VU by SC at 1/21/2021 1914    Acceptance, E, VU,NR by BOB at 1/20/2021 2305    Acceptance, E,TB, VU by EL at 1/18/2021 1142    Acceptance, E,TB, VU by EL at 1/15/2021 1254                   Point: Precautions (Done)     Learning Progress Summary           Patient Acceptance, E,TB, VU by SC at 1/22/2021 1204    Acceptance, E, VU,NR by BOB at 1/21/2021 2248    Acceptance, E, VU by SC at 1/21/2021 1914    Acceptance, E, VU,NR by BOB at 1/20/2021 2305    Acceptance, E,TB, VU by EL at 1/18/2021 1142     Acceptance, E,TB, VU by EL at 1/15/2021 1254                               User Key     Initials Effective Dates Name Provider Type Discipline    SC 03/01/19 -  Rosanne Caldera PTA Physical Therapy Assistant PT    EL 06/23/20 -  Larry Queen, PT Physical Therapist PT    TJ 03/01/19 -  Lorenza Nicole RN Registered Nurse Nurse              PT Recommendation and Plan     Plan of Care Reviewed With: patient  Progress: improving  Outcome Summary: pt cont to tolerate increased activity with each PT tx session.  pt amb 250' with rolling walker.  no loss balance note.  Recommend home with family at d/c and HHPT to follow.  PPE used:  mask, safety glasses and gloves     Time Calculation:   PT Charges     Row Name 01/22/21 1209             Time Calculation    Start Time  0903  -SC      Stop Time  0929  -SC      Time Calculation (min)  26 min  -SC      PT Received On  01/22/21  -SC      PT - Next Appointment  01/25/21  -SC         Time Calculation- PT    Total Timed Code Minutes- PT  26 minute(s)  -SC         Timed Charges    91980 - PT Therapeutic Exercise Minutes  14  -SC      46291 - Gait Training Minutes   12  -SC        User Key  (r) = Recorded By, (t) = Taken By, (c) = Cosigned By    Initials Name Provider Type    SC Rosanne Caldera PTA Physical Therapy Assistant        Therapy Charges for Today     Code Description Service Date Service Provider Modifiers Qty    38984812221 HC GAIT TRAINING EA 15 MIN 1/21/2021 Rosanne Caldera, PTA GP 1    63602337163 HC PT THERAPEUTIC ACT EA 15 MIN 1/21/2021 Rosanne Caldera, PTA GP 1    87036613053 HC PT THER PROC EA 15 MIN 1/22/2021 Rosanne Caldera, PTA GP 1    25376885257 HC GAIT TRAINING EA 15 MIN 1/22/2021 Rosanne Caldera, PTA GP 1          PT G-Codes  Outcome Measure Options: AM-PAC 6 Clicks Basic Mobility (PT)  AM-PAC 6 Clicks Score (PT): 23    Rosanne Caldera PTA  1/22/2021

## 2021-01-22 NOTE — PROGRESS NOTES
"PULMONARY CRITICAL CARE Progress  NOTE      PATIENT IDENTIFICATION:  Name: Lenny Larson  MRN: [id number]  :  1940     Age: 80 y.o.  Sex: male    DATE OF Note:  2021   Referring Physician: Dottie Casillas MD                  Subjective:   off O2, sitting in chair at bedside,  no SOB, no chest pain, no nausea or vomiting, no change in bowel habit, no dysuria,  no new  skin rash or itching.      Objective:  tMax 24 hrs: Temp (24hrs), Av °F (36.7 °C), Min:97.3 °F (36.3 °C), Max:98.7 °F (37.1 °C)      Vitals Ranges:   Temp:  [97.3 °F (36.3 °C)-98.7 °F (37.1 °C)] 97.4 °F (36.3 °C)  Heart Rate:  [] 121  Resp:  [18-20] 20  BP: (101-125)/(35-79) 114/68    Intake and Output Last 3 Shifts:   I/O last 3 completed shifts:  In: 1320 [P.O.:1320]  Out: 2750 [Urine:2750]    Exam:  /68   Pulse (!) 121   Temp 97.4 °F (36.3 °C) (Oral)   Resp 20   Ht 177.8 cm (70\")   Wt 75.8 kg (167 lb 1.7 oz)   SpO2 100%   BMI 23.98 kg/m²     General Appearance:  AAO   HEENT:  Normocephalic, without obvious abnormality, Conjunctiva/corneas clear,.  Normal external ear canals, Nares normal, no drainage     Neck:  Supple, symmetrical, trachea midline. No JVD.  Lungs /Chest wall:   Bilateral basal rhonchi, respirations unlabored,  symmetrical wall movement.     Heart:  Regular rate and rhythm, systolic murmur PMI left sternal border  Abdomen: Soft, non-tender, no masses, no organomegaly.    Extremities: Trace edema no clubbing or Cyanosis        Medications:    Current Facility-Administered Medications:   •  acetaminophen (TYLENOL) tablet 650 mg, 650 mg, Oral, Q4H PRN, 650 mg at 01/15/21 1507 **OR** acetaminophen (TYLENOL) 160 MG/5ML solution 650 mg, 650 mg, Oral, Q4H PRN **OR** acetaminophen (TYLENOL) suppository 650 mg, 650 mg, Rectal, Q4H PRN, Alvarado Cai MD  •  amiodarone (PACERONE) tablet 200 mg, 200 mg, Oral, BID With Meals, Alvarado Cai MD, 200 mg at 21 0846  •  atropine sulfate injection 0.5 mg, 0.5 " mg, Intravenous, Q5 Min PRN, Alvarado Cai MD  •  benzonatate (TESSALON) capsule 200 mg, 200 mg, Oral, TID PRN, Alvarado Cai MD  •  bisacodyl (DULCOLAX) suppository 10 mg, 10 mg, Rectal, Daily, Alvarado Cai MD, 10 mg at 01/22/21 0846  •  budesonide (PULMICORT) nebulizer solution 0.5 mg, 0.5 mg, Nebulization, BID - RT, Alvarado Cai MD, 0.5 mg at 01/22/21 0640  •  digoxin (LANOXIN) tablet 125 mcg, 125 mcg, Oral, Daily, Alvarado Cai MD, 125 mcg at 01/22/21 0846  •  dilTIAZem (CARDIZEM) tablet 30 mg, 30 mg, Oral, Q6H, Alvarado Cai MD, 30 mg at 01/22/21 1227  •  enoxaparin (LOVENOX) syringe 80 mg, 1 mg/kg, Subcutaneous, Q12H, Alvarado Cai MD, 80 mg at 01/22/21 0512  •  fentaNYL citrate (PF) (SUBLIMAZE) injection 25 mcg, 25 mcg, Intravenous, Once, Alvarado Cai MD  •  finasteride (PROSCAR) tablet 5 mg, 5 mg, Oral, Daily, Alvarado Cai MD, 5 mg at 01/22/21 0846  •  furosemide (LASIX) injection 20 mg, 20 mg, Intravenous, Daily, Cata Garcia APRN, 20 mg at 01/22/21 0846  •  guaiFENesin (MUCINEX) 12 hr tablet 1,200 mg, 1,200 mg, Oral, Q12H, Alvarado Cai MD, 1,200 mg at 01/22/21 0511  •  HYDROcodone-acetaminophen (NORCO)  MG per tablet 1 tablet, 1 tablet, Oral, Q4H PRN, Alvarado Cai MD, 1 tablet at 01/17/21 0942  •  HYDROcodone-acetaminophen (NORCO) 5-325 MG per tablet 1 tablet, 1 tablet, Oral, Q4H PRN, Alvarado Cai MD, 1 tablet at 01/17/21 0025  •  ipratropium-albuterol (DUO-NEB) nebulizer solution 3 mL, 3 mL, Nebulization, Q2H PRN, Alvarado Cai MD  •  ipratropium-albuterol (DUO-NEB) nebulizer solution 3 mL, 3 mL, Nebulization, 4x Daily - RT, Alvarado Cai MD, 3 mL at 01/22/21 1052  •  magnesium hydroxide (MILK OF MAGNESIA) suspension 2400 mg/10mL 10 mL, 10 mL, Oral, Daily PRN, Alvarado Cai MD, 10 mL at 01/15/21 1729  •  Magnesium Sulfate 2 gram infusion - Mg less than or equal to 1.5 mg/dL, 2 g, Intravenous, PRN **OR** Magnesium Sulfate 1 gram infusion - Mg 1.6-1.9 mg/dL, 1 g, Intravenous,  PRN, Alvarado Cai MD  •  methylPREDNISolone sodium succinate (SOLU-Medrol) injection 40 mg, 40 mg, Intravenous, Q8H, Alvarado Cai MD, 40 mg at 01/22/21 1232  •  metoprolol tartrate (LOPRESSOR) tablet 25 mg, 25 mg, Oral, Q8H, Alvarado Cai MD, 25 mg at 01/22/21 1231  •  midazolam (VERSED) injection 1 mg, 1 mg, Intravenous, Once, Alvarado Cai MD  •  multivitamin with minerals 1 tablet, 1 tablet, Oral, Daily, Alvarado Cai MD, 1 tablet at 01/22/21 0846  •  nitroglycerin (NITROSTAT) SL tablet 0.4 mg, 0.4 mg, Sublingual, Q5 Min PRN, Alvarado Cai MD  •  nystatin (MYCOSTATIN) 251113 UNIT/ML suspension 500,000 Units, 5 mL, Oral, 4x Daily, Dottie Casillas MD, 500,000 Units at 01/22/21 1227  •  ondansetron (ZOFRAN) tablet 4 mg, 4 mg, Oral, Q6H PRN **OR** ondansetron (ZOFRAN) injection 4 mg, 4 mg, Intravenous, Q6H PRN, Alvarado Cai MD  •  polyethylene glycol (MIRALAX) packet 17 g, 17 g, Oral, BID, Alvarado Cai MD, 17 g at 01/22/21 0846  •  potassium chloride (K-DUR,KLOR-CON) CR tablet 40 mEq, 40 mEq, Oral, PRN, Alvarado Cai MD  •  rosuvastatin (CRESTOR) tablet 40 mg, 40 mg, Oral, Nightly, Alvarado Cai MD, 40 mg at 01/21/21 2125  •  [COMPLETED] Insert peripheral IV, , , Once **AND** sodium chloride 0.9 % flush 10 mL, 10 mL, Intravenous, PRN, Alvarado Cai MD, 10 mL at 01/20/21 0512  •  sodium chloride 0.9 % flush 10 mL, 10 mL, Intravenous, Q12H, Alvarado Cai MD, 10 mL at 01/22/21 0846  •  sodium chloride 0.9 % flush 10 mL, 10 mL, Intravenous, PRN, Alvarado Cai MD, 10 mL at 01/19/21 0530  •  sodium chloride 0.9 % infusion 250 mL, 250 mL, Intravenous, Once PRN, Alvarado Cai MD  •  tamsulosin (FLOMAX) 24 hr capsule 0.4 mg, 0.4 mg, Oral, Daily With Dinner, Alvarado Cai MD, 0.4 mg at 01/21/21 1703  •  theophylline (THEODUR) 12 hr tablet 300 mg, 300 mg, Oral, Daily, Alvarado Cai MD, 300 mg at 01/22/21 0846    Data Review:  All labs (24hrs):   Recent Results (from the past 24 hour(s))   Urinalysis With  Culture If Indicated - Urine, Random Void    Collection Time: 01/21/21  5:52 PM    Specimen: Urine, Random Void   Result Value Ref Range    Color, UA Yellow Yellow, Straw    Appearance, UA Cloudy (A) Clear    pH, UA 5.5 5.0 - 8.0    Specific Gravity, UA 1.016 1.005 - 1.030    Glucose, UA Negative Negative    Ketones, UA Negative Negative    Bilirubin, UA Negative Negative    Blood, UA Large (3+) (A) Negative    Protein, UA 30 mg/dL (1+) (A) Negative    Leuk Esterase, UA Trace (A) Negative    Nitrite, UA Negative Negative    Urobilinogen, UA 0.2 E.U./dL 0.2 - 1.0 E.U./dL   Urinalysis, Microscopic Only - Urine, Random Void    Collection Time: 01/21/21  5:52 PM    Specimen: Urine, Random Void   Result Value Ref Range    RBC, UA 13-20 (A) None Seen /HPF    WBC, UA 13-20 (A) None Seen /HPF    Bacteria, UA Trace (A) None Seen /HPF    Squamous Epithelial Cells, UA None Seen None Seen, 0-2 /HPF    Hyaline Casts, UA None Seen None Seen /LPF    Amorphous Crystals, UA Small/1+ None Seen /HPF    Methodology Manual Light Microscopy    Urine Culture - Urine, Urine, Random Void    Collection Time: 01/21/21  5:52 PM    Specimen: Urine, Random Void   Result Value Ref Range    Urine Culture No growth    Magnesium    Collection Time: 01/22/21  4:49 AM    Specimen: Blood   Result Value Ref Range    Magnesium 2.2 1.6 - 2.4 mg/dL   Basic Metabolic Panel    Collection Time: 01/22/21  4:49 AM    Specimen: Blood   Result Value Ref Range    Glucose 127 (H) 65 - 99 mg/dL    BUN 36 (H) 8 - 23 mg/dL    Creatinine 1.05 0.76 - 1.27 mg/dL    Sodium 138 136 - 145 mmol/L    Potassium 4.1 3.5 - 5.2 mmol/L    Chloride 100 98 - 107 mmol/L    CO2 31.0 (H) 22.0 - 29.0 mmol/L    Calcium 8.5 (L) 8.6 - 10.5 mg/dL    eGFR Non African Amer 68 >60 mL/min/1.73    BUN/Creatinine Ratio 34.3 (H) 7.0 - 25.0    Anion Gap 7.0 5.0 - 15.0 mmol/L   CBC Auto Differential    Collection Time: 01/22/21  4:49 AM    Specimen: Blood   Result Value Ref Range    WBC 11.80 (H)  3.40 - 10.80 10*3/mm3    RBC 4.49 4.14 - 5.80 10*6/mm3    Hemoglobin 12.8 (L) 13.0 - 17.7 g/dL    Hematocrit 39.0 37.5 - 51.0 %    MCV 86.9 79.0 - 97.0 fL    MCH 28.5 26.6 - 33.0 pg    MCHC 32.8 31.5 - 35.7 g/dL    RDW 16.2 (H) 12.3 - 15.4 %    RDW-SD 49.4 37.0 - 54.0 fl    MPV 8.8 6.0 - 12.0 fL    Platelets 235 140 - 450 10*3/mm3    Neutrophil % 95.6 (H) 42.7 - 76.0 %    Lymphocyte % 0.8 (L) 19.6 - 45.3 %    Monocyte % 3.4 (L) 5.0 - 12.0 %    Eosinophil % 0.0 (L) 0.3 - 6.2 %    Basophil % 0.2 0.0 - 1.5 %    Neutrophils, Absolute 11.30 (H) 1.70 - 7.00 10*3/mm3    Lymphocytes, Absolute 0.10 (L) 0.70 - 3.10 10*3/mm3    Monocytes, Absolute 0.40 0.10 - 0.90 10*3/mm3    Eosinophils, Absolute 0.00 0.00 - 0.40 10*3/mm3    Basophils, Absolute 0.00 0.00 - 0.20 10*3/mm3    nRBC 0.0 0.0 - 0.2 /100 WBC        Imaging:  XR Chest 1 View  Narrative: DATE OF EXAM:  1/22/2021 5:16 AM     PROCEDURE:  XR CHEST 1 VW-     INDICATIONS:  chest catheter; J44.1-Chronic obstructive pulmonary disease with (acute)  exacerbation; R06.03-Acute respiratory distress; I48.91-Unspecified  atrial fibrillation; Z96-Rtkjbia effusion, not elsewhere classified;  C34.11-Malignant neoplasm of upper lobe, right bronchus or lung;  I50.21-Acute systolic (congestive) heart failure;  I25.118-Atherosclerotic heart disease of native coronary artery with  other     COMPARISON:  1/21/2021     TECHNIQUE:   Single radiographic view of the chest was obtained.     FINDINGS:  There is elevation of the right hemidiaphragm again noted. No  pneumothorax is seen. There is volume loss in the right lung again  noted. Right hilar and suprahilar mass or masslike consolidation is  again noted. Heart size and mediastinal contour appear unchanged. No  other pulmonary consolidations are evident.     Impression: 1 no evidence of pneumothorax.  2. Stable volume loss in the right lung with right hilar/suprahilar mass  or masslike consolidation, similar to prior.  3. Resolving right  basilar atelectasis.     Electronically Signed By-Nikolas Lloyd MD On:1/22/2021 7:42 AM  This report was finalized on [id number] by  Nikolas Lloyd MD.       ASSESSMENT:  Acute respiratory distress  COPD  CAD  Hyperlipidemia  HTN  Chest pain  A-fib    BPH     History of lung cancer  Malignant neoplasm of upper lobe of right lung         PLAN:  F/u as out pt for f/u on the lung mass  OOB daily   PT/OT  Low dose diuretic  Antibiotics   Ok to change steroids to oral   Bronchodilator  Inhaled corticosteroids  Amiodarone to po   Electrolytes/ glycemic control  DVT and GI prophylaxis      Discussed with Dr Shima Garcia, APRN  1/22/2021  12:48 EST     I personally have examined  and interviewed the patient. I have reviewed the history, data, problems, assessment and plan with our NP.  Critical care time in direct medical management (   ) minutes   Louis Ronquillo MD, D,ABSM  1/22/2021

## 2021-01-25 ENCOUNTER — TRANSITIONAL CARE MANAGEMENT TELEPHONE ENCOUNTER (OUTPATIENT)
Dept: CALL CENTER | Facility: HOSPITAL | Age: 81
End: 2021-01-25

## 2021-01-25 LAB — VIRUS SPEC CULT: ABNORMAL

## 2021-01-25 NOTE — OUTREACH NOTE
Call Center TCM Note      Responses   [facility] patient discharged from?  [place]   Does the patient have one of the following disease processes/diagnoses(primary or secondary)?  Other   TCM attempt successful?  Yes   Call start time  1029   Call end time  1036   Discharge diagnosis  Acute respiratory distress    Is patient permission given to speak with other caregiver?  Yes   Person spoke with today (if not patient) and relationship  Wife/[place]   Meds reviewed with patient/caregiver?  Yes   Is the patient having any side effects they believe may be caused by any medication additions or changes?  No   Does the patient have all medications ordered at discharge?  Yes   Is the patient taking all medications as directed (includes completed medication regime)?  Yes   Does the patient have an appointment with their PCP within 7 days of discharge?  No   Nursing Interventions  Educated patient on importance of making appointment, Advised patient to make appointment   Has the patient kept scheduled appointments due by today?  N/A   Comments  States she will call to make because she is needing to cancel her appt for a flu shot.   What is the Home health agency?   [facility]   Has home health visited the patient within 72 hours of discharge?  Yes   What DME was ordered?  [employer] rolling walker   Has all DME been delivered?  Yes   Psychosocial issues?  No   Did the patient receive a copy of their discharge instructions?  Yes   Nursing interventions  Reviewed instructions with patient   What is the patient's perception of their health status since discharge?  Improving   Is the patient/caregiver able to teach back signs and symptoms related to disease process for when to call PCP?  Yes   Is the patient/caregiver able to teach back signs and symptoms related to disease process for when to call 911?  Yes   Is the patient/caregiver able to teach back the hierarchy of who to call/visit for symptoms/problems? PCP, Specialist, Home  health nurse, Urgent Care, ED, 911  Yes   Additional teach back comments  States she called Dr. Gan's office regarding removing catheter and has an appt.  States they have new medications and taking as directed.    TCM call completed?  Yes   Wrap up additional comments  Pt to call office regarding an appt.          Aishwarya Estevez LPN    1/25/2021, 10:38 EST

## 2021-01-25 NOTE — PROGRESS NOTES
Case Management Discharge Note      Final Note: bhf h/h         Selected Continued Care - Discharged on 1/22/2021 Admission date: 1/13/2021 - Discharge disposition: Home-Health Care [facility] [place]                            Final Discharge Disposition Code: 06 - home with home health care

## 2021-01-30 LAB
QT INTERVAL: 356 MS
QT INTERVAL: 418 MS

## 2021-01-31 LAB
QT INTERVAL: 354 MS
QT INTERVAL: 380 MS

## 2021-02-01 ENCOUNTER — READMISSION MANAGEMENT (OUTPATIENT)
Dept: CALL CENTER | Facility: HOSPITAL | Age: 81
End: 2021-02-01

## 2021-02-01 NOTE — OUTREACH NOTE
Medical Week 2 Survey      Responses   [facility] patient discharged from?  [place]   Does the patient have one of the following disease processes/diagnoses(primary or secondary)?  Other   Week 2 attempt successful?  Yes   Call start time  0828   Discharge diagnosis  Acute respiratory distress    Call end time  0828   Person spoke with today (if not patient) and relationship  Wife/Italia   Meds reviewed with patient/caregiver?  Yes   Is the patient having any side effects they believe may be caused by any medication additions or changes?  No   Does the patient have all medications ordered at discharge?  Yes   Is the patient taking all medications as directed (includes completed medication regime)?  Yes   Does the patient have a primary care provider?   Yes   Does the patient have an appointment with their PCP within 7 days of discharge?  No   What is preventing the patient from scheduling follow up appointments within 7 days of discharge?  Haven't had time   Nursing Interventions  Educated patient on importance of making appointment, Advised patient to make appointment   Has the patient kept scheduled appointments due by today?  Yes   Comments  Has followed up with Urology but still needing to get an appt with Dr. Pollard.  States she is going to call today to set one up.   What is the Home health agency?   [facility]   Has home health visited the patient within 72 hours of discharge?  Yes   What DME was ordered?  [employer] rolling walker   Has all DME been delivered?  Yes   Psychosocial issues?  No   Did the patient receive a copy of their discharge instructions?  Yes   Nursing interventions  Reviewed instructions with patient   What is the patient's perception of their health status since discharge?  Improving   Is the patient/caregiver able to teach back signs and symptoms related to disease process for when to call PCP?  Yes   Is the patient/caregiver able to teach back signs and symptoms related to disease  "process for when to call 911?  Yes   Is the patient/caregiver able to teach back the hierarchy of who to call/visit for symptoms/problems? PCP, Specialist, Home health nurse, Urgent Care, ED, 911  Yes   Additional teach back comments  States he is doing \"good\".  They removed the catheter and she has only had to cath him once since then.  States now his is urinating with no issues.  She is also going to discuss a flu shot with Dr. Pollard.   Week 2 Call Completed?  Yes   Wrap up additional comments  Wife to call and make appt with PCP.          Aishwarya Estevez, KIRA  "

## 2021-02-03 ENCOUNTER — OFFICE VISIT (OUTPATIENT)
Dept: FAMILY MEDICINE CLINIC | Facility: CLINIC | Age: 81
End: 2021-02-03

## 2021-02-03 VITALS
OXYGEN SATURATION: 99 % | BODY MASS INDEX: 22.67 KG/M2 | HEART RATE: 53 BPM | TEMPERATURE: 96.8 F | WEIGHT: 158 LBS | SYSTOLIC BLOOD PRESSURE: 91 MMHG | DIASTOLIC BLOOD PRESSURE: 45 MMHG

## 2021-02-03 DIAGNOSIS — J44.9 CHRONIC OBSTRUCTIVE PULMONARY DISEASE, UNSPECIFIED COPD TYPE (HCC): Primary | ICD-10-CM

## 2021-02-03 DIAGNOSIS — R21 RASH AND NONSPECIFIC SKIN ERUPTION: ICD-10-CM

## 2021-02-03 PROCEDURE — 99213 OFFICE O/P EST LOW 20 MIN: CPT | Performed by: FAMILY MEDICINE

## 2021-02-03 RX ORDER — ZINC OXIDE 20 %
OINTMENT (GRAM) TOPICAL AS NEEDED
Qty: 425 G | Refills: 2 | Status: SHIPPED | OUTPATIENT
Start: 2021-02-03 | End: 2021-02-04 | Stop reason: SDUPTHER

## 2021-02-03 NOTE — PROGRESS NOTES
Subjective   Lenny Larson is a 80 y.o. male.     He is in for follow up on a recent hospital stay. He was seen for a large pleural effusion. He had A fib with RVR as well. He had some confusion at the hospital but has been a little better since being home.  He has underlying dementia and his wife is in a little bit of denial about how bad Lenny is.  She knows better than we will leave him at home alone, although he has good days mixed with bad days.  He does not appear to be in any pain at present.  His wife states that he is getting around a little better but she is concerned about a rash that has developed in the gluteal area.  She admits he has been sweating quite a lot since being home but she thinks the rash started while he was in the hospital.         BP 91/45 (BP Location: Left arm, Patient Position: Sitting, Cuff Size: Large Adult)   Pulse 53   Temp 96.8 °F (36 °C) (Temporal)   Wt 71.7 kg (158 lb)   SpO2 99%   BMI 22.67 kg/m²       Chief Complaint   Patient presents with   • COPD     [facility] discharge f/u    • Rash     on the meat part of his bottom - she has been treating it for 3 days now wont go away           Current Outpatient Medications:   •  acetaminophen (TYLENOL) 325 MG tablet, Take 2 tablets by mouth Every 4 (Four) Hours As Needed for Mild Pain ., Disp:  , Rfl:   •  albuterol sulfate HFA (Proventil HFA) 108 (90 Base) MCG/ACT inhaler, Inhale 2 puffs Every 4 (Four) Hours As Needed for Wheezing or Shortness of Air., Disp: 18 g, Rfl: 0  •  amiodarone (PACERONE) 200 MG tablet, Take 1 tablet by mouth 2 (Two) Times a Day With Meals., Disp: 60 tablet, Rfl: 0  •  apixaban (ELIQUIS) 5 MG tablet tablet, Take 1 tablet by mouth Every 12 (Twelve) Hours. Indications: Atrial Fibrillation, Atrial Fibrillation, Disp: 60 tablet, Rfl: 0  •  budesonide (PULMICORT) 0.5 MG/2ML nebulizer solution, Take 2 mL by nebulization 2 (Two) Times a Day., Disp: 60 each, Rfl: 0  •  calcium carbonate (OS-GILMA) 600 MG tablet, Take  600 mg by mouth Daily., Disp: , Rfl:   •  digoxin (LANOXIN) 125 MCG tablet, Take 1 tablet by mouth Daily., Disp: 30 tablet, Rfl: 0  •  dilTIAZem CD (CARDIZEM CD) 240 MG 24 hr capsule, TAKE 1 CAPSULE DAILY, Disp: 90 capsule, Rfl: 3  •  finasteride (PROSCAR) 5 MG tablet, Take 5 mg by mouth Daily., Disp: , Rfl:   •  furosemide (Lasix) 20 MG tablet, Take 1 tablet by mouth Daily., Disp: 30 tablet, Rfl: 0  •  guaiFENesin (MUCINEX) 600 MG 12 hr tablet, Take 2 tablets by mouth Every 12 (Twelve) Hours., Disp: 60 tablet, Rfl: 0  •  ipratropium-albuterol (DUO-NEB) 0.5-2.5 mg/3 ml nebulizer, Take 3 mL by nebulization 4 (Four) Times a Day., Disp: 360 mL, Rfl: 0  •  metoprolol succinate XL (Toprol XL) 50 MG 24 hr tablet, Take 1.5 tablets by mouth Daily., Disp: 45 tablet, Rfl: 0  •  multivitamin with minerals tablet tablet, Take 1 tablet by mouth Daily., Disp: , Rfl:   •  polyethylene glycol (MIRALAX) 17 g packet, Take 17 g by mouth 2 (Two) Times a Day. Available over the counter, Disp:  , Rfl:   •  predniSONE (DELTASONE) 10 MG tablet, 40mg qd po x 3 d, then reduce by 10mg evcery 3 days, then d/c, Disp: 30 tablet, Rfl: 0  •  rosuvastatin (Crestor) 40 MG tablet, Take 1 tablet by mouth Daily., Disp: 90 tablet, Rfl: 3  •  tamsulosin (FLOMAX) 0.4 MG capsule 24 hr capsule, Take 1 capsule by mouth every night at bedtime., Disp: , Rfl:   •  theophylline (THEODUR) 300 MG 12 hr tablet, Take 300 mg by mouth Daily., Disp: , Rfl:   •  zinc oxide ([employer] Zinc Oxide) 20 % ointment, Apply  topically to the appropriate area as directed As Needed for Irritation., Disp: 425 g, Rfl: 2        The following portions of the patient's history were reviewed and updated as appropriate: allergies, current medications, past family history, past medical history, past social history, past surgical history and problem list.    Review of Systems   Constitutional: Negative for activity change, fatigue and fever.   HENT: Negative for congestion, sinus pressure,  sinus pain, sore throat and trouble swallowing.    Eyes: Negative for visual disturbance.   Respiratory: Negative for chest tightness, shortness of breath and wheezing.    Cardiovascular: Negative for chest pain.   Gastrointestinal: Negative for abdominal distention, abdominal pain, constipation, diarrhea, nausea and vomiting.   Genitourinary: Negative for difficulty urinating, dysuria, frequency and urgency.   Musculoskeletal: Negative for back pain and neck pain.   Neurological: Negative for dizziness, weakness, light-headedness and numbness.   Psychiatric/Behavioral: Positive for confusion and decreased concentration. Negative for agitation, hallucinations and suicidal ideas.       Objective   Physical Exam  Vitals signs and nursing note reviewed.   Neck:      Musculoskeletal: Neck supple.   Cardiovascular:      Rate and Rhythm: Normal rate. Rhythm irregular.      Heart sounds: Normal heart sounds. No murmur.   Pulmonary:      Effort: Pulmonary effort is normal.      Breath sounds: Wheezing present. No rhonchi or rales.   Abdominal:      General: Bowel sounds are normal.      Tenderness: There is no abdominal tenderness. There is no guarding.   Skin:     Findings: Rash (pressure sore area in groin) present.   Neurological:      General: No focal deficit present.      Mental Status: He is alert and oriented to person, place, and time.      Gait: Gait normal.      Deep Tendon Reflexes: Reflexes normal.   Psychiatric:         Mood and Affect: Mood normal.           Assessment/Plan   Problems Addressed this Visit        Pulmonary and Pneumonias    Chronic obstructive pulmonary disease (CMS/HCC) - Primary (Chronic)      Other Visit Diagnoses     Rash and nonspecific skin eruption        Relevant Medications    zinc oxide ([employer] Zinc Oxide) 20 % ointment      Diagnoses       Codes Comments    Chronic obstructive pulmonary disease, unspecified COPD type (CMS/HCC)    -  Primary ICD-10-CM: J44.9  ICD-9-CM: 496     Rash  and nonspecific skin eruption     ICD-10-CM: R21  ICD-9-CM: 782.1         His respiratory status seems back to baseline  I will treat the rash with zinc topically  I have advised his wife that she needs to really look into getting Lenny into a nursing home  She can barely take care of herself and Lenny is starting to require significant supervision and assistance  Lenny's daughter, not here today, apparently has told her that we will put him in a nursing home if anything happens he is to Lenny's wife.  I will follow-up as scheduled in the spring  His wife is to call for any new concerns or if she reconsiders possible treatment of dementia which she is not in favor of at present

## 2021-02-04 ENCOUNTER — TELEPHONE (OUTPATIENT)
Dept: FAMILY MEDICINE CLINIC | Facility: CLINIC | Age: 81
End: 2021-02-04

## 2021-02-04 RX ORDER — ZINC OXIDE 20 %
OINTMENT (GRAM) TOPICAL AS NEEDED
Qty: 425 G | Refills: 2 | Status: SHIPPED | OUTPATIENT
Start: 2021-02-04 | End: 2021-02-08

## 2021-02-04 NOTE — TELEPHONE ENCOUNTER
PATIENT'S WIFE, ARLEN CALLED AND STATED THAT SHE SPOKE WITH HER PHARMACIST TODAY AT   [id number] [address] [place] AT [facility] [phone] [facility] [phone]      AND THEY ADVISED HER THEY HAVE NOT RECEIVED AN RX FOR A CREAM FOR THE PATIENT'S RASH. THE PATIENT'S WIFE IS REQUESTING THAT DR. BEASLEY SEND THE CREAM TO THE PHARMACY.    PATIENT'S WIFE ALSO WANTED DR. BEASLEY KNOW THAT SHE PURCHASED A DONUT FOR THE PATIENT TO SIT ON.    PATIENT'S WIFE CALLBACK: [phone]

## 2021-02-04 NOTE — TELEPHONE ENCOUNTER
I sent zinc oxide on Wed and resent again on Thursday  There were no issues with the computer transmission of this rx  Not sure what the problem is but it has been sent again    Hub to read

## 2021-02-05 NOTE — TELEPHONE ENCOUNTER
Spoke to Pharmacist and verbally game them the medicine. It did not come through on their end. Called and spoke to Italia and gave her the information.

## 2021-02-05 NOTE — TELEPHONE ENCOUNTER
Arlet, pt's wife called the hub just now. She is stating the the pharmacy is telling them that they have NOT received any scripts.

## 2021-02-08 ENCOUNTER — OFFICE VISIT (OUTPATIENT)
Dept: CARDIOLOGY | Facility: CLINIC | Age: 81
End: 2021-02-08

## 2021-02-08 VITALS
WEIGHT: 163 LBS | OXYGEN SATURATION: 98 % | SYSTOLIC BLOOD PRESSURE: 108 MMHG | HEIGHT: 70 IN | TEMPERATURE: 97.9 F | DIASTOLIC BLOOD PRESSURE: 56 MMHG | HEART RATE: 66 BPM | BODY MASS INDEX: 23.34 KG/M2

## 2021-02-08 DIAGNOSIS — I48.19 PERSISTENT ATRIAL FIBRILLATION (HCC): ICD-10-CM

## 2021-02-08 DIAGNOSIS — I25.118 CORONARY ARTERY DISEASE OF NATIVE ARTERY OF NATIVE HEART WITH STABLE ANGINA PECTORIS (HCC): Primary | ICD-10-CM

## 2021-02-08 DIAGNOSIS — I50.23 ACUTE ON CHRONIC SYSTOLIC CONGESTIVE HEART FAILURE (HCC): ICD-10-CM

## 2021-02-08 DIAGNOSIS — E78.00 PURE HYPERCHOLESTEROLEMIA: ICD-10-CM

## 2021-02-08 DIAGNOSIS — I10 ESSENTIAL HYPERTENSION: ICD-10-CM

## 2021-02-08 PROCEDURE — 99213 OFFICE O/P EST LOW 20 MIN: CPT | Performed by: INTERNAL MEDICINE

## 2021-02-08 RX ORDER — METOPROLOL SUCCINATE 50 MG/1
75 TABLET, EXTENDED RELEASE ORAL DAILY
Qty: 45 TABLET | Refills: 0 | Status: SHIPPED | OUTPATIENT
Start: 2021-02-08 | End: 2021-02-16 | Stop reason: SDUPTHER

## 2021-02-08 RX ORDER — AMIODARONE HYDROCHLORIDE 200 MG/1
200 TABLET ORAL DAILY
Qty: 60 TABLET | Refills: 0 | Status: SHIPPED | OUTPATIENT
Start: 2021-02-08 | End: 2021-02-16 | Stop reason: SDUPTHER

## 2021-02-08 NOTE — PROGRESS NOTES
Subjective:     Encounter Date:02/08/2021      Patient ID: Lenny Larson is a 80 y.o. male.    Chief Complaint:  History of Present Illness 80-year-old white male with history of coronary disease congestive heart failure hypertension hyperlipidemia atrial fibrillation COPD presents to my office for follow-up.  Patient is currently stable without any signs of chest pain or shortness of breath at rest on exertion.  No complains of any PND orthopnea.  No palpitation dizziness syncope or swelling of the feet.  He is taking his medicine regularly.  Does not smoke.  He was in the hospital with atrial fibrillation with rapid response and was placed on medical therapy including Eliquis and is stable.  Accardi catheterization which showed nonobstructive disease with severe LV dysfunction EF of 20 to 25%.    The following portions of the patient's history were reviewed and updated as appropriate: allergies, current medications, past family history, past medical history, past social history, past surgical history and problem list.  Past Medical History:   Diagnosis Date   • COPD (chronic obstructive pulmonary disease) (CMS/[facility])    • Coronary artery disease    • Hyperlipidemia    • Hypertension      Past Surgical History:   Procedure Laterality Date   • BRONCHOSCOPY N/A 1/14/2021    Procedure: BRONCHOSCOPY with bronchio alveolar lavage of right lower lung;  Surgeon: Sanjay Gonsales MD;  Location: [facility] ENDOSCOPY;  Service: Pulmonary;  Laterality: N/A;  post op: pneumonia   • CARDIAC CATHETERIZATION      PCI   • CARDIAC CATHETERIZATION N/A 1/20/2021    Procedure: Left Heart Cath and coronary angiogram;  Surgeon: Alvarado Cai MD;  Location: [facility] CATH INVASIVE LOCATION;  Service: Cardiovascular;  Laterality: N/A;   • CARDIAC CATHETERIZATION N/A 1/20/2021    Procedure: Left ventriculography;  Surgeon: Alvarado Cai MD;  Location: [facility] CATH INVASIVE LOCATION;  Service: Cardiovascular;  Laterality: N/A;   Current outpatient  "and discharge medications have been reconciled for the patient.  Reviewed by: Alvarado Cai MD    /56 (BP Location: Left arm, Patient Position: Sitting, Cuff Size: Large Adult)   Pulse 66   Temp 97.9 °F (36.6 °C)   Ht 177.8 cm (70\")   Wt 73.9 kg (163 lb)   SpO2 98%   BMI 23.39 kg/m²   History reviewed. No pertinent family history.    Current Outpatient Medications:   •  acetaminophen (TYLENOL) 325 MG tablet, Take 2 tablets by mouth Every 4 (Four) Hours As Needed for Mild Pain ., Disp:  , Rfl:   •  albuterol sulfate HFA (Proventil HFA) 108 (90 Base) MCG/ACT inhaler, Inhale 2 puffs Every 4 (Four) Hours As Needed for Wheezing or Shortness of Air., Disp: 18 g, Rfl: 0  •  amiodarone (PACERONE) 200 MG tablet, Take 1 tablet by mouth 2 (Two) Times a Day With Meals., Disp: 60 tablet, Rfl: 0  •  apixaban (ELIQUIS) 5 MG tablet tablet, Take 1 tablet by mouth Every 12 (Twelve) Hours. Indications: Atrial Fibrillation, Atrial Fibrillation, Disp: 60 tablet, Rfl: 0  •  budesonide (PULMICORT) 0.5 MG/2ML nebulizer solution, Take 2 mL by nebulization 2 (Two) Times a Day., Disp: 60 each, Rfl: 0  •  calcium carbonate (OS-GILMA) 600 MG tablet, Take 600 mg by mouth Daily., Disp: , Rfl:   •  digoxin (LANOXIN) 125 MCG tablet, Take 1 tablet by mouth Daily., Disp: 30 tablet, Rfl: 0  •  dilTIAZem CD (CARDIZEM CD) 240 MG 24 hr capsule, TAKE 1 CAPSULE DAILY, Disp: 90 capsule, Rfl: 3  •  finasteride (PROSCAR) 5 MG tablet, Take 5 mg by mouth Daily., Disp: , Rfl:   •  furosemide (Lasix) 20 MG tablet, Take 1 tablet by mouth Daily., Disp: 30 tablet, Rfl: 0  •  guaiFENesin (MUCINEX) 600 MG 12 hr tablet, Take 2 tablets by mouth Every 12 (Twelve) Hours., Disp: 60 tablet, Rfl: 0  •  ipratropium-albuterol (DUO-NEB) 0.5-2.5 mg/3 ml nebulizer, Take 3 mL by nebulization 4 (Four) Times a Day., Disp: 360 mL, Rfl: 0  •  metoprolol succinate XL (Toprol XL) 50 MG 24 hr tablet, Take 1.5 tablets by mouth Daily., Disp: 45 tablet, Rfl: 0  •  polyethylene " glycol (MIRALAX) 17 g packet, Take 17 g by mouth 2 (Two) Times a Day. Available over the counter, Disp:  , Rfl:   •  rosuvastatin (Crestor) 40 MG tablet, Take 1 tablet by mouth Daily., Disp: 90 tablet, Rfl: 3  •  tamsulosin (FLOMAX) 0.4 MG capsule 24 hr capsule, Take 1 capsule by mouth every night at bedtime., Disp: , Rfl:   •  theophylline (THEODUR) 300 MG 12 hr tablet, Take 300 mg by mouth Daily., Disp: , Rfl:   Allergies   Allergen Reactions   • Ativan [Lorazepam] Mental Status Change     Social History     Socioeconomic History   • Marital status:      Spouse name: Not on file   • Number of children: Not on file   • Years of education: Not on file   • Highest education level: Not on file   Tobacco Use   • Smoking status: Former Smoker   • Smokeless tobacco: Never Used   Substance and Sexual Activity   • Alcohol use: Not Currently   • Drug use: Never   • Sexual activity: Defer     Review of Systems   Constitution: Negative for fever and malaise/fatigue.   Cardiovascular: Negative for chest pain, dyspnea on exertion, leg swelling and palpitations.   Respiratory: Negative for cough and shortness of breath.    Skin: Negative for rash.   Gastrointestinal: Negative for abdominal pain, nausea and vomiting.   Neurological: Negative for focal weakness, headaches, light-headedness and numbness.   All other systems reviewed and are negative.             Objective:     Constitutional:       Appearance: Well-developed.   Eyes:      General: No scleral icterus.     Conjunctiva/sclera: Conjunctivae normal.   HENT:      Head: Normocephalic and atraumatic.   Neck:      Musculoskeletal: Normal range of motion and neck supple.      Vascular: No carotid bruit or JVD.   Pulmonary:      Effort: Pulmonary effort is normal.      Breath sounds: Normal breath sounds. No wheezing. No rales.   Cardiovascular:      Normal rate. Regular rhythm.   Pulses:     Intact distal pulses.   Abdominal:      General: Bowel sounds are normal.       Palpations: Abdomen is soft.   Skin:     General: Skin is warm and dry.      Findings: No rash.   Neurological:      Mental Status: Alert.       Procedures    Lab Review:         MDM   #1 coronary artery disease  Patient has nonobstructive disease with 20 to 30% in the LAD and RCA  2.  Congestive heart failure  Patient has nonischemic cardiomyopathy with EF of 25 to 30%.  Patient's LV systolic dysfunction is most likely secondary to atrial fibrillation  3.  Atrial fibrillation  Patient is well controlled with amiodarone metoprolol and digoxin.  He is also on Eliquis for anticoagulation  4.  Hypertension  Patient's blood pressure is currently stable on medication  5.  Hyperlipidemia  Patient's lipid levels are followed by the primary care doctor.

## 2021-02-11 LAB — FUNGUS WND CULT: ABNORMAL

## 2021-02-15 ENCOUNTER — TELEPHONE (OUTPATIENT)
Dept: CARDIAC REHAB | Facility: HOSPITAL | Age: 81
End: 2021-02-15

## 2021-02-16 RX ORDER — GUAIFENESIN 600 MG/1
1200 TABLET, EXTENDED RELEASE ORAL EVERY 12 HOURS
Qty: 60 TABLET | Refills: 0 | Status: SHIPPED | OUTPATIENT
Start: 2021-02-16 | End: 2021-02-18 | Stop reason: SDUPTHER

## 2021-02-16 RX ORDER — AMIODARONE HYDROCHLORIDE 200 MG/1
200 TABLET ORAL DAILY
Qty: 90 TABLET | Refills: 3 | Status: SHIPPED | OUTPATIENT
Start: 2021-02-16 | End: 2021-01-01 | Stop reason: SDUPTHER

## 2021-02-16 RX ORDER — METOPROLOL SUCCINATE 50 MG/1
75 TABLET, EXTENDED RELEASE ORAL DAILY
Qty: 180 TABLET | Refills: 2 | Status: SHIPPED | OUTPATIENT
Start: 2021-02-16 | End: 2021-01-01 | Stop reason: HOSPADM

## 2021-02-16 NOTE — TELEPHONE ENCOUNTER
Caller: Ankita Larsone    Relationship: Emergency Contact    Best call back number: [phone]     Medication needed:   Requested Prescriptions     Pending Prescriptions Disp Refills   • guaiFENesin (MUCINEX) 600 MG 12 hr tablet 60 tablet 0     Sig: Take 2 tablets by mouth Every 12 (Twelve) Hours.       Does the patient have less than a 3 day supply:  [x] Yes  [] No    What is the patient's preferred pharmacy: [id number] [address] AT [facility] [phone] [facility] [phone]

## 2021-02-17 ENCOUNTER — TELEPHONE (OUTPATIENT)
Dept: CARDIAC REHAB | Facility: HOSPITAL | Age: 81
End: 2021-02-17

## 2021-02-17 NOTE — TELEPHONE ENCOUNTER
"Italia states, \"Lenny is not interested in cardiac rehab at this time due to transportation.\"   "

## 2021-02-18 ENCOUNTER — TELEPHONE (OUTPATIENT)
Dept: FAMILY MEDICINE CLINIC | Facility: CLINIC | Age: 81
End: 2021-02-18

## 2021-02-18 RX ORDER — GUAIFENESIN 600 MG/1
1200 TABLET, EXTENDED RELEASE ORAL EVERY 12 HOURS
Qty: 60 TABLET | Refills: 0 | Status: SHIPPED | OUTPATIENT
Start: 2021-02-18 | End: 2021-01-01

## 2021-02-18 NOTE — TELEPHONE ENCOUNTER
Caller: Lenny Larson    Relationship: Self    Best call back number: [phone]    Medication needed: MUCINEX       When do you need the refill by:ASAP    What details did the patient provide when requesting the medication: PATIENT WAS IN HOSPITAL AND THEY GAVE HIM THIS MEDICATION (DR SANJUANITA KUHN)  Does the patient have less than a 3 day supply:  [x] Yes  [] No    What is the patient's preferred pharmacy:      [address] [facility]             30.9

## 2021-02-19 ENCOUNTER — TELEPHONE (OUTPATIENT)
Dept: CARDIOLOGY | Facility: CLINIC | Age: 81
End: 2021-02-19

## 2021-02-19 NOTE — TELEPHONE ENCOUNTER
ARLEN WIFE ASKING IF DR RUIZ CAN PRESCRIBE SOMETHING TO HELP TRACIE REST AT NIGHT, HE IS KEEPING HER UP ALL NIGHT

## 2021-03-01 NOTE — TELEPHONE ENCOUNTER
PT WIFE CALLED STATING AFTER LEAVING THE HOSPITAL, HE HAS LOST A LOT OF WEIGHT, HAS TROUBLE SLEEPING, AND A LOT OF MEMORY LOSS. SHE IS REQUESTING A CALL BACK TO DISCUSS THIS.

## 2021-03-02 NOTE — TELEPHONE ENCOUNTER
I returned her call  I will send him out some trazodone to help with sleep and anxiety  There is a plan in place to treat the aneurysm by vascular surgery  I will follow-up after that is done

## 2021-03-02 NOTE — TELEPHONE ENCOUNTER
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 SHE ALSO SAID THAT HE IS HAVING TROUBLE SLEEPING. SHE ASKED IF DR. BEASLEY COULD CALL HER TODAY TO TELL HER WHAT SHE COULD DO.

## 2021-04-14 NOTE — TELEPHONE ENCOUNTER
Talked to pt's wife, they had old script for only once a day of Amiodarone. Sending in RX for Amiodarone twice daily.

## 2021-07-01 NOTE — PROGRESS NOTES
Subjective   Lenny Larson is a 80 y.o. male.     Pt is here due to his nasal congestion and feeling more tired.  He has had issues with his memory since January and has not been back to himself.  He denies any fevers or current shortness of breath but does complain of this at times. He denies any chest pain.       The following portions of the patient's history were reviewed and updated as appropriate: allergies, current medications, past family history, past medical history, past social history, past surgical history and problem list.  Past Medical History:   Diagnosis Date   • COPD (chronic obstructive pulmonary disease) (CMS/[facility])    • Coronary artery disease    • Hyperlipidemia    • Hypertension      Past Surgical History:   Procedure Laterality Date   • BRONCHOSCOPY N/A 1/14/2021    Procedure: BRONCHOSCOPY with bronchio alveolar lavage of right lower lung;  Surgeon: Sanjay Gonsales MD;  Location: [facility] ENDOSCOPY;  Service: Pulmonary;  Laterality: N/A;  post op: pneumonia   • CARDIAC CATHETERIZATION      PCI   • CARDIAC CATHETERIZATION N/A 1/20/2021    Procedure: Left Heart Cath and coronary angiogram;  Surgeon: Alvarado Cai MD;  Location: [facility] CATH INVASIVE LOCATION;  Service: Cardiovascular;  Laterality: N/A;   • CARDIAC CATHETERIZATION N/A 1/20/2021    Procedure: Left ventriculography;  Surgeon: Alvarado Cai MD;  Location: [facility] CATH INVASIVE LOCATION;  Service: Cardiovascular;  Laterality: N/A;     History reviewed. No pertinent family history.  Social History     Socioeconomic History   • Marital status:      Spouse name: Not on file   • Number of children: Not on file   • Years of education: Not on file   • Highest education level: Not on file   Tobacco Use   • Smoking status: Former Smoker   • Smokeless tobacco: Never Used   Substance and Sexual Activity   • Alcohol use: Not Currently   • Drug use: Never   • Sexual activity: Defer         Current Outpatient Medications:   •  acetaminophen  (TYLENOL) 325 MG tablet, Take 2 tablets by mouth Every 4 (Four) Hours As Needed for Mild Pain ., Disp:  , Rfl:   •  albuterol sulfate HFA (Proventil HFA) 108 (90 Base) MCG/ACT inhaler, Inhale 2 puffs Every 4 (Four) Hours As Needed for Wheezing or Shortness of Air., Disp: 18 g, Rfl: 0  •  amiodarone (PACERONE) 200 MG tablet, Take 1 tablet by mouth 2 (two) times a day., Disp: 180 tablet, Rfl: 3  •  budesonide (PULMICORT) 0.5 MG/2ML nebulizer solution, Take 2 mL by nebulization 2 (Two) Times a Day., Disp: 60 each, Rfl: 0  •  calcium carbonate (OS-GILMA) 600 MG tablet, Take 600 mg by mouth Daily., Disp: , Rfl:   •  dilTIAZem CD (CARDIZEM CD) 240 MG 24 hr capsule, Take 1 capsule by mouth Daily., Disp: 90 capsule, Rfl: 3  •  Eliquis 5 MG tablet tablet, TAKE 1 TABLET BY MOUTH EVERY 12 HOURS FOR ATRIAL FIBRILLATION, Disp: 180 tablet, Rfl: 1  •  finasteride (PROSCAR) 5 MG tablet, Take 5 mg by mouth Daily., Disp: , Rfl:   •  guaiFENesin (MUCINEX) 600 MG 12 hr tablet, Take 2 tablets by mouth Every 12 (Twelve) Hours., Disp: 60 tablet, Rfl: 0  •  ipratropium-albuterol (DUO-NEB) 0.5-2.5 mg/3 ml nebulizer, Take 3 mL by nebulization 4 (Four) Times a Day., Disp: 360 mL, Rfl: 0  •  polyethylene glycol (MIRALAX) 17 g packet, Take 17 g by mouth 2 (Two) Times a Day. Available over the counter, Disp:  , Rfl:   •  rosuvastatin (Crestor) 40 MG tablet, Take 1 tablet by mouth Daily., Disp: 90 tablet, Rfl: 3  •  tamsulosin (FLOMAX) 0.4 MG capsule 24 hr capsule, Take 1 capsule by mouth every night at bedtime., Disp: , Rfl:   •  theophylline (THEODUR) 300 MG 12 hr tablet, Take 300 mg by mouth Daily., Disp: , Rfl:   •  traZODone (DESYREL) 150 MG tablet, TAKE 1 TABLET BY MOUTH EVERY NIGHT, Disp: 90 tablet, Rfl: 0  •  furosemide (Lasix) 20 MG tablet, Take 1 tablet by mouth Daily., Disp: 30 tablet, Rfl: 0  •  metoprolol succinate XL (Toprol XL) 50 MG 24 hr tablet, Take 1.5 tablets by mouth Daily., Disp: 180 tablet, Rfl: 2    Review of Systems    Constitutional: Positive for fatigue and unexpected weight loss. Negative for activity change, appetite change, chills, diaphoresis, fever and unexpected weight gain.   HENT: Positive for rhinorrhea. Negative for congestion, sore throat and trouble swallowing.    Respiratory: Negative for cough, shortness of breath and wheezing.    Cardiovascular: Negative for chest pain, palpitations and leg swelling.   Gastrointestinal: Positive for constipation. Negative for diarrhea, nausea and vomiting.   Musculoskeletal: Positive for gait problem.   Neurological: Positive for weakness and memory problem. Negative for dizziness, light-headedness, headache and confusion.   Psychiatric/Behavioral: Positive for sleep disturbance.     /54 (BP Location: Left arm, Patient Position: Sitting, Cuff Size: Adult)   Pulse 86   Temp 98.2 °F (36.8 °C) (Skin)   Wt 71.7 kg (158 lb)   SpO2 98%   BMI 22.67 kg/m²       Objective   Physical Exam  Vitals and nursing note reviewed.   Constitutional:       Appearance: Normal appearance.   Cardiovascular:      Rate and Rhythm: Normal rate and regular rhythm.      Heart sounds: Normal heart sounds.   Pulmonary:      Effort: Pulmonary effort is normal.      Breath sounds: Normal breath sounds.   Musculoskeletal:      Right lower leg: No edema.      Left lower leg: No edema.   Skin:     General: Skin is warm and dry.   Neurological:      Mental Status: He is alert and oriented to person, place, and time. Mental status is at baseline.   Psychiatric:         Mood and Affect: Mood normal.         Behavior: Behavior normal.         Thought Content: Thought content normal.         Procedures     Assessment/Plan   Diagnoses and all orders for this visit:    1. Memory deficit (Primary)  -     Cancel: TSH; Future  -     Cancel: Vitamin B12; Future  -     Cancel: Folate; Future  -     Cancel: Vitamin E; Future  -     POCT urinalysis dipstick, automated  -     Folate  -     TSH  -     Vitamin B12  -      Vitamin E    2. Hyperlipidemia, unspecified hyperlipidemia type  -     Cancel: Comprehensive Metabolic Panel; Future  -     Cancel: Lipid Panel; Future  -     Comprehensive Metabolic Panel  -     Lipid Panel    3. Fatigue, unspecified type  -     Cancel: CBC & Differential; Future  -     CBC & Differential    4. Insomnia, unspecified type  -     Cancel: TSH; Future  -     Cancel: CBC & Differential; Future  -     traZODone (DESYREL) 50 MG tablet; Take 1 tablet by mouth Every Night.  Dispense: 90 tablet; Refill: 0  -     CBC & Differential  -     TSH    5. History of lung cancer  -     CT Chest Without Contrast; Future    6. H/O pleural effusion  -     CT Chest Without Contrast; Future    Will check labs today and call with results.  Pt also due to repeat his CT chest.  He has had thoracentesis twice this year but last fluid was negative for malignancy. Will also get records from [facility] to see results of MRI, PET scans but it appears that he had recent MRI and PET scan which were not concerning. If labs normal, will need to have further evaluation through neurology.    I spent 30 minutes of patient care including reviewing pt's previous hx, reviewing current symptoms, performing exam, ordering tests, discussing treatment plan and completing my note.

## 2021-07-06 NOTE — TELEPHONE ENCOUNTER
Pts wife is calling regarding the lab results. She can see it on MyChart and some stuff is high but she wants to know what it means?

## 2021-07-06 NOTE — TELEPHONE ENCOUNTER
The labs showed he is mildly anemic but that is not unusual for age and chronic health issues that he has  The kidney functions are a little weak and the liver enzymes are little high  Those are usually a reflection of age and not drinking enough water  I did not see anything in the labs that I found overly concerning  If I need to call her and speak with her,  let me know and I will do so

## 2021-07-20 PROBLEM — J90 RECURRENT RIGHT PLEURAL EFFUSION: Status: ACTIVE | Noted: 2021-01-01

## 2021-07-20 NOTE — CONSULTS
PULMONARY CRITICAL CARE CONSULT NOTE      PATIENT IDENTIFICATION:  Name: Lenny Larson  MRN: [id number]  :  1940     Age: 80 y.o.  Sex: male        DATE OF CONSULTATION:  2021  PRIMARY CARE PHYSICIAN    Camron Pollard MD                  CHIEF COMPLAINT: Recurrent pleural effusion     History of Present Illness:   Lenny Larson is a 80 y.o. male with a history of COPD, Hx lung cancer, CAD, Hyperlipidiemia, and HTN who was direct admitted from home per his pulmonologist Dr Fuentes. Patient states he has had several pleural effusions and had them drained before and had a CT a couple weeks ago that showed he had another pleural effusion and has been getting increasingly SOB and called his MD and was instructed to come to [place] for direct admission.       Review of Systems:   Constitutional: As above    Eyes: negative   ENT/oropharynx: negative   Cardiovascular: negative   Respiratory: As above   Gastrointestinal: negative   Genitourinary: negative   Neurological: negative   Musculoskeletal: negative   Integument/breast: negative   Endocrine: negative   Allergic/Immunologic: negative     Past Medical History:  Past Medical History:   Diagnosis Date   • Cancer (CMS/[facility])    • COPD (chronic obstructive pulmonary disease) (CMS/[facility])    • Coronary artery disease    • Hyperlipidemia    • Hypertension        Past Surgical History:  Past Surgical History:   Procedure Laterality Date   • BRONCHOSCOPY N/A 2021    Procedure: BRONCHOSCOPY with bronchio alveolar lavage of right lower lung;  Surgeon: Sanjay Gonsales MD;  Location: [facility] ENDOSCOPY;  Service: Pulmonary;  Laterality: N/A;  post op: pneumonia   • CARDIAC CATHETERIZATION      PCI   • CARDIAC CATHETERIZATION N/A 2021    Procedure: Left Heart Cath and coronary angiogram;  Surgeon: Alvarado Cai MD;  Location: [facility] CATH INVASIVE LOCATION;  Service: Cardiovascular;  Laterality: N/A;   • CARDIAC CATHETERIZATION N/A 2021    Procedure: Left  ventriculography;  Surgeon: Alvarado Cai MD;  Location: [facility] INVASIVE LOCATION;  Service: Cardiovascular;  Laterality: N/A;        Family History:  Family History   Adopted: Yes   Family history unknown: Yes        Social History:   Social History     Tobacco Use   • Smoking status: Former Smoker   • Smokeless tobacco: Never Used   Substance Use Topics   • Alcohol use: Not Currently        Allergies:  Allergies   Allergen Reactions   • Ativan [Lorazepam] Mental Status Change       Home Meds:  Medications Prior to Admission   Medication Sig Dispense Refill Last Dose   • amiodarone (PACERONE) 200 MG tablet Take 1 tablet by mouth 2 (two) times a day. 180 tablet 3 7/20/2021 at Unknown time   • budesonide (PULMICORT) 0.5 MG/2ML nebulizer solution Take 2 mL by nebulization 2 (Two) Times a Day. 60 each 0    • calcium carbonate (TUMS) 500 MG chewable tablet Chew 2 tablets Daily.   7/19/2021 at Unknown time   • dilTIAZem CD (CARDIZEM CD) 240 MG 24 hr capsule Take 1 capsule by mouth Daily. 90 capsule 3 7/19/2021 at Unknown time   • Eliquis 5 MG tablet tablet TAKE 1 TABLET BY MOUTH EVERY 12 HOURS FOR ATRIAL FIBRILLATION 180 tablet 1 7/20/2021 at Unknown time   • finasteride (PROSCAR) 5 MG tablet Take 5 mg by mouth Daily.   7/19/2021 at Unknown time   • ipratropium-albuterol (DUO-NEB) 0.5-2.5 mg/3 ml nebulizer Take 3 mL by nebulization 4 (Four) Times a Day. 360 mL 0 7/19/2021 at Unknown time   • rosuvastatin (Crestor) 40 MG tablet Take 1 tablet by mouth Daily. 90 tablet 3 7/19/2021 at Unknown time   • tamsulosin (FLOMAX) 0.4 MG capsule 24 hr capsule Take 1 capsule by mouth every night at bedtime.   7/19/2021 at Unknown time   • theophylline (THEODUR) 300 MG 12 hr tablet Take 300 mg by mouth Daily.   7/19/2021 at Unknown time   • traZODone (DESYREL) 50 MG tablet Take 1 tablet by mouth Every Night. 90 tablet 0 7/19/2021 at Unknown time   • acetaminophen (TYLENOL) 325 MG tablet Take 2 tablets by mouth Every 4 (Four) Hours  "As Needed for Mild Pain .   Unknown at Unknown time   • albuterol sulfate HFA (Proventil HFA) 108 (90 Base) MCG/ACT inhaler Inhale 2 puffs Every 4 (Four) Hours As Needed for Wheezing or Shortness of Air. 18 g 0 Unknown at Unknown time   • calcium carbonate (OS-GILMA) 600 MG tablet Take 600 mg by mouth Daily.   Unknown at Unknown time   • furosemide (Lasix) 20 MG tablet Take 1 tablet by mouth Daily. 30 tablet 0 Unknown at Unknown time   • guaiFENesin (MUCINEX) 600 MG 12 hr tablet Take 2 tablets by mouth Every 12 (Twelve) Hours. 60 tablet 0 Unknown at Unknown time   • metoprolol succinate XL (Toprol XL) 50 MG 24 hr tablet Take 1.5 tablets by mouth Daily. 180 tablet 2 Unknown at Unknown time   • polyethylene glycol (MIRALAX) 17 g packet Take 17 g by mouth 2 (Two) Times a Day. Available over the counter   Unknown at Unknown time       Objective:  tMax 24 hrs: Temp (24hrs), Av °F (36.7 °C), Min:98 °F (36.7 °C), Max:98 °F (36.7 °C)      Vitals Ranges:   Temp:  [98 °F (36.7 °C)] 98 °F (36.7 °C)  Heart Rate:  [62-66] 62  Resp:  [17-20] 20  BP: (159)/(57) 159/57    Intake and Output Last 3 Shifts:   No intake/output data recorded.    Exam:  /57 (BP Location: Right arm, Patient Position: Lying)   Pulse 62   Temp 98 °F (36.7 °C) (Oral)   Resp 20   Ht 172.7 cm (68\")   Wt 70.3 kg (155 lb)   SpO2 96%   BMI 23.57 kg/m²       21  1223   Weight: 70.3 kg (155 lb)     General Appearance:      HEENT:  Normocephalic, without obvious abnormality, atraumaticConjunctiva/corneas clear,.  Normal external ear canals, Nares normal, no drainage  Neck:  Supple, symmetrical, trachea midline. No JVD.  Lungs /Chest wall:   Bilateral basal rhonchi, respirations unlabored symmetrical wall movement.     Heart:  Regular rate and rhythm, systolic murmur PMI left sternal border  Abdomen: Soft, non-tender, no masses, no organomegaly.    Extremities: Trace edema no clubbing or Cyanosis        Data Review:  All labs (24hrs):   Recent " Results (from the past 24 hour(s))   Comprehensive Metabolic Panel    Collection Time: 07/20/21  2:36 PM    Specimen: Blood   Result Value Ref Range    Glucose 94 65 - 99 mg/dL    BUN 19 8 - 23 mg/dL    Creatinine 1.21 0.76 - 1.27 mg/dL    Sodium 141 136 - 145 mmol/L    Potassium 3.9 3.5 - 5.2 mmol/L    Chloride 105 98 - 107 mmol/L    CO2 28.0 22.0 - 29.0 mmol/L    Calcium 8.6 8.6 - 10.5 mg/dL    Total Protein 6.7 6.0 - 8.5 g/dL    Albumin 3.30 (L) 3.50 - 5.20 g/dL    ALT (SGPT) 89 (H) 1 - 41 U/L    AST (SGOT) 70 (H) 1 - 40 U/L    Alkaline Phosphatase 70 39 - 117 U/L    Total Bilirubin 0.3 0.0 - 1.2 mg/dL    eGFR Non African Amer 58 (L) >60 mL/min/1.73    Globulin 3.4 gm/dL    A/G Ratio 1.0 g/dL    BUN/Creatinine Ratio 15.7 7.0 - 25.0    Anion Gap 8.0 5.0 - 15.0 mmol/L   Protime-INR    Collection Time: 07/20/21  2:36 PM    Specimen: Blood   Result Value Ref Range    Protime 11.5 9.6 - 11.7 Seconds    INR 1.04 0.93 - 1.10   CBC Auto Differential    Collection Time: 07/20/21  2:36 PM    Specimen: Blood   Result Value Ref Range    WBC 5.50 3.40 - 10.80 10*3/mm3    RBC 3.79 (L) 4.14 - 5.80 10*6/mm3    Hemoglobin 11.4 (L) 13.0 - 17.7 g/dL    Hematocrit 34.6 (L) 37.5 - 51.0 %    MCV 91.3 79.0 - 97.0 fL    MCH 29.9 26.6 - 33.0 pg    MCHC 32.8 31.5 - 35.7 g/dL    RDW 14.5 12.3 - 15.4 %    RDW-SD 46.4 37.0 - 54.0 fl    MPV 8.4 6.0 - 12.0 fL    Platelets 271 140 - 450 10*3/mm3    Neutrophil % 74.8 42.7 - 76.0 %    Lymphocyte % 16.4 (L) 19.6 - 45.3 %    Monocyte % 6.9 5.0 - 12.0 %    Eosinophil % 1.2 0.3 - 6.2 %    Basophil % 0.7 0.0 - 1.5 %    Neutrophils, Absolute 4.10 1.70 - 7.00 10*3/mm3    Lymphocytes, Absolute 0.90 0.70 - 3.10 10*3/mm3    Monocytes, Absolute 0.40 0.10 - 0.90 10*3/mm3    Eosinophils, Absolute 0.10 0.00 - 0.40 10*3/mm3    Basophils, Absolute 0.00 0.00 - 0.20 10*3/mm3    nRBC 0.0 0.0 - 0.2 /100 WBC   Gold Top - SST    Collection Time: 07/20/21  2:36 PM   Result Value Ref Range    Extra Tube Hold for  add-ons.         Imaging:  [unfilled]    ASSESSMENT:  Recurrent right pleural effusion  COPD  Hx of lung cancer  CAD  Hyperlipidemia  AAA  BPH   Chronic A-fib       PLAN:  Get CT chest without contrast most likely will consider tube placement  Bronchodilator  Inhaled corticosteroids  IS/Flutter valve   Electrolytes/ glycemic control  DVT and GI prophylaxis    Discussed with Dr Shima Garcia, APRN   7/20/2021  16:10 EDT    I personally have examined  and interviewed the patient. I have reviewed the history, data, problems, assessment and plan with our NP.  Critical care time in direct medical management (   ) minutes  Electronically signed by Louis Ronquillo MD, D,ABSM, 07/20/21, 4:21 PM EDT.

## 2021-07-20 NOTE — PLAN OF CARE
Goal Outcome Evaluation:         Admitted for pleural effusion possible thoracentesis. Will observe.

## 2021-07-20 NOTE — H&P
[facility] Medicine Services      Patient Name: Lenny Larson  : 1940  MRN: [id number]  Primary Care Physician:  Camron Pollard MD  Date of admission: 2021      Subjective      Chief Complaint: cough, shortness of breath     History of Present Illness: Lenny Larson is a 80 y.o. male who presented to [facility] on 2021 as a direct admit from home per pulmonary Dr. Fuentes request. Patient has a history of right pleural effusion that has required chest tube in 2021 at [place] and thoracentesis 2021 at [facility]. The patient has developed a cough with copious clear sputum as well as shortness of breath, no fever, no chest pain. His shortness of breath worsens upon exertion and with lying flat. He had CT chest on 21 that showed new moderate to large right pleural effusion with partial loculation. He saw Dr. Fuentes and was directly admitted with plans for thoracentesis.     Further PMH: CAD s/p stent, AAA s/p repair 2021, afib on eliquis, lung carcinoma s/p right upper lobectomy/chemo/radiation  now in remission       Review of Systems   Constitutional: Negative.   HENT: Negative.    Eyes: Negative.    Cardiovascular: Negative.    Respiratory: Positive for cough and shortness of breath. Negative for hemoptysis.    Endocrine: Negative.    Hematologic/Lymphatic: Negative.    Skin: Negative.    Musculoskeletal: Negative.    Gastrointestinal: Negative.    Genitourinary: Negative.    Neurological: Negative.    Psychiatric/Behavioral: Negative.    Allergic/Immunologic: Negative.    All other systems reviewed and are negative.       Personal History     Past Medical History:   Diagnosis Date   • COPD (chronic obstructive pulmonary disease) (CMS/HCC)    • Coronary artery disease    • Hyperlipidemia    • Hypertension        Past Surgical History:   Procedure Laterality Date   • BRONCHOSCOPY N/A 2021    Procedure: BRONCHOSCOPY with bronchio  alveolar lavage of right lower lung;  Surgeon: Sanjay Gonsales MD;  Location: [facility] ENDOSCOPY;  Service: Pulmonary;  Laterality: N/A;  post op: pneumonia   • CARDIAC CATHETERIZATION      PCI   • CARDIAC CATHETERIZATION N/A 1/20/2021    Procedure: Left Heart Cath and coronary angiogram;  Surgeon: Alvarado Cai MD;  Location: [facility] CATH INVASIVE LOCATION;  Service: Cardiovascular;  Laterality: N/A;   • CARDIAC CATHETERIZATION N/A 1/20/2021    Procedure: Left ventriculography;  Surgeon: Alvarado Cai MD;  Location: [facility] CATH INVASIVE LOCATION;  Service: Cardiovascular;  Laterality: N/A;       Family History: family history is not on file. Otherwise pertinent FHx was reviewed and not pertinent to current issue.    Social History:  reports that he has quit smoking. He has never used smokeless tobacco. He reports previous alcohol use. He reports that he does not use drugs.    Home Medications:  Prior to Admission Medications     Prescriptions Last Dose Informant Patient Reported? Taking?    acetaminophen (TYLENOL) 325 MG tablet   No No    Take 2 tablets by mouth Every 4 (Four) Hours As Needed for Mild Pain .    albuterol sulfate HFA (Proventil HFA) 108 (90 Base) MCG/ACT inhaler   No No    Inhale 2 puffs Every 4 (Four) Hours As Needed for Wheezing or Shortness of Air.    amiodarone (PACERONE) 200 MG tablet   No No    Take 1 tablet by mouth 2 (two) times a day.    budesonide (PULMICORT) 0.5 MG/2ML nebulizer solution   No No    Take 2 mL by nebulization 2 (Two) Times a Day.    calcium carbonate (OS-GILMA) 600 MG tablet   Yes No    Take 600 mg by mouth Daily.    dilTIAZem CD (CARDIZEM CD) 240 MG 24 hr capsule   No No    Take 1 capsule by mouth Daily.    Eliquis 5 MG tablet tablet   No No    TAKE 1 TABLET BY MOUTH EVERY 12 HOURS FOR ATRIAL FIBRILLATION    finasteride (PROSCAR) 5 MG tablet   Yes No    Take 5 mg by mouth Daily.    furosemide (Lasix) 20 MG tablet   No No    Take 1 tablet by mouth Daily.    guaiFENesin (MUCINEX)  600 MG 12 hr tablet   No No    Take 2 tablets by mouth Every 12 (Twelve) Hours.    ipratropium-albuterol (DUO-NEB) 0.5-2.5 mg/3 ml nebulizer   No No    Take 3 mL by nebulization 4 (Four) Times a Day.    metoprolol succinate XL (Toprol XL) 50 MG 24 hr tablet   No No    Take 1.5 tablets by mouth Daily.    polyethylene glycol (MIRALAX) 17 g packet   No No    Take 17 g by mouth 2 (Two) Times a Day. Available over the counter    rosuvastatin (Crestor) 40 MG tablet   No No    Take 1 tablet by mouth Daily.    tamsulosin (FLOMAX) 0.4 MG capsule 24 hr capsule   Yes No    Take 1 capsule by mouth every night at bedtime.    theophylline (THEODUR) 300 MG 12 hr tablet  Pharmacy Yes No    Take 300 mg by mouth Daily.    traZODone (DESYREL) 50 MG tablet   No No    Take 1 tablet by mouth Every Night.            Allergies:  Allergies   Allergen Reactions   • Ativan [Lorazepam] Mental Status Change       Objective      Vitals:   Temp:  [98 °F (36.7 °C)] 98 °F (36.7 °C)  Heart Rate:  [65] 65  Resp:  [17] 17  BP: (159)/(57) 159/57    Physical Exam  Vitals reviewed.   Constitutional:       Appearance: Normal appearance. He is normal weight.   HENT:      Head: Normocephalic.   Eyes:      Pupils: Pupils are equal, round, and reactive to light.   Cardiovascular:      Rate and Rhythm: Normal rate and regular rhythm.      Pulses: Normal pulses.      Heart sounds: Murmur heard.     Pulmonary:      Effort: Pulmonary effort is normal.      Breath sounds: Examination of the right-upper field reveals decreased breath sounds and rhonchi. Decreased breath sounds and rhonchi present.   Abdominal:      General: Bowel sounds are normal.      Palpations: Abdomen is soft.      Tenderness: There is no abdominal tenderness.   Musculoskeletal:         General: Normal range of motion.      Cervical back: Normal range of motion.   Skin:     General: Skin is warm.   Neurological:      Mental Status: He is alert and oriented to person, place, and time.    Psychiatric:         Mood and Affect: Mood normal.         Result Review    Result Review:  I have personally reviewed the results from the time of this admission to 7/20/2021 14:33 EDT and agree with these findings:  [x]  Laboratory  []  Microbiology  [x]  Radiology  []  EKG/Telemetry   []  Cardiology/Vascular   []  Pathology  [x]  Old records  []  Other:        Assessment/Plan        Active Hospital Problems:  Active Hospital Problems    Diagnosis    • **Recurrent right pleural effusion    • Chronic atrial fibrillation (CMS/HCC)    • BPH without obstruction/lower urinary tract symptoms    • Hyperlipidemia    • Coronary artery disease    • Abdominal aortic aneurysm (AAA) (CMS/HCC)    • Chronic obstructive pulmonary disease (CMS/HCC)    • History of lung cancer      Plan:     Recurrent right pleural effusion  -symptomatic with shortness of breath, cough  -CT chest 7/12/21: new moderate to large right pleural effusion with partial loculation  -on room air with normal oxygen saturation   -check labs, cont prn duonebs  -pulmonary consulted for planned paracentesis, hold eliquis/pharmacy to dose lovenox    CAD s/p PCI  AAA s/p repair 4/2021  -cont home statin    Chronic atrial fibrillation  -cont home Cardizem, amiodarone  -Eliquis on hold due to planned thoracentesis    Chronic systolic CHF due to ischemic cardiomyopathy with valvular heart disease  -echo 1/2021: LVEF of 21 to 25% with severe MVR, mild dilatation of the aortic root, large pericardial effusion greater than 2 cm adjacent to the right atrium, with no cardiac tamponade; RVSP 36.9 mmHg  -cont home lasix    COPD  -stable not in exacerbation  -cont home bronchodilators, theophylline     BPH, urinary retention  -continue home Proscar, Flomax    H/O lung carcinoma status post right upper lobectomy/chemo/radiation 2005 now in remission      DVT prophylaxis:  Medical DVT prophylaxis orders are present.   lovenox pharmacy to dose, will need eliquis resumed at  discharge     CODE STATUS:    Limited Support to NOT Include: Intubation  Code Status: No CPR  Medical Interventions (Level of Support Prior to Arrest): Limited    Admission Status:  I believe this patient meets observation status.    I discussed the patient's findings and my recommendations with patient, family and nursing staff.    This patient has been examined wearing appropriate Personal Protective Equipment 07/20/21      Signature: Electronically signed by PRIYA Beebe, 07/20/21, 2:34 PM EDT.

## 2021-07-21 NOTE — PROGRESS NOTES
"PULMONARY CRITICAL CARE Progress  NOTE      PATIENT IDENTIFICATION:  Name: Lenny Larson  MRN: [id number]  :  1940     Age: 80 y.o.  Sex: male    DATE OF Note:  2021   Referring Physician: Dottie Casillas MD                  Subjective:   Feeling better, no new issue, no SOB no chest pain, no nausea or vomiting, no change in bowel habit, no dysuria,  no new  skin rash or itching.      Objective:  tMax 24 hrs: Temp (24hrs), Av.8 °F (36.6 °C), Min:97.4 °F (36.3 °C), Max:98 °F (36.7 °C)      Vitals Ranges:   Temp:  [97.4 °F (36.3 °C)-98 °F (36.7 °C)] 97.9 °F (36.6 °C)  Heart Rate:  [62-76] 75  Resp:  [14-20] 18  BP: (105-159)/(50-57) 141/52    Intake and Output Last 3 Shifts:   I/O last 3 completed shifts:  In: 480 [P.O.:480]  Out: -     Exam:  /52 (BP Location: Right arm, Patient Position: Lying)   Pulse 75   Temp 97.9 °F (36.6 °C) (Oral)   Resp 18   Ht 172.7 cm (68\")   Wt 69.4 kg (153 lb)   SpO2 99%   BMI 23.26 kg/m²     General Appearance:     HEENT:  Normocephalic, without obvious abnormality, Conjunctiva/corneas clear,.  Normal external ear canals, Nares normal, no drainage     Neck:  Supple, symmetrical, trachea midline. No JVD.  Lungs /Chest wall:   Bilateral basal rhonchi, respirations unlabored symmetrical wall movement.     Heart:  Regular rate and rhythm, systolic murmur PMI left sternal border  Abdomen: Soft, non-tender, no masses, no organomegaly.    Extremities: Trace edema no clubbing or Cyanosis        Medications:    Current Facility-Administered Medications:   •  acetaminophen (TYLENOL) tablet 650 mg, 650 mg, Oral, Q4H PRN **OR** acetaminophen (TYLENOL) 160 MG/5ML solution 650 mg, 650 mg, Oral, Q4H PRN **OR** acetaminophen (TYLENOL) suppository 650 mg, 650 mg, Rectal, Q4H PRN, Joanna Johnson APRN  •  albuterol (PROVENTIL) nebulizer solution 0.083% 2.5 mg/3mL, 2.5 mg, Nebulization, Q6H PRN, Joanna Johnson APRN  •  aluminum-magnesium hydroxide-simethicone " (MAALOX MAX) 400-400-40 MG/5ML suspension 15 mL, 15 mL, Oral, Q6H PRN, Achterberg, Joanna, APRN  •  amiodarone (PACERONE) tablet 200 mg, 200 mg, Oral, Q12H, Achterberg, Joanna, APRN, 200 mg at 07/20/21 2047  •  budesonide (PULMICORT) nebulizer solution 0.5 mg, 0.5 mg, Nebulization, BID - RT, Cata Garcia, APRN, 0.5 mg at 07/21/21 0759  •  calcium 500 mg vitamin D 5 mcg (200 UT) per tablet 1 tablet, 1 tablet, Oral, Daily, Achterberg, Joanna, APRN, 1 tablet at 07/20/21 1456  •  dilTIAZem CD (CARDIZEM CD) 24 hr capsule 240 mg, 240 mg, Oral, Daily, Achterberg, Joanna, APRN, 240 mg at 07/20/21 1456  •  enoxaparin (LOVENOX) syringe 70 mg, 70 mg, Subcutaneous, Q12H, Achterberg, Joanna, APRN, 70 mg at 07/21/21 0343  •  finasteride (PROSCAR) tablet 5 mg, 5 mg, Oral, Daily, Achterberg, Joanna, APRN, 5 mg at 07/20/21 1457  •  furosemide (LASIX) tablet 20 mg, 20 mg, Oral, Daily, Achterberg, Joanna, APRN, 20 mg at 07/20/21 1457  •  guaiFENesin (MUCINEX) 12 hr tablet 1,200 mg, 1,200 mg, Oral, BID, Achterberg, Joanna, APRN, 1,200 mg at 07/20/21 2050  •  ipratropium-albuterol (DUO-NEB) nebulizer solution 3 mL, 3 mL, Nebulization, 4x Daily - RT, Achterberg, Joanna, APRN, 3 mL at 07/21/21 0759  •  melatonin tablet 5 mg, 5 mg, Oral, Nightly PRN, Achterberg, Joanna, APRN  •  ondansetron (ZOFRAN) tablet 4 mg, 4 mg, Oral, Q6H PRN **OR** ondansetron (ZOFRAN) injection 4 mg, 4 mg, Intravenous, Q6H PRN, Joanna Johnson, APRN  •  Pharmacy to Dose enoxaparin (LOVENOX), , Does not apply, Continuous PRN, Achterberg, Joanna, APRN  •  rosuvastatin (CRESTOR) tablet 40 mg, 40 mg, Oral, Nightly, Achteryong, Joanna, APRN, 40 mg at 07/20/21 2047  •  sodium chloride 0.9 % flush 10 mL, 10 mL, Intravenous, Q12H, Achteryong, Joanna, APRN, 10 mL at 07/20/21 2047  •  sodium chloride 0.9 % flush 10 mL, 10 mL, Intravenous, PRN, JordynterAggie beachle, APRN  •  tamsulosin (FLOMAX) 24 hr capsule 0.4 mg, 0.4 mg, Oral, Nightly,  Achterberg, Joanna, APRN, 0.4 mg at 07/20/21 2047  •  theophylline (THEODUR) 12 hr tablet 300 mg, 300 mg, Oral, Daily, AchterbergAggiele, APRN, 300 mg at 07/20/21 1457  •  traZODone (DESYREL) tablet 50 mg, 50 mg, Oral, Nightly, Achterberg, Joanna, APRN, 50 mg at 07/20/21 2047    Data Review:  All labs (24hrs):   Recent Results (from the past 24 hour(s))   Comprehensive Metabolic Panel    Collection Time: 07/20/21  2:36 PM    Specimen: Blood   Result Value Ref Range    Glucose 94 65 - 99 mg/dL    BUN 19 8 - 23 mg/dL    Creatinine 1.21 0.76 - 1.27 mg/dL    Sodium 141 136 - 145 mmol/L    Potassium 3.9 3.5 - 5.2 mmol/L    Chloride 105 98 - 107 mmol/L    CO2 28.0 22.0 - 29.0 mmol/L    Calcium 8.6 8.6 - 10.5 mg/dL    Total Protein 6.7 6.0 - 8.5 g/dL    Albumin 3.30 (L) 3.50 - 5.20 g/dL    ALT (SGPT) 89 (H) 1 - 41 U/L    AST (SGOT) 70 (H) 1 - 40 U/L    Alkaline Phosphatase 70 39 - 117 U/L    Total Bilirubin 0.3 0.0 - 1.2 mg/dL    eGFR Non African Amer 58 (L) >60 mL/min/1.73    Globulin 3.4 gm/dL    A/G Ratio 1.0 g/dL    BUN/Creatinine Ratio 15.7 7.0 - 25.0    Anion Gap 8.0 5.0 - 15.0 mmol/L   Protime-INR    Collection Time: 07/20/21  2:36 PM    Specimen: Blood   Result Value Ref Range    Protime 11.5 9.6 - 11.7 Seconds    INR 1.04 0.93 - 1.10   CBC Auto Differential    Collection Time: 07/20/21  2:36 PM    Specimen: Blood   Result Value Ref Range    WBC 5.50 3.40 - 10.80 10*3/mm3    RBC 3.79 (L) 4.14 - 5.80 10*6/mm3    Hemoglobin 11.4 (L) 13.0 - 17.7 g/dL    Hematocrit 34.6 (L) 37.5 - 51.0 %    MCV 91.3 79.0 - 97.0 fL    MCH 29.9 26.6 - 33.0 pg    MCHC 32.8 31.5 - 35.7 g/dL    RDW 14.5 12.3 - 15.4 %    RDW-SD 46.4 37.0 - 54.0 fl    MPV 8.4 6.0 - 12.0 fL    Platelets 271 140 - 450 10*3/mm3    Neutrophil % 74.8 42.7 - 76.0 %    Lymphocyte % 16.4 (L) 19.6 - 45.3 %    Monocyte % 6.9 5.0 - 12.0 %    Eosinophil % 1.2 0.3 - 6.2 %    Basophil % 0.7 0.0 - 1.5 %    Neutrophils, Absolute 4.10 1.70 - 7.00 10*3/mm3     Lymphocytes, Absolute 0.90 0.70 - 3.10 10*3/mm3    Monocytes, Absolute 0.40 0.10 - 0.90 10*3/mm3    Eosinophils, Absolute 0.10 0.00 - 0.40 10*3/mm3    Basophils, Absolute 0.00 0.00 - 0.20 10*3/mm3    nRBC 0.0 0.0 - 0.2 /100 WBC   Gold Top - SST    Collection Time: 07/20/21  2:36 PM   Result Value Ref Range    Extra Tube Hold for add-ons.    Basic Metabolic Panel    Collection Time: 07/21/21  5:20 AM    Specimen: Blood   Result Value Ref Range    Glucose 91 65 - 99 mg/dL    BUN 19 8 - 23 mg/dL    Creatinine 1.31 (H) 0.76 - 1.27 mg/dL    Sodium 140 136 - 145 mmol/L    Potassium 3.3 (L) 3.5 - 5.2 mmol/L    Chloride 105 98 - 107 mmol/L    CO2 26.0 22.0 - 29.0 mmol/L    Calcium 8.3 (L) 8.6 - 10.5 mg/dL    eGFR Non African Amer 53 (L) >60 mL/min/1.73    BUN/Creatinine Ratio 14.5 7.0 - 25.0    Anion Gap 9.0 5.0 - 15.0 mmol/L   CBC Auto Differential    Collection Time: 07/21/21  5:20 AM    Specimen: Blood   Result Value Ref Range    WBC 5.50 3.40 - 10.80 10*3/mm3    RBC 3.44 (L) 4.14 - 5.80 10*6/mm3    Hemoglobin 10.2 (L) 13.0 - 17.7 g/dL    Hematocrit 31.1 (L) 37.5 - 51.0 %    MCV 90.6 79.0 - 97.0 fL    MCH 29.6 26.6 - 33.0 pg    MCHC 32.7 31.5 - 35.7 g/dL    RDW 14.6 12.3 - 15.4 %    RDW-SD 45.9 37.0 - 54.0 fl    MPV 9.0 6.0 - 12.0 fL    Platelets 237 140 - 450 10*3/mm3    Neutrophil % 72.2 42.7 - 76.0 %    Lymphocyte % 15.9 (L) 19.6 - 45.3 %    Monocyte % 9.7 5.0 - 12.0 %    Eosinophil % 1.4 0.3 - 6.2 %    Basophil % 0.8 0.0 - 1.5 %    Neutrophils, Absolute 4.00 1.70 - 7.00 10*3/mm3    Lymphocytes, Absolute 0.90 0.70 - 3.10 10*3/mm3    Monocytes, Absolute 0.50 0.10 - 0.90 10*3/mm3    Eosinophils, Absolute 0.10 0.00 - 0.40 10*3/mm3    Basophils, Absolute 0.00 0.00 - 0.20 10*3/mm3    nRBC 0.0 0.0 - 0.2 /100 WBC        Imaging:  CT Chest Without Contrast Diagnostic  Narrative: CT CHEST WO CONTRAST DIAGNOSTIC-     Date of Exam: 7/20/2021 5:22 PM     Indication: Lung nodule, 6-8mm. Recurrent right pleural effusion ,  history  of lung cancer     Comparison: CT chest 07/12/2021, 01/11/2019     Technique: Serial and axial CT images of the chest were obtained.  Reconstructions in the coronal and sagittal planes were performed.   Automated exposure control and iterative reconstruction methods were  used.     FINDINGS:  There is aortic and coronary artery calcification. The upper and of an  abdominal aortic aneurysm with stent is included, as was previously  reported. Heart size is normal.     There is a large area of airspace consolidation in the medial aspect of  the right upper-mid lung extending into and surrounding the hilum. There  are air bronchograms within this. The appearance is unchanged from the  prior exams. There is a large right pleural effusion, similar to the  prior exam. Some of this layers posteriorly and some is loculated  anteriorly and in the major fissure. There is mild stable middle lobe  volume loss.     There is a stable 3 mm left upper lung nodule on image 26. There is  minor lingular opacity which is unchanged. Left lung is otherwise clear.  No left pleural effusion. There is no mediastinal adenopathy. There is a  calcified subcarinal node.     Limited upper abdominal imaging shows the liver to be abnormally dense,  Hounsfield reading 104. There is left adrenal enlargement which is  similar to priors. Bone window show no fracture or lytic or blastic bone  lesion.     Impression: 1. No significant change from 07/12/2021.  2. There is a large area of dense consolidation in the medial aspect of  the right lung which is stable and may be due to posttreatment changes.  3. There is a large right pleural effusion which is partly loculated,  and is unchanged from 07/12/2021.  4. Atherosclerosis. There is an abdominal aortic aneurysm and stent,  only the upper end included.  5. The liver is abnormally dense, the appearance suggesting  hemachromatosis.     Electronically Signed By-Olga Maldonado MD On:7/20/2021 5:50 PM  This  report was finalized on [id number] by  Olga Maldonado MD.       ASSESSMENT:    Recurrent right pleural effusion  COPD  Hx of lung cancer  CAD  Hyperlipidemia  AAA  BPH   Chronic A-fib       PLAN:  Plan to place ultrasound-guided chest tube today we will send the fluid out for evaluation  It looks like still localized no significant change from before but might require decortication if his lung expanded  Bronchodilator  Inhaled corticosteroids  IS/Flutter valve   Electrolytes/ glycemic control  DVT and GI prophylaxis    Louis Ronquillo MD, D,ABSM,  7/21/2021  08:13 EDT

## 2021-07-21 NOTE — PLAN OF CARE
Problem: Adult Inpatient Plan of Care  Goal: Plan of Care Review  Outcome: Ongoing, Progressing  Flowsheets (Taken 7/21/2021 1517)  Plan of Care Reviewed With: patient  Outcome Summary: Patient has been alert throughout day.  Chest Tube placed by Dr. Ronquillo. Pt tolerating well.  Denies SOA.  Goal: Patient-Specific Goal (Individualized)  Outcome: Ongoing, Progressing  Goal: Absence of Hospital-Acquired Illness or Injury  Outcome: Ongoing, Progressing  Intervention: Identify and Manage Fall Risk  Recent Flowsheet Documentation  Taken 7/21/2021 1256 by Vangie Pretty RN  Safety Promotion/Fall Prevention:   safety round/check completed   room organization consistent   nonskid shoes/slippers when out of bed   lighting adjusted   fall prevention program maintained   clutter free environment maintained   assistive device/personal items within reach   activity supervised  Taken 7/21/2021 1100 by Vangie Pretty RN  Safety Promotion/Fall Prevention:   safety round/check completed   room organization consistent   muscle strengthening facilitated   lighting adjusted   fall prevention program maintained   clutter free environment maintained   assistive device/personal items within reach   activity supervised  Taken 7/21/2021 0900 by Vangie Pretty RN  Safety Promotion/Fall Prevention:   activity supervised   assistive device/personal items within reach   clutter free environment maintained   fall prevention program maintained   lighting adjusted   nonskid shoes/slippers when out of bed   room organization consistent   safety round/check completed  Intervention: Prevent Skin Injury  Recent Flowsheet Documentation  Taken 7/21/2021 1256 by Vangie Pretty RN  Body Position: (on side of bed)   position changed independently   sitting up in bed  Taken 7/21/2021 1100 by Vangie Pretty RN  Body Position: position changed independently  Taken 7/21/2021 0900 by Vangie Pretty RN  Body Position: position  changed independently  Intervention: Prevent Infection  Recent Flowsheet Documentation  Taken 7/21/2021 1256 by Vangie Pretty, RN  Infection Prevention: hand hygiene promoted  Taken 7/21/2021 1100 by Vangie Pretty RN  Infection Prevention: hand hygiene promoted  Taken 7/21/2021 0900 by Vangie Pretty RN  Infection Prevention: hand hygiene promoted  Goal: Optimal Comfort and Wellbeing  Outcome: Ongoing, Progressing  Intervention: Provide Person-Centered Care  Recent Flowsheet Documentation  Taken 7/21/2021 0900 by Vangie Pretty RN  Trust Relationship/Rapport:   care explained   choices provided   questions answered   questions encouraged   reassurance provided   thoughts/feelings acknowledged  Goal: Readiness for Transition of Care  Outcome: Ongoing, Progressing   Goal Outcome Evaluation:  Plan of Care Reviewed With: patient           Outcome Summary: Patient has been alert throughout day.  Chest Tube placed by Dr. Ronquillo. Pt tolerating well.  Denies SOA.

## 2021-07-21 NOTE — PLAN OF CARE
Problem: Adult Inpatient Plan of Care  Goal: Absence of Hospital-Acquired Illness or Injury  Intervention: Identify and Manage Fall Risk  Recent Flowsheet Documentation  Taken 7/21/2021 0100 by Harrison Banerjee LPN  Safety Promotion/Fall Prevention:   safety round/check completed   room organization consistent   muscle strengthening facilitated   nonskid shoes/slippers when out of bed   mobility aid in reach   lighting adjusted   activity supervised   assistive device/personal items within reach   clutter free environment maintained   elopement precautions   fall prevention program maintained  Taken 7/20/2021 2310 by Harrison Banerjee LPN  Safety Promotion/Fall Prevention:   safety round/check completed   room organization consistent   nonskid shoes/slippers when out of bed   muscle strengthening facilitated   mobility aid in reach   lighting adjusted   fall prevention program maintained   clutter free environment maintained   assistive device/personal items within reach   activity supervised  Taken 7/20/2021 2140 by Harrison Banerjee LPN  Safety Promotion/Fall Prevention:   safety round/check completed   room organization consistent   nonskid shoes/slippers when out of bed   muscle strengthening facilitated   mobility aid in reach   lighting adjusted   fall prevention program maintained   assistive device/personal items within reach   activity supervised   clutter free environment maintained  Taken 7/20/2021 1955 by Harrison Banerjee LPN  Safety Promotion/Fall Prevention:   activity supervised   assistive device/personal items within reach   clutter free environment maintained   fall prevention program maintained   lighting adjusted   mobility aid in reach   muscle strengthening facilitated   nonskid shoes/slippers when out of bed   room organization consistent   safety round/check completed  Intervention: Prevent Skin Injury  Recent Flowsheet Documentation  Taken 7/21/2021 0100 by Harrison Banerjee  KIRA SANCHEZ  Body Position: position changed independently  Taken 7/20/2021 2310 by Harrison Banerjee LPN  Body Position: position changed independently  Taken 7/20/2021 2140 by Harrison Banerjee LPN  Body Position: position changed independently  Taken 7/20/2021 1955 by Harrison Banerjee LPN  Body Position: position changed independently  Intervention: Prevent Infection  Recent Flowsheet Documentation  Taken 7/21/2021 0100 by Harrison Banerjee LPN  Infection Prevention:   visitors restricted/screened   single patient room provided   rest/sleep promoted   personal protective equipment utilized   hand hygiene promoted  Taken 7/20/2021 2310 by Harrison Banerjee LPN  Infection Prevention:   visitors restricted/screened   single patient room provided   rest/sleep promoted   hand hygiene promoted   personal protective equipment utilized  Taken 7/20/2021 2140 by Harrison Banerjee LPN  Infection Prevention:   visitors restricted/screened   single patient room provided   rest/sleep promoted   personal protective equipment utilized   hand hygiene promoted  Taken 7/20/2021 1955 by Harrison Banerjee LPN  Infection Prevention:   visitors restricted/screened   single patient room provided   rest/sleep promoted   personal protective equipment utilized   hand hygiene promoted  Goal: Optimal Comfort and Wellbeing  Intervention: Provide Person-Centered Care  Recent Flowsheet Documentation  Taken 7/20/2021 1955 by Harrison Banerjee LPN  Trust Relationship/Rapport:   care explained   choices provided   questions answered   questions encouraged   thoughts/feelings acknowledged   Goal Outcome Evaluation:

## 2021-07-21 NOTE — PROCEDURES
Chest tube placement    Name: Lenny Larson  MRN: [id number]  :  1940  Age: 80 y.o.  Sex: male    Time of procedure: 16:13 EDT      Date of Operation: 2021     Pre-op Diagnosis: Pneumothorax  Site: Right pleural effusion  Post-op Diagnosis: same    Surgeon: Louis Ronquillo    Sample: Pleural fluid  Procedure: X-ray was reviewed, informed consent was obtained, Patient was sitting, physical exam used to confirm site and location for placement tube in the 5th intercostal space posterior axillar line (right)  side , preparing the site with chlorhexidine, then lidocaine 1% 5 mL used as a local anesthesia for the skin tissue above the rib, pleura, then into the pleural   was draining yellow fluid, 1.5  centimeters incision made,  To skin then dilatation with with dilator above rib edge to pleural space passing wire to the pleural space then chest tube placed over the wire   (300 yellow thin fluid coming out to the Pleur-evac, sutured dressed with vasselin attached to pleuravac with -20 pressure water seal, patient tolerated the procedure very well no acute complication vitals continue to be stable  chest x-ray to follow.        Louis Ronquillo MD. D, ABSM.  2021  16:13 EDT

## 2021-07-21 NOTE — CASE MANAGEMENT/SOCIAL WORK
Discharge Planning Assessment  [facility]     Patient Name: Lenny Larson  MRN: [id number]  Today's Date: 7/21/2021    Admit Date: 7/20/2021    Discharge Needs Assessment     Row Name 07/21/21 1625       Living Environment    Lives With  spouse    Name(s) of Who Lives With Patient  [place]    Current Living Arrangements  home/apartment/condo    Primary Care Provided by  self;spouse/significant other    Provides Primary Care For  no one    Family Caregiver if Needed  spouse    Able to Return to Prior Arrangements  yes       Resource/Environmental Concerns    Resource/Environmental Concerns  none    Transportation Concerns  car, none       Transition Planning    Patient/Family Anticipates Transition to  home with family    Patient/Family Anticipated Services at Transition      Transportation Anticipated  family or friend will provide       Discharge Needs Assessment    Readmission Within the Last 30 Days  no previous admission in last 30 days    Equipment Currently Used at Home  nebulizer;walker, rolling    Concerns to be Addressed  denies needs/concerns at this time    Discharge Coordination/Progress  DC Plan: From home with spouse, consider home health. R Chest tube 7/21.        Discharge Plan     Row Name 07/21/21 1632       Plan    Plan Comments  Assist with ADLs. Pharmacy [facility] off of [address]    Row Name 07/21/21 1630       Plan    Plan  DC Plan: From home with spouse, consider home health. R Chest tube 7/21.        Continued Care and Services - Admitted Since 7/20/2021    Coordination has not been started for this encounter.         Demographic Summary     Row Name 07/21/21 1623       General Information    Admission Type  observation    Arrived From  emergency department    Required Notices Provided  Observation Status Notice    Referral Source  admission list    Reason for Consult  discharge planning    Preferred Language  English     Used During This Interaction  no    General  Information Comments  Spoke to pt in room.        Functional Status     Row Name 07/21/21 5108       Functional Status    Usual Activity Tolerance  moderate    Current Activity Tolerance  fair       Functional Status, IADL    Medications  independent    Meal Preparation  assistive equipment and person    Housekeeping  assistive equipment and person    Laundry  completely dependent    Shopping  completely dependent       Mental Status    General Appearance WDL  WDL       Mental Status Summary    Recent Changes in Mental Status/Cognitive Functioning  no changes        Met with patient in room wearing PPE: mask, goggles.      Maintained distance greater than six feet and spent less than 15 minutes in the room.               Negar Page RN

## 2021-07-22 NOTE — ANESTHESIA PREPROCEDURE EVALUATION
Anesthesia Evaluation     Patient summary reviewed and Nursing notes reviewed   no history of anesthetic complications:  NPO Solid Status: > 8 hours  NPO Liquid Status: > 8 hours           Airway   Mallampati: II  TM distance: >3 FB  Neck ROM: full  No difficulty expected  Dental      Pulmonary     breath sounds clear to auscultation  (+) a smoker Former, lung cancer, COPD,   Cardiovascular     ECG reviewed  Rhythm: regular  Rate: normal    (+) hypertension, CAD, dysrhythmias Atrial Fib, hyperlipidemia,       Neuro/Psych- negative ROS  GI/Hepatic/Renal/Endo - negative ROS     Musculoskeletal (-) negative ROS    Abdominal     Abdomen: soft.   Substance History - negative use     OB/GYN negative ob/gyn ROS         Other      history of cancer                      Anesthesia Plan    ASA 4     MAC     intravenous induction     Anesthetic plan, all risks, benefits, and alternatives have been provided, discussed and informed consent has been obtained with: patient.

## 2021-07-22 NOTE — ANESTHESIA POSTPROCEDURE EVALUATION
Patient: Lenny Larson    Procedure Summary     Date: 07/22/21 Room / Location: [facility] ENDOSCOPY 4 / [facility] ENDOSCOPY    Anesthesia Start: 1425 Anesthesia Stop: 1452    Procedure: BRONCHOSCOPY with bronchio alveolar lavage right lower lung (N/A Bronchus) Diagnosis:       History of lung cancer      (History of lung cancer [Z85.118])    Surgeons: Louis Ronquillo MD Provider: Aftab Medrano MD    Anesthesia Type: MAC ASA Status: 4          Anesthesia Type: MAC    Vitals  Vitals Value Taken Time   BP 95/38 07/22/21 1513   Temp     Pulse 71 07/22/21 1514   Resp 22 07/22/21 1508   SpO2 96 % 07/22/21 1514   Vitals shown include unvalidated device data.        Post Anesthesia Care and Evaluation    Patient location during evaluation: PACU  Patient participation: complete - patient participated  Level of consciousness: awake  Pain scale: See nurse's notes for pain score.  Pain management: adequate  Airway patency: patent  Anesthetic complications: No anesthetic complications  PONV Status: none  Cardiovascular status: acceptable  Respiratory status: acceptable  Hydration status: acceptable    Comments: Patient seen and examined postoperatively; vital signs stable; SpO2 greater than or equal to 90%; cardiopulmonary status stable; nausea/vomiting adequately controlled; pain adequately controlled; no apparent anesthesia complications; patient discharged from anesthesia care when discharge criteria were met

## 2021-07-22 NOTE — PLAN OF CARE
Goal Outcome Evaluation:  Plan of Care Reviewed With: patient        Progress: improving  Outcome Summary: Prior note was done in error.

## 2021-07-22 NOTE — POST-PROCEDURE NOTE
OP Note  Preop Dx: Urinary retention  Postop Dx: Dx same  Procedure:   Placement of a urethral catheter  Surgeon: Antonio Boyd MD  Anesthesia:  Local  Complications: None  Findings: No evidence of urethral stricture.  EBL 0.     Description of procedure:  At the patient's bedside he was prepped and draped in a sterile fashion.  The rating jelly was instilled within the urethra.  A 16 Korean coudé catheter was then passed without significant difficulties.  There is some resistance passing through the prostate suggesting significant BPH.  Once passed this there was good return of yellow urine.  Patient tolerated the procedure well without any complications.  We will leave the catheter in place until the patient is ambulatory and the chest tube has been removed.

## 2021-07-22 NOTE — PLAN OF CARE
Goal Outcome Evaluation:  Plan of Care Reviewed With: patient        Progress: improving  Outcome Summary: Patient up doing very well in mobility. Will continue to observe

## 2021-07-22 NOTE — PLAN OF CARE
Goal Outcome Evaluation:  Plan of Care Reviewed With: patient        Progress: no change  Outcome Summary: pt has rested most of the night, has a chest tube in , tolerating fine, c/o pain gave prn med

## 2021-07-22 NOTE — PROCEDURES
Bronchoscopy Procedure Note    Name: Lenny Larson   Age:  80 y.o.   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 with trapped lung with possible endobronchial lesion.  The risks, benefits, complications, treatment options and expected outcomes were discussed with the patient and informed consent was obtained. The possibilities of reaction to medication, pulmonary aspiration, pneumothorax, bleeding, respiratory failure and failure to diagnose a condition and creating a complication requiring transfusion or operation were discussed with the patient who freely signed the consent.      Description of Procedure:  After the induction of topical nasopharyngeal anesthesia, the bronchoscope was passed through the oropharynx . The vocal cords were visualized and 2% lidocaine  was topically placed onto the cords and below in the bronchus.  Careful inspection of the tracheal lumen was accomplished.     An entire bronchial exam was performed including the right and left bronchi with the following findings:     Endobronchial findings: There is some abnormality in the structures of the airways in the right lower lobe and right middle lobe due to previous treatment, no endobronchial lesion was seen mucosa is normal but there is mucous plugs in the right lower lobe segment washed suctioned and cleared  Trachea: Normal  Darlyn: Normal shape  Right upper lobe bronchus: nicely open no lesions  Right midlelobe bronchus:nicely open no lesions  Right lower  lobe bronchus: nicely open no lesions  Left main bronchus: nicely open no lesions  Left upper lobe bronchus: nicely open no lesions  Left lower lobe bronchus: nicely open no lesions    The Patient tolerated the procedure well.       SOPHIE Peterson, [facility]  7/22/2021  15:21 EDT

## 2021-07-22 NOTE — PLAN OF CARE
Goal Outcome Evaluation:      Patient had urethral catheter placed, having bronch today. Chest tube remains in place. Well continue to observe.

## 2021-07-22 NOTE — PROGRESS NOTES
"PULMONARY CRITICAL CARE Progress  NOTE      PATIENT IDENTIFICATION:  Name: Lenny Larson  MRN: [id number]  :  1940     Age: 80 y.o.  Sex: male    DATE OF Note:  2021   Referring Physician: Dottie Casillas MD                  Subjective:   Patient feeling better since chest tube  Please of the chest x-ray reviewed with the patient  No nausea or vomiting, no change in bowel habit, no dysuria,  no new  skin rash or itching.      Objective:  tMax 24 hrs: Temp (24hrs), Av.8 °F (36.6 °C), Min:97.4 °F (36.3 °C), Max:98.2 °F (36.8 °C)      Vitals Ranges:   Temp:  [97.4 °F (36.3 °C)-98.2 °F (36.8 °C)] 97.7 °F (36.5 °C)  Heart Rate:  [68-93] 82  Resp:  [8-22] 22  BP: ()/(37-58) 86/43    Intake and Output Last 3 Shifts:   I/O last 3 completed shifts:  In: 720 [P.O.:720]  Out: 2450 [Chest Tube:2450]    Exam:  BP (!) 86/43   Pulse 82   Temp 97.7 °F (36.5 °C) (Oral)   Resp 22   Ht 172.7 cm (68\")   Wt 68 kg (149 lb 14.6 oz)   SpO2 95%   BMI 22.79 kg/m²     General Appearance: Alert awake not in distress  HEENT:  Normocephalic, without obvious abnormality, Conjunctiva/corneas clear,.  Normal external ear canals, Nares normal, no drainage     Neck:  Supple, symmetrical, trachea midline. No JVD.  Lungs /Chest wall: Chest tube in place no air leak bilateral basal rhonchi, respirations unlabored symmetrical wall movement.     Heart:  Regular rate and rhythm, systolic murmur PMI left sternal border  Abdomen: Soft, non-tender, no masses, no organomegaly.    Extremities: Trace edema no clubbing or Cyanosis        Medications:    Current Facility-Administered Medications:   •  acetaminophen (TYLENOL) tablet 650 mg, 650 mg, Oral, Q4H PRN, 650 mg at 21 **OR** acetaminophen (TYLENOL) 160 MG/5ML solution 650 mg, 650 mg, Oral, Q4H PRN **OR** acetaminophen (TYLENOL) suppository 650 mg, 650 mg, Rectal, Q4H PRN, Joanna Johnson APRN  •  albuterol (PROVENTIL) nebulizer solution 0.083% 2.5 mg/3mL, 2.5 " mg, Nebulization, Q6H PRN, Achteryong Joanna, APRN  •  aluminum-magnesium hydroxide-simethicone (MAALOX MAX) 400-400-40 MG/5ML suspension 15 mL, 15 mL, Oral, Q6H PRN, Achterberg, Joanna, APRN  •  amiodarone (PACERONE) tablet 200 mg, 200 mg, Oral, Q12H, Achterberg, Joanna, APRN, 200 mg at 07/22/21 0840  •  budesonide (PULMICORT) nebulizer solution 0.5 mg, 0.5 mg, Nebulization, BID - RT, Cata Garcia, APRN, 0.5 mg at 07/22/21 0759  •  calcium 500 mg vitamin D 5 mcg (200 UT) per tablet 1 tablet, 1 tablet, Oral, Daily, Achterberg, Joanna, APRN, 1 tablet at 07/22/21 0840  •  dilTIAZem CD (CARDIZEM CD) 24 hr capsule 240 mg, 240 mg, Oral, Daily, Achterberg, Joanna, APRN, 240 mg at 07/22/21 0840  •  enoxaparin (LOVENOX) syringe 70 mg, 70 mg, Subcutaneous, Q12H, Achterberg, Joanna, APRN, 70 mg at 07/22/21 0436  •  finasteride (PROSCAR) tablet 5 mg, 5 mg, Oral, Daily, Achterberg, Joanna, APRN, 5 mg at 07/22/21 0840  •  furosemide (LASIX) tablet 20 mg, 20 mg, Oral, Daily, Achterberg, Joanna, APRN, 20 mg at 07/22/21 0840  •  guaiFENesin (MUCINEX) 12 hr tablet 1,200 mg, 1,200 mg, Oral, BID, Achterberg, Joanna, APRN, 1,200 mg at 07/22/21 0840  •  ipratropium-albuterol (DUO-NEB) nebulizer solution 3 mL, 3 mL, Nebulization, 4x Daily - RT, Achterberg, Joanna, APRN, 3 mL at 07/22/21 1142  •  Lidocaine HCl Urethral/Mucosal/Topical 2% (XYLOCAINE) 2 % gel  - ADS Override Pull, , , ,   •  lidocaine PF 1% (XYLOCAINE) 1 % injection  - ADS Override Pull, , , ,   •  lidocaine PF 2% (XYLOCAINE) 2 % injection  - ADS Override Pull, , , ,   •  melatonin tablet 5 mg, 5 mg, Oral, Nightly PRN, Joanna Johnson, PRIYA  •  ondansetron (ZOFRAN) tablet 4 mg, 4 mg, Oral, Q6H PRN **OR** ondansetron (ZOFRAN) injection 4 mg, 4 mg, Intravenous, Q6H PRN, Joanna Johnson, PRIYA  •  Pharmacy to Dose enoxaparin (LOVENOX), , Does not apply, Continuous PRN, Joanna Johnson, PRIYA  •  rosuvastatin (CRESTOR) tablet 40 mg, 40 mg,  Oral, Nightly, Achterberg, Joanna, APRN, 40 mg at 07/21/21 2123  •  sodium chloride 0.9 % flush 10 mL, 10 mL, Intravenous, Q12H, Achterberg, Joanna, APRN, 10 mL at 07/22/21 0840  •  sodium chloride 0.9 % flush 10 mL, 10 mL, Intravenous, PRN, Achterberg, Joanna, APRN  •  tamsulosin (FLOMAX) 24 hr capsule 0.4 mg, 0.4 mg, Oral, BID, Dottie Casillas MD, 0.4 mg at 07/22/21 1118  •  theophylline (THEODUR) 12 hr tablet 300 mg, 300 mg, Oral, Daily, Achterberg, Joanna, APRN, 300 mg at 07/22/21 0840  •  traZODone (DESYREL) tablet 50 mg, 50 mg, Oral, Nightly, Achterberg, Joanna, APRN, 50 mg at 07/21/21 2123    Data Review:  All labs (24hrs):   Recent Results (from the past 24 hour(s))   COVID-19,CEPHEID,COR/IGNACIO/PAD/CHRISTAL IN-HOUSE(OR EMERGENT/ADD-ON),NP SWAB IN TRANSPORT MEDIA 3-4 HR TAT, RT-PCR - Swab, Nasopharynx    Collection Time: 07/21/21  9:32 PM    Specimen: Nasopharynx; Swab   Result Value Ref Range    COVID19 Not Detected Not Detected - Ref. Range   Basic Metabolic Panel    Collection Time: 07/22/21  2:54 AM    Specimen: Blood   Result Value Ref Range    Glucose 107 (H) 65 - 99 mg/dL    BUN 20 8 - 23 mg/dL    Creatinine 1.33 (H) 0.76 - 1.27 mg/dL    Sodium 140 136 - 145 mmol/L    Potassium 3.4 (L) 3.5 - 5.2 mmol/L    Chloride 106 98 - 107 mmol/L    CO2 25.0 22.0 - 29.0 mmol/L    Calcium 8.0 (L) 8.6 - 10.5 mg/dL    eGFR Non African Amer 52 (L) >60 mL/min/1.73    BUN/Creatinine Ratio 15.0 7.0 - 25.0    Anion Gap 9.0 5.0 - 15.0 mmol/L   CBC Auto Differential    Collection Time: 07/22/21  2:54 AM    Specimen: Blood   Result Value Ref Range    WBC 6.10 3.40 - 10.80 10*3/mm3    RBC 3.55 (L) 4.14 - 5.80 10*6/mm3    Hemoglobin 10.6 (L) 13.0 - 17.7 g/dL    Hematocrit 32.0 (L) 37.5 - 51.0 %    MCV 90.1 79.0 - 97.0 fL    MCH 29.9 26.6 - 33.0 pg    MCHC 33.2 31.5 - 35.7 g/dL    RDW 14.8 12.3 - 15.4 %    RDW-SD 47.7 37.0 - 54.0 fl    MPV 8.8 6.0 - 12.0 fL    Platelets 229 140 - 450 10*3/mm3    Neutrophil % 78.3 (H) 42.7 -  76.0 %    Lymphocyte % 12.9 (L) 19.6 - 45.3 %    Monocyte % 7.6 5.0 - 12.0 %    Eosinophil % 0.6 0.3 - 6.2 %    Basophil % 0.6 0.0 - 1.5 %    Neutrophils, Absolute 4.80 1.70 - 7.00 10*3/mm3    Lymphocytes, Absolute 0.80 0.70 - 3.10 10*3/mm3    Monocytes, Absolute 0.50 0.10 - 0.90 10*3/mm3    Eosinophils, Absolute 0.00 0.00 - 0.40 10*3/mm3    Basophils, Absolute 0.00 0.00 - 0.20 10*3/mm3    nRBC 0.1 0.0 - 0.2 /100 WBC        Imaging:  XR Chest 1 View  Narrative: DATE OF EXAM:  7/21/2021 1:09 PM     PROCEDURE:  XR CHEST 1 VW-     INDICATIONS:  s/p CT placement       COMPARISON:  AP portable chest 1/22/2021, CT chest without contrast 7/20/2021.     TECHNIQUE:   Single radiographic view of the chest was obtained.     FINDINGS:  Right basilar pigtail pleural drain is in place. There is a small  pneumothorax in the right costophrenic angle.Masslike consolidation in  the perihilar right lung is unchanged. There is volume loss in the right  hemithorax with stable elevation of the right hemidiaphragm. Left lung  appears clear. Stable borderline cardiac enlargement.  No pleural  effusion is seen.     Impression:    1. Masslike consolidative change in the perihilar right lung is  unchanged from the prior study.  2. Right basilar pigtail pleural drain is in place. There is a small  pneumothorax in the right costophrenic angle.  3. No pleural effusion is identified.     Electronically Signed By-Maura Esteves MD On:7/21/2021 2:03 PM  This report was finalized on [id number] by  Maura Esteves MD.       ASSESSMENT:    Recurrent right pleural effusion  COPD  Hx of lung cancer  CAD  Hyperlipidemia  AAA  BPH   Chronic A-fib       PLAN:  Chest x-ray did not expand the lung go all the way after the chest tube due to the trapped lung plan to proceed with bronchoscopy to be sure there is no endobronchial lesion  Continue chest tube management  Bronchodilator  Inhaled corticosteroids  IS/Flutter valve   Electrolytes/ glycemic  control  DVT and GI prophylaxis    Louis Ronquillo MD, D,ABSM,  7/22/2021  15:20 EDT

## 2021-07-22 NOTE — CONSULTS
Urology Consult Note    Patient:Lenny Larson :1940  Room:230  Admit Date2021  Age:80 y.o.     SEX:male     DOS:2021     MR:[id number]     Visit:[id number]       Attending: Dottie Casillas MD  Referring Provider: Dr. Casillas  Reason for Consultation: Urinary retention    Patient Care Team:  Camron Pollard MD as PCP - General (Family Medicine)  Alvarado Cai MD as Consulting Physician (Cardiology)    Chief complaint unable to void    Subjective .     History of present illness: 72-year-old gentleman who was admitted with pleural effusion.  He has had a thoracentesis and has a chest tube in place.  Patient feels the urge to go but is only able to get a small amount out.  He has nearly 500 cc in his bladder by bladder scan.  Patient has a history of voiding symptoms but generally does fairly well on Flomax.  He follows with my partner Dr. Flanagan.    The nurses attempted to place a Beach catheter but were unable.    Review of Systems  10 point review of systems were reviewed and are negative except for:  Constitution:  positive for See HPI    History  Past Medical History:   Diagnosis Date   • Cancer (CMS/HCC)    • COPD (chronic obstructive pulmonary disease) (CMS/HCC)    • Coronary artery disease    • Hyperlipidemia    • Hypertension      Past Surgical History:   Procedure Laterality Date   • BRONCHOSCOPY N/A 2021    Procedure: BRONCHOSCOPY with bronchio alveolar lavage of right lower lung;  Surgeon: Sanjay Gonsales MD;  Location: [facility] ENDOSCOPY;  Service: Pulmonary;  Laterality: N/A;  post op: pneumonia   • CARDIAC CATHETERIZATION      PCI   • CARDIAC CATHETERIZATION N/A 2021    Procedure: Left Heart Cath and coronary angiogram;  Surgeon: Alvarado Cai MD;  Location: [facility] CATH INVASIVE LOCATION;  Service: Cardiovascular;  Laterality: N/A;   • CARDIAC CATHETERIZATION N/A 2021    Procedure: Left ventriculography;  Surgeon: Alvarado Cai MD;  Location: [facility] CATH INVASIVE  LOCATION;  Service: Cardiovascular;  Laterality: N/A;     Social History     Socioeconomic History   • Marital status:      Spouse name: Not on file   • Number of children: Not on file   • Years of education: Not on file   • Highest education level: Not on file   Tobacco Use   • Smoking status: Former Smoker   • Smokeless tobacco: Never Used   Substance and Sexual Activity   • Alcohol use: Not Currently   • Drug use: Never   • Sexual activity: Defer     Family History   Adopted: Yes   Family history unknown: Yes     Allergy  Allergies   Allergen Reactions   • Ativan [Lorazepam] Mental Status Change     Prior to Admission medications    Medication Sig Start Date End Date Taking? Authorizing Provider   amiodarone (PACERONE) 200 MG tablet Take 1 tablet by mouth 2 (two) times a day. 4/14/21  Yes Alvarado Cai MD   budesonide (PULMICORT) 0.5 MG/2ML nebulizer solution Take 2 mL by nebulization 2 (Two) Times a Day. 1/22/21  Yes Dottie Casillas MD   calcium carbonate (TUMS) 500 MG chewable tablet Chew 2 tablets Daily.   Yes ProviderNabil MD   dilTIAZem CD (CARDIZEM CD) 240 MG 24 hr capsule Take 1 capsule by mouth Daily. 5/28/21  Yes Alvarado Cai MD   Eliquis 5 MG tablet tablet TAKE 1 TABLET BY MOUTH EVERY 12 HOURS FOR ATRIAL FIBRILLATION 3/26/21  Yes Alvarado Cai MD   finasteride (PROSCAR) 5 MG tablet Take 5 mg by mouth Daily.   Yes ProviderNabil MD   ipratropium-albuterol (DUO-NEB) 0.5-2.5 mg/3 ml nebulizer Take 3 mL by nebulization 4 (Four) Times a Day. 1/22/21  Yes Dottie Casillas MD   rosuvastatin (Crestor) 40 MG tablet Take 1 tablet by mouth Daily. 7/6/21  Yes Camron Pollard MD   tamsulosin (FLOMAX) 0.4 MG capsule 24 hr capsule Take 1 capsule by mouth every night at bedtime.   Yes ProviderNabil MD   theophylline (THEODUR) 300 MG 12 hr tablet Take 300 mg by mouth Daily.   Yes ProviderNabil MD   traZODone (DESYREL) 50 MG tablet Take 1 tablet by mouth Every Night. 7/1/21   Yes Sylwia Ware PA   acetaminophen (TYLENOL) 325 MG tablet Take 2 tablets by mouth Every 4 (Four) Hours As Needed for Mild Pain . 21   Dottie Casillas MD   albuterol sulfate HFA (Proventil HFA) 108 (90 Base) MCG/ACT inhaler Inhale 2 puffs Every 4 (Four) Hours As Needed for Wheezing or Shortness of Air. 21   Dottie Casillas MD   calcium carbonate (OS-GILMA) 600 MG tablet Take 600 mg by mouth Daily.    Nabil Quiroz MD   furosemide (Lasix) 20 MG tablet Take 1 tablet by mouth Daily. 21   Dottie Casillas MD   guaiFENesin (MUCINEX) 600 MG 12 hr tablet Take 2 tablets by mouth Every 12 (Twelve) Hours. 21   Camron Pollard MD   metoprolol succinate XL (Toprol XL) 50 MG 24 hr tablet Take 1.5 tablets by mouth Daily. 21   Alvarado Cai MD   polyethylene glycol (MIRALAX) 17 g packet Take 17 g by mouth 2 (Two) Times a Day. Available over the counter 21   Dottie Casillas MD         Objective     tMax 24 hours:  Temp (24hrs), Av.8 °F (36.6 °C), Min:97.4 °F (36.3 °C), Max:98.2 °F (36.8 °C)    Vital Sign Ranges:  Temp:  [97.4 °F (36.3 °C)-98.2 °F (36.8 °C)] 97.7 °F (36.5 °C)  Heart Rate:  [68-93] 68  Resp:  [12-18] 12  BP: ()/(49-58) 120/50  Intake and Output Last 3 Shifts:  I/O last 3 completed shifts:  In: 720 [P.O.:720]  Out: 2450 [Chest Tube:2450]      Physical Exam:   General Appearance alert, appears stated age and cooperative  Head normocephalic, without obvious abnormality and atraumatic  Eyes lids and lashes normal, conjunctivae and sclerae normal, no icterus, no pallor and corneas clear  Lungs respirations regular, respirations even, respirations unlabored and Right chest tube in place  Heart regular rhythm & normal rate  Abdomen soft non-tender, no guarding and no rebound tenderness  Extremities moves extremities well, no edema, no cyanosis and no redness  Skin no bleeding, bruising or rash  Neurologic Mental Status orientated to person, place, time and  situation    Results Review:     Lab Results (last 24 hours)     Procedure Component Value Units Date/Time    Non-gynecologic Cytology [[id number]] Collected: 07/21/21 1328    Specimen: Body Fluid from Pleural Cavity Updated: 07/22/21 0838    Body Fluid Culture - Body Fluid, Pleural Cavity [[id number]] Collected: 07/21/21 1328    Specimen: Body Fluid from Pleural Cavity Updated: 07/22/21 0653     Body Fluid Culture No growth     Gram Stain Few (2+) WBCs per low power field      No organisms seen    Basic Metabolic Panel [[id number]]  (Abnormal) Collected: 07/22/21 0254    Specimen: Blood Updated: 07/22/21 0415     Glucose 107 mg/dL      BUN 20 mg/dL      Creatinine 1.33 mg/dL      Sodium 140 mmol/L      Potassium 3.4 mmol/L      Chloride 106 mmol/L      CO2 25.0 mmol/L      Calcium 8.0 mg/dL      eGFR Non African Amer 52 mL/min/1.73      BUN/Creatinine Ratio 15.0     Anion Gap 9.0 mmol/L     Narrative:      GFR Normal >60  Chronic Kidney Disease <60  Kidney Failure <15      CBC & Differential [[id number]]  (Abnormal) Collected: 07/22/21 0254    Specimen: Blood Updated: 07/22/21 0351    Narrative:      The following orders were created for panel order CBC & Differential.  Procedure                               Abnormality         Status                     ---------                               -----------         ------                     CBC Auto Differential[[id number]]        Abnormal            Final result                 Please view results for these tests on the individual orders.    CBC Auto Differential [[id number]]  (Abnormal) Collected: 07/22/21 0254    Specimen: Blood Updated: 07/22/21 0351     WBC 6.10 10*3/mm3      RBC 3.55 10*6/mm3      Hemoglobin 10.6 g/dL      Hematocrit 32.0 %      MCV 90.1 fL      MCH 29.9 pg      MCHC 33.2 g/dL      RDW 14.8 %      RDW-SD 47.7 fl      MPV 8.8 fL      Platelets 229 10*3/mm3      Neutrophil % 78.3 %      Lymphocyte % 12.9 %      Monocyte % 7.6 %      Eosinophil % 0.6  %      Basophil % 0.6 %      Neutrophils, Absolute 4.80 10*3/mm3      Lymphocytes, Absolute 0.80 10*3/mm3      Monocytes, Absolute 0.50 10*3/mm3      Eosinophils, Absolute 0.00 10*3/mm3      Basophils, Absolute 0.00 10*3/mm3      nRBC 0.1 /100 WBC     COVID PRE-OP / PRE-PROCEDURE SCREENING ORDER (NO ISOLATION) - Swab, Nasopharynx [[id number]]  (Normal) Collected: 07/21/21 2132    Specimen: Swab from Nasopharynx Updated: 07/21/21 2218    Narrative:      The following orders were created for panel order COVID PRE-OP / PRE-PROCEDURE SCREENING ORDER (NO ISOLATION) - Swab, Nasopharynx.  Procedure                               Abnormality         Status                     ---------                               -----------         ------                     COVID-19,CEPHEID,COR/IGNACIO...[[id number]]  Normal              Final result                 Please view results for these tests on the individual orders.    COVID-19,CEPHEID,COR/IGNACIO/PAD/CHRISTAL IN-HOUSE(OR EMERGENT/ADD-ON),NP SWAB IN TRANSPORT MEDIA 3-4 HR TAT, RT-PCR - Swab, Nasopharynx [[id number]]  (Normal) Collected: 07/21/21 2132    Specimen: Swab from Nasopharynx Updated: 07/21/21 2218     COVID19 Not Detected    Narrative:      Fact sheet for providers: https://www.fda.gov/media/090458/download     Fact sheet for patients: https://www.fda.gov/media/048610/download  Fact sheet for providers: https://www.fda.gov/media/657184/download    Fact sheet for patients: https://www.fda.gov/media/061834/download    Test performed by PCR.    Lactate Dehydrogenase, Body Fluid - Body Fluid, Pleural Cavity [[id number]] Collected: 07/21/21 1328    Specimen: Body Fluid from Pleural Cavity Updated: 07/21/21 1924     Lactate Dehydrogenase (LD), Fluid 143 U/L     Narrative:      No Reference Ranges Established.    Serous fluid LDH greater than 60 percent of the serum LDH or serous fluid LDH two-thirds of the upper limit of normal for serum LDH suggests the fluid is an exudate.     1. Pleural  TP/Serum TP >0.5  2. Pleural LD/Serum LD >0.6  3. Pleural LD >2/3 of the upper limit of normal for serum LDH    This test was developed, it performance characteristics determined and judged suitable for clinical purposes by [facility].  It has not been cleared or approved by the FDA.  The laboratory is regulated under CLIA as qualified to perform high-complexity testing.     Protein, Body Fluid - Body Fluid, Pleural Cavity [[id number]] Collected: 07/21/21 1328    Specimen: Body Fluid from Pleural Cavity Updated: 07/21/21 1924     Protein, Total, Fluid 3.6 g/dL     Narrative:      No Reference Ranges Established.    A serous fluid total fluid (TP) greater than 50 percent of the serum TP suggests the fluid is an exudate.      1. Pleural TP/Serum TP >0.5  2. Pleural LD/Serum LD >0.6  3. Pleural LD >2/3 of the upper limit of normal for serum LDH    This test was developed, it performance characteristics determined and judged suitable for clinical purposes by [facility].  It has not been cleared or approved by the FDA.  The laboratory is regulated under CLIA as qualified to perform high-complexity testing.          No results found for: URINECX     Imaging Results (Last 7 Days)     Procedure Component Value Units Date/Time    XR Chest 1 View [[id number]] Collected: 07/21/21 1401     Updated: 07/21/21 1405    Narrative:      DATE OF EXAM:  7/21/2021 1:09 PM     PROCEDURE:  XR CHEST 1 VW-     INDICATIONS:  s/p CT placement       COMPARISON:  AP portable chest 1/22/2021, CT chest without contrast 7/20/2021.     TECHNIQUE:   Single radiographic view of the chest was obtained.     FINDINGS:  Right basilar pigtail pleural drain is in place. There is a small  pneumothorax in the right costophrenic angle.Masslike consolidation in  the perihilar right lung is unchanged. There is volume loss in the right  hemithorax with stable elevation of the right hemidiaphragm. Left  lung  appears clear. Stable borderline cardiac enlargement.  No pleural  effusion is seen.       Impression:         1. Masslike consolidative change in the perihilar right lung is  unchanged from the prior study.  2. Right basilar pigtail pleural drain is in place. There is a small  pneumothorax in the right costophrenic angle.  3. No pleural effusion is identified.     Electronically Signed By-Maura Esteves MD On:7/21/2021 2:03 PM  This report was finalized on [id number] by  Maura Esteves MD.    CT Chest Without Contrast Diagnostic [[id number]] Collected: 07/20/21 1738     Updated: 07/20/21 1752    Narrative:      CT CHEST WO CONTRAST DIAGNOSTIC-     Date of Exam: 7/20/2021 5:22 PM     Indication: Lung nodule, 6-8mm. Recurrent right pleural effusion ,  history of lung cancer     Comparison: CT chest 07/12/2021, 01/11/2019     Technique: Serial and axial CT images of the chest were obtained.  Reconstructions in the coronal and sagittal planes were performed.   Automated exposure control and iterative reconstruction methods were  used.     FINDINGS:  There is aortic and coronary artery calcification. The upper and of an  abdominal aortic aneurysm with stent is included, as was previously  reported. Heart size is normal.     There is a large area of airspace consolidation in the medial aspect of  the right upper-mid lung extending into and surrounding the hilum. There  are air bronchograms within this. The appearance is unchanged from the  prior exams. There is a large right pleural effusion, similar to the  prior exam. Some of this layers posteriorly and some is loculated  anteriorly and in the major fissure. There is mild stable middle lobe  volume loss.     There is a stable 3 mm left upper lung nodule on image 26. There is  minor lingular opacity which is unchanged. Left lung is otherwise clear.  No left pleural effusion. There is no mediastinal adenopathy. There is a  calcified subcarinal node.     Limited  upper abdominal imaging shows the liver to be abnormally dense,  Hounsfield reading 104. There is left adrenal enlargement which is  similar to priors. Bone window show no fracture or lytic or blastic bone  lesion.       Impression:      1. No significant change from 07/12/2021.  2. There is a large area of dense consolidation in the medial aspect of  the right lung which is stable and may be due to posttreatment changes.  3. There is a large right pleural effusion which is partly loculated,  and is unchanged from 07/12/2021.  4. Atherosclerosis. There is an abdominal aortic aneurysm and stent,  only the upper end included.  5. The liver is abnormally dense, the appearance suggesting  hemachromatosis.     Electronically Signed By-Olga Maldonado MD On:7/20/2021 5:50 PM  This report was finalized on [id number] by  Olga Maldonado MD.          Inpatient Meds:   Scheduled Meds:amiodarone, 200 mg, Oral, Q12H  budesonide, 0.5 mg, Nebulization, BID - RT  calcium 500 mg vitamin D 5 mcg (200 UT), 1 tablet, Oral, Daily  dilTIAZem CD, 240 mg, Oral, Daily  enoxaparin, 70 mg, Subcutaneous, Q12H  finasteride, 5 mg, Oral, Daily  furosemide, 20 mg, Oral, Daily  guaiFENesin, 1,200 mg, Oral, BID  ipratropium-albuterol, 3 mL, Nebulization, 4x Daily - RT  rosuvastatin, 40 mg, Oral, Nightly  sodium chloride, 10 mL, Intravenous, Q12H  tamsulosin, 0.4 mg, Oral, BID  theophylline, 300 mg, Oral, Daily  traZODone, 50 mg, Oral, Nightly       Continuous Infusions:Pharmacy to Dose enoxaparin (LOVENOX),        PRN Meds:.•  acetaminophen **OR** acetaminophen **OR** acetaminophen  •  albuterol  •  aluminum-magnesium hydroxide-simethicone  •  melatonin  •  ondansetron **OR** ondansetron  •  Pharmacy to Dose enoxaparin (LOVENOX)  •  sodium chloride      Assessment/Plan     Principal Problem:    Recurrent right pleural effusion  Active Problems:    Chronic obstructive pulmonary disease (CMS/HCC)    Coronary artery disease    Hyperlipidemia     Abdominal aortic aneurysm (AAA) (CMS/HCC)    History of lung cancer    BPH without obstruction/lower urinary tract symptoms    Chronic atrial fibrillation (CMS/HCC)    Urinary retention  BPH with lower urinary tract symptoms exacerbated by weakness of acute illness and decreased mobility necessitated by the presence of his chest tube    Plan  Continue Flomax twice daily  Will attempt placement of coudé catheter.  If this ends unsuccessful then we will proceed with cystoscopy in order to place a catheter.      I discussed the patients findings and my recommendations with patient    Thank you for this  consult    Antonio Boyd MD  07/22/21  13:54 EDT

## 2021-07-22 NOTE — PROGRESS NOTES
[facility] Medicine Services Daily Progress Note    Patient Name: Lenny Larson  : 1940  MRN: [id number]  Primary Care Physician:  Camron Pollard MD  Date of admission: 2021      Subjective      Chief Complaint: Cough and shortness of breath.      Patient Reports:  2021: Patient reports significant improvement in his shortness of breath after the chest tube was placed.  He reports minimal discomfort at the chest tube site that is tolerable with the current pain medication regimen.  He denies any other complaints at this time and is tolerating p.o.  He reports having a bowel movement yesterday but none today.  2021: The patient denies specific complaints at this time except for urinating very small amounts very frequently.  He denies any dysuria.  Post void residual was 491 cc.  Urology was consulted and recommended in and out cath which the nurse was unable to perform successfully.  The patient is scheduled for a bronchoscopy this afternoon.    ROS 12 point review of systems was reviewed and was negative except as above.  Patient reports frequent small amounts of urine and having to get up multiple times at night.  He denies any pain or shortness of breath currently.    Objective      Vitals:   Temp:  [97.4 °F (36.3 °C)-98.2 °F (36.8 °C)] 97.7 °F (36.5 °C)  Heart Rate:  [68-93] 68  Resp:  [12-18] 12  BP: ()/(49-58) 120/50    Physical Exam   Vital signs and nurses notes reviewed.   Well-developed well-nourished elderly gentleman in no acute distress sitting up in bed comfortable on room air awake and alert; mucous membranes moist; sclerae anicteric; lungs with decreased air entry but clear to auscultation bilaterally; CV regular rate and rhythm; abdomen soft nontender nondistended with active bowel sounds; extremities with no edema, cyanosis or calf tenderness; palpable pedal pulses bilaterally; no Beach catheter.    Result Review    Result Review:  I  have personally reviewed the results from the time of this admission to 7/22/2021 13:13 EDT and agree with these findings:  [x]  Laboratory  []  Microbiology  [x]  Radiology  []  EKG/Telemetry   []  Cardiology/Vascular   []  Pathology  [x]  Old records  []  Other:  Most notable findings include:     Patient was recently started on trazodone 7/1/2021 office visit by PCP for insomnia.    CT chest: There is a large area of dense consolidation in the medial aspect of the right lung which is stable and may be due to posttreatment changes. There is a large right pleural effusion which is partly loculated, and is unchanged from 07/12/2021.    Incidental finding on CT chest: The liver is abnormally dense, the appearance suggesting  hemachromatosis.  Iron levels performed on 7/21/2021 were low.    Assessment/Plan      Brief Patient Summary:  Lenny Larson is a 80 y.o. male CAD s/p stent, AAA s/p repair 4/2021, afib on eliquis, lung carcinoma s/p right upper lobectomy/chemo/radiation 2005 now in remission who presented to [facility] on 7/20/2021 as a direct admit from home per pulmonary Dr. Fuentes request. Patient has a history of right pleural effusion that has required chest tube in Jan 2021 at [place] and thoracentesis April 2021 at [facility]. The patient has developed a cough with copious clear sputum as well as shortness of breath, no fever, no chest pain. His shortness of breath worsens upon exertion and with lying flat. He had CT chest on 7/12/21 that showed new moderate to large right pleural effusion with partial loculation. He saw Dr. Fuentes and was directly admitted with plans for thoracentesis.   Dr. Valadez saw the patient and performed right thoracentesis 7/21/2021.      amiodarone, 200 mg, Oral, Q12H  budesonide, 0.5 mg, Nebulization, BID - RT  calcium 500 mg vitamin D 5 mcg (200 UT), 1 tablet, Oral, Daily  dilTIAZem CD, 240 mg, Oral, Daily  enoxaparin, 70 mg, Subcutaneous, Q12H  finasteride, 5 mg,  Oral, Daily  furosemide, 20 mg, Oral, Daily  guaiFENesin, 1,200 mg, Oral, BID  ipratropium-albuterol, 3 mL, Nebulization, 4x Daily - RT  rosuvastatin, 40 mg, Oral, Nightly  sodium chloride, 10 mL, Intravenous, Q12H  tamsulosin, 0.4 mg, Oral, BID  theophylline, 300 mg, Oral, Daily  traZODone, 50 mg, Oral, Nightly       Pharmacy to Dose enoxaparin (LOVENOX),          Active Hospital Problems:  Active Hospital Problems    Diagnosis    • **Recurrent right pleural effusion    • Chronic atrial fibrillation (CMS/HCC)    • BPH without obstruction/lower urinary tract symptoms    • Hyperlipidemia    • Coronary artery disease    • Abdominal aortic aneurysm (AAA) (CMS/HCC)    • Chronic obstructive pulmonary disease (CMS/HCC)    • History of lung cancer      Plan:   Recurrent right pleural effusion  -symptomatic with shortness of breath, cough  -CT chest 7/12/21: new moderate to large right pleural effusion with partial loculation  -on room air with normal oxygen saturation   -cont prn diogenes  -pulmonary consulted and performed thoracentesis 7/21/2021 with large volume of ina fluid removed which was sent for cytology and culture  -Bronchoscopy planned for 7/22/2021    BPH, urinary retention with nocturia  -Post void residual 491 cc  -continue home Proscar  - Flomax was increased to twice daily  -Urology was consulted  -In and out cath was unsuccessful and urology was notified     CAD s/p PCI  AAA s/p repair 4/2021  -cont home statin  -Therapeutic Lovenox being substituted for Eliquis  -Patient is not on any antiplatelet therapy at home according to his medication reconciliation     Chronic atrial fibrillation  -cont home Cardizem, amiodarone  -Eliquis on hold due to thoracentesis  -Continue therapeutic Lovenox for now     Chronic systolic CHF due to ischemic cardiomyopathy with valvular heart disease  -echo 1/2021: LVEF of 21 to 25% with severe MVR, mild dilatation of the aortic root, large pericardial effusion greater than 2  cm adjacent to the right atrium, with no cardiac tamponade; RVSP 36.9 mmHg  -cont home lasix     COPD  -stable not in exacerbation  -cont home bronchodilators, theophylline      H/O lung carcinoma status post right upper lobectomy/chemo/radiation 2005 now in remission     Incidental finding on CT chest: The liver is abnormally dense, the appearance suggesting  Hemachromatosis  -Iron profile ordered    DVT prophylaxis:  Medical DVT prophylaxis orders are present.    CODE STATUS:    Limited Support to NOT Include: Intubation  Code Status: No CPR  Medical Interventions (Level of Support Prior to Arrest): Limited    I discussed CODE STATUS with the patient again on 7/22/2021, and he wants to be DNI and DNR.    Disposition:  I expect patient to be discharged in 3 to 4 days depending on how long his chest tube drains and whether he needs decortication.    This patient has been examined wearing appropriate Personal Protective Equipment and discussed with hospital infection control department. 07/22/21      Electronically signed by Dottie Casillas MD, 07/22/21, 13:13 EDT.  [facility] Hospitalist Team

## 2021-07-23 NOTE — CASE MANAGEMENT/SOCIAL WORK
Continued Stay Note   [place]     Patient Name: Lenny Larson  MRN: [id number]  Today's Date: 7/23/2021    Admit Date: 7/20/2021    Discharge Plan     Row Name 07/23/21 1628       Plan    Plan  DC Plan: Anticipate home with spouse. R Chest tube.    Plan Comments  Called patient in room and he is declining home health services.  D/C Barriers: R chest tube            Expected Discharge Date and Time     Expected Discharge Date Expected Discharge Time    Jul 26, 2021             Nicole Newell RN

## 2021-07-23 NOTE — PLAN OF CARE
Problem: Adult Inpatient Plan of Care  Goal: Plan of Care Review  Outcome: Ongoing, Progressing  Goal: Patient-Specific Goal (Individualized)  Outcome: Ongoing, Progressing  Goal: Absence of Hospital-Acquired Illness or Injury  Outcome: Ongoing, Progressing  Intervention: Identify and Manage Fall Risk  Recent Flowsheet Documentation  Taken 7/23/2021 1700 by Joelle Gamboa RN  Safety Promotion/Fall Prevention:   safety round/check completed   nonskid shoes/slippers when out of bed  Taken 7/23/2021 1500 by Joelle Gamboa RN  Safety Promotion/Fall Prevention: safety round/check completed  Taken 7/23/2021 1100 by Joelle Gamboa RN  Safety Promotion/Fall Prevention:   safety round/check completed   nonskid shoes/slippers when out of bed  Taken 7/23/2021 0900 by Joelle Gamboa RN  Safety Promotion/Fall Prevention:   safety round/check completed   nonskid shoes/slippers when out of bed  Taken 7/23/2021 0715 by Joelle Gamboa RN  Safety Promotion/Fall Prevention:   safety round/check completed   nonskid shoes/slippers when out of bed  Intervention: Prevent Infection  Recent Flowsheet Documentation  Taken 7/23/2021 1700 by Joelle Gamboa RN  Infection Prevention: personal protective equipment utilized  Taken 7/23/2021 1500 by Joelle Gamboa RN  Infection Prevention: personal protective equipment utilized  Taken 7/23/2021 1100 by Joelle Gamboa RN  Infection Prevention: personal protective equipment utilized  Taken 7/23/2021 0900 by Joelle Gamboa RN  Infection Prevention: personal protective equipment utilized  Taken 7/23/2021 0715 by Joelle Gamboa RN  Infection Prevention: personal protective equipment utilized  Goal: Optimal Comfort and Wellbeing  Outcome: Ongoing, Progressing  Intervention: Provide Person-Centered Care  Recent Flowsheet Documentation  Taken 7/23/2021 0715 by Joelle Gamboa RN  Trust Relationship/Rapport: care explained  Goal: Readiness for Transition of Care  Outcome:  Ongoing, Progressing     Problem: Activity Intolerance (Pulmonary Impairment)  Goal: Improved Activity Tolerance  Outcome: Ongoing, Progressing     Problem: Airway Clearance Ineffective (Pulmonary Impairment)  Goal: Effective Airway Clearance  Outcome: Ongoing, Progressing     Problem: Gas Exchange Impaired (Pulmonary Impairment)  Goal: Optimal Gas Exchange  Outcome: Ongoing, Progressing     Problem: Fall Injury Risk  Goal: Absence of Fall and Fall-Related Injury  Outcome: Ongoing, Progressing  Intervention: Identify and Manage Contributors to Fall Injury Risk  Recent Flowsheet Documentation  Taken 7/23/2021 1700 by Joelle Gamboa RN  Medication Review/Management: medications reviewed  Taken 7/23/2021 1500 by Joelle Gamboa RN  Medication Review/Management: medications reviewed  Taken 7/23/2021 1100 by Joelle Gamboa RN  Medication Review/Management: medications reviewed  Taken 7/23/2021 0900 by Joelle Gamboa RN  Medication Review/Management: medications reviewed  Taken 7/23/2021 0715 by Joelle Gamboa RN  Medication Review/Management: medications reviewed  Intervention: Promote Injury-Free Environment  Recent Flowsheet Documentation  Taken 7/23/2021 1700 by Joelle Gamboa RN  Safety Promotion/Fall Prevention:   safety round/check completed   nonskid shoes/slippers when out of bed  Taken 7/23/2021 1500 by Joelle Gamboa RN  Safety Promotion/Fall Prevention: safety round/check completed  Taken 7/23/2021 1100 by Joelle Gamboa RN  Safety Promotion/Fall Prevention:   safety round/check completed   nonskid shoes/slippers when out of bed  Taken 7/23/2021 0900 by Joelle Gamboa, RN  Safety Promotion/Fall Prevention:   safety round/check completed   nonskid shoes/slippers when out of bed  Taken 7/23/2021 0715 by Joelle Gamboa, RN  Safety Promotion/Fall Prevention:   safety round/check completed   nonskid shoes/slippers when out of bed     Problem: Skin Injury Risk Increased  Goal: Skin Health  and Integrity  Outcome: Ongoing, Progressing  Intervention: Optimize Skin Protection  Recent Flowsheet Documentation  Taken 7/23/2021 0715 by Joelle Gamboa, RN  Pressure Reduction Techniques: frequent weight shift encouraged  Pressure Reduction Devices: pressure-redistributing mattress utilized     Problem: Hypertension Comorbidity  Goal: Blood Pressure in Desired Range  Outcome: Ongoing, Progressing  Intervention: Maintain Hypertension-Management Strategies  Recent Flowsheet Documentation  Taken 7/23/2021 1700 by Joelle Gamboa, RN  Medication Review/Management: medications reviewed  Taken 7/23/2021 1500 by Joelle Gamboa, RN  Medication Review/Management: medications reviewed  Taken 7/23/2021 1100 by Joelle Gamboa, RN  Medication Review/Management: medications reviewed  Taken 7/23/2021 0900 by Joelle Gamboa, RN  Medication Review/Management: medications reviewed  Taken 7/23/2021 0715 by Joelle Gamboa, RN  Medication Review/Management: medications reviewed   Goal Outcome Evaluation:

## 2021-07-23 NOTE — PROGRESS NOTES
[facility] Medicine Services Daily Progress Note    Patient Name: Lenny Larson  : 1940  MRN: [id number]  Primary Care Physician:  Camron Pollard MD  Date of admission: 2021      Subjective      Chief Complaint: Cough and shortness of breath.      Patient Reports:  2021: Patient reports significant improvement in his shortness of breath after the chest tube was placed.  He reports minimal discomfort at the chest tube site that is tolerable with the current pain medication regimen.  He denies any other complaints at this time and is tolerating p.o.  He reports having a bowel movement yesterday but none today.  2021: The patient denies specific complaints at this time except for urinating very small amounts very frequently.  He denies any dysuria.  Post void residual was 491 cc.  Urology was consulted and recommended in and out cath which the nurse was unable to perform successfully.  The patient is scheduled for a bronchoscopy this afternoon.  2021: The patient reports feeling better and denies specific complaints other than some mild sensation of urge to urinate due to the catheter.    ROS 12 point review of systems was reviewed and was negative except as above.  No complaints of pain or shortness of breath.    Objective      Vitals:   Temp:  [97.7 °F (36.5 °C)-98 °F (36.7 °C)] 97.7 °F (36.5 °C)  Heart Rate:  [70-87] 84  Resp:  [16-20] 16  BP: (114-130)/(50-56) 130/50    Physical Exam   Vital signs and nurses notes reviewed.   Well-developed well-nourished elderly gentleman in no acute distress sitting up in bed comfortable on room air awake and alert; mucous membranes moist; sclerae anicteric; lungs with decreased air entry but clear to auscultation bilaterally; CV regular rate and rhythm; abdomen soft nontender nondistended with active bowel sounds; extremities with no edema, cyanosis or calf tenderness; palpable pedal pulses bilaterally; no Beach  catheter.    Result Review    Result Review:  I have personally reviewed the results from the time of this admission to 7/23/2021 17:01 EDT and agree with these findings:  [x]  Laboratory  []  Microbiology  [x]  Radiology  []  EKG/Telemetry   []  Cardiology/Vascular   []  Pathology  [x]  Old records  []  Other:  Most notable findings include:     Patient was recently started on trazodone 7/1/2021 office visit by PCP for insomnia.    CT chest: There is a large area of dense consolidation in the medial aspect of the right lung which is stable and may be due to posttreatment changes. There is a large right pleural effusion which is partly loculated, and is unchanged from 07/12/2021.    Incidental finding on CT chest: The liver is abnormally dense, the appearance suggesting  hemachromatosis.  Iron levels performed on 7/21/2021 were low.    Assessment/Plan      Brief Patient Summary:  Lenny Larson is a 80 y.o. male CAD s/p stent, AAA s/p repair 4/2021, afib on eliquis, lung carcinoma s/p right upper lobectomy/chemo/radiation 2005 now in remission who presented to [facility] on [facility]2021 as a direct admit from home per pulmonary Dr. Fuentes request. Patient has a history of right pleural effusion that has required chest tube in Jan 2021 at [place] and thoracentesis April 2021 at [facility]. The patient has developed a cough with copious clear sputum as well as shortness of breath, no fever, no chest pain. His shortness of breath worsens upon exertion and with lying flat. He had CT chest on 7/12/21 that showed new moderate to large right pleural effusion with partial loculation. He saw Dr. Fuentes and was directly admitted with plans for thoracentesis.   Dr. Valadez saw the patient and performed right thoracentesis 7/21/2021.      amiodarone, 200 mg, Oral, Q12H  budesonide, 0.5 mg, Nebulization, BID - RT  calcium 500 mg vitamin D 5 mcg (200 UT), 1 tablet, Oral, Daily  dilTIAZem CD, 240 mg, Oral, Daily  enoxaparin,  70 mg, Subcutaneous, Q12H  finasteride, 5 mg, Oral, Daily  furosemide, 20 mg, Oral, Daily  guaiFENesin, 1,200 mg, Oral, BID  ipratropium-albuterol, 3 mL, Nebulization, 4x Daily - RT  rosuvastatin, 40 mg, Oral, Nightly  sodium chloride, 10 mL, Intravenous, Q12H  tamsulosin, 0.4 mg, Oral, BID  theophylline, 300 mg, Oral, Daily  traZODone, 50 mg, Oral, Nightly       Pharmacy to Dose enoxaparin (LOVENOX),          Active Hospital Problems:  Active Hospital Problems    Diagnosis    • **Recurrent right pleural effusion    • Chronic atrial fibrillation (CMS/HCC)    • BPH without obstruction/lower urinary tract symptoms    • Hyperlipidemia    • Coronary artery disease    • Abdominal aortic aneurysm (AAA) (CMS/HCC)    • Chronic obstructive pulmonary disease (CMS/HCC)    • History of lung cancer      Plan:   Recurrent right pleural effusion  -symptomatic with shortness of breath, cough  -CT chest 7/12/21: new moderate to large right pleural effusion with partial loculation  -on room air with normal oxygen saturation   -cont prn diogenes  -pulmonary consulted and performed thoracentesis 7/21/2021 with large volume of ina fluid removed which was sent for cytology; culture of thoracentesis fluid is negative  -Bronchoscopy was performed 7/22/2021 with cultures and pathology are pending    BPH, urinary retention with nocturia  -Post void residual 491 cc  -continue home Proscar  - Flomax was increased to twice daily  -Urology was consulted  -In and out cath was unsuccessful and urology placed a Beach catheter  -Voiding trial as an outpatient once patient more mobile     CAD s/p PCI  AAA s/p repair 4/2021  -cont home statin  -Therapeutic Lovenox being substituted for Eliquis  -Patient is not on any antiplatelet therapy at home according to his medication reconciliation     Chronic atrial fibrillation  -cont home Cardizem, amiodarone  -Eliquis on hold due to thoracentesis  -Continue therapeutic Lovenox for now     Chronic systolic  CHF due to ischemic cardiomyopathy with valvular heart disease  -echo 1/2021: LVEF of 21 to 25% with severe MVR, mild dilatation of the aortic root, large pericardial effusion greater than 2 cm adjacent to the right atrium, with no cardiac tamponade; RVSP 36.9 mmHg  -cont home lasix     COPD  -stable not in exacerbation  -cont home bronchodilators, theophylline      H/O lung carcinoma status post right upper lobectomy/chemo/radiation 2005 now in remission     Incidental finding on CT chest: The liver is abnormally dense, the appearance suggesting  Hemachromatosis  -Iron profile ordered    Chronic insomnia  -Continue trazodone    DVT prophylaxis:  Medical DVT prophylaxis orders are present.    CODE STATUS:    Limited Support to NOT Include: Intubation  Code Status: No CPR  Medical Interventions (Level of Support Prior to Arrest): Limited    I discussed CODE STATUS with the patient again on 7/22/2021, and he wants to be DNI and DNR.    Disposition:  I expect patient to be discharged in 3 to 4 days depending on how long his chest tube drains and whether he needs decortication.    This patient has been examined wearing appropriate Personal Protective Equipment and discussed with hospital infection control department. 07/23/21      Electronically signed by Dottie Casillas MD, 07/23/21, 17:01 EDT.  [facility]ist Team

## 2021-07-23 NOTE — PROGRESS NOTES
"PULMONARY CRITICAL CARE Progress  NOTE      PATIENT IDENTIFICATION:  Name: Lenny Larson  MRN: [id number]  :  1940     Age: 80 y.o.  Sex: male    DATE OF Note:  2021   Referring Physician: Dottie Casillas MD                  Subjective:   Still pending results of cytology  Patient feeling significantly better no complaints  No nausea or vomiting, no change in bowel habit, no dysuria,  no new  skin rash or itching.      Objective:  tMax 24 hrs: Temp (24hrs), Av.9 °F (36.6 °C), Min:97.7 °F (36.5 °C), Max:98 °F (36.7 °C)      Vitals Ranges:   Temp:  [97.7 °F (36.5 °C)-98 °F (36.7 °C)] 98 °F (36.7 °C)  Heart Rate:  [68-83] 82  Resp:  [8-22] 20  BP: ()/(37-56) 123/56    Intake and Output Last 3 Shifts:   I/O last 3 completed shifts:  In: 1060 [P.O.:960; I.V.:100]  Out: 1825 [Urine:1225; Chest Tube:600]    Exam:  /56 (BP Location: Right arm, Patient Position: Lying)   Pulse 82   Temp 98 °F (36.7 °C) (Oral)   Resp 20   Ht 172.7 cm (68\")   Wt 69.1 kg (152 lb 5.4 oz)   SpO2 97%   BMI 23.16 kg/m²     General Appearance: Alert awake not in distress  HEENT:  Normocephalic, without obvious abnormality, Conjunctiva/corneas clear,.  Normal external ear canals, Nares normal, no drainage     Neck:  Supple, symmetrical, trachea midline. No JVD.  Lungs /Chest wall: Chest tube in place no air leak bilateral basal rhonchi, respirations unlabored symmetrical wall movement.     Heart:  Regular rate and rhythm, systolic murmur PMI left sternal border  Abdomen: Soft, non-tender, no masses, no organomegaly.    Extremities: Trace edema no clubbing or Cyanosis        Medications:    Current Facility-Administered Medications:   •  acetaminophen (TYLENOL) tablet 650 mg, 650 mg, Oral, Q4H PRN, 650 mg at 21 1659 **OR** acetaminophen (TYLENOL) 160 MG/5ML solution 650 mg, 650 mg, Oral, Q4H PRN **OR** acetaminophen (TYLENOL) suppository 650 mg, 650 mg, Rectal, Q4H PRN, Joanna Johnson, APRN  •  " albuterol (PROVENTIL) nebulizer solution 0.083% 2.5 mg/3mL, 2.5 mg, Nebulization, Q6H PRN, Achterberg Joanna, APRN  •  aluminum-magnesium hydroxide-simethicone (MAALOX MAX) 400-400-40 MG/5ML suspension 15 mL, 15 mL, Oral, Q6H PRN, Achterberg, Joanna, APRN  •  amiodarone (PACERONE) tablet 200 mg, 200 mg, Oral, Q12H, Achterberg, Joanna, APRN, 200 mg at 07/22/21 2134  •  budesonide (PULMICORT) nebulizer solution 0.5 mg, 0.5 mg, Nebulization, BID - RT, Cata Garcia, APRN, 0.5 mg at 07/23/21 0655  •  calcium 500 mg vitamin D 5 mcg (200 UT) per tablet 1 tablet, 1 tablet, Oral, Daily, Achterberg, Joanna, APRN, 1 tablet at 07/22/21 0840  •  dilTIAZem CD (CARDIZEM CD) 24 hr capsule 240 mg, 240 mg, Oral, Daily, Achterberg, Joanna, APRN, 240 mg at 07/22/21 0840  •  enoxaparin (LOVENOX) syringe 70 mg, 70 mg, Subcutaneous, Q12H, Achterberg, Joanna, APRN, 70 mg at 07/23/21 0407  •  finasteride (PROSCAR) tablet 5 mg, 5 mg, Oral, Daily, Achterberg, Joanna, APRN, 5 mg at 07/22/21 0840  •  furosemide (LASIX) tablet 20 mg, 20 mg, Oral, Daily, Achterberg, Joanna, APRN, 20 mg at 07/22/21 0840  •  guaiFENesin (MUCINEX) 12 hr tablet 1,200 mg, 1,200 mg, Oral, BID, Achterberg, Joanna, APRN, 1,200 mg at 07/22/21 2134  •  ipratropium-albuterol (DUO-NEB) nebulizer solution 3 mL, 3 mL, Nebulization, 4x Daily - RT, Achterberg, Joanna, APRN, 3 mL at 07/23/21 0650  •  melatonin tablet 5 mg, 5 mg, Oral, Nightly PRN, Joanna Johnson, APRN  •  ondansetron (ZOFRAN) tablet 4 mg, 4 mg, Oral, Q6H PRN **OR** ondansetron (ZOFRAN) injection 4 mg, 4 mg, Intravenous, Q6H PRN, Joanna Johnson, APRN  •  Pharmacy to Dose enoxaparin (LOVENOX), , Does not apply, Continuous PRN, Joanna Johnson, APRN  •  rosuvastatin (CRESTOR) tablet 40 mg, 40 mg, Oral, Nightly, AchterAggie beachle, APRN, 40 mg at 07/22/21 5705  •  sodium chloride 0.9 % flush 10 mL, 10 mL, Intravenous, Q12H, Joanna Johnson, APRN, 10 mL at 07/22/21  2135  •  sodium chloride 0.9 % flush 10 mL, 10 mL, Intravenous, PRN, Achterberg, Joanna, APRN  •  tamsulosin (FLOMAX) 24 hr capsule 0.4 mg, 0.4 mg, Oral, BID, Dottie Casillas MD, 0.4 mg at 07/22/21 2135  •  theophylline (THEODUR) 12 hr tablet 300 mg, 300 mg, Oral, Daily, Achterberg, Joanna, APRN, 300 mg at 07/22/21 0840  •  traZODone (DESYREL) tablet 50 mg, 50 mg, Oral, Nightly, Achterberg, Joanna, APRN, 50 mg at 07/22/21 2134    Data Review:  All labs (24hrs):   Recent Results (from the past 24 hour(s))   BAL Culture, Quantitative - Lavage, Lung, Right Lower Lobe    Collection Time: 07/22/21  2:43 PM    Specimen: Lung, Right Lower Lobe; Lavage   Result Value Ref Range    Gram Stain Many (4+) WBCs per low power field     Gram Stain Occasional Epithelial cells per low power field     Gram Stain       Rare (1+) Mixed bacterial morphotypes seen on Gram Stain   Respiratory Panel, PCR (WITHOUT COVID) - Lavage, Lung, Right Lower Lobe    Collection Time: 07/22/21  2:43 PM    Specimen: Lung, Right Lower Lobe; Lavage   Result Value Ref Range    ADENOVIRUS, PCR Not Detected Not Detected    Coronavirus 229E Not Detected Not Detected    Coronavirus HKU1 Not Detected Not Detected    Coronavirus NL63 Not Detected Not Detected    Coronavirus OC43 Not Detected Not Detected    Human Metapneumovirus Not Detected Not Detected    Human Rhinovirus/Enterovirus Not Detected Not Detected    Influenza B PCR Not Detected Not Detected    Parainfluenza Virus 1 Not Detected Not Detected    Parainfluenza Virus 2 Not Detected Not Detected    Parainfluenza Virus 3 Not Detected Not Detected    Parainfluenza Virus 4 Not Detected Not Detected    Bordetella pertussis pcr Not Detected Not Detected    Chlamydophila pneumoniae PCR Not Detected Not Detected    Mycoplasma pneumo by PCR Not Detected Not Detected    Influenza A PCR Not Detected Not Detected    RSV, PCR Not Detected Not Detected    Bordetella parapertussis PCR Not Detected Not Detected         Imaging:  XR Chest 1 View  Narrative: XR CHEST 1 VW-     Date of Exam: 7/23/2021 4:58 AM     Indication: Shortness of breath; Z85.118-Personal history of other  malignant neoplasm of bronchus and lung.     Comparison Exams: July 21, 2021     Technique: Single AP chest radiograph     FINDINGS:  A right-sided chest tube is in place. A small right basilar pneumothorax  appears slightly improved. Masslike consolidation in the right perihilar  region appears unchanged. The heart and mediastinal contours appear  stable. The pulmonary vasculature appears normal. The osseous structures  appear intact.     Impression: 1.Right-sided chest tube in place. Small right basilar pneumothorax  appears slightly improved.  2.Masslike consolidation within right perihilar region appears  unchanged.     Electronically Signed By-Lenny Goetz MD On:7/23/2021 8:02 AM  This report was finalized on [id number] by  Lenny Goetz MD.       ASSESSMENT:    Recurrent right pleural effusion  COPD  Hx of lung cancer  CAD  Hyperlipidemia  AAA  BPH   Chronic A-fib       PLAN:  Plan to remove the chest tube today  Explained with the patient that part of his lung is trapped surgical and have the fluid  Pending cytology results of the pleural fluid and BAL  Continue chest tube management  Bronchodilator  Inhaled corticosteroids  IS/Flutter valve   Electrolytes/ glycemic control  DVT and GI prophylaxis    Louis Ronquillo MD, D,ABSM,  7/23/2021  08:07 EDT

## 2021-07-24 PROBLEM — R06.03 ACUTE RESPIRATORY DISTRESS: Status: RESOLVED | Noted: 2021-01-13 | Resolved: 2021-01-01

## 2021-07-24 PROBLEM — J44.1 COPD EXACERBATION: Status: RESOLVED | Noted: 2021-01-13 | Resolved: 2021-01-01

## 2021-07-24 PROBLEM — I50.21 ACUTE SYSTOLIC CONGESTIVE HEART FAILURE (HCC): Status: RESOLVED | Noted: 2021-01-13 | Resolved: 2021-01-01

## 2021-07-24 PROBLEM — I50.22 CHRONIC SYSTOLIC CONGESTIVE HEART FAILURE: Status: ACTIVE | Noted: 2021-01-01

## 2021-07-24 NOTE — DISCHARGE SUMMARY
[facility] Medicine Services  DISCHARGE SUMMARY    Patient Name: Lenny Larson  : 1940  MRN: [id number]    Date of Admission: 2021  Date of Discharge: 2021  Primary Care Physician: Camron Pollard MD      Presenting Problem:   Pleural effusion [J90]  Recurrent right pleural effusion [J90]    Active and Resolved Hospital Problems:  Active Hospital Problems    Diagnosis POA   • **Recurrent right pleural effusion [J90] Yes     Priority: High   • Chronic systolic congestive heart failure (CMS/HCC) [I50.22] Yes   • Chronic atrial fibrillation (CMS/HCC) [I48.20] Yes   • Moderate malnutrition (CMS/HCC) [E44.0] Yes   • BPH without obstruction/lower urinary tract symptoms [N40.0] Yes   • History of lung cancer [Z85.118] Not Applicable   • Hyperlipidemia [E78.5] Yes   • Coronary artery disease [I25.10] Yes   • Essential hypertension [I10] Yes   • Abdominal aortic aneurysm (AAA) (CMS/HCC) [I71.4] Yes   • Chronic obstructive pulmonary disease (CMS/HCC) [J44.9] Yes   • History of lung cancer [Z85.118] Not Applicable      Resolved Hospital Problems    Diagnosis POA   • Acute respiratory distress [R06.03] Yes     Recurrent right pleural effusion  -symptomatic with shortness of breath, cough  -CT chest 21: new moderate to large right pleural effusion with partial loculation  -on room air with normal oxygen saturation   -cont prn duonebs  -pulmonary consulted and performed thoracentesis 2021 with large volume of ina fluid removed which was sent for cytology; culture of thoracentesis fluid is negative  -Bronchoscopy was performed 2021 with cultures and pathology are pending     BPH, urinary retention with nocturia  -Post void residual 491 cc  -continue home Proscar  - Flomax was increased to twice daily  -Urology was consulted  -In and out cath was unsuccessful and urology placed a Beach catheter  -Voiding trial as an outpatient once patient more  mobile     CAD s/p PCI  AAA s/p repair 4/2021  -cont home statin  -Therapeutic Lovenox being substituted for Eliquis  -Patient is not on any antiplatelet therapy at home according to his medication reconciliation     Chronic atrial fibrillation  -cont home Cardizem, amiodarone  -Eliquis on hold due to thoracentesis  -Continue therapeutic Lovenox for now     Chronic systolic CHF due to ischemic cardiomyopathy with valvular heart disease  -echo 1/2021: LVEF of 21 to 25% with severe MVR, mild dilatation of the aortic root, large pericardial effusion greater than 2 cm adjacent to the right atrium, with no cardiac tamponade; RVSP 36.9 mmHg  -cont home lasix     COPD  -stable not in exacerbation  -cont home bronchodilators, theophylline      H/O lung carcinoma status post right upper lobectomy/chemo/radiation 2005 now in remission     Incidental finding on CT chest: The liver is abnormally dense, the appearance suggesting Hemachromatosis, hemosiderosis, Agus's disease, glycogen storage disease and some medications.  -Iron profile showed no evidence of elevated iron levels  -Abdominal ultrasound showed subtle heterogeneity of the liver texture.  -Outpatient follow-up concerning abnormal findings of the liver on CT and ultrasound is recommended     Chronic insomnia  -Continue trazodone    Hospital Course     Hospital Course:  Lenny Larson is a 80 y.o. male with a history of CAD s/p stent, AAA s/p repair 4/2021, afib on eliquis, lung carcinoma s/p right upper lobectomy/chemo/radiation 2005 now in remission who presented to [facility] on 7/20/2021 as a direct admit from home per pulmonary Dr. Fuentes request. Patient has a history of right pleural effusion that has required chest tube in Jan 2021 at [place] and thoracentesis April 2021 at [facility]. The patient has developed a cough with copious clear sputum as well as shortness of breath, no fever, no chest pain. His shortness of breath worsens upon exertion  and with lying flat. He had CT chest on 7/12/21 that showed new moderate to large right pleural effusion with partial loculation. He saw Dr. Fuentes and was directly admitted with plans for thoracentesis.     7/21/2021: Dr. Ronquillo saw the patient and performed right thoracentesis. Patient reports significant improvement in his shortness of breath after the chest tube was placed.  He reports minimal discomfort at the chest tube site that is tolerable with the current pain medication regimen.  He denies any other complaints at this time and is tolerating p.o.  He reports having a bowel movement yesterday but none today.  7/22/2021: The patient denies specific complaints at this time except for urinating very small amounts very frequently.  He denies any dysuria.  Post void residual was 491 cc.  Urology was consulted and recommended in and out cath which the nurse was unable to perform successfully.  Subsequently urology saw the patient and placed a 16 Upper sorbian coudé catheter.  Bronchoscopy performed in the afternoon showed abnormality in the structures of the airways in the right lower lobe and right middle lobe due to previous treatment but no endobronchial lesion was seen and the mucosa was normal.  Mucous plugs in the right lower segment were washed and suctioned and cleared.  7/23/2021: The patient reports feeling better and denies specific complaints other than some mild sensation of urge to urinate due to the catheter.  The thoracentesis fluid and BAL cultures are negative so far.  Additional tests are pending on the BAL.    7/24/2021: Denies current complaints.  The patient's chest tube was removed and he has increased his activity.  The Beach catheter was removed and he had a successful voiding trial.  The patient is now felt to be stable for discharge.      DISCHARGE Follow Up Recommendations for labs and diagnostics:   -Outpatient follow-up concerning abnormal findings of the liver on CT and ultrasound is  recommended      Reasons For Change In Medications and Indications for New Medications:  Flomax was increased to twice a day because of urinary retention secondary to BPH.    Metoprolol was discontinued because of relative hypotension during the hospital stay.    Day of Discharge     Vital Signs:  Temp:  [97.7 °F (36.5 °C)-98.5 °F (36.9 °C)] 97.9 °F (36.6 °C)  Heart Rate:  [70-87] 72  Resp:  [16-20] 19  BP: (121-132)/(50-61) 125/55    Physical Exam:  Physical Exam vital signs and nurses notes reviewed.  Well-developed well-nourished elderly gentleman comfortable on room air in no acute distress sitting up in bed awake and alert; mucous membranes moist; sclerae anicteric; chest tube site dressing right chest clean and dry and not removed; lungs clear to auscultation with decreased air entry bilaterally; CV regular rate and rhythm; abdomen soft nontender nondistended with active bowel sounds; extremities with no edema, cyanosis or calf tenderness; no Beach catheter.      Pertinent  and/or Most Recent Results     LAB RESULTS:      Lab 07/23/21 0920 07/22/21 0254 07/21/21 0520 07/20/21  1436   WBC 6.00 6.10 5.50 5.50   HEMOGLOBIN 11.4* 10.6* 10.2* 11.4*   HEMATOCRIT 34.6* 32.0* 31.1* 34.6*   PLATELETS 272 229 237 271   NEUTROS ABS  --  4.80 4.00 4.10   LYMPHS ABS  --  0.80 0.90 0.90   MONOS ABS  --  0.50 0.50 0.40   EOS ABS  --  0.00 0.10 0.10   MCV 90.2 90.1 90.6 91.3   LDH  --   --  238*  --    PROTIME  --   --   --  11.5         Lab 07/23/21 0920 07/22/21 0254 07/21/21 0520 07/20/21  1436   SODIUM 143 140 140 141   POTASSIUM 3.6 3.4* 3.3* 3.9   CHLORIDE 107 106 105 105   CO2 26.0 25.0 26.0 28.0   ANION GAP 10.0 9.0 9.0 8.0   BUN 17 20 19 19   CREATININE 1.20 1.33* 1.31* 1.21   GLUCOSE 117* 107* 91 94   CALCIUM 8.6 8.0* 8.3* 8.6         Lab 07/20/21  1436   TOTAL PROTEIN 6.7   ALBUMIN 3.30*   GLOBULIN 3.4   ALT (SGPT) 89*   AST (SGOT) 70*   BILIRUBIN 0.3   ALK PHOS 70         Lab 07/20/21  1436   PROTIME 11.5    INR 1.04             Lab 07/21/21  0520   IRON 26*   IRON SATURATION 13*   TIBC 206*   TRANSFERRIN 138*         Brief Urine Lab Results  (Last result in the past 365 days)      Color   Clarity   Blood   Leuk Est   Nitrite   Protein   CREAT   Urine HCG        07/01/21 1415 Yellow Clear Negative Negative Negative 30 mg/dL             Microbiology Results (last 10 days)     Procedure Component Value - Date/Time    AFB Culture - Lavage, Lung, Right Lower Lobe [[id number]] Collected: 07/22/21 1443    Lab Status: Preliminary result Specimen: Lavage from Lung, Right Lower Lobe Updated: 07/23/21 1020     AFB Stain No acid fast bacilli seen    BAL Culture, Quantitative - Lavage, Lung, Right Lower Lobe [[id number]] Collected: 07/22/21 1443    Lab Status: Preliminary result Specimen: Lavage from Lung, Right Lower Lobe Updated: 07/24/21 1142     BAL Culture >100,000 CFU/mL Normal Respiratory Terri: NO S.aureus/MRSA or Pseudomonas aeruginosa     Gram Stain Many (4+) WBCs per low power field      Occasional Epithelial cells per low power field      Rare (1+) Mixed bacterial morphotypes seen on Gram Stain    Respiratory Panel, PCR (WITHOUT COVID) - Lavage, Lung, Right Lower Lobe [[id number]]  (Normal) Collected: 07/22/21 1443    Lab Status: Final result Specimen: Lavage from Lung, Right Lower Lobe Updated: 07/22/21 1911     ADENOVIRUS, PCR Not Detected     Coronavirus 229E Not Detected     Coronavirus HKU1 Not Detected     Coronavirus NL63 Not Detected     Coronavirus OC43 Not Detected     Human Metapneumovirus Not Detected     Human Rhinovirus/Enterovirus Not Detected     Influenza B PCR Not Detected     Parainfluenza Virus 1 Not Detected     Parainfluenza Virus 2 Not Detected     Parainfluenza Virus 3 Not Detected     Parainfluenza Virus 4 Not Detected     Bordetella pertussis pcr Not Detected     Chlamydophila pneumoniae PCR Not Detected     Mycoplasma pneumo by PCR Not Detected     Influenza A PCR Not Detected     RSV, PCR Not  Detected     Bordetella parapertussis PCR Not Detected    Narrative:      The coronavirus on the RVP is NOT COVID-19 and is NOT indicative of infection with COVID-19.    In the setting of a positive respiratory panel with a viral infection PLUS a negative procalcitonin without other underlying concern for bacterial infection, consider observing off antibiotics or discontinuation of antibiotics and continue supportive care. If the respiratory panel is positive for atypical bacterial infection (Bordetella pertussis, Chlamydophila pneumoniae, or Mycoplasma pneumoniae), consider antibiotic de-escalation to target atypical bacterial infection.    COVID PRE-OP / PRE-PROCEDURE SCREENING ORDER (NO ISOLATION) - Swab, Nasopharynx [[id number]]  (Normal) Collected: 07/21/21 2132    Lab Status: Final result Specimen: Swab from Nasopharynx Updated: 07/21/21 2218    Narrative:      The following orders were created for panel order COVID PRE-OP / PRE-PROCEDURE SCREENING ORDER (NO ISOLATION) - Swab, Nasopharynx.  Procedure                               Abnormality         Status                     ---------                               -----------         ------                     COVID-19,CEPHEID,COR/IGNACIO...[[id number]]  Normal              Final result                 Please view results for these tests on the individual orders.    COVID-19,CEPHEID,COR/IGNACIO/PAD/CHRISTAL IN-HOUSE(OR EMERGENT/ADD-ON),NP SWAB IN TRANSPORT MEDIA 3-4 HR TAT, RT-PCR - Swab, Nasopharynx [[id number]]  (Normal) Collected: 07/21/21 2132    Lab Status: Final result Specimen: Swab from Nasopharynx Updated: 07/21/21 2218     COVID19 Not Detected    Narrative:      Fact sheet for providers: https://www.fda.gov/media/691821/download     Fact sheet for patients: https://www.fda.gov/media/958624/download  Fact sheet for providers: https://www.fda.gov/media/458576/download    Fact sheet for patients: https://www.fda.gov/media/345379/download    Test performed by PCR.     Body Fluid Culture - Body Fluid, Pleural Cavity [[id number]] Collected: 07/21/21 1328    Lab Status: Final result Specimen: Body Fluid from Pleural Cavity Updated: 07/24/21 0800     Body Fluid Culture No growth at 3 days     Gram Stain Few (2+) WBCs per low power field      No organisms seen          CT Chest Without Contrast Diagnostic    Result Date: 7/20/2021  Impression: 1. No significant change from 07/12/2021. 2. There is a large area of dense consolidation in the medial aspect of the right lung which is stable and may be due to posttreatment changes. 3. There is a large right pleural effusion which is partly loculated, and is unchanged from 07/12/2021. 4. Atherosclerosis. There is an abdominal aortic aneurysm and stent, only the upper end included. 5. The liver is abnormally dense, the appearance suggesting hemachromatosis.  Electronically Signed By-Olga Maldonado MD On:7/20/2021 5:50 PM This report was finalized on [id number] by  Olga Maldonado MD.    CT Chest Without Contrast Diagnostic    Result Date: 7/12/2021  Impression:  1. New moderate to large right pleural effusion, with partial loculation. Given the patient's history of lung cancer, recommend correlation with thoracentesis to include pleural cytology. No focal are well-defined pleural-based mass is evident. 2. There is partial scattered atelectatic change within the right lung. 3. There is chronic presumed posttreatment fibrosis appear mediastinal right upper lobe which appears stable since 2019. 4. New diffuse hepatic hyperdensity could reflect changes of. 5. High-density material in the gallbladder could represent vicariously excreted contrast, stones or sludge. 6. Incompletely imaged in the stent repair of infrarenal abdominal aortic aneurysm. Imaged Endosac size is 4.4 cm. 7. 3.1 cm mid descending thoracic aortic aneurysm is unchanged.    Electronically Signed By-Maura Esteves MD On:7/12/2021 1:57 PM This report was finalized on  [id number] by  Maura Esteves MD.    XR Chest 1 View    Result Date: 7/24/2021  Impression: 1.Small pneumothorax inferolateral aspect right hemithorax which has been suggested. 2.Density right lateral costophrenic sulcus which may relate to pleural fluid. 3.Density right perihilar region that might reflect sequela to posttreatment change and fibrosis. 4.Atelectasis or scarring left lower lobe.  Electronically Signed By-Duane Hubbard MD On:7/24/2021 8:00 AM This report was finalized on [id number] by  Duane Hubbard MD.    XR Chest 1 View    Result Date: 7/23/2021  Impression: 1.Right-sided chest tube in place. Small right basilar pneumothorax appears slightly improved. 2.Masslike consolidation within right perihilar region appears unchanged.  Electronically Signed By-Lenny Goetz MD On:7/23/2021 8:02 AM This report was finalized on [id number] by  Lenny Goetz MD.    XR Chest 1 View    Result Date: 7/21/2021  Impression:  1. Masslike consolidative change in the perihilar right lung is unchanged from the prior study. 2. Right basilar pigtail pleural drain is in place. There is a small pneumothorax in the right costophrenic angle. 3. No pleural effusion is identified.  Electronically Signed By-Maura Esteves MD On:7/21/2021 2:03 PM This report was finalized on [id number] by  Maura Esteves MD.    US Abdomen Limited    Result Date: 7/23/2021  Impression: 1. There is subtle heterogeneity of the liver texture. On recent CT the liver was abnormally dense, which can be seen with hemochromatosis, hemosiderosis, Agus's disease, glycogen storage disease, and some medications. 2. Small right pleural effusion. 3. Pancreas is obscured.  Electronically Signed By-Olga Maldonado MD On:7/23/2021 11:05 PM This report was finalized on [id number] by  Olga Mladonado MD.              Results for orders placed during the hospital encounter of 01/13/21    Adult Transthoracic Echo Complete W/ Cont if Necessary Per  Protocol    Interpretation Summary  · Left ventricular ejection fraction appears to be 21 - 25%.  · Severe mitral valve regurgitation is present.  · Mild dilation of the aortic root is present.  · There is a large (>2cm) pericardial effusion adjacent to the right atrium.  · The following left ventricular wall segments are hypokinetic: mid anterior, apical anterior, basal anterolateral, mid anterolateral, apical lateral, basal inferolateral, mid inferolateral, apical inferior, mid inferior, apical septal, basal inferoseptal, mid inferoseptal, apex hypokinetic, mid anteroseptal, basal anterior, basal inferior and basal inferoseptal.  · No pericardial tamponade noted      Labs Pending at Discharge:  Pending Labs     Order Current Status    Cytomegalovirus (CMV) By PCR - Lavage, Lung, Right Lower Lobe In process    Fungus Culture - Lavage, Lung, Right Lower Lobe In process    Histoplasma Antigen, CSF or BAL - Lavage, Lung, Right Lower Lobe In process    Non-gynecologic Cytology In process    Pneumocystis PCR - Lavage, Lung, Right Lower Lobe In process    Virus Culture - Lavage, Lung, Right Lower Lobe In process    AFB Culture - Lavage, Lung, Right Lower Lobe Preliminary result    BAL Culture, Quantitative - Lavage, Lung, Right Lower Lobe Preliminary result          Procedures Performed  Procedure(s):  BRONCHOSCOPY with bronchio alveolar lavage right lower lung  07/22 1521 BRONCHOSCOPY  07/21 1613 CHEST TUBE INSERTION      Consults:   Consults     Date and Time Order Name Status Description    7/22/2021 10:01 AM Inpatient Urology Consult Completed     7/20/2021 12:45 PM Inpatient Pulmonology Consult Completed             Discharge Details        Discharge Medications      Changes to Medications      Instructions Start Date   tamsulosin 0.4 MG capsule 24 hr capsule  Commonly known as: FLOMAX  What changed: when to take this   0.4 mg, Oral, 2 Times Daily         Continue These Medications      Instructions Start Date    acetaminophen 325 MG tablet  Commonly known as: TYLENOL   650 mg, Oral, Every 4 Hours PRN      albuterol sulfate  (90 Base) MCG/ACT inhaler  Commonly known as: Proventil HFA   2 puffs, Inhalation, Every 4 Hours PRN      amiodarone 200 MG tablet  Commonly known as: PACERONE   200 mg, Oral, 2 times daily      budesonide 0.5 MG/2ML nebulizer solution  Commonly known as: PULMICORT   0.5 mg, Nebulization, 2 Times Daily - RT      calcium carbonate 500 MG chewable tablet  Commonly known as: TUMS   2 tablets, Oral, Daily      calcium carbonate 600 MG tablet  Commonly known as: OS-GILMA   600 mg, Oral, Daily      dilTIAZem  MG 24 hr capsule  Commonly known as: CARDIZEM CD   240 mg, Oral, Daily      Eliquis 5 MG tablet tablet  Generic drug: apixaban   TAKE 1 TABLET BY MOUTH EVERY 12 HOURS FOR ATRIAL FIBRILLATION      finasteride 5 MG tablet  Commonly known as: PROSCAR   5 mg, Oral, Daily      furosemide 20 MG tablet  Commonly known as: Lasix   20 mg, Oral, Daily      guaiFENesin 600 MG 12 hr tablet  Commonly known as: MUCINEX   1,200 mg, Oral, Every 12 Hours      ipratropium-albuterol 0.5-2.5 mg/3 ml nebulizer  Commonly known as: DUO-NEB   3 mL, Nebulization, 4 Times Daily - RT      polyethylene glycol 17 g packet  Commonly known as: MIRALAX   17 g, Oral, 2 Times Daily, Available over the counter      rosuvastatin 40 MG tablet  Commonly known as: Crestor   40 mg, Oral, Daily      theophylline 300 MG 12 hr tablet  Commonly known as: THEODUR   300 mg, Oral, Daily      traZODone 50 MG tablet  Commonly known as: DESYREL   50 mg, Oral, Nightly         Stop These Medications    metoprolol succinate XL 50 MG 24 hr tablet  Commonly known as: Toprol XL            Allergies   Allergen Reactions   • Ativan [Lorazepam] Mental Status Change         Discharge Disposition:  Home or Self Care    Diet:  Hospital:  Diet Order   Procedures   • Diet Regular         Discharge Activity:   Activity Instructions     Activity as  Tolerated              CODE STATUS:  Code Status and Medical Interventions:   Ordered at: 07/20/21 1406     Limited Support to NOT Include:    Intubation     Code Status:    No CPR     Medical Interventions (Level of Support Prior to Arrest):    Limited         Future Appointments   Date Time Provider Department Center   7/26/2021 11:30 AM [facility] 2 [facility]   8/18/2021  3:00 PM Alvarado Cai MD [facility]   11/15/2021  1:00 PM Seipel, Joseph F, MD [facility]       Additional Instructions for the Follow-ups that You Need to Schedule     Call MD With Problems / Concerns   As directed      Instructions: Call [phone] or email [email or web address] for problems or concerns.    Order Comments: Instructions: Call [phone] or email [email or web address] for problems or concerns.          Discharge Follow-up with PCP   As directed       Currently Documented PCP:    Camron Pollard MD    PCP Phone Number:    [phone]     Follow Up Details: 2 to 3 days via telehealth if possible               Time spent on Discharge including face to face service: 35 minutes    This patient has been examined wearing appropriate Personal Protective Equipment and discussed with hospital infection control department. 07/24/21      Signature: Electronically signed by Dottie Casillas MD, 07/31/21, 2:24 PM EDT.

## 2021-07-24 NOTE — PLAN OF CARE
Goal Outcome Evaluation:  Plan of Care Reviewed With: patient, spouse       Progress: improving  Pt has Dc orders.  Cleared DC with Dr. Fuentes and Dr. Boyd.  Home care instructions given to pt.  Called and discussed DC instructions with spouse.  Pt up in chair waiting on WC to lobby to meet daughter and spouse.  No c/o pain or shortness of breath.  In no apparent distress.

## 2021-07-24 NOTE — OUTREACH NOTE
Prep Survey      Responses   [facility] patient discharged from?  [place]   Is LACE score < 7 ?  No   Emergency Room discharge w/ pulse ox?  No   Eligibility  [facility]   Date of Admission  07/20/21   Date of Discharge  07/24/21   Discharge Disposition  Home or Self Care   Discharge diagnosis  Recurrent right pleural effusion   Does the patient have one of the following disease processes/diagnoses(primary or secondary)?  Other   Does the patient have Home health ordered?  No   Is there a DME ordered?  No   Prep survey completed?  Yes          Sirisha Curry RN

## 2021-07-24 NOTE — PROGRESS NOTES
Urology Progress Note    Patient Identification:  Name:  Lenny Larson  Age:  80 y.o.  Sex:  male  :  1940  MRN:  [id number]    Chief Complaint: Patient feels better    History of Present Illness: Chest tube has been removed and patient is increasing his activity.  Denies any significant voiding symptoms prior to hospitalization.    Problem List:  Patient Active Problem List   Diagnosis   • Chronic obstructive pulmonary disease (CMS/HCC)   • Coronary artery disease   • Hyperlipidemia   • Hypertension   • ST elevation (STEMI) myocardial infarction (CMS/HCC)   • Status post coronary artery stent placement   • Acute respiratory distress   • Abdominal aortic aneurysm (AAA) (CMS/HCC)   • History of lung cancer   • BPH without obstruction/lower urinary tract symptoms   • History of lung cancer   • COPD exacerbation (CMS/HCC)   • Malignant neoplasm of upper lobe of right lung (CMS/HCC)   • Acute systolic congestive heart failure (CMS/HCC)   • 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)   • Chronic atrial fibrillation (CMS/HCC)   • Moderate malnutrition (CMS/HCC)   • Recurrent right pleural effusion     Past Medical History:  Past Medical History:   Diagnosis Date   • Cancer (CMS/HCC)    • COPD (chronic obstructive pulmonary disease) (CMS/HCC)    • Coronary artery disease    • Hyperlipidemia    • Hypertension      Past Surgical History:  Past Surgical History:   Procedure Laterality Date   • BRONCHOSCOPY N/A 2021    Procedure: BRONCHOSCOPY with bronchio alveolar lavage of right lower lung;  Surgeon: Sanjay Gonsales MD;  Location: [facility] ENDOSCOPY;  Service: Pulmonary;  Laterality: N/A;  post op: pneumonia   • CARDIAC CATHETERIZATION      PCI   • CARDIAC CATHETERIZATION N/A 2021    Procedure: Left Heart Cath and coronary angiogram;  Surgeon: Alvarado Cai MD;  Location: [facility] CATH INVASIVE LOCATION;  Service: Cardiovascular;  Laterality: N/A;   • CARDIAC  CATHETERIZATION N/A 1/20/2021    Procedure: Left ventriculography;  Surgeon: Alvarado Cai MD;  Location: [facility] CATH INVASIVE LOCATION;  Service: Cardiovascular;  Laterality: N/A;     Home Meds:  Medications Prior to Admission   Medication Sig Dispense Refill Last Dose   • amiodarone (PACERONE) 200 MG tablet Take 1 tablet by mouth 2 (two) times a day. 180 tablet 3 7/20/2021 at Unknown time   • budesonide (PULMICORT) 0.5 MG/2ML nebulizer solution Take 2 mL by nebulization 2 (Two) Times a Day. 60 each 0    • calcium carbonate (TUMS) 500 MG chewable tablet Chew 2 tablets Daily.   7/19/2021 at Unknown time   • dilTIAZem CD (CARDIZEM CD) 240 MG 24 hr capsule Take 1 capsule by mouth Daily. 90 capsule 3 7/19/2021 at Unknown time   • Eliquis 5 MG tablet tablet TAKE 1 TABLET BY MOUTH EVERY 12 HOURS FOR ATRIAL FIBRILLATION 180 tablet 1 7/20/2021 at Unknown time   • finasteride (PROSCAR) 5 MG tablet Take 5 mg by mouth Daily.   7/19/2021 at Unknown time   • ipratropium-albuterol (DUO-NEB) 0.5-2.5 mg/3 ml nebulizer Take 3 mL by nebulization 4 (Four) Times a Day. 360 mL 0 7/19/2021 at Unknown time   • rosuvastatin (Crestor) 40 MG tablet Take 1 tablet by mouth Daily. 90 tablet 3 7/19/2021 at Unknown time   • tamsulosin (FLOMAX) 0.4 MG capsule 24 hr capsule Take 1 capsule by mouth every night at bedtime.   7/19/2021 at Unknown time   • theophylline (THEODUR) 300 MG 12 hr tablet Take 300 mg by mouth Daily.   7/19/2021 at Unknown time   • traZODone (DESYREL) 50 MG tablet Take 1 tablet by mouth Every Night. 90 tablet 0 7/19/2021 at Unknown time   • acetaminophen (TYLENOL) 325 MG tablet Take 2 tablets by mouth Every 4 (Four) Hours As Needed for Mild Pain .   Unknown at Unknown time   • albuterol sulfate HFA (Proventil HFA) 108 (90 Base) MCG/ACT inhaler Inhale 2 puffs Every 4 (Four) Hours As Needed for Wheezing or Shortness of Air. 18 g 0 Unknown at Unknown time   • calcium carbonate (OS-GILMA) 600 MG tablet Take 600 mg by mouth Daily.    Unknown at Unknown time   • furosemide (Lasix) 20 MG tablet Take 1 tablet by mouth Daily. 30 tablet 0 Unknown at Unknown time   • guaiFENesin (MUCINEX) 600 MG 12 hr tablet Take 2 tablets by mouth Every 12 (Twelve) Hours. 60 tablet 0 Unknown at Unknown time   • metoprolol succinate XL (Toprol XL) 50 MG 24 hr tablet Take 1.5 tablets by mouth Daily. 180 tablet 2 Unknown at Unknown time   • polyethylene glycol (MIRALAX) 17 g packet Take 17 g by mouth 2 (Two) Times a Day. Available over the counter   Unknown at Unknown time     Current Meds:    Current Facility-Administered Medications:   •  acetaminophen (TYLENOL) tablet 650 mg, 650 mg, Oral, Q4H PRN, 650 mg at 07/22/21 1659 **OR** acetaminophen (TYLENOL) 160 MG/5ML solution 650 mg, 650 mg, Oral, Q4H PRN **OR** acetaminophen (TYLENOL) suppository 650 mg, 650 mg, Rectal, Q4H PRN, Joanna Johnson, APRN  •  albuterol (PROVENTIL) nebulizer solution 0.083% 2.5 mg/3mL, 2.5 mg, Nebulization, Q6H PRN, Joanna Johnson APRN  •  aluminum-magnesium hydroxide-simethicone (MAALOX MAX) 400-400-40 MG/5ML suspension 15 mL, 15 mL, Oral, Q6H PRN, Joanna Johnson, APRN  •  amiodarone (PACERONE) tablet 200 mg, 200 mg, Oral, Q12H, Joanna Johnson APRN, 200 mg at 07/24/21 0804  •  budesonide (PULMICORT) nebulizer solution 0.5 mg, 0.5 mg, Nebulization, BID - RT, Cata Garcia APRN, 0.5 mg at 07/24/21 0716  •  calcium 500 mg vitamin D 5 mcg (200 UT) per tablet 1 tablet, 1 tablet, Oral, Daily, Joanna Johnson APRN, 1 tablet at 07/24/21 0804  •  dilTIAZem CD (CARDIZEM CD) 24 hr capsule 240 mg, 240 mg, Oral, Daily, Joanna Johnson APRN, 240 mg at 07/24/21 0804  •  enoxaparin (LOVENOX) syringe 70 mg, 70 mg, Subcutaneous, Q12H, Joanna Johnson APRN, 70 mg at 07/24/21 0528  •  finasteride (PROSCAR) tablet 5 mg, 5 mg, Oral, Daily, Joanna Johnson APRN, 5 mg at 07/24/21 0804  •  furosemide (LASIX) tablet 20 mg, 20 mg, Oral, Daily, Joanna Johnson,  APRN, 20 mg at 21  •  guaiFENesin (MUCINEX) 12 hr tablet 1,200 mg, 1,200 mg, Oral, BID, Achterberg, Joanna, APRN, 1,200 mg at 21  •  ipratropium-albuterol (DUO-NEB) nebulizer solution 3 mL, 3 mL, Nebulization, 4x Daily - RT, Achterberg, Joanna, APRN, 3 mL at 21  •  melatonin tablet 5 mg, 5 mg, Oral, Nightly PRN, Achterberg, Joanna, APRN  •  ondansetron (ZOFRAN) tablet 4 mg, 4 mg, Oral, Q6H PRN **OR** ondansetron (ZOFRAN) injection 4 mg, 4 mg, Intravenous, Q6H PRN, Achterberg, Joanna, APRN  •  Pharmacy to Dose enoxaparin (LOVENOX), , Does not apply, Continuous PRN, Achterberg, Joanna, APRN  •  rosuvastatin (CRESTOR) tablet 40 mg, 40 mg, Oral, Nightly, Achterberg, Joanna, APRN, 40 mg at 21  •  sodium chloride 0.9 % flush 10 mL, 10 mL, Intravenous, Q12H, Achterberg, Joanna, APRN, 10 mL at 21  •  sodium chloride 0.9 % flush 10 mL, 10 mL, Intravenous, PRN, Achterberg, Joanna, APRN  •  tamsulosin (FLOMAX) 24 hr capsule 0.4 mg, 0.4 mg, Oral, BID, Dottie Casillas MD, 0.4 mg at 21  •  theophylline (THEODUR) 12 hr tablet 300 mg, 300 mg, Oral, Daily, Achterberg, Joanna, APRN, 300 mg at 21  •  traZODone (DESYREL) tablet 50 mg, 50 mg, Oral, Nightly, Achterberg, Joanna, APRN, 50 mg at 21  Allergies:  Ativan [lorazepam]    Review of Systems no fevers or chills.  No congestion or nasal discharge.    Objective:  tMax 24 hours:  Temp (24hrs), Av.9 °F (36.6 °C), Min:97.7 °F (36.5 °C), Max:98.5 °F (36.9 °C)    Vital Sign Ranges:  Temp:  [97.7 °F (36.5 °C)-98.5 °F (36.9 °C)] 97.9 °F (36.6 °C)  Heart Rate:  [70-87] 75  Resp:  [16-20] 20  BP: (121-132)/(50-61) 125/55  Intake and Output Last 3 Shifts:  I/O last 3 completed shifts:  In: 960 [P.O.:960]  Out: 1300 [Urine:950; Chest Tube:350]    Exam:  /55 (BP Location: Right arm, Patient Position: Sitting)   Pulse 75   Temp 97.9 °F (36.6 °C) (Oral)   Resp 20   Ht 172.7 cm  "(68\")   Wt 69.8 kg (153 lb 14.1 oz)   SpO2 96%   BMI 23.40 kg/m²    General Appearance:    Alert, cooperative, no acute distress, general         appearance is normal   Head:    Normocephalic, without obvious abnormality, atraumatic   Eyes:            Pupils/Irises normal. Exterior inspection conjunctivae       and lids normal.   Ears:    Normal external inspection   Nose:   Exterior inspection of nose is normal   Throat:   Lips, mucosa, and tongue normal   Lungs:     Respirations unlabored; normal effort, no audible     abnormality   CV:   Regular rhythm and normal rate, no edema   Abdomen:     examination of the abdomen is normal with     no masses, tenderness, or distension    :  Beach with yellow urine     Data Review:  All labs (24hrs):    Lab Results (last 24 hours)     Procedure Component Value Units Date/Time    Body Fluid Culture - Body Fluid, Pleural Cavity [[id number]] Collected: 07/21/21 1328    Specimen: Body Fluid from Pleural Cavity Updated: 07/24/21 0800     Body Fluid Culture No growth at 3 days     Gram Stain Few (2+) WBCs per low power field      No organisms seen    Hepatitis Panel, Acute [[id number]]  (Normal) Collected: 07/24/21 0521    Specimen: Blood Updated: 07/24/21 0636     Hepatitis B Surface Ag Non-Reactive     Hep A IgM Non-Reactive     Hep B C IgM Non-Reactive     Hepatitis C Ab Non-Reactive    Narrative:      Results may be falsely decreased if patient taking Biotin.         Radiology:   Imaging Results (Last 72 Hours)     Procedure Component Value Units Date/Time    XR Chest 1 View [[id number]] Collected: 07/24/21 0757     Updated: 07/24/21 0803    Narrative:      DATE OF EXAM:  7/24/2021 6:13 AM     PROCEDURE:  XR CHEST 1 VW-     INDICATIONS:  Shortness of breath; Z85.118-Personal history of other malignant  neoplasm of bronchus and lung     COMPARISON:  07/23/2021     TECHNIQUE:   Single radiographic AP view of the chest was obtained.     FINDINGS:  There is density in the right " perihilar region that may reflect sequela  of posttreatment change. There is a small pneumothorax suggested at the  right base laterally. There is blunting of the right lateral  costophrenic sulcus which might reflect a small effusion. There some  minimal atelectasis or scarring in the left lower lobe. The heart is not  definitely enlarged.        Impression:      1.Small pneumothorax inferolateral aspect right hemithorax which has  been suggested.  2.Density right lateral costophrenic sulcus which may relate to pleural  fluid.  3.Density right perihilar region that might reflect sequela to  posttreatment change and fibrosis.  4.Atelectasis or scarring left lower lobe.     Electronically Signed By-Duane Hubbard MD On:7/24/2021 8:00 AM  This report was finalized on [id number] by  Duane Hubbard MD.    US Abdomen Limited [[id number]] Collected: 07/23/21 2257     Updated: 07/23/21 2307    Narrative:      US ABDOMEN LIMITED-     Date of Exam: 7/23/2021 7:43 PM     Indication: elevated liver tests; Z85.118-Personal history of other  malignant neoplasm of bronchus and lung.     Comparison: CT abdomen pelvis 07/20/2021     Technique: Transverse and sagittal ultrasound images of the right upper  quadrant were obtained. Doppler evaluation was also conducted.     FINDINGS:  The pancreas is obscured. There is subtle heterogeneity of the liver  texture. There is a 9 mm liver lesion consistent with a cyst. There is  hepatopedal flow in the portal vein and hepatofugal flow in the hepatic  venous system.     The common duct measures 3 mm. The gallbladder appears normal. Right  renal images are unremarkable. There is a small right pleural effusion.       Impression:      1. There is subtle heterogeneity of the liver texture. On recent CT the  liver was abnormally dense, which can be seen with hemochromatosis,  hemosiderosis, Agus's disease, glycogen storage disease, and some  medications.  2. Small right pleural effusion.  3.  Pancreas is obscured.     Electronically Signed By-Olga Maldonado MD On:7/23/2021 11:05 PM  This report was finalized on [id number] by  Olga Maldonado MD.    XR Chest 1 View [[id number]] Collected: 07/23/21 0800     Updated: 07/23/21 0804    Narrative:      XR CHEST 1 VW-     Date of Exam: 7/23/2021 4:58 AM     Indication: Shortness of breath; Z85.118-Personal history of other  malignant neoplasm of bronchus and lung.     Comparison Exams: July 21, 2021     Technique: Single AP chest radiograph     FINDINGS:  A right-sided chest tube is in place. A small right basilar pneumothorax  appears slightly improved. Masslike consolidation in the right perihilar  region appears unchanged. The heart and mediastinal contours appear  stable. The pulmonary vasculature appears normal. The osseous structures  appear intact.       Impression:      1.Right-sided chest tube in place. Small right basilar pneumothorax  appears slightly improved.  2.Masslike consolidation within right perihilar region appears  unchanged.     Electronically Signed By-Lenny Goetz MD On:7/23/2021 8:02 AM  This report was finalized on [id number] by  Lenny Goetz MD.    XR Chest 1 View [[id number]] Collected: 07/21/21 1401     Updated: 07/21/21 1405    Narrative:      DATE OF EXAM:  7/21/2021 1:09 PM     PROCEDURE:  XR CHEST 1 VW-     INDICATIONS:  s/p CT placement       COMPARISON:  AP portable chest 1/22/2021, CT chest without contrast 7/20/2021.     TECHNIQUE:   Single radiographic view of the chest was obtained.     FINDINGS:  Right basilar pigtail pleural drain is in place. There is a small  pneumothorax in the right costophrenic angle.Masslike consolidation in  the perihilar right lung is unchanged. There is volume loss in the right  hemithorax with stable elevation of the right hemidiaphragm. Left lung  appears clear. Stable borderline cardiac enlargement.  No pleural  effusion is seen.       Impression:         1. Masslike consolidative  change in the perihilar right lung is  unchanged from the prior study.  2. Right basilar pigtail pleural drain is in place. There is a small  pneumothorax in the right costophrenic angle.  3. No pleural effusion is identified.     Electronically Signed By-Maura Esteves MD On:7/21/2021 2:03 PM  This report was finalized on [id number] by  Maura Esteves MD.          Assessment/Plan:    Principal Problem:    Recurrent right pleural effusion  Active Problems:    Chronic obstructive pulmonary disease (CMS/HCC)    Coronary artery disease    Hyperlipidemia    Abdominal aortic aneurysm (AAA) (CMS/HCC)    History of lung cancer    BPH without obstruction/lower urinary tract symptoms    Chronic atrial fibrillation (CMS/HCC)     Urinary retention secondary to BPH exacerbated by weakness of acute illness and decreased mobility  Patient is increasing his activity and his chest tube has been removed    Plan  Continue Flomax and Proscar  Voiding trial today      Antonio Boyd MD  7/24/2021  08:50 EDT

## 2021-07-24 NOTE — PROGRESS NOTES
"PULMONARY CRITICAL CARE Progress  NOTE      PATIENT IDENTIFICATION:  Name: Lenny Larson  MRN: [id number]  :  1940     Age: 80 y.o.  Sex: male    DATE OF Note:  2021   Referring Physician: Dottie Casillas MD                  Subjective:   Patient feeling significantly better no complaints  No nausea or vomiting, no change in bowel habit, no dysuria,  no new  skin rash or itching.      Objective:  tMax 24 hrs: Temp (24hrs), Av.9 °F (36.6 °C), Min:97.7 °F (36.5 °C), Max:98.5 °F (36.9 °C)      Vitals Ranges:   Temp:  [97.7 °F (36.5 °C)-98.5 °F (36.9 °C)] 97.9 °F (36.6 °C)  Heart Rate:  [70-87] 75  Resp:  [16-20] 20  BP: (121-132)/(50-61) 125/55    Intake and Output Last 3 Shifts:   I/O last 3 completed shifts:  In: 960 [P.O.:960]  Out: 1300 [Urine:950; Chest Tube:350]    Exam:  /55 (BP Location: Right arm, Patient Position: Sitting)   Pulse 75   Temp 97.9 °F (36.6 °C) (Oral)   Resp 20   Ht 172.7 cm (68\")   Wt 69.8 kg (153 lb 14.1 oz)   SpO2 96%   BMI 23.40 kg/m²     General Appearance: Alert awake not in distress  HEENT:  Normocephalic, without obvious abnormality, Conjunctiva/corneas clear,.  Normal external ear canals, Nares normal, no drainage     Neck:  Supple, symmetrical, trachea midline. No JVD.  Lungs /Chest wall: Diminished lower lobes, symmetrical wall movement.     Heart:  Regular rate and rhythm, systolic murmur PMI left sternal border  Abdomen: Soft, non-tender, no masses, no organomegaly.    Extremities: Trace edema no clubbing or Cyanosis        Medications:    Current Facility-Administered Medications:   •  acetaminophen (TYLENOL) tablet 650 mg, 650 mg, Oral, Q4H PRN, 650 mg at 21 1659 **OR** acetaminophen (TYLENOL) 160 MG/5ML solution 650 mg, 650 mg, Oral, Q4H PRN **OR** acetaminophen (TYLENOL) suppository 650 mg, 650 mg, Rectal, Q4H PRN, Joanna Johnson APRN  •  albuterol (PROVENTIL) nebulizer solution 0.083% 2.5 mg/3mL, 2.5 mg, Nebulization, Q6H PRN, " Joanna Johnson APRN  •  aluminum-magnesium hydroxide-simethicone (MAALOX MAX) 400-400-40 MG/5ML suspension 15 mL, 15 mL, Oral, Q6H PRN, Joanna Johnson APRN  •  amiodarone (PACERONE) tablet 200 mg, 200 mg, Oral, Q12H, Achteryong, Joanna, APRN, 200 mg at 07/24/21 0804  •  budesonide (PULMICORT) nebulizer solution 0.5 mg, 0.5 mg, Nebulization, BID - RT, Cata Garcia, APRN, 0.5 mg at 07/24/21 0716  •  calcium 500 mg vitamin D 5 mcg (200 UT) per tablet 1 tablet, 1 tablet, Oral, Daily, Joanna Johnson, APRN, 1 tablet at 07/24/21 0804  •  dilTIAZem CD (CARDIZEM CD) 24 hr capsule 240 mg, 240 mg, Oral, Daily, JordynterAggie beachle, APRN, 240 mg at 07/24/21 0804  •  enoxaparin (LOVENOX) syringe 70 mg, 70 mg, Subcutaneous, Q12H, AchterAggie beachle, APRN, 70 mg at 07/24/21 0528  •  finasteride (PROSCAR) tablet 5 mg, 5 mg, Oral, Daily, AchterAggie beachle, APRN, 5 mg at 07/24/21 0804  •  furosemide (LASIX) tablet 20 mg, 20 mg, Oral, Daily, JordynterAggie beachle, APRN, 20 mg at 07/24/21 0804  •  guaiFENesin (MUCINEX) 12 hr tablet 1,200 mg, 1,200 mg, Oral, BID, Achteryong, Joanna, APRN, 1,200 mg at 07/24/21 0804  •  ipratropium-albuterol (DUO-NEB) nebulizer solution 3 mL, 3 mL, Nebulization, 4x Daily - RT, Joanna Johnson, APRN, 3 mL at 07/24/21 0717  •  melatonin tablet 5 mg, 5 mg, Oral, Nightly PRN, Aggie Johnsonle, APRN  •  ondansetron (ZOFRAN) tablet 4 mg, 4 mg, Oral, Q6H PRN **OR** ondansetron (ZOFRAN) injection 4 mg, 4 mg, Intravenous, Q6H PRN, Joanna Johnson, APRN  •  Pharmacy to Dose enoxaparin (LOVENOX), , Does not apply, Continuous PRN, JordynterAggie beachle, APRN  •  rosuvastatin (CRESTOR) tablet 40 mg, 40 mg, Oral, Nightly, Joanna Johnson, APRN, 40 mg at 07/23/21 2105  •  sodium chloride 0.9 % flush 10 mL, 10 mL, Intravenous, Q12H, Joanna Johnson, APRN, 10 mL at 07/24/21 0804  •  sodium chloride 0.9 % flush 10 mL, 10 mL, Intravenous, PRN, Joanna Johnson,  APRN  •  tamsulosin (FLOMAX) 24 hr capsule 0.4 mg, 0.4 mg, Oral, BID, Dottie Casillas MD, 0.4 mg at 07/24/21 0804  •  theophylline (THEODUR) 12 hr tablet 300 mg, 300 mg, Oral, Daily, JordynterAggie beachle, APRN, 300 mg at 07/24/21 0804  •  traZODone (DESYREL) tablet 50 mg, 50 mg, Oral, Nightly, JordynterAggie beachle, APRN, 50 mg at 07/23/21 2105    Data Review:  All labs (24hrs):   Recent Results (from the past 24 hour(s))   Basic Metabolic Panel    Collection Time: 07/23/21  9:20 AM    Specimen: Blood   Result Value Ref Range    Glucose 117 (H) 65 - 99 mg/dL    BUN 17 8 - 23 mg/dL    Creatinine 1.20 0.76 - 1.27 mg/dL    Sodium 143 136 - 145 mmol/L    Potassium 3.6 3.5 - 5.2 mmol/L    Chloride 107 98 - 107 mmol/L    CO2 26.0 22.0 - 29.0 mmol/L    Calcium 8.6 8.6 - 10.5 mg/dL    eGFR Non African Amer 59 (L) >60 mL/min/1.73    BUN/Creatinine Ratio 14.2 7.0 - 25.0    Anion Gap 10.0 5.0 - 15.0 mmol/L   CBC (No Diff)    Collection Time: 07/23/21  9:20 AM    Specimen: Blood   Result Value Ref Range    WBC 6.00 3.40 - 10.80 10*3/mm3    RBC 3.84 (L) 4.14 - 5.80 10*6/mm3    Hemoglobin 11.4 (L) 13.0 - 17.7 g/dL    Hematocrit 34.6 (L) 37.5 - 51.0 %    MCV 90.2 79.0 - 97.0 fL    MCH 29.7 26.6 - 33.0 pg    MCHC 33.0 31.5 - 35.7 g/dL    RDW 15.1 12.3 - 15.4 %    RDW-SD 48.6 37.0 - 54.0 fl    MPV 8.8 6.0 - 12.0 fL    Platelets 272 140 - 450 10*3/mm3   Hepatitis Panel, Acute    Collection Time: 07/24/21  5:21 AM    Specimen: Blood   Result Value Ref Range    Hepatitis B Surface Ag Non-Reactive Non-Reactive    Hep A IgM Non-Reactive Non-Reactive    Hep B C IgM Non-Reactive Non-Reactive    Hepatitis C Ab Non-Reactive Non-Reactive        Imaging:  XR Chest 1 View  Narrative: DATE OF EXAM:  7/24/2021 6:13 AM     PROCEDURE:  XR CHEST 1 VW-     INDICATIONS:  Shortness of breath; Z85.118-Personal history of other malignant  neoplasm of bronchus and lung     COMPARISON:  07/23/2021     TECHNIQUE:   Single radiographic AP view of the chest was  obtained.     FINDINGS:  There is density in the right perihilar region that may reflect sequela  of posttreatment change. There is a small pneumothorax suggested at the  right base laterally. There is blunting of the right lateral  costophrenic sulcus which might reflect a small effusion. There some  minimal atelectasis or scarring in the left lower lobe. The heart is not  definitely enlarged.      Impression: 1.Small pneumothorax inferolateral aspect right hemithorax which has  been suggested.  2.Density right lateral costophrenic sulcus which may relate to pleural  fluid.  3.Density right perihilar region that might reflect sequela to  posttreatment change and fibrosis.  4.Atelectasis or scarring left lower lobe.     Electronically Signed By-Duane Hubbard MD On:7/24/2021 8:00 AM  This report was finalized on [id number] by  Duane Hubbard MD.       ASSESSMENT:    Recurrent right pleural effusion  COPD  Hx of lung cancer  CAD  Hyperlipidemia  AAA  BPH   Chronic A-fib       PLAN:  Continue current plan   Explained with the patient that part of his lung is trapped surgical and have the fluid  Pending cytology results of the pleural fluid and BAL  Continue chest tube management  Bronchodilator  Inhaled corticosteroids  IS/Flutter valve   Electrolytes/ glycemic control  DVT and GI prophylaxis    Discussed with Dr Gina Garcia, APRN  7/24/2021  08:36 EDT  I personally have examined  and interviewed the patient. I have reviewed the history, data, problems, assessment and plan with our NP and agree with it.    Adeline Fuentes MD

## 2021-07-26 NOTE — OUTREACH NOTE
Call Center TCM Note      Responses   [facility] patient discharged from?  [facility]   Does the patient have one of the following disease processes/diagnoses(primary or secondary)?  Other   TCM attempt successful?  Yes   Call start time  1512   Call end time  1513   Discharge diagnosis  Recurrent right pleural effusion   Is patient permission given to speak with other caregiver?  Yes   Person spoke with today (if not patient) and relationship  Wife/ [place]   Meds reviewed with patient/caregiver?  Yes   Is the patient having any side effects they believe may be caused by any medication additions or changes?  No   Does the patient have all medications ordered at discharge?  Yes   Is the patient taking all medications as directed (includes completed medication regime)?  Yes   Does the patient have a primary care provider?   Yes   Does the patient have an appointment with their PCP within 7 days of discharge?  No   Comments regarding PCP  Declines PCP followup at this time.    What is preventing the patient from scheduling follow up appointments within 7 days of discharge?  Haven't had time   Nursing Interventions  Educated patient on importance of making appointment, Advised patient to make appointment   Has the patient kept scheduled appointments due by today?  N/A   Has home health visited the patient within 72 hours of discharge?  N/A   Has all DME been delivered?  No   Psychosocial issues?  No   Did the patient receive a copy of their discharge instructions?  Yes   Nursing interventions  Reviewed instructions with patient   What is the patient's perception of their health status since discharge?  Improving   Is the patient/caregiver able to teach back signs and symptoms related to disease process for when to call PCP?  Yes   Is the patient/caregiver able to teach back signs and symptoms related to disease process for when to call 911?  Yes   Is the patient/caregiver able to teach back the hierarchy of who to  call/visit for symptoms/problems? PCP, Specialist, Home health nurse, Urgent Care, ED, 911  Yes   If the patient is a current smoker, are they able to teach back resources for cessation?  Not a smoker   TCM call completed?  Yes          Easton Otero RN    7/26/2021, 15:14 EDT

## 2021-07-26 NOTE — CASE MANAGEMENT/SOCIAL WORK
Case Management Discharge Note      Final Note: Discharged home.         Selected Continued Care - Discharged on 7/24/2021 Admission date: 7/20/2021 - Discharge disposition: Home or Self Care          Final Discharge Disposition Code: 01 - home or self-care

## 2021-08-04 NOTE — OUTREACH NOTE
Medical Week 2 Survey      Responses   [facility] patient discharged from?  [facility]   Does the patient have one of the following disease processes/diagnoses(primary or secondary)?  Other   Week 2 attempt successful?  Yes   Call start time  1624   Discharge diagnosis  Recurrent right pleural effusion   Call end time  1629   Is patient permission given to speak with other caregiver?  Yes   List who call center can speak with  YANIRA MIMS   Person spoke with today (if not patient) and relationship  ARLEN MIMS- WIFE   Meds reviewed with patient/caregiver?  Yes   Is the patient having any side effects they believe may be caused by any medication additions or changes?  No   Does the patient have all medications ordered at discharge?  Yes   Is the patient taking all medications as directed (includes completed medication regime)?  Yes   Does the patient have a primary care provider?   Yes   Does the patient have an appointment with their PCP within 7 days of discharge?  No   Comments regarding PCP  WIFE STATES SHE WILL MAKE HIM AN APPOINTMENT WITH DR. BEASLEY OR ONE OF THE NURSE PRACTIONERS   What is preventing the patient from scheduling follow up appointments within 7 days of discharge?  Haven't had time   Nursing Interventions  Educated patient on importance of making appointment, Advised patient to make appointment   Has the patient kept scheduled appointments due by today?  N/A   Has home health visited the patient within 72 hours of discharge?  N/A   Has all DME been delivered?  No   Did the patient receive a copy of their discharge instructions?  Yes   Nursing interventions  Reviewed instructions with patient   What is the patient's perception of their health status since discharge?  Improving   Is the patient/caregiver able to teach back signs and symptoms related to disease process for when to call PCP?  Yes   Is the patient/caregiver able to teach back signs and symptoms related to disease process for  when to call 911?  Yes   Is the patient/caregiver able to teach back the hierarchy of who to call/visit for symptoms/problems? PCP, Specialist, Home health nurse, Urgent Care, ED, 911  Yes   If the patient is a current smoker, are they able to teach back resources for cessation?  Not a smoker   Week 2 Call Completed?  Yes          Karen Robert LPN

## 2021-08-12 NOTE — OUTREACH NOTE
Medical Week 3 Survey      Responses   [facility] patient discharged from?  [facility]   Does the patient have one of the following disease processes/diagnoses(primary or secondary)?  Other   Week 3 attempt successful?  Yes   Call start time  0923   Call end time  0927   Discharge diagnosis  Recurrent right pleural effusion   Is patient permission given to speak with other caregiver?  Yes   List who call center can speak with  YANIRA MIMS   Person spoke with today (if not patient) and relationship  ARLEN MIMS- WIFE   Meds reviewed with patient/caregiver?  Yes   Is the patient having any side effects they believe may be caused by any medication additions or changes?  No   Does the patient have all medications ordered at discharge?  Yes   Is the patient taking all medications as directed (includes completed medication regime)?  Yes   Does the patient have a primary care provider?   Yes   Has the patient kept scheduled appointments due by today?  Yes   Has home health visited the patient within 72 hours of discharge?  N/A   Has all DME been delivered?  No   Psychosocial issues?  No   Did the patient receive a copy of their discharge instructions?  Yes   Nursing interventions  Reviewed instructions with patient   What is the patient's perception of their health status since discharge?  Improving   Is the patient/caregiver able to teach back signs and symptoms related to disease process for when to call PCP?  Yes   Is the patient/caregiver able to teach back signs and symptoms related to disease process for when to call 911?  Yes   Is the patient/caregiver able to teach back the hierarchy of who to call/visit for symptoms/problems? PCP, Specialist, Home health nurse, Urgent Care, ED, 911  Yes   If the patient is a current smoker, are they able to teach back resources for cessation?  Not a smoker   Additional teach back comments  WIFE STATES HE IS DOING WELL. SHE IS HIS CAREGIVER AND IS NOW SUFFERING WITH GALLSTONES  HERSELF.    Week 3 Call Completed?  Yes          Karen Robert LPN

## 2021-08-18 NOTE — PROGRESS NOTES
Subjective:     Encounter Date:08/18/2021      Patient ID: Lenny Larson is a 80 y.o. male.    Chief Complaint: Coronary artery disease  History of Present Illness 80-year-old white male with history of coronary disease history of hypertension hyperlipidemia and history of atrial fibrillation the past presents to my office for follow-up.  Patient is currently stable without any symptoms of chest pain or shortness of breath at rest but has some shortness with exertion but no complains any PND orthopnea.  No palpitation dizziness syncope or swelling of the feet.  Patient has been taking his meds regular but he also has lung cancer and is having work-up done.    The following portions of the patient's history were reviewed and updated as appropriate: allergies, current medications, past family history, past medical history, past social history, past surgical history and problem list.  Past Medical History:   Diagnosis Date   • Cancer (CMS/[facility])    • COPD (chronic obstructive pulmonary disease) (CMS/[facility])    • Coronary artery disease    • Hyperlipidemia    • Hypertension      Past Surgical History:   Procedure Laterality Date   • BRONCHOSCOPY N/A 1/14/2021    Procedure: BRONCHOSCOPY with bronchio alveolar lavage of right lower lung;  Surgeon: Sanjay Gonsales MD;  Location: [facility] ENDOSCOPY;  Service: Pulmonary;  Laterality: N/A;  post op: pneumonia   • BRONCHOSCOPY N/A 7/22/2021    Procedure: BRONCHOSCOPY with bronchio alveolar lavage right lower lung;  Surgeon: Louis Ronquillo MD;  Location: [facility] ENDOSCOPY;  Service: Pulmonary;  Laterality: N/A;  hx cancer   • CARDIAC CATHETERIZATION      PCI   • CARDIAC CATHETERIZATION N/A 1/20/2021    Procedure: Left Heart Cath and coronary angiogram;  Surgeon: Alvarado Cai MD;  Location: [facility] CATH INVASIVE LOCATION;  Service: Cardiovascular;  Laterality: N/A;   • CARDIAC CATHETERIZATION N/A 1/20/2021    Procedure: Left ventriculography;  Surgeon: Alvarado Cai MD;  Location:   "[facility] CATH INVASIVE LOCATION;  Service: Cardiovascular;  Laterality: N/A;     /56 (BP Location: Left arm, Patient Position: Sitting, Cuff Size: Adult)   Pulse 108   Ht 172.7 cm (68\")   Wt 71.1 kg (156 lb 12 oz)   SpO2 96%   BMI 23.83 kg/m²   Family History   Adopted: Yes   Family history unknown: Yes       Current Outpatient Medications:   •  albuterol sulfate HFA (Proventil HFA) 108 (90 Base) MCG/ACT inhaler, Inhale 2 puffs Every 4 (Four) Hours As Needed for Wheezing or Shortness of Air., Disp: 18 g, Rfl: 0  •  amiodarone (PACERONE) 200 MG tablet, Take 1 tablet by mouth 2 (two) times a day., Disp: 180 tablet, Rfl: 3  •  calcium carbonate (OS-GILMA) 600 MG tablet, Take 600 mg by mouth Daily., Disp: , Rfl:   •  dilTIAZem CD (CARDIZEM CD) 240 MG 24 hr capsule, Take 1 capsule by mouth Daily., Disp: 90 capsule, Rfl: 3  •  Eliquis 5 MG tablet tablet, TAKE 1 TABLET BY MOUTH EVERY 12 HOURS FOR ATRIAL FIBRILLATION, Disp: 180 tablet, Rfl: 1  •  finasteride (PROSCAR) 5 MG tablet, Take 5 mg by mouth Daily., Disp: , Rfl:   •  rosuvastatin (Crestor) 40 MG tablet, Take 1 tablet by mouth Daily., Disp: 90 tablet, Rfl: 3  •  tamsulosin (FLOMAX) 0.4 MG capsule 24 hr capsule, Take 1 capsule by mouth 2 (Two) Times a Day., Disp: 60 capsule, Rfl: 0  •  theophylline (THEODUR) 300 MG 12 hr tablet, Take 300 mg by mouth Daily., Disp: , Rfl:   Allergies   Allergen Reactions   • Ativan [Lorazepam] Mental Status Change     Social History     Socioeconomic History   • Marital status:      Spouse name: Not on file   • Number of children: Not on file   • Years of education: Not on file   • Highest education level: Not on file   Tobacco Use   • Smoking status: Former Smoker   • Smokeless tobacco: Never Used   Vaping Use   • Vaping Use: Never used   Substance and Sexual Activity   • Alcohol use: Not Currently   • Drug use: Never   • Sexual activity: Defer     Review of Systems   Constitutional: Positive for malaise/fatigue. Negative " for fever.   HENT: Negative for congestion and hearing loss.    Eyes: Negative for double vision and visual disturbance.   Cardiovascular: Negative for chest pain, claudication, dyspnea on exertion, leg swelling and syncope.   Respiratory: Positive for shortness of breath. Negative for cough.    Endocrine: Negative for cold intolerance.   Skin: Negative for color change and rash.   Musculoskeletal: Negative for arthritis and joint pain.   Gastrointestinal: Negative for abdominal pain and heartburn.   Genitourinary: Negative for hematuria.   Neurological: Negative for excessive daytime sleepiness and dizziness.   Psychiatric/Behavioral: Negative for depression. The patient is not nervous/anxious.    All other systems reviewed and are negative.             Objective:     Constitutional:       Appearance: Well-developed.   Eyes:      General: No scleral icterus.     Conjunctiva/sclera: Conjunctivae normal.   HENT:      Head: Normocephalic and atraumatic.   Neck:      Vascular: No carotid bruit or JVD.   Pulmonary:      Effort: Pulmonary effort is normal.      Breath sounds: Normal breath sounds. No wheezing. No rales.   Cardiovascular:      Normal rate. Regular rhythm.   Pulses:     Intact distal pulses.   Abdominal:      General: Bowel sounds are normal.      Palpations: Abdomen is soft.   Musculoskeletal:      Cervical back: Normal range of motion and neck supple. Skin:     General: Skin is warm and dry.      Findings: No rash.   Neurological:      Mental Status: Alert.       Procedures    Lab Review:         MDM  1.  Coronary disease  Patient had stent placement to the LAD in the past and is currently stable on medical therapy without any significant disease he has normal function  2.  Hypertension  Patient blood pressure currently stable on medications  3.  Hyperlipidemia patient is on Crestor  4.  Paroxysmal afibrillation  Patient is currently in sinus rhythm and is on diltiazem and Eliquis  5.  Lung  cancer  Patient is having work-up done for the same      Patient's previous medical records, labs, and EKG were reviewed and discussed with the patient at today's visit.

## 2021-09-09 NOTE — TELEPHONE ENCOUNTER
PT NEEDS TO BE SEEN. HIS BREATHING ISNT DOING WELL AND HIS FEET ARE SO SWOLLEN HE CANNOT WEAR HIS SHOES. PLEASE ADVISE.

## 2021-09-14 NOTE — TELEPHONE ENCOUNTER
Patients daughter is calling back again for advise. Patient is still swelling, unable to put his shoes on, and having shortness of air.

## 2021-09-15 NOTE — TELEPHONE ENCOUNTER
Scheduled echo for tomorrow 9/16 at 10:15 and they are coming back for the follow up at 3:40. Spoke with patient's wife.

## 2021-09-16 PROBLEM — I50.9 ACUTE CHF (HCC): Status: ACTIVE | Noted: 2021-01-01

## 2021-09-16 PROBLEM — I50.22 CHRONIC SYSTOLIC CONGESTIVE HEART FAILURE (HCC): Chronic | Status: ACTIVE | Noted: 2021-01-01

## 2021-09-16 PROBLEM — I48.20 CHRONIC ATRIAL FIBRILLATION (HCC): Chronic | Status: ACTIVE | Noted: 2021-01-22

## 2021-09-16 PROBLEM — I71.40 ABDOMINAL AORTIC ANEURYSM (AAA): Chronic | Status: ACTIVE | Noted: 2019-03-06

## 2021-09-16 PROBLEM — I95.9 HYPOTENSION: Status: ACTIVE | Noted: 2021-01-01

## 2021-09-16 NOTE — PROGRESS NOTES
Subjective:     Encounter Date:09/16/2021      Patient ID: Lenny Larson is a 80 y.o. male.    Chief Complaint:  History of Present Illness 80-year-old white male with history of coronary artery disease history of congestive heart failure hypertension hyperlipidemia atrial fibrillation and lung cancer in the past presents to the office for a follow-up.  Patient has been having increasing symptoms of shortness of breath along with significant swelling of the feet but is gained about 20 pounds in the last 2 to 3 weeks.  He is not able to walk.  No complaints of any chest pain.  No PND orthopnea.  He has some palpitation without any dizziness.  He does not smoke.  He had an echocardiogram which showed a EF of about 20 to 25%.    The following portions of the patient's history were reviewed and updated as appropriate: allergies, current medications, past family history, past medical history, past social history, past surgical history and problem list.  Past Medical History:   Diagnosis Date   • Cancer (CMS/HCC)    • COPD (chronic obstructive pulmonary disease) (CMS/[facility])    • Coronary artery disease    • Hyperlipidemia    • Hypertension      Past Surgical History:   Procedure Laterality Date   • BRONCHOSCOPY N/A 1/14/2021    Procedure: BRONCHOSCOPY with bronchio alveolar lavage of right lower lung;  Surgeon: Sanjay Gonsales MD;  Location: [facility] ENDOSCOPY;  Service: Pulmonary;  Laterality: N/A;  post op: pneumonia   • BRONCHOSCOPY N/A 7/22/2021    Procedure: BRONCHOSCOPY with bronchio alveolar lavage right lower lung;  Surgeon: Louis Ronquillo MD;  Location: [facility] ENDOSCOPY;  Service: Pulmonary;  Laterality: N/A;  hx cancer   • CARDIAC CATHETERIZATION      PCI   • CARDIAC CATHETERIZATION N/A 1/20/2021    Procedure: Left Heart Cath and coronary angiogram;  Surgeon: Alvarado Cai MD;  Location: [facility] CATH INVASIVE LOCATION;  Service: Cardiovascular;  Laterality: N/A;   • CARDIAC CATHETERIZATION N/A 1/20/2021     "Procedure: Left ventriculography;  Surgeon: Alvarado Cai MD;  Location: [facility] CATH INVASIVE LOCATION;  Service: Cardiovascular;  Laterality: N/A;     /70 (BP Location: Left arm, Patient Position: Sitting)   Pulse 108   Ht 172.7 cm (68\")   Wt 78 kg (172 lb)   SpO2 95%   BMI 26.15 kg/m²   Family History   Adopted: Yes   Family history unknown: Yes       Current Outpatient Medications:   •  albuterol sulfate HFA (Proventil HFA) 108 (90 Base) MCG/ACT inhaler, Inhale 2 puffs Every 4 (Four) Hours As Needed for Wheezing or Shortness of Air., Disp: 18 g, Rfl: 0  •  amiodarone (PACERONE) 200 MG tablet, Take 1 tablet by mouth 2 (two) times a day., Disp: 180 tablet, Rfl: 3  •  calcium carbonate (OS-GILMA) 600 MG tablet, Take 600 mg by mouth Daily., Disp: , Rfl:   •  dilTIAZem CD (CARDIZEM CD) 240 MG 24 hr capsule, Take 1 capsule by mouth Daily., Disp: 90 capsule, Rfl: 3  •  Eliquis 5 MG tablet tablet, TAKE 1 TABLET BY MOUTH EVERY 12 HOURS FOR ATRIAL FIBRILLATION, Disp: 180 tablet, Rfl: 1  •  finasteride (PROSCAR) 5 MG tablet, Take 5 mg by mouth Daily., Disp: , Rfl:   •  rosuvastatin (Crestor) 40 MG tablet, Take 1 tablet by mouth Daily., Disp: 90 tablet, Rfl: 3  •  tamsulosin (FLOMAX) 0.4 MG capsule 24 hr capsule, Take 1 capsule by mouth 2 (Two) Times a Day., Disp: 60 capsule, Rfl: 0  •  theophylline (THEODUR) 300 MG 12 hr tablet, Take 300 mg by mouth Daily., Disp: , Rfl:   Allergies   Allergen Reactions   • Ativan [Lorazepam] Mental Status Change     Social History     Socioeconomic History   • Marital status:      Spouse name: Not on file   • Number of children: Not on file   • Years of education: Not on file   • Highest education level: Not on file   Tobacco Use   • Smoking status: Former Smoker   • Smokeless tobacco: Never Used   Vaping Use   • Vaping Use: Never used   Substance and Sexual Activity   • Alcohol use: Not Currently   • Drug use: Never   • Sexual activity: Defer     Review of Systems "   Constitutional: Negative for fever and malaise/fatigue.   Cardiovascular: Positive for leg swelling and palpitations. Negative for chest pain and dyspnea on exertion.   Respiratory: Positive for shortness of breath. Negative for cough.    Skin: Negative for rash.   Gastrointestinal: Negative for abdominal pain, nausea and vomiting.   Neurological: Negative for focal weakness and headaches.   All other systems reviewed and are negative.             Objective:     Constitutional:       Appearance: Well-developed.   Eyes:      General: No scleral icterus.     Conjunctiva/sclera: Conjunctivae normal.   HENT:      Head: Normocephalic and atraumatic.   Neck:      Vascular: No carotid bruit or JVD.   Pulmonary:      Effort: Pulmonary effort is normal.      Breath sounds: Normal breath sounds. No wheezing. No rales.   Cardiovascular:      Normal rate. Irregularly irregular rhythm.   Pulses:     Intact distal pulses.   Edema:     Peripheral edema present.  Abdominal:      General: Bowel sounds are normal.      Palpations: Abdomen is soft.   Musculoskeletal:      Cervical back: Normal range of motion and neck supple. Skin:     General: Skin is warm and dry.      Findings: No rash.   Neurological:      Mental Status: Alert.       Procedures    Lab Review:         MDM  1.  Coronary artery disease  Patient had stent placement to the LAD in the past and is currently stable on headache cardiac catheterization in January of this year which showed nonobstructive disease  2.  Congestive heart failure  Patient has acute on chronic congestive heart failure and has symptoms with weight gain shortness of breath and swelling of the feet and needs to be admitted to the hospital for changing his medicines to beta-blockers and Entresto and also IV diuresis  Patient is currently on diltiazem and amiodarone which will be stopped  3.  Hypertension  Patient blood pressure currently stable on medications but will be changed  4.   Hyperlipidemia  Patient on Crestor and his lipid levels are well within normal meds  5.  Atrial fibrillation  Patient is currently on Eliquis but will have his medication changed      Patient's previous medical records, labs, and EKG were reviewed and discussed with the patient at today's visit.

## 2021-09-18 PROBLEM — I50.9 ACUTE ON CHRONIC HEART FAILURE: Status: ACTIVE | Noted: 2021-01-01

## 2021-09-22 NOTE — ANESTHESIA POSTPROCEDURE EVALUATION
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          Post Anesthesia Care and Evaluation    Patient location during evaluation: PACU  Patient participation: complete - patient cannot participate  Level of consciousness: responsive to light touch and responsive to physical stimuli  Pain score: 0  Pain management: adequate  Airway patency: patent  Anesthetic complications: No anesthetic complications  PONV Status: controlled  Cardiovascular status: acceptable and hemodynamically stable  Respiratory status: acceptable, face mask and oral airway  Hydration status: acceptable    Comments: Satisfactory progress.Patient seen and examined postoperatively; vital signs stable; SpO2 greater than or equal to 90%; cardiopulmonary status stable; nausea/vomiting adequately controlled; pain adequately controlled; no apparent anesthesia complications; patient discharged from anesthesia care when discharge criteria were met

## 2021-09-25 PROBLEM — Z95.828 HISTORY OF ENDOVASCULAR STENT GRAFT FOR ABDOMINAL AORTIC ANEURYSM: Chronic | Status: ACTIVE | Noted: 2021-01-01

## 2021-09-29 NOTE — OUTREACH NOTE
Prep Survey      Responses   [facility] discharged from?  [facility]   Is LACE score < 7 ?  No   Emergency Room discharge w/ pulse ox?  No   Eligibility  [facility]   Date of Admission  09/16/21   Date of Discharge  09/29/21   Discharge Disposition  Home or Self Care   Discharge diagnosis  Acute CHF    Does the patient have one of the following disease processes/diagnoses(primary or secondary)?  CHF   Does the patient have Home health ordered?  Yes   What is the Home health agency?   [facility]   Is there a DME ordered?  No   Prep survey completed?  Yes          Sirisha Curry RN

## 2021-09-30 NOTE — OUTREACH NOTE
Call Center TCM Note      Responses   [facility] patient discharged from?  [facility]   Does the patient have one of the following disease processes/diagnoses(primary or secondary)?  CHF   TCM attempt successful?  No   Unsuccessful attempts  Attempt 1           Ashlee Crandall RN    9/30/2021, 14:02 EDT

## 2021-09-30 NOTE — OUTREACH NOTE
Call Center TCM Note      Responses   [facility] patient discharged from?  [facility]   Does the patient have one of the following disease processes/diagnoses(primary or secondary)?  CHF   TCM attempt successful?  No   Unsuccessful attempts  Attempt 2           Ashlee Crandall RN    9/30/2021, 14:51 EDT

## 2021-10-01 NOTE — OUTREACH NOTE
Call Center TCM Note      Responses   [facility] patient discharged from?  [facility]   Does the patient have one of the following disease processes/diagnoses(primary or secondary)?  CHF   TCM attempt successful?  No   Unsuccessful attempts  Attempt 3           Ashlee Crandall RN    10/1/2021, 09:31 EDT

## 2021-10-01 NOTE — HOME HEALTH
Patient is a 80 year old male referred to [facility] for acute on chronic CHF. He lives in a single dwelling home with his wife Italia who is his caregiver. He reported he had a 20lb weight gain over 1 week and was admitted for diuresis. Patient was unable to urinate when dawkins catheter was removed so urology was consulted for urinary retention and dawkins was replaced and patient is to follow up next week for voiding trial. Caregiver states he has a history of this and will be fine to have it removed and is adament it be removed as soon as possible. She contacted urology to try to move the appt up but was unable to do so. Dawkins teaching gone over with caregiver. Patient is very forgetful and is supposed to follow up with neuro as well for possible dementia. Caregiver is having him sleep in recliner right now because when he sleeps in bed he forgets about the catheter.     Hx: CHF, AAA, hypotension, chronic Afib, BPH, hx of lung CA, CAD, HLD, HTN, COPD    Plan for next visit: follow up on any appts, CP assess, dawkins cath assess/teaching as necessary, medication education, safety education, f/u on PT plan for patient

## 2021-10-03 NOTE — TELEPHONE ENCOUNTER
CM received message from house supervisor regarding a call from this patient's daughter Janine. CM spoke with Janine over the phone. She was concerned about patient's weakness and patient's wife ability to care for the patient. Patient is new to . First appointment with [facility] scheduled for tomorrow. Family interested in acute rehab and CM provided numbers for [facility] and [place] per family request. Janine was also interested in transportation services in the area. With the help of SW, multiple transportation resource numbers were provided.

## 2021-10-04 NOTE — HOME HEALTH
Pt is a 81 yo male referred for PT sp hospitalization for CHF.    PLOF I with ambulation without AD. I with ADL.    Environment Lives with spouse in a patio home with no steps to get in and out. Spouse is physically limited and can assist patient with light activities. Pt currently sleeping in lift chair and uses a BSC for toileting. Pt still has a indwelling urinary catheter.     Pt presents with weakness and deconditioning from hospital stay. Pt requires min assist with transfers and ambulation with use of rollator. Pt was only able to tolerate ambulation x 20 ft with use of rollator due to fatigue.     Plan for next visit [facility] as tolerated, transfer training balance and gait training.

## 2021-10-07 NOTE — PROGRESS NOTES
Transitional Care Follow Up Visit  Subjective     Lenny Larson is a 80 y.o. male who presents for a transitional care management visit.    Within 48 business hours after discharge our office contacted him via telephone to coordinate his care and needs.      I reviewed and discussed the details of that call along with the discharge summary, hospital problems, inpatient lab results, inpatient diagnostic studies, and consultation reports with Lenny.     Current outpatient and discharge medications have been reconciled for the patient.  Reviewed by: LAKSHMI Escobar      Date of TCM Phone Call 9/29/2021   [facility]   Date of Admission 9/16/2021   Date of Discharge 9/29/2021   Discharge Disposition Home or Self Care     Risk for Readmission (LACE) Score: 14 (9/29/2021  6:02 AM)      Pt is here to follow up from recent hospitalizations due to acute on chronic CHF.  Pt's blood pressure was significantly low while in the hospital.  He was started on midodrine and has been taking this at home as prescribed but blood pressure is low today again.  He was started on toprol XL due to hx of CAD and atrial fibrillation. He has follow up with cardiology next week.  He is supposed to follow up with nephrology and vascular as well.  Wife states they cannot travel to [place] for appointments since they are not comfortable driving there and the nephrologist they were advised to follow up with is in [place].  He does have a vascular surgeon in [place] that he will follow up with regarding his AAA.  He did follow up with urologist yesterday due to catheter accidentally being pulled partially out.  They removed catheter and he is urinating on his own now without difficulty.    Wife concerned about pain that he has had in tailbone area since he fell and hit it in January.  She states he now has a sore on that area that bothers him.  He is weak and cannot walk on his own without assistance from others.  He  does have home health coming to the house and also physical therapy currently.    He also has follow up with Dr. Ilda bah.     Course During Hospital Stay:  Hospital Course:  Lenny Larson is a 80 y.o. male with a past medical history of BPH, chronic atrial fibrillation, COPD, systolic congestive heart failure, CAD, hypertension, lung cancer, and hyperlipidemia who was directly admitted [facility] by Dr. Cai on 9/16/2021 complaining of dyspnea with b/l LE edema and 20lb weight gain over 1 week.  Admitted and started on diuresis for HF exacerbation.  Bronch done on 9/22 and mucous secretions suctioned.  Echo revealed EF 20-25% with valvular abnormalities.  Cardiology following. Torsemide held due to hypotension on 9/27/2021.  Patient was cleared for discharge on 9/28 by cardiology and nephrology, however he was unable to urinate when dawkins removed.  Urology consulted for urinary retention, dawkins placed and patient to follow up outpatient.  Patient discharged home in agreement with discharge plan below.           DISCHARGE Follow Up Recommendations for labs and diagnostics:     - Follow up with PCP in 5-7 days   - Follow up with Dr. Cai in two weeks   - Follow up with Dr. Anand in two weeks   - Follow up with Dr. Kilpatrick within one week for dawkins evaluation and voiding trial   - Follow up with Dr. Morales within one month to evaluate AAA   - Stop taking Cardizem   - Start Digoxin 125mcg daily   - Start lasix 20mg daily   - Start Toprol-XL 25mg daily   - Take Midodrine 10mg three times a day   - Take Diflucan 100mg daily for 5 days  - [facility] arranged       The following portions of the patient's history were reviewed and updated as appropriate: allergies, current medications, past family history, past medical history, past social history, past surgical history and problem list.    Review of Systems   Constitutional: Positive for activity change, fatigue and unexpected weight change. Negative for appetite  change, chills, diaphoresis and fever.   Respiratory: Negative for chest tightness, shortness of breath, wheezing and stridor.    Cardiovascular: Negative for chest pain, palpitations and leg swelling.   Gastrointestinal: Negative for abdominal pain.   Genitourinary: Positive for frequency. Negative for decreased urine volume, difficulty urinating and dysuria.   Musculoskeletal: Positive for gait problem.        Pain in tailbone area   Skin: Positive for wound.   Neurological: Positive for weakness and light-headedness. Negative for dizziness, syncope and headaches.       Objective   Physical Exam  Vitals and nursing note reviewed.   Constitutional:       Appearance: Normal appearance. He is ill-appearing.   Neck:      Vascular: No carotid bruit.   Cardiovascular:      Rate and Rhythm: Regular rhythm. Bradycardia present.      Heart sounds: Normal heart sounds.   Pulmonary:      Effort: Pulmonary effort is normal.      Breath sounds: Normal breath sounds.   Musculoskeletal:      Right lower leg: No edema.      Left lower leg: No edema.   Skin:     General: Skin is warm and dry.      Findings: Wound present.          Neurological:      Mental Status: He is alert and oriented to person, place, and time. Mental status is at baseline.   Psychiatric:         Mood and Affect: Mood normal.         Behavior: Behavior normal.         Thought Content: Thought content normal.         Assessment/Plan   Diagnoses and all orders for this visit:    1. Acute on chronic systolic heart failure (HCC) (Primary)    2. Coccyx pain  -     XR Sacrum & Coccyx; Future    3. Hypotension, unspecified hypotension type    4. Panlobular emphysema (HCC)    5. Abnormal renal function  -     Ambulatory Referral to Nephrology    6. Pressure injury of sacral region, stage 1  -     Ambulatory Referral to Home Health      Pt's blood pressure is low today and slightly bradycardic.  Will hold toprol dose for now and continue other medications.  Pt to  monitor heart rate and blood pressure closely and call office on Monday with readings.  He does have follow up with cardiology next week.  Pt to also follow up with his vascular doctor in [place] regarding his AAA and continue follow up with urology.  Referral also entered for nephrologist in [place] since pt doesn't want to go to [place]. Pt to get xray of coccyx since wife concerned he may have broken it when he feel in January since still having pain.  His pain is likely from his pressure ulcer that is secondary to his immobility and weight loss. Pt to continue physical therapy and will have home health provide wound care for pt.

## 2021-10-08 NOTE — OUTREACH NOTE
CHF Week 2 Survey      Responses   [facility] facility patient discharged from?  [facility]   Does the patient have one of the following disease processes/diagnoses(primary or secondary)?  CHF   Week 2 attempt successful?  Yes   Call start time  1425   Revoke  Decline to participate [Wife states may be admitted to hospital-states would rather call if they have any questions/concerns.]   Call end time  1424          Marisol Medellin RN

## 2021-10-12 NOTE — HOME HEALTH
"Italia states patient is complaining of pain to coccyx area, assessed and appears to have a stage 2 pressure injury. Optifoam bandages ordered and educated patient/caregiver that frequent turning is important in getting this to heal. Caregiver states patient seen PCP and they are concerned about possible osteomyelitis and xray showed thinning skin in that area, they have ordered an MRI and are just waiting on insurance approval. Patient does have a 100.4 fever today but no other new symptoms. PCP made aware. Caregiver states no issues voiding since they removed dawkins catheter, no more than his \"normal\"."

## 2021-10-14 NOTE — PROGRESS NOTES
Subjective:     Encounter Date:10/14/2021      Patient ID: Lenny Larson is a 80 y.o. male.    Chief Complaint:  History of Present Illness 80-year-old white male with history of coronary disease history of congestive heart failure atrial fibrillation hypertension hyperlipidemia presents to my office for follow-up.  Patient is currently stable with episodes of chest pain or shortness of breath at rest on exertion but no vomiting PND orthopnea no palpitation dizziness leg.  No swelling of the feet but does take his medicine daily.  He was in the hospital recently with congestive heart failure and atrial fibrillation was placed on medications.    The following portions of the patient's history were reviewed and updated as appropriate: allergies, current medications, past family history, past medical history, past social history, past surgical history and problem list.  Past Medical History:   Diagnosis Date   • Cancer (HCC)    • COPD (chronic obstructive pulmonary disease) (HCC)    • Coronary artery disease    • Hyperlipidemia    • Hypertension      Past Surgical History:   Procedure Laterality Date   • BRONCHOSCOPY N/A 1/14/2021    Procedure: BRONCHOSCOPY with bronchio alveolar lavage of right lower lung;  Surgeon: Sanjay Gonsales MD;  Location: [facility] ENDOSCOPY;  Service: Pulmonary;  Laterality: N/A;  post op: pneumonia   • BRONCHOSCOPY N/A 7/22/2021    Procedure: BRONCHOSCOPY with bronchio alveolar lavage right lower lung;  Surgeon: Louis Ronquillo MD;  Location: [facility] ENDOSCOPY;  Service: Pulmonary;  Laterality: N/A;  hx cancer   • BRONCHOSCOPY N/A 9/22/2021    Procedure: BRONCHOSCOPY with lung wash;  Surgeon: Sanjay Gonsales MD;  Location: [facility] ENDOSCOPY;  Service: Pulmonary;  Laterality: N/A;  Post Op: [place]   • CARDIAC CATHETERIZATION      PCI   • CARDIAC CATHETERIZATION N/A 1/20/2021    Procedure: Left Heart Cath and coronary angiogram;  Surgeon: Alvarado Cai MD;  Location: [facility] CATH INVASIVE LOCATION;   "Service: Cardiovascular;  Laterality: N/A;   • CARDIAC CATHETERIZATION N/A 1/20/2021    Procedure: Left ventriculography;  Surgeon: Alvarado Cai MD;  Location: [facility] CATH INVASIVE LOCATION;  Service: Cardiovascular;  Laterality: N/A;     /49 (BP Location: Right arm, Patient Position: Sitting, Cuff Size: Adult)   Pulse (!) 47   Ht 172.2 cm (67.8\")   Wt 66.5 kg (146 lb 8 oz)   SpO2 100%   BMI 22.41 kg/m²   Family History   Adopted: Yes   Family history unknown: Yes       Current Outpatient Medications:   •  albuterol sulfate HFA (Proventil HFA) 108 (90 Base) MCG/ACT inhaler, Inhale 2 puffs Every 4 (Four) Hours As Needed for Wheezing or Shortness of Air., Disp: 18 g, Rfl: 0  •  amiodarone (PACERONE) 200 MG tablet, Take 1 tablet by mouth 2 (two) times a day., Disp: 180 tablet, Rfl: 3  •  calcium carbonate (OS-GILMA) 600 MG tablet, Take 1 tablet by mouth Daily., Disp: 30 tablet, Rfl: 11  •  digoxin (LANOXIN) 125 MCG tablet, Take 1 tablet by mouth Daily., Disp: 30 tablet, Rfl: 2  •  Eliquis 5 MG tablet tablet, TAKE 1 TABLET BY MOUTH EVERY 12 HOURS FOR ATRIAL FIBRILLATION, Disp: 180 tablet, Rfl: 1  •  finasteride (PROSCAR) 5 MG tablet, Take 5 mg by mouth Daily., Disp: , Rfl:   •  midodrine (PROAMATINE) 10 MG tablet, Take 1 tablet by mouth 3 (Three) Times a Day Before Meals., Disp: 90 tablet, Rfl: 2  •  rosuvastatin (Crestor) 40 MG tablet, Take 1 tablet by mouth Daily., Disp: 90 tablet, Rfl: 3  •  theophylline (THEODUR) 300 MG 12 hr tablet, Take 300 mg by mouth Daily., Disp: , Rfl:   •  furosemide (Lasix) 20 MG tablet, Take 1 tablet by mouth Daily., Disp: 30 tablet, Rfl: 0  Allergies   Allergen Reactions   • Ativan [Lorazepam] Mental Status Change     Social History     Socioeconomic History   • Marital status:    Tobacco Use   • Smoking status: Former Smoker   • Smokeless tobacco: Never Used   Vaping Use   • Vaping Use: Never used   Substance and Sexual Activity   • Alcohol use: Not Currently   • Drug " use: Never   • Sexual activity: Defer     Review of Systems   Constitutional: Positive for malaise/fatigue. Negative for fever.   Cardiovascular: Negative for chest pain, dyspnea on exertion, leg swelling and palpitations.   Respiratory: Negative for cough and shortness of breath.    Skin: Negative for rash.   Gastrointestinal: Negative for abdominal pain, nausea and vomiting.   Neurological: Negative for focal weakness and headaches.   All other systems reviewed and are negative.             Objective:     Constitutional:       Appearance: Well-developed.   Eyes:      General: No scleral icterus.     Conjunctiva/sclera: Conjunctivae normal.   HENT:      Head: Normocephalic and atraumatic.   Neck:      Vascular: No carotid bruit or JVD.   Pulmonary:      Effort: Pulmonary effort is normal.      Breath sounds: Normal breath sounds. No wheezing. No rales.   Cardiovascular:      Normal rate. Regular rhythm.   Pulses:     Intact distal pulses.   Abdominal:      General: Bowel sounds are normal.      Palpations: Abdomen is soft.   Musculoskeletal:      Cervical back: Normal range of motion and neck supple. Skin:     General: Skin is warm and dry.      Findings: No rash.   Neurological:      Mental Status: Alert.       Procedures    Lab Review:         MDM  1.  Coronary disease  Patient had stent placement to the LAD in the past and is currently stable on medications  2.  Congestive heart failure  Patient has LV systolic dysfunction is currently taking digoxin and amiodarone only.  His blood pressure is low and hence he cannot tolerate beta-blockers or ACE inhibitor  3.  Atrial fibrillation  Patient is currently stable on medical therapy including amiodarone digoxin and Eliquis  4.  Hyperlipidemia  Patient's lipid levels are followed by the primary care doctor.      Patient's previous medical records, labs, and EKG were reviewed and discussed with the patient at today's visit.

## 2021-10-18 NOTE — HOME HEALTH
30-Day PT Reassessment completed. Patient/spouse are compliant with illustrated home exercise program in sitting. Functionally, he is independent in sit to stand and able to ambulate safely and independently indoor with rollator walker. Wife pleased with patient's ability to ambulate safely with rollator walker and declined to progress mobility goal to independent ambulation without assitive device due to history of impaired cognition.    Patient/family goals and expectations met regarding functional mobility status and they agreed with PT discharge this visit.

## 2021-10-19 NOTE — HOME HEALTH
Patient's wife Italia is requesting discharge from [facility] services at this time. States f/u MRI did not show anything about osteomyelitis and she is comfortable making sure patient turns frequently and watching pressure ulcer. She states if it gets worse she will call Dr. Pollard to order services again.

## 2021-10-27 NOTE — TELEPHONE ENCOUNTER
Rx Refill Note  Requested Prescriptions     Pending Prescriptions Disp Refills   • Eliquis 5 MG tablet tablet [Pharmacy Med Name: ELIQUIS 5MG TABLETS] 180 tablet 1     Sig: TAKE 1 TABLET BY MOUTH EVERY 12 HOURS FOR ATRIAL FIBRILLATION      Last office visit with prescribing clinician: 10/14/2021      Next office visit with prescribing clinician: 3/16/2022            Rosanne Garzon MA  10/27/21, 09:58 EDT  
Tachypnea

## 2021-11-02 NOTE — TELEPHONE ENCOUNTER
Caller: Ankita Larsone    Relationship: Emergency Contact    Best call back number:     What is the best time to reach you: ANYTIME    Who are you requesting to speak with (clinical staff, provider,  specific staff member):     Do you know the name of the person who called:    What was the call regarding: PATIENTS WIFE ARLEN IS CALLING IN WANTING TO KNOW IF THE OFFICE HAS THE RESULTS FROM PATIENTS URINE SAMPLE THAT SHE DROPPED OFF     Do you require a callback: YES

## 2021-11-12 NOTE — PROGRESS NOTES
"Chief Complaint  Memory Loss    Subjective            Lenny Larson presents to [facility] for Memory loss  History of Present Illness   New patient referred by Sylwia HENDRICKS for memory loss.     He has had issues with his memory since January and has not been back to himself.  He has memory problems off and on. Worse at night. And evening.     Long term memory is good, but forgets where the restroom is at home, they only have on bathroom.    Patient was adopted doesn't know much of the history.     He has a hard time with spelling words even as a child.     Patient is not currently on any medication for memory.     He wakes up confused at night    MRI / CT brain shows white matter changes.     He does not drive, could not remember how to drive     He acted normal till he was in the hospital.january this year   He lost weight, had low heart rate.  He was ok at this time .     He hasw had cognitive problems since the January hospital stay  Since coming home at end of January he has been stable no improvement or worsening    vitame e, b12, folate  nTSHnormal July 20201  Maximum   Score Patient's   Score Questions   5 1 \"What is the year?Season?Date?Day of the week?Month?\"   5 2 \"Where are we now: State?County?Town/city?Hospital?Floor?\"   3 3 3 Unrelated objects Number of trials:___   5 0 Count backward from 100 by sevens or spell WORLD backwards   3 0 Name 3 things from above   2 2 Identify 2 objects   1 0 Repeat the phrase: No ifs, ands,or buts.   3 3 Take paper in right hand, fold it in half, and put it on the floor.   1 1 Please read this and do what it says. \"Close your eyes\"   1 0 Make up and write a sentence about anything. Noun and verb   1 0 Copy this picture 10 angles must be present.   30 12 Total MMSE     CT HEAD WO CONTRAST-COMPARISON:5/20/19  IMPRESSION:  1.No evidence for acute intracranial abnormality.  2.Diffuse nonspecific white matter changes are noted with " associated  diffuse volume loss. These findings are likely related to chronic small  vessel ischemic changes and/or age-related changes    MR BRAIN WO (ROUTINE) HISTORY:  Cerebrovascular accident history of lung cancer, dizziness, weakness,  difficulty walking-COMPARISON:Head CT dated 05/20/2019    IMPRESSION:  1.  No acute intracranial abnormality.  Specifically, no evidence of  hemorrhage, mass effect or acute infarct.     2. Mild global cerebral volume loss with moderate chronic small vessel  ischemic disease.    MRI brain w w/o 3-  Comparison: 9-  Impression:  1. No acute intracranial abnormality  2. No evidence of new or progressive metastatic disease in the rain  3. Mild paranasal sinus mucosal diease and trace left mastoid effusion  Dr. Martinez    CT Head WO Contrast  9-  Impression:  1. No evidence for acute intracranial abnormality  2. Diffuse non specific white matter changes are noted with associated diffuse volum loss.  These finding are likely related to chronic small vessel ischemic changes and / or age-related changes.   Dr. Chin        Family History   Adopted: Yes   Family history unknown: Yes       Past Medical History:   Diagnosis Date   • Cancer (HCC)    • COPD (chronic obstructive pulmonary disease) (HCC)    • Coronary artery disease    • Hyperlipidemia    • Hypertension        Social History     Socioeconomic History   • Marital status:    Tobacco Use   • Smoking status: Former Smoker   • Smokeless tobacco: Never Used   Vaping Use   • Vaping Use: Never used   Substance and Sexual Activity   • Alcohol use: Not Currently   • Drug use: Never   • Sexual activity: Defer         Current Outpatient Medications:   •  albuterol sulfate HFA (Proventil HFA) 108 (90 Base) MCG/ACT inhaler, Inhale 2 puffs Every 4 (Four) Hours As Needed for Wheezing or Shortness of Air., Disp: 18 g, Rfl: 0  •  amiodarone (PACERONE) 200 MG tablet, Take 1 tablet by mouth 2 (two) times a day., Disp:  "180 tablet, Rfl: 3  •  calcium carbonate (OS-GILMA) 600 MG tablet, Take 1 tablet by mouth Daily., Disp: 30 tablet, Rfl: 11  •  Eliquis 5 MG tablet tablet, TAKE 1 TABLET BY MOUTH EVERY 12 HOURS FOR ATRIAL FIBRILLATION, Disp: 180 tablet, Rfl: 3  •  finasteride (PROSCAR) 5 MG tablet, Take 5 mg by mouth Daily., Disp: , Rfl:   •  midodrine (PROAMATINE) 10 MG tablet, Take 1 tablet by mouth 3 (Three) Times a Day Before Meals., Disp: 90 tablet, Rfl: 2  •  rosuvastatin (Crestor) 40 MG tablet, Take 1 tablet by mouth Daily., Disp: 90 tablet, Rfl: 3  •  theophylline (THEODUR) 300 MG 12 hr tablet, Take 300 mg by mouth Daily., Disp: , Rfl:   •  digoxin (LANOXIN) 125 MCG tablet, Take 1 tablet by mouth Daily., Disp: 30 tablet, Rfl: 2  •  donepezil (Aricept) 5 MG tablet, Take 1 tablet by mouth Every Night., Disp: 30 tablet, Rfl: 5  •  furosemide (Lasix) 20 MG tablet, Take 1 tablet by mouth Daily., Disp: 30 tablet, Rfl: 0    Review of Systems   Constitutional: Positive for fatigue and unexpected weight change.   HENT: Negative for ear discharge and ear pain.    Eyes: Negative for pain and itching.   Respiratory: Positive for shortness of breath. Negative for cough.    Cardiovascular: Positive for leg swelling.   Gastrointestinal: Positive for constipation.   Genitourinary: Positive for difficulty urinating.   Musculoskeletal: Positive for back pain and gait problem.   Skin: Positive for wound.   Neurological: Positive for tremors, weakness and light-headedness.   Psychiatric/Behavioral: Positive for behavioral problems, confusion and decreased concentration.            Objective   Vital Signs:   /46 (BP Location: Left arm, Patient Position: Sitting, Cuff Size: Adult)   Pulse 65   Temp 97.1 °F (36.2 °C)   Ht 172.2 cm (67.8\")   Wt 64.4 kg (142 lb)   BMI 21.72 kg/m²     Physical Exam  Vitals reviewed.   Constitutional:       Appearance: Normal appearance.   HENT:      Head: Normocephalic.      Nose: Nose normal.      " Mouth/Throat:      Mouth: Mucous membranes are moist.   Eyes:      Extraocular Movements: Extraocular movements intact.      Pupils: Pupils are equal, round, and reactive to light.   Cardiovascular:      Rate and Rhythm: Normal rate.   Pulmonary:      Effort: Pulmonary effort is normal. No respiratory distress.   Musculoskeletal:      Cervical back: Normal range of motion.      Right lower leg: No edema.      Left lower leg: No edema.   Neurological:      General: No focal deficit present.      Mental Status: He is alert. He is disoriented.      Deep Tendon Reflexes:      Reflex Scores:       Tricep reflexes are 1+ on the right side and 1+ on the left side.       Bicep reflexes are 1+ on the right side and 1+ on the left side.  Psychiatric:         Mood and Affect: Mood normal.         Behavior: Behavior normal.        Result Review :                Neurologic Exam     Mental Status   Level of consciousness: alert    Cranial Nerves     CN III, IV, VI   Pupils are equal, round, and reactive to light.    Motor Exam   Right arm pronator drift: absent  Left arm pronator drift: absent    Gait, Coordination, and Reflexes     Gait  Gait: wide-based    Tremor   Resting tremor: absent    Reflexes   Right biceps: 1+  Left biceps: 1+  Right triceps: 1+  Left triceps: 1+Mild ataxic gait              Assessment and Plan {CC Problem List  Visit Diagnosis  ROS  Review (Popup)  Health Maintenance  Quality  BestPractice  Medications  SmartSets  SnapShot Encounters  Media :23}   Diagnoses and all orders for this visit:    1. Memory loss (Primary)  -     EEG (Hospital Performed); Future    2. Gait abnormality    Other orders  -     donepezil (Aricept) 5 MG tablet; Take 1 tablet by mouth Every Night.  Dispense: 30 tablet; Refill: 5    pt has brain atrophy and microvascular disease   He had onset of gait and memory problems after the hospital stay in January suggesting acute brain injury, if he had a period of hypoxia he may  have injured the larger neurons which are more susceptible to injury resulting in gait and memory problems    May have brief seizures, will check an eeg  Will start on aricept       Follow Up   Return in about 6 months (around 5/15/2022).  Patient was given instructions and counseling regarding his condition or for health maintenance advice. Please see specific information pulled into the AVS if appropriate.         This document has been electronically signed by Joseph Seipel, MD on November 15, 2021 13:41 EST

## 2021-11-15 NOTE — TELEPHONE ENCOUNTER
WIFE ASKING IF HE IS TO CONTINUE LASIX 40MG?, HE WAS PUT ON THIS DURING September Located within [facility] ADMISSION

## 2021-11-18 NOTE — TELEPHONE ENCOUNTER
Rx Refill Note  Requested Prescriptions      No prescriptions requested or ordered in this encounter      Last office visit with prescribing clinician: 10/14/2021      Next office visit with prescribing clinician: 3/16/2022            Melvi Renteria MA  11/18/21, 09:26 EST

## 2021-11-18 NOTE — TELEPHONE ENCOUNTER
Incoming Refill Request      Medication requested (name and dose): Midodrine 10 mg     Pharmacy where request should be sent: [facility]/yolie [facility]aires    Additional details provided by patient: They can not get a hold of hospitalitis who wrote prescription. Has 2 pills left.       Best call back number: [phone]    Does the patient have less than a 3 day supply:  [x] Yes  [] No    Prerna Zuñiga Rep  11/18/21, 09:21 EST

## 2022-01-01 ENCOUNTER — HOSPITAL ENCOUNTER (INPATIENT)
Facility: HOSPITAL | Age: 82
LOS: 3 days | End: 2022-02-24
Attending: EMERGENCY MEDICINE | Admitting: INTERNAL MEDICINE

## 2022-01-01 ENCOUNTER — TRANSCRIBE ORDERS (OUTPATIENT)
Dept: ADMINISTRATIVE | Facility: HOSPITAL | Age: 82
End: 2022-01-01

## 2022-01-01 ENCOUNTER — APPOINTMENT (OUTPATIENT)
Dept: CARDIOLOGY | Facility: HOSPITAL | Age: 82
End: 2022-01-01

## 2022-01-01 ENCOUNTER — APPOINTMENT (OUTPATIENT)
Dept: CT IMAGING | Facility: HOSPITAL | Age: 82
End: 2022-01-01

## 2022-01-01 ENCOUNTER — APPOINTMENT (OUTPATIENT)
Dept: GENERAL RADIOLOGY | Facility: HOSPITAL | Age: 82
End: 2022-01-01

## 2022-01-01 ENCOUNTER — TELEPHONE (OUTPATIENT)
Dept: CARDIOLOGY | Facility: CLINIC | Age: 82
End: 2022-01-01

## 2022-01-01 ENCOUNTER — HOSPITAL ENCOUNTER (OUTPATIENT)
Dept: GENERAL RADIOLOGY | Facility: HOSPITAL | Age: 82
Discharge: HOME | End: 2022-02-01
Admitting: INTERNAL MEDICINE

## 2022-01-01 VITALS
BODY MASS INDEX: 17.77 KG/M2 | SYSTOLIC BLOOD PRESSURE: 98 MMHG | TEMPERATURE: 97.4 F | OXYGEN SATURATION: 94 % | WEIGHT: 131.2 LBS | DIASTOLIC BLOOD PRESSURE: 57 MMHG | HEIGHT: 72 IN | RESPIRATION RATE: 18 BRPM | HEART RATE: 64 BPM

## 2022-01-01 DIAGNOSIS — J90 PLEURAL EFFUSION: Primary | ICD-10-CM

## 2022-01-01 DIAGNOSIS — J90 PLEURAL EFFUSION: ICD-10-CM

## 2022-01-01 DIAGNOSIS — R07.9 RIGHT-SIDED CHEST PAIN: ICD-10-CM

## 2022-01-01 DIAGNOSIS — I50.814 RIGHT-SIDED CONGESTIVE HEART FAILURE SECONDARY TO LEFT-SIDED CONGESTIVE HEART FAILURE: Primary | ICD-10-CM

## 2022-01-01 LAB
ALBUMIN SERPL-MCNC: 3.3 G/DL (ref 3.5–5.2)
ALBUMIN/GLOB SERPL: 0.9 G/DL
ALP SERPL-CCNC: 70 U/L (ref 39–117)
ALT SERPL W P-5'-P-CCNC: 42 U/L (ref 1–41)
ANION GAP SERPL CALCULATED.3IONS-SCNC: 11 MMOL/L (ref 5–15)
ANION GAP SERPL CALCULATED.3IONS-SCNC: 12 MMOL/L (ref 5–15)
ANION GAP SERPL CALCULATED.3IONS-SCNC: 14 MMOL/L (ref 5–15)
ANION GAP SERPL CALCULATED.3IONS-SCNC: 14 MMOL/L (ref 5–15)
ARTERIAL PATENCY WRIST A: POSITIVE
AST SERPL-CCNC: 48 U/L (ref 1–40)
ATMOSPHERIC PRESS: ABNORMAL MM[HG]
BASE EXCESS BLDA CALC-SCNC: 6.8 MMOL/L (ref 0–3)
BASOPHILS # BLD AUTO: 0 10*3/MM3 (ref 0–0.2)
BASOPHILS NFR BLD AUTO: 0.1 % (ref 0–1.5)
BASOPHILS NFR BLD AUTO: 0.1 % (ref 0–1.5)
BASOPHILS NFR BLD AUTO: 0.2 % (ref 0–1.5)
BASOPHILS NFR BLD AUTO: 0.2 % (ref 0–1.5)
BDY SITE: ABNORMAL
BILIRUB SERPL-MCNC: 0.3 MG/DL (ref 0–1.2)
BUN SERPL-MCNC: 24 MG/DL (ref 8–23)
BUN SERPL-MCNC: 28 MG/DL (ref 8–23)
BUN SERPL-MCNC: 47 MG/DL (ref 8–23)
BUN SERPL-MCNC: 70 MG/DL (ref 8–23)
BUN/CREAT SERPL: 18.8 (ref 7–25)
BUN/CREAT SERPL: 21.9 (ref 7–25)
BUN/CREAT SERPL: 25.3 (ref 7–25)
BUN/CREAT SERPL: 30.6 (ref 7–25)
CALCIUM SPEC-SCNC: 10.3 MG/DL (ref 8.6–10.5)
CALCIUM SPEC-SCNC: 8.8 MG/DL (ref 8.6–10.5)
CALCIUM SPEC-SCNC: 9 MG/DL (ref 8.6–10.5)
CALCIUM SPEC-SCNC: 9.3 MG/DL (ref 8.6–10.5)
CHLORIDE SERPL-SCNC: 100 MMOL/L (ref 98–107)
CHLORIDE SERPL-SCNC: 102 MMOL/L (ref 98–107)
CHLORIDE SERPL-SCNC: 98 MMOL/L (ref 98–107)
CHLORIDE SERPL-SCNC: 98 MMOL/L (ref 98–107)
CO2 BLDA-SCNC: 31.2 MMOL/L (ref 22–29)
CO2 SERPL-SCNC: 28 MMOL/L (ref 22–29)
CO2 SERPL-SCNC: 28 MMOL/L (ref 22–29)
CO2 SERPL-SCNC: 29 MMOL/L (ref 22–29)
CO2 SERPL-SCNC: 30 MMOL/L (ref 22–29)
CREAT SERPL-MCNC: 1.28 MG/DL (ref 0.76–1.27)
CREAT SERPL-MCNC: 1.28 MG/DL (ref 0.76–1.27)
CREAT SERPL-MCNC: 1.86 MG/DL (ref 0.76–1.27)
CREAT SERPL-MCNC: 2.29 MG/DL (ref 0.76–1.27)
CRP SERPL-MCNC: 2.09 MG/DL (ref 0–0.5)
CRP SERPL-MCNC: 3.51 MG/DL (ref 0–0.5)
CRP SERPL-MCNC: 4.51 MG/DL (ref 0–0.5)
D DIMER PPP FEU-MCNC: 1.49 MG/L (FEU) (ref 0–0.59)
DEPRECATED RDW RBC AUTO: 45.9 FL (ref 37–54)
DEPRECATED RDW RBC AUTO: 46.4 FL (ref 37–54)
DEPRECATED RDW RBC AUTO: 46.8 FL (ref 37–54)
DEPRECATED RDW RBC AUTO: 48.1 FL (ref 37–54)
DIGOXIN SERPL-MCNC: <0.3 NG/ML (ref 0.6–1.2)
EOSINOPHIL # BLD AUTO: 0 10*3/MM3 (ref 0–0.4)
EOSINOPHIL NFR BLD AUTO: 0 % (ref 0.3–6.2)
EOSINOPHIL NFR BLD AUTO: 0 % (ref 0.3–6.2)
EOSINOPHIL NFR BLD AUTO: 0.1 % (ref 0.3–6.2)
EOSINOPHIL NFR BLD AUTO: 0.2 % (ref 0.3–6.2)
ERYTHROCYTE [DISTWIDTH] IN BLOOD BY AUTOMATED COUNT: 14.3 % (ref 12.3–15.4)
ERYTHROCYTE [DISTWIDTH] IN BLOOD BY AUTOMATED COUNT: 14.7 % (ref 12.3–15.4)
ERYTHROCYTE [DISTWIDTH] IN BLOOD BY AUTOMATED COUNT: 14.8 % (ref 12.3–15.4)
ERYTHROCYTE [DISTWIDTH] IN BLOOD BY AUTOMATED COUNT: 14.9 % (ref 12.3–15.4)
GFR SERPL CREATININE-BSD FRML MDRD: 28 ML/MIN/1.73
GFR SERPL CREATININE-BSD FRML MDRD: 35 ML/MIN/1.73
GFR SERPL CREATININE-BSD FRML MDRD: 54 ML/MIN/1.73
GFR SERPL CREATININE-BSD FRML MDRD: 54 ML/MIN/1.73
GLOBULIN UR ELPH-MCNC: 3.7 GM/DL
GLUCOSE BLDC GLUCOMTR-MCNC: 129 MG/DL (ref 70–105)
GLUCOSE SERPL-MCNC: 101 MG/DL (ref 65–99)
GLUCOSE SERPL-MCNC: 119 MG/DL (ref 65–99)
GLUCOSE SERPL-MCNC: 128 MG/DL (ref 65–99)
GLUCOSE SERPL-MCNC: 96 MG/DL (ref 65–99)
HCO3 BLDA-SCNC: 30 MMOL/L (ref 21–28)
HCT VFR BLD AUTO: 35.8 % (ref 37.5–51)
HCT VFR BLD AUTO: 36.8 % (ref 37.5–51)
HCT VFR BLD AUTO: 38.6 % (ref 37.5–51)
HCT VFR BLD AUTO: 39.2 % (ref 37.5–51)
HEMODILUTION: YES
HGB BLD-MCNC: 12.1 G/DL (ref 13–17.7)
HGB BLD-MCNC: 12.2 G/DL (ref 13–17.7)
HGB BLD-MCNC: 12.8 G/DL (ref 13–17.7)
HGB BLD-MCNC: 13.1 G/DL (ref 13–17.7)
HOLD SPECIMEN: NORMAL
HOLD SPECIMEN: NORMAL
INHALED O2 CONCENTRATION: 28 %
LYMPHOCYTES # BLD AUTO: 0.2 10*3/MM3 (ref 0.7–3.1)
LYMPHOCYTES # BLD AUTO: 0.3 10*3/MM3 (ref 0.7–3.1)
LYMPHOCYTES # BLD AUTO: 0.3 10*3/MM3 (ref 0.7–3.1)
LYMPHOCYTES # BLD AUTO: 0.5 10*3/MM3 (ref 0.7–3.1)
LYMPHOCYTES NFR BLD AUTO: 1.7 % (ref 19.6–45.3)
LYMPHOCYTES NFR BLD AUTO: 3.4 % (ref 19.6–45.3)
LYMPHOCYTES NFR BLD AUTO: 5 % (ref 19.6–45.3)
LYMPHOCYTES NFR BLD AUTO: 7.3 % (ref 19.6–45.3)
MAGNESIUM SERPL-MCNC: 2.1 MG/DL (ref 1.6–2.4)
MAGNESIUM SERPL-MCNC: 2.3 MG/DL (ref 1.6–2.4)
MAGNESIUM SERPL-MCNC: 3 MG/DL (ref 1.6–2.4)
MAGNESIUM SERPL-MCNC: 3.1 MG/DL (ref 1.6–2.4)
MCH RBC QN AUTO: 29.9 PG (ref 26.6–33)
MCH RBC QN AUTO: 29.9 PG (ref 26.6–33)
MCH RBC QN AUTO: 30 PG (ref 26.6–33)
MCH RBC QN AUTO: 30.7 PG (ref 26.6–33)
MCHC RBC AUTO-ENTMCNC: 33.1 G/DL (ref 31.5–35.7)
MCHC RBC AUTO-ENTMCNC: 33.2 G/DL (ref 31.5–35.7)
MCHC RBC AUTO-ENTMCNC: 33.3 G/DL (ref 31.5–35.7)
MCHC RBC AUTO-ENTMCNC: 33.9 G/DL (ref 31.5–35.7)
MCV RBC AUTO: 90 FL (ref 79–97)
MCV RBC AUTO: 90 FL (ref 79–97)
MCV RBC AUTO: 90.2 FL (ref 79–97)
MCV RBC AUTO: 90.7 FL (ref 79–97)
MODALITY: ABNORMAL
MONOCYTES # BLD AUTO: 0.1 10*3/MM3 (ref 0.1–0.9)
MONOCYTES # BLD AUTO: 0.4 10*3/MM3 (ref 0.1–0.9)
MONOCYTES NFR BLD AUTO: 1.2 % (ref 5–12)
MONOCYTES NFR BLD AUTO: 2.8 % (ref 5–12)
MONOCYTES NFR BLD AUTO: 5.8 % (ref 5–12)
MONOCYTES NFR BLD AUTO: 6 % (ref 5–12)
NEUTROPHILS NFR BLD AUTO: 12.5 10*3/MM3 (ref 1.7–7)
NEUTROPHILS NFR BLD AUTO: 5.9 10*3/MM3 (ref 1.7–7)
NEUTROPHILS NFR BLD AUTO: 6 10*3/MM3 (ref 1.7–7)
NEUTROPHILS NFR BLD AUTO: 7.2 10*3/MM3 (ref 1.7–7)
NEUTROPHILS NFR BLD AUTO: 86.3 % (ref 42.7–76)
NEUTROPHILS NFR BLD AUTO: 88.9 % (ref 42.7–76)
NEUTROPHILS NFR BLD AUTO: 95.3 % (ref 42.7–76)
NEUTROPHILS NFR BLD AUTO: 95.4 % (ref 42.7–76)
NRBC BLD AUTO-RTO: 0 /100 WBC (ref 0–0.2)
NRBC BLD AUTO-RTO: 0.1 /100 WBC (ref 0–0.2)
NT-PROBNP SERPL-MCNC: 4623 PG/ML (ref 0–1800)
PCO2 BLDA: 37 MM HG (ref 35–48)
PH BLDA: 7.52 PH UNITS (ref 7.35–7.45)
PHOSPHATE SERPL-MCNC: 4.2 MG/DL (ref 2.5–4.5)
PHOSPHATE SERPL-MCNC: 5.9 MG/DL (ref 2.5–4.5)
PLATELET # BLD AUTO: 306 10*3/MM3 (ref 140–450)
PLATELET # BLD AUTO: 317 10*3/MM3 (ref 140–450)
PLATELET # BLD AUTO: 333 10*3/MM3 (ref 140–450)
PLATELET # BLD AUTO: 347 10*3/MM3 (ref 140–450)
PMV BLD AUTO: 8.3 FL (ref 6–12)
PMV BLD AUTO: 8.7 FL (ref 6–12)
PMV BLD AUTO: 9.1 FL (ref 6–12)
PMV BLD AUTO: 9.2 FL (ref 6–12)
PO2 BLDA: 58.5 MM HG (ref 83–108)
POTASSIUM SERPL-SCNC: 3.2 MMOL/L (ref 3.5–5.2)
POTASSIUM SERPL-SCNC: 3.9 MMOL/L (ref 3.5–5.2)
POTASSIUM SERPL-SCNC: 4.2 MMOL/L (ref 3.5–5.2)
POTASSIUM SERPL-SCNC: 4.7 MMOL/L (ref 3.5–5.2)
PROCALCITONIN SERPL-MCNC: 0.18 NG/ML (ref 0–0.25)
PROT SERPL-MCNC: 7 G/DL (ref 6–8.5)
QT INTERVAL: 381 MS
QT INTERVAL: 406 MS
RBC # BLD AUTO: 3.95 10*6/MM3 (ref 4.14–5.8)
RBC # BLD AUTO: 4.08 10*6/MM3 (ref 4.14–5.8)
RBC # BLD AUTO: 4.29 10*6/MM3 (ref 4.14–5.8)
RBC # BLD AUTO: 4.36 10*6/MM3 (ref 4.14–5.8)
SAO2 % BLDCOA: 92.6 % (ref 94–98)
SARS-COV-2 RNA PNL SPEC NAA+PROBE: NOT DETECTED
SODIUM SERPL-SCNC: 140 MMOL/L (ref 136–145)
SODIUM SERPL-SCNC: 141 MMOL/L (ref 136–145)
SODIUM SERPL-SCNC: 141 MMOL/L (ref 136–145)
SODIUM SERPL-SCNC: 142 MMOL/L (ref 136–145)
TROPONIN T SERPL-MCNC: 0.12 NG/ML (ref 0–0.03)
TROPONIN T SERPL-MCNC: 0.16 NG/ML (ref 0–0.03)
WBC NRBC COR # BLD: 13.1 10*3/MM3 (ref 3.4–10.8)
WBC NRBC COR # BLD: 6.7 10*3/MM3 (ref 3.4–10.8)
WBC NRBC COR # BLD: 6.8 10*3/MM3 (ref 3.4–10.8)
WBC NRBC COR # BLD: 7.6 10*3/MM3 (ref 3.4–10.8)
WHOLE BLOOD HOLD SPECIMEN: NORMAL
WHOLE BLOOD HOLD SPECIMEN: NORMAL

## 2022-01-01 PROCEDURE — 36415 COLL VENOUS BLD VENIPUNCTURE: CPT | Performed by: INTERNAL MEDICINE

## 2022-01-01 PROCEDURE — 94799 UNLISTED PULMONARY SVC/PX: CPT

## 2022-01-01 PROCEDURE — 25010000002 METHYLPREDNISOLONE PER 125 MG: Performed by: NURSE PRACTITIONER

## 2022-01-01 PROCEDURE — 80048 BASIC METABOLIC PNL TOTAL CA: CPT | Performed by: INTERNAL MEDICINE

## 2022-01-01 PROCEDURE — 99222 1ST HOSP IP/OBS MODERATE 55: CPT | Performed by: NURSE PRACTITIONER

## 2022-01-01 PROCEDURE — 99232 SBSQ HOSP IP/OBS MODERATE 35: CPT | Performed by: INTERNAL MEDICINE

## 2022-01-01 PROCEDURE — 87635 SARS-COV-2 COVID-19 AMP PRB: CPT | Performed by: EMERGENCY MEDICINE

## 2022-01-01 PROCEDURE — 83735 ASSAY OF MAGNESIUM: CPT | Performed by: INTERNAL MEDICINE

## 2022-01-01 PROCEDURE — 85025 COMPLETE CBC W/AUTO DIFF WBC: CPT | Performed by: NURSE PRACTITIONER

## 2022-01-01 PROCEDURE — 25010000002 MAGNESIUM SULFATE 2 GM/50ML SOLUTION: Performed by: INTERNAL MEDICINE

## 2022-01-01 PROCEDURE — 36600 WITHDRAWAL OF ARTERIAL BLOOD: CPT

## 2022-01-01 PROCEDURE — 85025 COMPLETE CBC W/AUTO DIFF WBC: CPT | Performed by: INTERNAL MEDICINE

## 2022-01-01 PROCEDURE — 87040 BLOOD CULTURE FOR BACTERIA: CPT | Performed by: NURSE PRACTITIONER

## 2022-01-01 PROCEDURE — 84484 ASSAY OF TROPONIN QUANT: CPT | Performed by: NURSE PRACTITIONER

## 2022-01-01 PROCEDURE — 25010000002 CEFTRIAXONE PER 250 MG: Performed by: INTERNAL MEDICINE

## 2022-01-01 PROCEDURE — 93005 ELECTROCARDIOGRAM TRACING: CPT | Performed by: NURSE PRACTITIONER

## 2022-01-01 PROCEDURE — 84484 ASSAY OF TROPONIN QUANT: CPT | Performed by: INTERNAL MEDICINE

## 2022-01-01 PROCEDURE — 99233 SBSQ HOSP IP/OBS HIGH 50: CPT | Performed by: INTERNAL MEDICINE

## 2022-01-01 PROCEDURE — 80053 COMPREHEN METABOLIC PANEL: CPT | Performed by: NURSE PRACTITIONER

## 2022-01-01 PROCEDURE — 71046 X-RAY EXAM CHEST 2 VIEWS: CPT

## 2022-01-01 PROCEDURE — 0 IOPAMIDOL PER 1 ML: Performed by: EMERGENCY MEDICINE

## 2022-01-01 PROCEDURE — 84100 ASSAY OF PHOSPHORUS: CPT | Performed by: INTERNAL MEDICINE

## 2022-01-01 PROCEDURE — 94640 AIRWAY INHALATION TREATMENT: CPT

## 2022-01-01 PROCEDURE — 25010000002 HEPARIN (PORCINE) PER 1000 UNITS: Performed by: INTERNAL MEDICINE

## 2022-01-01 PROCEDURE — 92526 ORAL FUNCTION THERAPY: CPT

## 2022-01-01 PROCEDURE — 25010000002 FUROSEMIDE PER 20 MG: Performed by: INTERNAL MEDICINE

## 2022-01-01 PROCEDURE — 25010000002 FUROSEMIDE PER 20 MG: Performed by: NURSE PRACTITIONER

## 2022-01-01 PROCEDURE — 94760 N-INVAS EAR/PLS OXIMETRY 1: CPT

## 2022-01-01 PROCEDURE — 93005 ELECTROCARDIOGRAM TRACING: CPT

## 2022-01-01 PROCEDURE — 97116 GAIT TRAINING THERAPY: CPT

## 2022-01-01 PROCEDURE — 86140 C-REACTIVE PROTEIN: CPT | Performed by: INTERNAL MEDICINE

## 2022-01-01 PROCEDURE — 71045 X-RAY EXAM CHEST 1 VIEW: CPT

## 2022-01-01 PROCEDURE — 93010 ELECTROCARDIOGRAM REPORT: CPT | Performed by: INTERNAL MEDICINE

## 2022-01-01 PROCEDURE — 82962 GLUCOSE BLOOD TEST: CPT

## 2022-01-01 PROCEDURE — 85379 FIBRIN DEGRADATION QUANT: CPT | Performed by: NURSE PRACTITIONER

## 2022-01-01 PROCEDURE — 82803 BLOOD GASES ANY COMBINATION: CPT

## 2022-01-01 PROCEDURE — 93005 ELECTROCARDIOGRAM TRACING: CPT | Performed by: EMERGENCY MEDICINE

## 2022-01-01 PROCEDURE — 80162 ASSAY OF DIGOXIN TOTAL: CPT | Performed by: NURSE PRACTITIONER

## 2022-01-01 PROCEDURE — 99285 EMERGENCY DEPT VISIT HI MDM: CPT

## 2022-01-01 PROCEDURE — 25010000002 CEFTRIAXONE PER 250 MG: Performed by: NURSE PRACTITIONER

## 2022-01-01 PROCEDURE — 83880 ASSAY OF NATRIURETIC PEPTIDE: CPT | Performed by: NURSE PRACTITIONER

## 2022-01-01 PROCEDURE — 92610 EVALUATE SWALLOWING FUNCTION: CPT

## 2022-01-01 PROCEDURE — 97162 PT EVAL MOD COMPLEX 30 MIN: CPT

## 2022-01-01 PROCEDURE — 71275 CT ANGIOGRAPHY CHEST: CPT

## 2022-01-01 PROCEDURE — 97530 THERAPEUTIC ACTIVITIES: CPT

## 2022-01-01 PROCEDURE — 99239 HOSP IP/OBS DSCHRG MGMT >30: CPT | Performed by: INTERNAL MEDICINE

## 2022-01-01 PROCEDURE — 84145 PROCALCITONIN (PCT): CPT | Performed by: NURSE PRACTITIONER

## 2022-01-01 PROCEDURE — 99221 1ST HOSP IP/OBS SF/LOW 40: CPT | Performed by: INTERNAL MEDICINE

## 2022-01-01 PROCEDURE — 97166 OT EVAL MOD COMPLEX 45 MIN: CPT

## 2022-01-01 RX ORDER — AMOXICILLIN 250 MG
2 CAPSULE ORAL 2 TIMES DAILY
Status: DISCONTINUED | OUTPATIENT
Start: 2022-01-01 | End: 2022-01-01 | Stop reason: HOSPADM

## 2022-01-01 RX ORDER — IPRATROPIUM BROMIDE AND ALBUTEROL SULFATE 2.5; .5 MG/3ML; MG/3ML
3 SOLUTION RESPIRATORY (INHALATION)
Status: DISCONTINUED | OUTPATIENT
Start: 2022-01-01 | End: 2022-01-01 | Stop reason: HOSPADM

## 2022-01-01 RX ORDER — MAGNESIUM SULFATE HEPTAHYDRATE 40 MG/ML
2 INJECTION, SOLUTION INTRAVENOUS AS NEEDED
Status: DISCONTINUED | OUTPATIENT
Start: 2022-01-01 | End: 2022-01-01 | Stop reason: HOSPADM

## 2022-01-01 RX ORDER — ALBUTEROL SULFATE 90 UG/1
2 AEROSOL, METERED RESPIRATORY (INHALATION) EVERY 6 HOURS PRN
Status: DISCONTINUED | OUTPATIENT
Start: 2022-01-01 | End: 2022-01-01 | Stop reason: HOSPADM

## 2022-01-01 RX ORDER — ACETAMINOPHEN 160 MG/5ML
650 SOLUTION ORAL EVERY 4 HOURS PRN
Status: DISCONTINUED | OUTPATIENT
Start: 2022-01-01 | End: 2022-01-01 | Stop reason: HOSPADM

## 2022-01-01 RX ORDER — ASPIRIN 81 MG/1
324 TABLET, CHEWABLE ORAL ONCE
Status: COMPLETED | OUTPATIENT
Start: 2022-01-01 | End: 2022-01-01

## 2022-01-01 RX ORDER — DIPHENHYDRAMINE HYDROCHLORIDE AND LIDOCAINE HYDROCHLORIDE AND ALUMINUM HYDROXIDE AND MAGNESIUM HYDRO
5 KIT EVERY 6 HOURS
Status: DISCONTINUED | OUTPATIENT
Start: 2022-01-01 | End: 2022-01-01 | Stop reason: HOSPADM

## 2022-01-01 RX ORDER — BISACODYL 10 MG
10 SUPPOSITORY, RECTAL RECTAL DAILY PRN
Status: DISCONTINUED | OUTPATIENT
Start: 2022-01-01 | End: 2022-01-01 | Stop reason: HOSPADM

## 2022-01-01 RX ORDER — POTASSIUM CHLORIDE 20 MEQ/1
40 TABLET, EXTENDED RELEASE ORAL AS NEEDED
Status: DISCONTINUED | OUTPATIENT
Start: 2022-01-01 | End: 2022-01-01 | Stop reason: HOSPADM

## 2022-01-01 RX ORDER — ROSUVASTATIN CALCIUM 10 MG/1
10 TABLET, COATED ORAL NIGHTLY
Status: DISCONTINUED | OUTPATIENT
Start: 2022-01-01 | End: 2022-01-01 | Stop reason: HOSPADM

## 2022-01-01 RX ORDER — SODIUM CHLORIDE 0.9 % (FLUSH) 0.9 %
10 SYRINGE (ML) INJECTION AS NEEDED
Status: DISCONTINUED | OUTPATIENT
Start: 2022-01-01 | End: 2022-01-01 | Stop reason: HOSPADM

## 2022-01-01 RX ORDER — BISACODYL 5 MG/1
5 TABLET, DELAYED RELEASE ORAL DAILY PRN
Status: DISCONTINUED | OUTPATIENT
Start: 2022-01-01 | End: 2022-01-01 | Stop reason: HOSPADM

## 2022-01-01 RX ORDER — FUROSEMIDE 10 MG/ML
80 INJECTION INTRAMUSCULAR; INTRAVENOUS ONCE
Status: COMPLETED | OUTPATIENT
Start: 2022-01-01 | End: 2022-01-01

## 2022-01-01 RX ORDER — AMIODARONE HYDROCHLORIDE 200 MG/1
200 TABLET ORAL EVERY 12 HOURS SCHEDULED
Status: DISCONTINUED | OUTPATIENT
Start: 2022-01-01 | End: 2022-01-01 | Stop reason: HOSPADM

## 2022-01-01 RX ORDER — METHYLPREDNISOLONE SODIUM SUCCINATE 125 MG/2ML
60 INJECTION, POWDER, LYOPHILIZED, FOR SOLUTION INTRAMUSCULAR; INTRAVENOUS EVERY 6 HOURS
Status: DISCONTINUED | OUTPATIENT
Start: 2022-01-01 | End: 2022-01-01 | Stop reason: HOSPADM

## 2022-01-01 RX ORDER — CHOLECALCIFEROL (VITAMIN D3) 125 MCG
5 CAPSULE ORAL NIGHTLY PRN
Status: DISCONTINUED | OUTPATIENT
Start: 2022-01-01 | End: 2022-01-01 | Stop reason: HOSPADM

## 2022-01-01 RX ORDER — NITROGLYCERIN 0.4 MG/1
0.4 TABLET SUBLINGUAL
Status: DISCONTINUED | OUTPATIENT
Start: 2022-01-01 | End: 2022-01-01 | Stop reason: HOSPADM

## 2022-01-01 RX ORDER — FUROSEMIDE 10 MG/ML
40 INJECTION INTRAMUSCULAR; INTRAVENOUS EVERY 8 HOURS
Status: DISCONTINUED | OUTPATIENT
Start: 2022-01-01 | End: 2022-01-01 | Stop reason: HOSPADM

## 2022-01-01 RX ORDER — BUDESONIDE 0.5 MG/2ML
0.5 INHALANT ORAL
Status: DISCONTINUED | OUTPATIENT
Start: 2022-01-01 | End: 2022-01-01 | Stop reason: HOSPADM

## 2022-01-01 RX ORDER — POTASSIUM CHLORIDE 1.5 G/1.77G
40 POWDER, FOR SOLUTION ORAL AS NEEDED
Status: DISCONTINUED | OUTPATIENT
Start: 2022-01-01 | End: 2022-01-01 | Stop reason: HOSPADM

## 2022-01-01 RX ORDER — POLYETHYLENE GLYCOL 3350 17 G/17G
17 POWDER, FOR SOLUTION ORAL DAILY PRN
Status: DISCONTINUED | OUTPATIENT
Start: 2022-01-01 | End: 2022-01-01 | Stop reason: HOSPADM

## 2022-01-01 RX ORDER — ROSUVASTATIN CALCIUM 10 MG/1
40 TABLET, COATED ORAL NIGHTLY
Status: DISCONTINUED | OUTPATIENT
Start: 2022-01-01 | End: 2022-01-01

## 2022-01-01 RX ORDER — MAGNESIUM SULFATE HEPTAHYDRATE 40 MG/ML
4 INJECTION, SOLUTION INTRAVENOUS AS NEEDED
Status: DISCONTINUED | OUTPATIENT
Start: 2022-01-01 | End: 2022-01-01 | Stop reason: HOSPADM

## 2022-01-01 RX ORDER — HEPARIN SODIUM 5000 [USP'U]/ML
5000 INJECTION, SOLUTION INTRAVENOUS; SUBCUTANEOUS EVERY 8 HOURS SCHEDULED
Status: DISCONTINUED | OUTPATIENT
Start: 2022-01-01 | End: 2022-01-01

## 2022-01-01 RX ORDER — SODIUM CHLORIDE 0.9 % (FLUSH) 0.9 %
10 SYRINGE (ML) INJECTION EVERY 12 HOURS SCHEDULED
Status: DISCONTINUED | OUTPATIENT
Start: 2022-01-01 | End: 2022-01-01 | Stop reason: HOSPADM

## 2022-01-01 RX ORDER — ACETAMINOPHEN 650 MG/1
650 SUPPOSITORY RECTAL EVERY 4 HOURS PRN
Status: DISCONTINUED | OUTPATIENT
Start: 2022-01-01 | End: 2022-01-01 | Stop reason: HOSPADM

## 2022-01-01 RX ORDER — ONDANSETRON 2 MG/ML
4 INJECTION INTRAMUSCULAR; INTRAVENOUS EVERY 6 HOURS PRN
Status: DISCONTINUED | OUTPATIENT
Start: 2022-01-01 | End: 2022-01-01 | Stop reason: HOSPADM

## 2022-01-01 RX ORDER — ACETAMINOPHEN 325 MG/1
650 TABLET ORAL EVERY 4 HOURS PRN
Status: DISCONTINUED | OUTPATIENT
Start: 2022-01-01 | End: 2022-01-01 | Stop reason: HOSPADM

## 2022-01-01 RX ORDER — MIDODRINE HYDROCHLORIDE 5 MG/1
10 TABLET ORAL
Status: DISCONTINUED | OUTPATIENT
Start: 2022-01-01 | End: 2022-01-01 | Stop reason: HOSPADM

## 2022-01-01 RX ORDER — FINASTERIDE 5 MG/1
5 TABLET, FILM COATED ORAL DAILY
Status: DISCONTINUED | OUTPATIENT
Start: 2022-01-01 | End: 2022-01-01 | Stop reason: HOSPADM

## 2022-01-01 RX ORDER — ALUMINA, MAGNESIA, AND SIMETHICONE 2400; 2400; 240 MG/30ML; MG/30ML; MG/30ML
15 SUSPENSION ORAL EVERY 6 HOURS PRN
Status: DISCONTINUED | OUTPATIENT
Start: 2022-01-01 | End: 2022-01-01 | Stop reason: HOSPADM

## 2022-01-01 RX ADMIN — METHYLPREDNISOLONE SODIUM SUCCINATE 60 MG: 125 INJECTION, POWDER, FOR SOLUTION INTRAMUSCULAR; INTRAVENOUS at 15:38

## 2022-01-01 RX ADMIN — METHYLPREDNISOLONE SODIUM SUCCINATE 60 MG: 125 INJECTION, POWDER, FOR SOLUTION INTRAMUSCULAR; INTRAVENOUS at 08:42

## 2022-01-01 RX ADMIN — DOCUSATE SODIUM 50 MG AND SENNOSIDES 8.6 MG 2 TABLET: 8.6; 5 TABLET, FILM COATED ORAL at 09:59

## 2022-01-01 RX ADMIN — MIDODRINE HYDROCHLORIDE 10 MG: 5 TABLET ORAL at 08:31

## 2022-01-01 RX ADMIN — APIXABAN 5 MG: 5 TABLET, FILM COATED ORAL at 20:39

## 2022-01-01 RX ADMIN — FUROSEMIDE 40 MG: 10 INJECTION, SOLUTION INTRAMUSCULAR; INTRAVENOUS at 20:24

## 2022-01-01 RX ADMIN — SODIUM CHLORIDE, PRESERVATIVE FREE 10 ML: 5 INJECTION INTRAVENOUS at 10:01

## 2022-01-01 RX ADMIN — DOCUSATE SODIUM 50 MG AND SENNOSIDES 8.6 MG 2 TABLET: 8.6; 5 TABLET, FILM COATED ORAL at 20:23

## 2022-01-01 RX ADMIN — SODIUM CHLORIDE, PRESERVATIVE FREE 10 ML: 5 INJECTION INTRAVENOUS at 22:21

## 2022-01-01 RX ADMIN — DOCUSATE SODIUM 50 MG AND SENNOSIDES 8.6 MG 2 TABLET: 8.6; 5 TABLET, FILM COATED ORAL at 20:39

## 2022-01-01 RX ADMIN — FUROSEMIDE 40 MG: 10 INJECTION, SOLUTION INTRAMUSCULAR; INTRAVENOUS at 20:38

## 2022-01-01 RX ADMIN — FUROSEMIDE 40 MG: 10 INJECTION, SOLUTION INTRAMUSCULAR; INTRAVENOUS at 04:09

## 2022-01-01 RX ADMIN — IPRATROPIUM BROMIDE AND ALBUTEROL SULFATE 3 ML: 2.5; .5 SOLUTION RESPIRATORY (INHALATION) at 15:37

## 2022-01-01 RX ADMIN — METHYLPREDNISOLONE SODIUM SUCCINATE 60 MG: 125 INJECTION, POWDER, FOR SOLUTION INTRAMUSCULAR; INTRAVENOUS at 10:12

## 2022-01-01 RX ADMIN — METHYLPREDNISOLONE SODIUM SUCCINATE 60 MG: 125 INJECTION, POWDER, FOR SOLUTION INTRAMUSCULAR; INTRAVENOUS at 04:09

## 2022-01-01 RX ADMIN — HEPARIN SODIUM 5000 UNITS: 5000 INJECTION, SOLUTION INTRAVENOUS; SUBCUTANEOUS at 05:03

## 2022-01-01 RX ADMIN — IPRATROPIUM BROMIDE AND ALBUTEROL SULFATE 3 ML: 2.5; .5 SOLUTION RESPIRATORY (INHALATION) at 20:00

## 2022-01-01 RX ADMIN — BUDESONIDE 0.5 MG: 0.5 INHALANT RESPIRATORY (INHALATION) at 20:08

## 2022-01-01 RX ADMIN — METHYLPREDNISOLONE SODIUM SUCCINATE 60 MG: 125 INJECTION, POWDER, FOR SOLUTION INTRAMUSCULAR; INTRAVENOUS at 20:38

## 2022-01-01 RX ADMIN — MIDODRINE HYDROCHLORIDE 10 MG: 5 TABLET ORAL at 12:22

## 2022-01-01 RX ADMIN — FUROSEMIDE 40 MG: 10 INJECTION, SOLUTION INTRAMUSCULAR; INTRAVENOUS at 03:27

## 2022-01-01 RX ADMIN — IPRATROPIUM BROMIDE AND ALBUTEROL SULFATE 3 ML: 2.5; .5 SOLUTION RESPIRATORY (INHALATION) at 11:00

## 2022-01-01 RX ADMIN — IPRATROPIUM BROMIDE AND ALBUTEROL SULFATE 3 ML: 2.5; .5 SOLUTION RESPIRATORY (INHALATION) at 07:49

## 2022-01-01 RX ADMIN — MIDODRINE HYDROCHLORIDE 10 MG: 5 TABLET ORAL at 16:39

## 2022-01-01 RX ADMIN — IPRATROPIUM BROMIDE AND ALBUTEROL SULFATE 3 ML: 2.5; .5 SOLUTION RESPIRATORY (INHALATION) at 10:54

## 2022-01-01 RX ADMIN — FUROSEMIDE 40 MG: 10 INJECTION, SOLUTION INTRAMUSCULAR; INTRAVENOUS at 22:21

## 2022-01-01 RX ADMIN — APIXABAN 5 MG: 5 TABLET, FILM COATED ORAL at 10:09

## 2022-01-01 RX ADMIN — FUROSEMIDE 80 MG: 10 INJECTION, SOLUTION INTRAMUSCULAR; INTRAVENOUS at 13:03

## 2022-01-01 RX ADMIN — AMIODARONE HYDROCHLORIDE 200 MG: 200 TABLET ORAL at 20:24

## 2022-01-01 RX ADMIN — APIXABAN 5 MG: 5 TABLET, FILM COATED ORAL at 08:42

## 2022-01-01 RX ADMIN — BUDESONIDE 0.5 MG: 0.5 INHALANT RESPIRATORY (INHALATION) at 07:10

## 2022-01-01 RX ADMIN — APIXABAN 5 MG: 5 TABLET, FILM COATED ORAL at 20:24

## 2022-01-01 RX ADMIN — BUDESONIDE 0.5 MG: 0.5 INHALANT RESPIRATORY (INHALATION) at 08:17

## 2022-01-01 RX ADMIN — METOPROLOL TARTRATE 25 MG: 25 TABLET, FILM COATED ORAL at 20:39

## 2022-01-01 RX ADMIN — SODIUM CHLORIDE, PRESERVATIVE FREE 10 ML: 5 INJECTION INTRAVENOUS at 08:35

## 2022-01-01 RX ADMIN — ROSUVASTATIN CALCIUM 40 MG: 10 TABLET, FILM COATED ORAL at 20:24

## 2022-01-01 RX ADMIN — SODIUM CHLORIDE, PRESERVATIVE FREE 10 ML: 5 INJECTION INTRAVENOUS at 08:29

## 2022-01-01 RX ADMIN — AMIODARONE HYDROCHLORIDE 200 MG: 200 TABLET ORAL at 20:39

## 2022-01-01 RX ADMIN — IPRATROPIUM BROMIDE AND ALBUTEROL SULFATE 3 ML: 2.5; .5 SOLUTION RESPIRATORY (INHALATION) at 07:07

## 2022-01-01 RX ADMIN — FUROSEMIDE 40 MG: 10 INJECTION, SOLUTION INTRAMUSCULAR; INTRAVENOUS at 12:31

## 2022-01-01 RX ADMIN — AMIODARONE HYDROCHLORIDE 200 MG: 200 TABLET ORAL at 08:42

## 2022-01-01 RX ADMIN — SODIUM CHLORIDE, PRESERVATIVE FREE 10 ML: 5 INJECTION INTRAVENOUS at 20:39

## 2022-01-01 RX ADMIN — SODIUM CHLORIDE, PRESERVATIVE FREE 10 ML: 5 INJECTION INTRAVENOUS at 20:46

## 2022-01-01 RX ADMIN — METHYLPREDNISOLONE SODIUM SUCCINATE 60 MG: 125 INJECTION, POWDER, FOR SOLUTION INTRAMUSCULAR; INTRAVENOUS at 16:57

## 2022-01-01 RX ADMIN — CEFTRIAXONE 1 G: 1 INJECTION, POWDER, FOR SOLUTION INTRAMUSCULAR; INTRAVENOUS at 15:39

## 2022-01-01 RX ADMIN — ASPIRIN 81 MG CHEWABLE TABLET 324 MG: 81 TABLET CHEWABLE at 11:53

## 2022-01-01 RX ADMIN — BUDESONIDE 0.5 MG: 0.5 INHALANT RESPIRATORY (INHALATION) at 19:27

## 2022-01-01 RX ADMIN — FUROSEMIDE 40 MG: 10 INJECTION, SOLUTION INTRAMUSCULAR; INTRAVENOUS at 03:21

## 2022-01-01 RX ADMIN — POTASSIUM CHLORIDE 40 MEQ: 1500 TABLET, EXTENDED RELEASE ORAL at 16:56

## 2022-01-01 RX ADMIN — ROSUVASTATIN CALCIUM 40 MG: 10 TABLET, FILM COATED ORAL at 20:39

## 2022-01-01 RX ADMIN — IPRATROPIUM BROMIDE AND ALBUTEROL SULFATE 3 ML: 2.5; .5 SOLUTION RESPIRATORY (INHALATION) at 11:11

## 2022-01-01 RX ADMIN — FINASTERIDE 5 MG: 5 TABLET, FILM COATED ORAL at 08:32

## 2022-01-01 RX ADMIN — APIXABAN 5 MG: 5 TABLET, FILM COATED ORAL at 08:31

## 2022-01-01 RX ADMIN — IOPAMIDOL 100 ML: 755 INJECTION, SOLUTION INTRAVENOUS at 14:37

## 2022-01-01 RX ADMIN — BUDESONIDE 0.5 MG: 0.5 INHALANT RESPIRATORY (INHALATION) at 07:49

## 2022-01-01 RX ADMIN — MAGNESIUM SULFATE HEPTAHYDRATE 2 G: 2 INJECTION, SOLUTION INTRAVENOUS at 08:29

## 2022-01-01 RX ADMIN — METHYLPREDNISOLONE SODIUM SUCCINATE 60 MG: 125 INJECTION, POWDER, FOR SOLUTION INTRAMUSCULAR; INTRAVENOUS at 08:31

## 2022-01-01 RX ADMIN — AMIODARONE HYDROCHLORIDE 200 MG: 200 TABLET ORAL at 09:59

## 2022-01-01 RX ADMIN — FINASTERIDE 5 MG: 5 TABLET, FILM COATED ORAL at 08:31

## 2022-01-01 RX ADMIN — CEFTRIAXONE 1 G: 1 INJECTION, POWDER, FOR SOLUTION INTRAMUSCULAR; INTRAVENOUS at 16:57

## 2022-01-01 RX ADMIN — METOPROLOL TARTRATE 25 MG: 25 TABLET, FILM COATED ORAL at 08:31

## 2022-01-01 RX ADMIN — DOCUSATE SODIUM 50 MG AND SENNOSIDES 8.6 MG 2 TABLET: 8.6; 5 TABLET, FILM COATED ORAL at 08:31

## 2022-01-01 RX ADMIN — MIDODRINE HYDROCHLORIDE 10 MG: 5 TABLET ORAL at 08:40

## 2022-01-01 RX ADMIN — METOPROLOL TARTRATE 25 MG: 25 TABLET, FILM COATED ORAL at 20:24

## 2022-01-01 RX ADMIN — FINASTERIDE 5 MG: 5 TABLET, FILM COATED ORAL at 09:59

## 2022-01-01 RX ADMIN — METHYLPREDNISOLONE SODIUM SUCCINATE 60 MG: 125 INJECTION, POWDER, FOR SOLUTION INTRAMUSCULAR; INTRAVENOUS at 03:21

## 2022-01-01 RX ADMIN — POTASSIUM CHLORIDE 40 MEQ: 1500 TABLET, EXTENDED RELEASE ORAL at 10:12

## 2022-01-01 RX ADMIN — METOPROLOL TARTRATE 25 MG: 25 TABLET, FILM COATED ORAL at 08:41

## 2022-01-01 RX ADMIN — FUROSEMIDE 40 MG: 10 INJECTION, SOLUTION INTRAMUSCULAR; INTRAVENOUS at 12:22

## 2022-01-01 RX ADMIN — CEFTRIAXONE 1 G: 10 INJECTION, POWDER, FOR SOLUTION INTRAVENOUS at 16:33

## 2022-01-01 RX ADMIN — MIDODRINE HYDROCHLORIDE 10 MG: 5 TABLET ORAL at 11:58

## 2022-01-01 RX ADMIN — MIDODRINE HYDROCHLORIDE 10 MG: 5 TABLET ORAL at 12:39

## 2022-01-01 RX ADMIN — IPRATROPIUM BROMIDE AND ALBUTEROL SULFATE 3 ML: 2.5; .5 SOLUTION RESPIRATORY (INHALATION) at 19:26

## 2022-01-01 RX ADMIN — FUROSEMIDE 40 MG: 10 INJECTION, SOLUTION INTRAMUSCULAR; INTRAVENOUS at 11:59

## 2022-01-01 RX ADMIN — METHYLPREDNISOLONE SODIUM SUCCINATE 60 MG: 125 INJECTION, POWDER, FOR SOLUTION INTRAMUSCULAR; INTRAVENOUS at 20:24

## 2022-01-01 RX ADMIN — DOCUSATE SODIUM 50 MG AND SENNOSIDES 8.6 MG 2 TABLET: 8.6; 5 TABLET, FILM COATED ORAL at 08:41

## 2022-01-01 RX ADMIN — MIDODRINE HYDROCHLORIDE 10 MG: 5 TABLET ORAL at 09:59

## 2022-01-01 RX ADMIN — HEPARIN SODIUM 5000 UNITS: 5000 INJECTION, SOLUTION INTRAVENOUS; SUBCUTANEOUS at 18:46

## 2022-01-01 RX ADMIN — IPRATROPIUM BROMIDE AND ALBUTEROL SULFATE 3 ML: 2.5; .5 SOLUTION RESPIRATORY (INHALATION) at 14:50

## 2022-02-21 PROBLEM — I50.814 RIGHT-SIDED CONGESTIVE HEART FAILURE SECONDARY TO LEFT-SIDED CONGESTIVE HEART FAILURE (HCC): Status: ACTIVE | Noted: 2022-01-01

## 2022-02-21 NOTE — TELEPHONE ENCOUNTER
WIFE CALLING AMBULANCE FOR PATIENT. HE CAN NOT BREATHE. HAS USED ALL HIS INHALERS, NOT HELPING. CAN BARLEY STAND TO SHAVE. SHE DID NOT ASK FOR CALL BACK, JUST WANTING TO NOTIFY DR. RUIZ.

## 2022-02-26 LAB
BACTERIA SPEC AEROBE CULT: NORMAL
BACTERIA SPEC AEROBE CULT: NORMAL

## 2023-04-16 NOTE — CONSULTS
Group: [facility]         CONSULT NOTE    Patient Identification:  Lenny Larson  80 y.o.  male  1940  [id number]            Requesting physician: Attending physician    Reason for Consultation:         History of Present Illness:  80 y.o. male with a past medical history of COPD, CAD, hyperlipidemia, hypertension, and atrial fibrillation who presented to [facility] on 1/13/2021 with complaints of shortness of breath.  Patient explains he has not been able to breathe for the last couple of days.  He states it is worse in the morning, laying flat, and on exertion.  He denies any alleviating factors.  He used to have to stick his finger down his throat to get his phlegm out.  He explains that he has an appointment with Dr. Gonsales in 3 days.  He did have an outpatient CT scan ordered this week.  He explains he is also had accompanying chills and cough with yellow sputum production.  He reports today he had left-sided chest pain, which have since subsided.  He has had accompanied heart palpitations.  He denies any aggravating or alleviating factors.  He denies any recent nausea, vomiting, diarrhea, or fever.     In the ED, chest x-ray showed Interval development of a moderate-sized right pleural effusion. Worsening consolidation in the right lung particularly in the mid and lower lung zone. This is likely due to a combination of compressive atelectasis with superimposed pneumonia.  Masslike area of consolidation has been present the right hilar  region on previous imaging studies consistent with treated lung cancer. EKG showed sinus tachycardia, patient subsequently went into Atrial fibrillation with RVR in ED. all labs unremarkable upon admission except proBNP 7372.  Patient Covid negative.  Blood culture x2 pending.  All vital signs stable upon admission except heart rate 140s to 160s.  Patient was placed on Cardizem drip as well as given Proventil, cefepime, Lovenox, Lasix, and Solu-Medrol  in the ED.    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 fib with RVR    Recommendations:    Bronchoscopy in a.m.  Hold Lovenox  We will consider ultrasound-guided thoracentesis based on bronchoscopy findings  Antibiotics empirically on cefepime  Steroids IV Solu-Medrol 40 mg every 8      CT scan 1/12/2021 at [facility]          Review of Sytems:  Review of Systems   Respiratory: Positive for cough and shortness of breath.    Cardiovascular: Positive for palpitations and leg swelling.       Past Medical History:  Past Medical History:   Diagnosis Date   • COPD (chronic obstructive pulmonary disease) (CMS/HCC)    • Coronary artery disease    • Hyperlipidemia    • Hypertension        Past Surgical History:  Past Surgical History:   Procedure Laterality Date   • CARDIAC CATHETERIZATION      PCI        Home Meds:  Medications Prior to Admission   Medication Sig Dispense Refill Last Dose   • calcium carbonate (OS-GILMA) 600 MG tablet Take 600 mg by mouth Daily.      • dilTIAZem CD (CARDIZEM CD) 240 MG 24 hr capsule TAKE 1 CAPSULE DAILY 90 capsule 3    • finasteride (PROSCAR) 5 MG tablet Take 5 mg by mouth Daily.      • multivitamin with minerals tablet tablet Take 1 tablet by mouth Daily.      • rosuvastatin (Crestor) 40 MG tablet Take 1 tablet by mouth Daily. 90 tablet 3    • tamsulosin (FLOMAX) 0.4 MG capsule 24 hr capsule Take 1 capsule by mouth every night at bedtime.      • theophylline (THEODUR) 300 MG 12 hr tablet Take 300 mg by mouth Daily.          Allergies:  Allergies   Allergen Reactions   • Ativan [Lorazepam] Mental  "Status Change       Social History:   Social History     Socioeconomic History   • Marital status:      Spouse name: Not on file   • Number of children: Not on file   • Years of education: Not on file   • Highest education level: Not on file   Tobacco Use   • Smoking status: Never Smoker   • Smokeless tobacco: Never Used   Substance and Sexual Activity   • Alcohol use: Not Currently   • Drug use: Never   • Sexual activity: Defer       Family History:  History reviewed. No pertinent family history.    Physical Exam:  BP 94/66   Pulse 106   Temp 98 °F (36.7 °C) (Oral)   Resp 20   Ht 177.8 cm (70\")   Wt 80.3 kg (177 lb)   SpO2 94%   BMI 25.40 kg/m²  Body mass index is 25.4 kg/m². 94% 80.3 kg (177 lb)  Physical Exam  Cardiovascular:      Heart sounds: Murmur present.   Pulmonary:      Breath sounds: Rhonchi and rales present.         LABS:  Lab Results   Component Value Date    CALCIUM 9.1 01/13/2021     Results from last 7 days   Lab Units 01/13/21  1302   SODIUM mmol/L 140   POTASSIUM mmol/L 4.1   CHLORIDE mmol/L 104   CO2 mmol/L 25.0   BUN mg/dL 11   CREATININE mg/dL 0.92   GLUCOSE mg/dL 102*   CALCIUM mg/dL 9.1   WBC 10*3/mm3 6.00   HEMOGLOBIN g/dL 13.0   PLATELETS 10*3/mm3 221   ALT (SGPT) U/L 22   AST (SGOT) U/L 26   PROBNP pg/mL 7,372.0*   PROCALCITONIN ng/mL 0.11     Lab Results   Component Value Date    TROPONINI <0.03 05/18/2019    TROPONINT 0.022 01/13/2021     Results from last 7 days   Lab Units 01/13/21  1302   TROPONIN T ng/mL 0.022         Results from last 7 days   Lab Units 01/13/21  1302   PROCALCITONIN ng/mL 0.11             Results from last 7 days   Lab Units 01/13/21  1310   INR  1.04         Lab Results   Component Value Date    TSH 1.19 05/20/2019     Estimated Creatinine Clearance: 75.2 mL/min (by C-G formula based on SCr of 0.92 mg/dL).         Imaging:  Imaging Results (Last 24 Hours)     Procedure Component Value Units Date/Time    XR Chest 1 View [[id number]] Collected: " 01/13/21 1416     Updated: 01/13/21 1421    Narrative:      DATE OF EXAM:  1/13/2021 1:30 PM     PROCEDURE:  XR CHEST 1 VW-     INDICATIONS:  Shortness of breath, cough, lung cancer, hypoxia.      COMPARISON:  Chest x-ray performed on 8/13/2018 and 8 CT the chest from 1/11/2019.     TECHNIQUE:   Single radiographic view of the chest was obtained.     FINDINGS:  There has been interval development of a moderate right pleural  effusion. There is worsening consolidation in the right lung,  particularly in the mid and lower lung zone. Some of this is due to  compressive atelectasis from the pleural effusion. There also may be  superimposed pneumonia. There is a masslike area of consolidation in the  right hilar region which has been seen on previous imaging studies  presumably related to treated lung cancer. The left lung and pleural  space are clear. The heart size is difficult to evaluate as the right  cardiac border is obscured.  There are chronic age-related changes  involving the bony thorax.       Impression:         1. Interval development of a moderate-sized right pleural effusion.  2. Worsening consolidation in the right lung particularly in the mid and  lower lung zone. This is likely due to a combination of compressive  atelectasis with superimposed pneumonia.  3. Masslike area of consolidation has been present the right hilar  region on previous imaging studies consistent with treated lung cancer.     Electronically Signed By-Kel Fair MD On:1/13/2021 2:19 PM  This report was finalized on [id number] by  Kel Fair MD.            Current Meds:   SCHEDULE  budesonide, 0.5 mg, Nebulization, BID - RT  [START ON 1/14/2021] cefepime, 2 g, Intravenous, Q8H  finasteride, 5 mg, Oral, Daily  furosemide, 40 mg, Intravenous, Q12H  guaiFENesin, 1,200 mg, Oral, Q12H  ipratropium-albuterol, 3 mL, Nebulization, Q4H - RT  methylPREDNISolone sodium succinate, 40 mg, Intravenous, Q8H  multivitamin with minerals, 1  tablet, Oral, Daily  rosuvastatin, 40 mg, Oral, Nightly  sodium chloride, 10 mL, Intravenous, Q12H  tamsulosin, 0.4 mg, Oral, Daily With Dinner  [START ON 1/14/2021] theophylline, 300 mg, Oral, Daily      Infusions  dilTIAZem, 5-15 mg/hr, Last Rate: 7.5 mg/hr (01/13/21 1725)      PRNs  •  acetaminophen **OR** acetaminophen **OR** acetaminophen  •  benzonatate  •  ipratropium-albuterol  •  magnesium sulfate **OR** magnesium sulfate in D5W 1g/100mL (PREMIX)  •  nitroglycerin  •  ondansetron **OR** ondansetron  •  potassium chloride  •  [COMPLETED] Insert peripheral IV **AND** sodium chloride  •  sodium chloride        Sanjay Gonsales MD  1/13/2021  17:48 EST      Much of this encounter note is an electronic transcription/translation of spoken language to printed text using Dragon Software.   Intact

## (undated) DEVICE — CATH DIAG IMPULSE PIG .056 6F 110CM

## (undated) DEVICE — CATH DIAG IMPULSE FL4 6F 100CM

## (undated) DEVICE — BAPTIST FLOYD BRONCHOSCOPY: Brand: MEDLINE INDUSTRIES, INC.

## (undated) DEVICE — PK TRY HEART CATH 50

## (undated) DEVICE — CATH DIAG IMPULSE FR4 6F 100CM

## (undated) DEVICE — PINNACLE INTRODUCER SHEATH: Brand: PINNACLE

## (undated) DEVICE — SKIN PREP TRAY W/CHG: Brand: MEDLINE INDUSTRIES, INC.

## (undated) DEVICE — GW DIAG EMERALD HEPCOAT MOVE JTIP STD .035 3MM 150CM